# Patient Record
Sex: MALE | Race: WHITE | NOT HISPANIC OR LATINO | Employment: OTHER | URBAN - METROPOLITAN AREA
[De-identification: names, ages, dates, MRNs, and addresses within clinical notes are randomized per-mention and may not be internally consistent; named-entity substitution may affect disease eponyms.]

---

## 2017-11-10 ENCOUNTER — HOSPITAL ENCOUNTER (OUTPATIENT)
Dept: CARDIOLOGY | Facility: HOSPITAL | Age: 72
Discharge: HOME | End: 2017-11-10
Attending: INTERNAL MEDICINE | Admitting: INTERNAL MEDICINE

## 2018-03-09 ENCOUNTER — HOSPITAL ENCOUNTER (OUTPATIENT)
Dept: NUCLEAR MEDICINE | Facility: HOSPITAL | Age: 73
Discharge: HOME | End: 2018-03-09
Attending: INTERNAL MEDICINE | Admitting: INTERNAL MEDICINE

## 2018-03-16 ENCOUNTER — HOSPITAL ENCOUNTER (OUTPATIENT)
Dept: OTHER | Facility: HOSPITAL | Age: 73
Discharge: HOME | End: 2018-03-16
Attending: INTERNAL MEDICINE | Admitting: INTERNAL MEDICINE

## 2018-04-26 ENCOUNTER — ON CAMPUS - OUTPATIENT (AMBULATORY)
Dept: URBAN - METROPOLITAN AREA HOSPITAL 2 | Facility: HOSPITAL | Age: 73
End: 2018-04-26
Payer: COMMERCIAL

## 2018-04-26 ENCOUNTER — OFFICE (AMBULATORY)
Dept: URBAN - METROPOLITAN AREA PATHOLOGY 4 | Facility: PATHOLOGY | Age: 73
End: 2018-04-26
Payer: COMMERCIAL

## 2018-04-26 ENCOUNTER — HOSPITAL ENCOUNTER (OUTPATIENT)
Dept: OTHER | Facility: HOSPITAL | Age: 73
Setting detail: SPECIMEN
Discharge: HOME | End: 2018-04-26
Attending: INTERNAL MEDICINE | Admitting: INTERNAL MEDICINE

## 2018-04-26 VITALS
SYSTOLIC BLOOD PRESSURE: 117 MMHG | OXYGEN SATURATION: 98 % | SYSTOLIC BLOOD PRESSURE: 115 MMHG | HEART RATE: 60 BPM | DIASTOLIC BLOOD PRESSURE: 64 MMHG | DIASTOLIC BLOOD PRESSURE: 46 MMHG | TEMPERATURE: 97.7 F | RESPIRATION RATE: 17 BRPM | OXYGEN SATURATION: 93 % | HEART RATE: 81 BPM | SYSTOLIC BLOOD PRESSURE: 77 MMHG | DIASTOLIC BLOOD PRESSURE: 59 MMHG | OXYGEN SATURATION: 99 % | SYSTOLIC BLOOD PRESSURE: 129 MMHG | SYSTOLIC BLOOD PRESSURE: 131 MMHG | SYSTOLIC BLOOD PRESSURE: 100 MMHG | DIASTOLIC BLOOD PRESSURE: 58 MMHG | DIASTOLIC BLOOD PRESSURE: 60 MMHG | RESPIRATION RATE: 16 BRPM | SYSTOLIC BLOOD PRESSURE: 128 MMHG | WEIGHT: 223 LBS | SYSTOLIC BLOOD PRESSURE: 107 MMHG | DIASTOLIC BLOOD PRESSURE: 48 MMHG | SYSTOLIC BLOOD PRESSURE: 90 MMHG | OXYGEN SATURATION: 97 % | DIASTOLIC BLOOD PRESSURE: 65 MMHG | SYSTOLIC BLOOD PRESSURE: 124 MMHG | SYSTOLIC BLOOD PRESSURE: 98 MMHG | HEART RATE: 78 BPM | DIASTOLIC BLOOD PRESSURE: 41 MMHG | DIASTOLIC BLOOD PRESSURE: 61 MMHG | OXYGEN SATURATION: 95 % | OXYGEN SATURATION: 96 % | HEIGHT: 68 IN | HEART RATE: 66 BPM | DIASTOLIC BLOOD PRESSURE: 72 MMHG | HEART RATE: 61 BPM

## 2018-04-26 DIAGNOSIS — D12.3 BENIGN NEOPLASM OF TRANSVERSE COLON: ICD-10-CM

## 2018-04-26 DIAGNOSIS — D12.8 BENIGN NEOPLASM OF RECTUM: ICD-10-CM

## 2018-04-26 DIAGNOSIS — D12.2 BENIGN NEOPLASM OF ASCENDING COLON: ICD-10-CM

## 2018-04-26 DIAGNOSIS — Z12.11 ENCOUNTER FOR SCREENING FOR MALIGNANT NEOPLASM OF COLON: ICD-10-CM

## 2018-04-26 DIAGNOSIS — K64.8 OTHER HEMORRHOIDS: ICD-10-CM

## 2018-04-26 DIAGNOSIS — D12.5 BENIGN NEOPLASM OF SIGMOID COLON: ICD-10-CM

## 2018-04-26 DIAGNOSIS — K62.1 RECTAL POLYP: ICD-10-CM

## 2018-04-26 DIAGNOSIS — K57.30 DIVERTICULOSIS OF LARGE INTESTINE WITHOUT PERFORATION OR ABS: ICD-10-CM

## 2018-04-26 LAB
GI HISTOLOGY: A. UNSPECIFIED: (no result)
GI HISTOLOGY: B. UNSPECIFIED: (no result)
GI HISTOLOGY: C. UNSPECIFIED: (no result)
GI HISTOLOGY: D. UNSPECIFIED: (no result)
GI HISTOLOGY: PDF REPORT: (no result)

## 2018-04-26 PROCEDURE — 88305 TISSUE EXAM BY PATHOLOGIST: CPT | Mod: 26 | Performed by: INTERNAL MEDICINE

## 2018-04-26 PROCEDURE — 45385 COLONOSCOPY W/LESION REMOVAL: CPT | Mod: PT | Performed by: INTERNAL MEDICINE

## 2018-04-26 RX ADMIN — PROPOFOL: 10 INJECTION, EMULSION INTRAVENOUS at 07:40

## 2018-05-23 ENCOUNTER — HOSPITAL ENCOUNTER (OUTPATIENT)
Dept: OTHER | Facility: HOSPITAL | Age: 73
Discharge: HOME | End: 2018-05-23
Attending: UROLOGY | Admitting: UROLOGY

## 2018-05-23 LAB
ANION GAP SERPL CALC-SCNC: 9.9 MMOL/L (ref 10–20)
BASOPHILS # BLD AUTO: 0.1 10*3/UL (ref 0–0.2)
BASOPHILS NFR BLD AUTO: 1 % (ref 0–2)
BUN SERPL-MCNC: 11 MG/DL (ref 8–20)
BUN/CREAT SERPL: 12.2 (ref 6.2–20.3)
CALCIUM SERPL-MCNC: 9.2 MG/DL (ref 8.9–10.3)
CHLORIDE SERPL-SCNC: 106 MMOL/L (ref 101–111)
CONV CO2: 25 MMOL/L (ref 22–32)
CREAT UR-MCNC: 0.9 MG/DL (ref 0.7–1.2)
DIFFERENTIAL METHOD BLD: (no result)
EOSINOPHIL # BLD AUTO: 0.2 10*3/UL (ref 0–0.3)
EOSINOPHIL # BLD AUTO: 2 % (ref 0–3)
ERYTHROCYTE [DISTWIDTH] IN BLOOD BY AUTOMATED COUNT: 13.2 % (ref 11.5–14.5)
GLUCOSE SERPL-MCNC: 104 MG/DL (ref 65–99)
HCT VFR BLD AUTO: 46.5 % (ref 40–54)
HGB BLD-MCNC: 15.7 G/DL (ref 14–18)
LYMPHOCYTES # BLD AUTO: 3.1 10*3/UL (ref 0.8–4.8)
LYMPHOCYTES NFR BLD AUTO: 30 % (ref 18–42)
MCH RBC QN AUTO: 32 PG (ref 26–32)
MCHC RBC AUTO-ENTMCNC: 33.8 G/DL (ref 32–36)
MCV RBC AUTO: 94.8 FL (ref 80–94)
MONOCYTES # BLD AUTO: 0.9 10*3/UL (ref 0.1–1.3)
MONOCYTES NFR BLD AUTO: 9 % (ref 2–11)
NEUTROPHILS # BLD AUTO: 6.2 10*3/UL (ref 2.3–8.6)
NEUTROPHILS NFR BLD AUTO: 58 % (ref 50–75)
NRBC BLD AUTO-RTO: 0 /100{WBCS}
NRBC/RBC NFR BLD MANUAL: 0 10*3/UL
PLATELET # BLD AUTO: 162 10*3/UL (ref 150–450)
PMV BLD AUTO: 8.7 FL (ref 7.4–10.4)
POTASSIUM SERPL-SCNC: 3.9 MMOL/L (ref 3.6–5.1)
RBC # BLD AUTO: 4.9 10*6/UL (ref 4.6–6)
SODIUM SERPL-SCNC: 137 MMOL/L (ref 136–144)
WBC # BLD AUTO: 10.4 10*3/UL (ref 4.5–11.5)

## 2018-08-09 ENCOUNTER — HOSPITAL ENCOUNTER (OUTPATIENT)
Dept: OTHER | Facility: HOSPITAL | Age: 73
Setting detail: SPECIMEN
Discharge: HOME | End: 2018-08-09
Attending: UROLOGY | Admitting: UROLOGY

## 2018-08-27 ENCOUNTER — HOSPITAL ENCOUNTER (OUTPATIENT)
Dept: CT IMAGING | Facility: HOSPITAL | Age: 73
Discharge: HOME | End: 2018-08-27
Attending: UROLOGY | Admitting: UROLOGY

## 2018-08-27 LAB — CREAT BLDA-MCNC: 1 MG/DL (ref 0.6–1.3)

## 2019-01-07 ENCOUNTER — HOSPITAL ENCOUNTER (OUTPATIENT)
Dept: OTHER | Facility: HOSPITAL | Age: 74
Setting detail: SPECIMEN
Discharge: HOME | End: 2019-01-07
Attending: UROLOGY | Admitting: UROLOGY

## 2019-06-27 ENCOUNTER — CLINICAL SUPPORT NO REQUIREMENTS (OUTPATIENT)
Dept: CARDIOLOGY | Facility: CLINIC | Age: 74
End: 2019-06-27

## 2019-06-27 DIAGNOSIS — Z95.0 PRESENCE OF CARDIAC PACEMAKER: ICD-10-CM

## 2019-06-27 DIAGNOSIS — I49.5 SSS (SICK SINUS SYNDROME) (HCC): Primary | ICD-10-CM

## 2019-06-27 PROCEDURE — 93294 REM INTERROG EVL PM/LDLS PM: CPT | Performed by: NURSE PRACTITIONER

## 2019-06-27 PROCEDURE — 93296 REM INTERROG EVL PM/IDS: CPT | Performed by: NURSE PRACTITIONER

## 2019-07-05 PROBLEM — I49.5 SSS (SICK SINUS SYNDROME): Status: ACTIVE | Noted: 2019-07-05

## 2019-07-05 PROBLEM — Z95.0 PRESENCE OF CARDIAC PACEMAKER: Status: ACTIVE | Noted: 2019-07-05

## 2019-07-05 NOTE — PROGRESS NOTES
REMOTE DEVICE INTERROGATION    Remote device interrogation is reviewed.     Device Company: [employer]    Battery Stable.  Time to AMPARO 7.5 years    Presenting EGM: Sinus rhythm    Arrhythmia Logbook Reviewed: No events    Device function appears Stable    Continue remote device interrogations every 3 months.

## 2019-07-31 RX ORDER — AMLODIPINE BESYLATE 10 MG/1
TABLET ORAL
Qty: 90 TABLET | Refills: 2 | Status: SHIPPED | OUTPATIENT
Start: 2019-07-31 | End: 2020-09-17 | Stop reason: SDUPTHER

## 2019-09-30 ENCOUNTER — CLINICAL SUPPORT NO REQUIREMENTS (OUTPATIENT)
Dept: CARDIOLOGY | Facility: CLINIC | Age: 74
End: 2019-09-30

## 2019-09-30 DIAGNOSIS — I49.5 SSS (SICK SINUS SYNDROME) (HCC): Primary | ICD-10-CM

## 2019-09-30 DIAGNOSIS — Z95.0 PRESENCE OF CARDIAC PACEMAKER: ICD-10-CM

## 2019-09-30 PROCEDURE — 93296 REM INTERROG EVL PM/IDS: CPT | Performed by: NURSE PRACTITIONER

## 2019-09-30 PROCEDURE — 93294 REM INTERROG EVL PM/LDLS PM: CPT | Performed by: NURSE PRACTITIONER

## 2019-10-28 NOTE — PROGRESS NOTES
REMOTE DEVICE INTERROGATION    Remote device interrogation is reviewed.     Device Company: [employer]    Battery Stable.  Time to AMPARO 0.5 years    Presenting EGM: A sensed V sensed    Arrhythmia Logbook Reviewed: No sustained events    Device function appears Stable    Continue remote device interrogations every 3 months.

## 2019-11-19 PROBLEM — E78.5 HYPERLIPIDEMIA: Status: ACTIVE | Noted: 2019-11-19

## 2019-11-19 PROBLEM — R01.1 HEART MURMUR: Status: ACTIVE | Noted: 2017-04-28

## 2019-11-19 PROBLEM — N52.9 IMPOTENCE OF ORGANIC ORIGIN: Status: ACTIVE | Noted: 2019-11-19

## 2019-11-19 PROBLEM — E78.2 MIXED HYPERLIPIDEMIA: Status: ACTIVE | Noted: 2019-11-19

## 2019-11-19 PROBLEM — I10 HYPERTENSION: Status: ACTIVE | Noted: 2019-11-19

## 2019-11-19 PROBLEM — I25.10 CORONARY ARTERY DISEASE: Status: ACTIVE | Noted: 2019-11-19

## 2019-11-19 RX ORDER — TAMSULOSIN HYDROCHLORIDE 0.4 MG/1
1 CAPSULE ORAL EVERY 24 HOURS
COMMUNITY
Start: 2017-06-07 | End: 2019-11-25

## 2019-11-19 RX ORDER — SIMVASTATIN 40 MG
40 TABLET ORAL DAILY
COMMUNITY
End: 2020-06-05

## 2019-11-19 RX ORDER — LOSARTAN POTASSIUM 25 MG/1
25 TABLET ORAL DAILY
COMMUNITY
End: 2019-11-25

## 2019-11-19 RX ORDER — MULTIVIT WITH MINERALS/LUTEIN
1 TABLET ORAL DAILY
COMMUNITY
Start: 2016-11-22 | End: 2019-11-25

## 2019-11-19 RX ORDER — ASPIRIN 81 MG/1
81 TABLET ORAL DAILY
COMMUNITY

## 2019-11-19 RX ORDER — GABAPENTIN 100 MG/1
100 CAPSULE ORAL 2 TIMES DAILY
COMMUNITY

## 2019-11-20 PROBLEM — I49.5 SSS (SICK SINUS SYNDROME) (HCC): Status: RESOLVED | Noted: 2019-07-05 | Resolved: 2019-11-20

## 2019-11-25 ENCOUNTER — OFFICE VISIT (OUTPATIENT)
Dept: CARDIOLOGY | Facility: CLINIC | Age: 74
End: 2019-11-25

## 2019-11-25 VITALS
WEIGHT: 224 LBS | SYSTOLIC BLOOD PRESSURE: 118 MMHG | BODY MASS INDEX: 38.24 KG/M2 | OXYGEN SATURATION: 98 % | HEART RATE: 78 BPM | DIASTOLIC BLOOD PRESSURE: 62 MMHG | HEIGHT: 64 IN

## 2019-11-25 DIAGNOSIS — I10 ESSENTIAL HYPERTENSION: ICD-10-CM

## 2019-11-25 DIAGNOSIS — I45.10 BUNDLE BRANCH BLOCK, RIGHT: ICD-10-CM

## 2019-11-25 DIAGNOSIS — T87.89: ICD-10-CM

## 2019-11-25 DIAGNOSIS — S45.292A: ICD-10-CM

## 2019-11-25 DIAGNOSIS — I35.0 AORTIC VALVE STENOSIS, ETIOLOGY OF CARDIAC VALVE DISEASE UNSPECIFIED: Primary | ICD-10-CM

## 2019-11-25 DIAGNOSIS — Z95.0 PRESENCE OF CARDIAC PACEMAKER: ICD-10-CM

## 2019-11-25 DIAGNOSIS — I25.10 CORONARY ARTERY DISEASE INVOLVING NATIVE CORONARY ARTERY OF NATIVE HEART WITHOUT ANGINA PECTORIS: ICD-10-CM

## 2019-11-25 PROCEDURE — 93000 ELECTROCARDIOGRAM COMPLETE: CPT | Performed by: INTERNAL MEDICINE

## 2019-11-25 PROCEDURE — 99213 OFFICE O/P EST LOW 20 MIN: CPT | Performed by: INTERNAL MEDICINE

## 2019-11-25 NOTE — PROGRESS NOTES
Cardiology Office Visit Note      Referring physician:  MD Sebastian    Reason For Followup: 6 month    HPI:  Navneet Lind is a 73 y.o. male presents FU  multivessel CABG in 1995: Had subsequent occlusion of saphenous vein graft to RCA but is still thought to have   well-preserved LV systolic function; right bundle branch block on ECG, HTN, hyperlipidemia, NATALIA with cpap and BS dual chamber PM   Implanted on  11/7/16.      Continues to report some ZULETA; unchanged.  Goes to the [facility] to swim 4 days weekly.     He denies chest pain, orthopnea, PND, palpitations,   lower extremity edema,  or  feelings of his  heart racing.          Past Medical History:   Diagnosis Date   • Aortic stenosis 9/29/2015    Per Echo 2017   • Benign essential HTN    • Bundle branch block, right 2/7/2013   • CAD (coronary artery disease), native coronary artery    • Coronary artery disease involving native coronary artery of native heart without angina pectoris 11/19/2019    CABG 1995; SVG to RCA is occluded, LIMA to LAD, SVG to diag of LAD, SVG to marginal of LCX   • Essential hypertension 11/19/2019   • Heart murmur    • Hyperlipidemia, mixed    • Mixed hyperlipidemia 11/19/2019   • Near syncope    • NATALIA (obstructive sleep apnea)    • Premature ventricular contractions 2/11/2014   • Presence of cardiac pacemaker 7/5/2019    BS dual chamber PM 11/7/2016   • Sick sinus syndrome (CMS/HCC)    • SSS (sick sinus syndrome) (CMS/HCC) 7/5/2019       Past Surgical History:   Procedure Laterality Date   • CARDIAC CATHETERIZATION  2018   • CARDIOVASCULAR STRESS TEST  2017   • CORONARY ARTERY BYPASS GRAFT  1995   • ECHO - CONVERTED  2017   • PACEMAKER IMPLANTATION  2016    bs         Current Outpatient Medications:   •  amLODIPine (NORVASC) 10 MG tablet, TAKE 1 TABLET EVERY DAY, Disp: 90 tablet, Rfl: 2  •  aspirin 81 MG EC tablet, Take 81 mg by mouth Daily., Disp: , Rfl:   •  gabapentin (NEURONTIN) 800 MG tablet, Take 800 mg by mouth Daily., Disp: ,  "Rfl:   •  simvastatin (ZOCOR) 40 MG tablet, Take 40 mg by mouth Daily., Disp: , Rfl:     Social History     Socioeconomic History   • Marital status:      Spouse name: Not on file   • Number of children: Not on file   • Years of education: Not on file   • Highest education level: Not on file   Tobacco Use   • Smoking status: Former Smoker       No family history on file.      Review of Systems   General: denies fever, chills, anorexia, weight loss  Eyes: denies blurring, diplopia  Ear/Nose/Throat: denies ear pain, nosebleeds, hoarseness  Cardiovascular: See HPI  Respiratory: denies excessive sputum, hemoptysis, wheezing  Gastrointestinal: denies nausea, vomiting, change in bowel habits, abdominal pain  Genitourinary: denies dysuria and hematuria  Musculoskeletal: denies back pain, joint pain, joint swelling, muscle cramps, weakness  Skin: denies rashes, itching, suspicious lesions  Neurologic: denies focal neuro deficits  Psychiatric: denies depression, anxiety  Endocrine: denies cold intolerance, heat intolerance  Hematologic/Lymphatic: denies abnormal bruising, bleeding  Allergic/Immunologic: denies urticaria or persistent infections      Objective     Visit Vitals  /62   Pulse 78   Ht 162.6 cm (64\")   Wt 102 kg (224 lb)   SpO2 98% Comment: room air   BMI 38.45 kg/m²           Physical Exam  General:     Obese, well developed,, in no acute distress.    Head:     normocephalic and atraumatic.    Eyes:    PERRL/EOM intact, conjunctiva and sclera clear with out nystagmus.    Neck:    no jvd or bruits  Chest Wall:    no deformities   Lungs:    clear bilaterally to auscultation with adequate global airflow   Heart:    non-displaced PMI; regular rate and rhythm, normal S1, S2 without murmurs, rubs, or gallops  Abdomen:  Soft, nontender without HSM  Msk:    no deformity; adequate R OM  Pulses:    pulses normal in all 4 extremities.    Extremities:    no clubbing, cyanosis, edema   Neurologic:    no focal " sensory or motor deficits  Skin:    intact without lesions or rashes.    Psych:    alert and cooperative; normal mood and affect; normal attention span and concentration.            ECG 12 Lead  Date/Time: 11/25/2019 11:12 AM  Performed by: ADRIANO Erazo MD  Authorized by: ADRIANO Erazo MD   Comparison: compared with previous ECG from 4/16/2019  Similar to previous ECG  Comments: Sinus rhythm; heart rate 78.  Marked right axis and right bundle branch block.  No change from previous tracing              Assessment:   1. Aortic valve stenosis, etiology of cardiac valve disease unspecified  -Remains well compensated, asymptomatic    2. Bundle branch block, right  Unchanged, asymptomatic    3. Coronary artery disease involving native coronary artery of native heart without angina pectoris  Remote multivessel CABG 1995; now well compensated and angina free    4. Essential hypertension  Well-regulated on current medications.    5. Presence of cardiac pacemaker  Satisfactory device site and functioning      Plan:  Continue current medication as listed reviewed in detail.  Encourage to continue regular progressive exercise and weight reduction.  Return to clinic 1 year    ADRIANO Erazo MD  11/30/2019 11:00 PM

## 2019-11-30 PROBLEM — T87.89: Status: RESOLVED | Noted: 2019-11-25 | Resolved: 2019-11-30

## 2020-01-02 ENCOUNTER — CLINICAL SUPPORT NO REQUIREMENTS (OUTPATIENT)
Dept: CARDIOLOGY | Facility: CLINIC | Age: 75
End: 2020-01-02

## 2020-01-02 DIAGNOSIS — Z95.0 PRESENCE OF CARDIAC PACEMAKER: ICD-10-CM

## 2020-01-02 DIAGNOSIS — I49.5 SICK SINUS SYNDROME (HCC): Primary | ICD-10-CM

## 2020-01-10 PROCEDURE — 93294 REM INTERROG EVL PM/LDLS PM: CPT | Performed by: NURSE PRACTITIONER

## 2020-01-10 PROCEDURE — 93296 REM INTERROG EVL PM/IDS: CPT | Performed by: NURSE PRACTITIONER

## 2020-01-10 NOTE — PROGRESS NOTES
REMOTE DEVICE INTERROGATION    Remote device interrogation is reviewed.     Device Company: [employer]    Battery Stable.  Time to AMPARO 7 years    Presenting EGM: A sensed V sense    Arrhythmia Logbook Reviewed: No sustained events    Device function appears Stable    Continue remote device interrogations every 3 months.

## 2020-02-27 ENCOUNTER — LAB REQUISITION (OUTPATIENT)
Dept: LAB | Facility: HOSPITAL | Age: 75
End: 2020-02-27

## 2020-02-27 DIAGNOSIS — K80.20 CALCULUS OF GALLBLADDER WITHOUT CHOLECYSTITIS WITHOUT OBSTRUCTION: ICD-10-CM

## 2020-02-27 PROCEDURE — 88304 TISSUE EXAM BY PATHOLOGIST: CPT | Performed by: SURGERY

## 2020-02-28 LAB
LAB AP CASE REPORT: NORMAL
PATH REPORT.FINAL DX SPEC: NORMAL
PATH REPORT.GROSS SPEC: NORMAL

## 2020-04-06 ENCOUNTER — LAB (OUTPATIENT)
Dept: LAB | Facility: HOSPITAL | Age: 75
End: 2020-04-06

## 2020-04-06 ENCOUNTER — TRANSCRIBE ORDERS (OUTPATIENT)
Dept: LAB | Facility: HOSPITAL | Age: 75
End: 2020-04-06

## 2020-04-06 ENCOUNTER — HOSPITAL ENCOUNTER (OUTPATIENT)
Dept: CARDIOLOGY | Facility: HOSPITAL | Age: 75
Discharge: HOME | End: 2020-04-06
Admitting: UROLOGY

## 2020-04-06 DIAGNOSIS — Z01.818 PRE-OP EXAMINATION: ICD-10-CM

## 2020-04-06 DIAGNOSIS — N13.8 ENLARGED PROSTATE WITH URINARY OBSTRUCTION: ICD-10-CM

## 2020-04-06 DIAGNOSIS — C67.9 MALIGNANT NEOPLASM OF URINARY BLADDER, UNSPECIFIED SITE (HCC): ICD-10-CM

## 2020-04-06 DIAGNOSIS — N40.1 ENLARGED PROSTATE WITH URINARY OBSTRUCTION: ICD-10-CM

## 2020-04-06 DIAGNOSIS — Z01.818 PRE-OP EXAMINATION: Primary | ICD-10-CM

## 2020-04-06 LAB
ANION GAP SERPL CALCULATED.3IONS-SCNC: 10.2 MMOL/L (ref 5–15)
BASOPHILS # BLD AUTO: 0.09 10*3/MM3 (ref 0–0.2)
BASOPHILS NFR BLD AUTO: 0.7 % (ref 0–1.5)
BUN BLD-MCNC: 10 MG/DL (ref 8–23)
BUN/CREAT SERPL: 10.5 (ref 7–25)
CALCIUM SPEC-SCNC: 8.9 MG/DL (ref 8.6–10.5)
CHLORIDE SERPL-SCNC: 101 MMOL/L (ref 98–107)
CO2 SERPL-SCNC: 25.8 MMOL/L (ref 22–29)
CREAT BLD-MCNC: 0.95 MG/DL (ref 0.76–1.27)
DEPRECATED RDW RBC AUTO: 43.5 FL (ref 37–54)
EOSINOPHIL # BLD AUTO: 0.36 10*3/MM3 (ref 0–0.4)
EOSINOPHIL NFR BLD AUTO: 2.8 % (ref 0.3–6.2)
ERYTHROCYTE [DISTWIDTH] IN BLOOD BY AUTOMATED COUNT: 12.7 % (ref 12.3–15.4)
GFR SERPL CREATININE-BSD FRML MDRD: 78 ML/MIN/1.73
GLUCOSE BLD-MCNC: 146 MG/DL (ref 65–99)
HCT VFR BLD AUTO: 43.5 % (ref 37.5–51)
HGB BLD-MCNC: 14.9 G/DL (ref 13–17.7)
IMM GRANULOCYTES # BLD AUTO: 0.04 10*3/MM3 (ref 0–0.05)
IMM GRANULOCYTES NFR BLD AUTO: 0.3 % (ref 0–0.5)
LYMPHOCYTES # BLD AUTO: 3.61 10*3/MM3 (ref 0.7–3.1)
LYMPHOCYTES NFR BLD AUTO: 28.5 % (ref 19.6–45.3)
MCH RBC QN AUTO: 32 PG (ref 26.6–33)
MCHC RBC AUTO-ENTMCNC: 34.3 G/DL (ref 31.5–35.7)
MCV RBC AUTO: 93.3 FL (ref 79–97)
MONOCYTES # BLD AUTO: 0.76 10*3/MM3 (ref 0.1–0.9)
MONOCYTES NFR BLD AUTO: 6 % (ref 5–12)
NEUTROPHILS # BLD AUTO: 7.82 10*3/MM3 (ref 1.7–7)
NEUTROPHILS NFR BLD AUTO: 61.7 % (ref 42.7–76)
NRBC BLD AUTO-RTO: 0 /100 WBC (ref 0–0.2)
PLATELET # BLD AUTO: 190 10*3/MM3 (ref 140–450)
PMV BLD AUTO: 10.9 FL (ref 6–12)
POTASSIUM BLD-SCNC: 4.2 MMOL/L (ref 3.5–5.2)
RBC # BLD AUTO: 4.66 10*6/MM3 (ref 4.14–5.8)
SODIUM BLD-SCNC: 137 MMOL/L (ref 136–145)
WBC NRBC COR # BLD: 12.68 10*3/MM3 (ref 3.4–10.8)

## 2020-04-06 PROCEDURE — 36415 COLL VENOUS BLD VENIPUNCTURE: CPT

## 2020-04-06 PROCEDURE — 85025 COMPLETE CBC W/AUTO DIFF WBC: CPT

## 2020-04-06 PROCEDURE — 80048 BASIC METABOLIC PNL TOTAL CA: CPT

## 2020-04-06 PROCEDURE — 93005 ELECTROCARDIOGRAM TRACING: CPT | Performed by: UROLOGY

## 2020-04-13 ENCOUNTER — LAB REQUISITION (OUTPATIENT)
Dept: LAB | Facility: HOSPITAL | Age: 75
End: 2020-04-13

## 2020-04-13 DIAGNOSIS — R33.8 OTHER RETENTION OF URINE: ICD-10-CM

## 2020-04-13 DIAGNOSIS — N40.1 BENIGN PROSTATIC HYPERPLASIA WITH LOWER URINARY TRACT SYMPTOMS: ICD-10-CM

## 2020-04-13 DIAGNOSIS — C67.0 MALIGNANT NEOPLASM OF TRIGONE OF BLADDER (HCC): ICD-10-CM

## 2020-04-13 PROCEDURE — 88305 TISSUE EXAM BY PATHOLOGIST: CPT | Performed by: UROLOGY

## 2020-04-13 PROCEDURE — 88307 TISSUE EXAM BY PATHOLOGIST: CPT | Performed by: UROLOGY

## 2020-04-14 ENCOUNTER — CLINICAL SUPPORT NO REQUIREMENTS (OUTPATIENT)
Dept: CARDIOLOGY | Facility: CLINIC | Age: 75
End: 2020-04-14

## 2020-04-14 ENCOUNTER — LAB REQUISITION (OUTPATIENT)
Dept: LAB | Facility: HOSPITAL | Age: 75
End: 2020-04-14

## 2020-04-14 DIAGNOSIS — I25.10 ATHEROSCLEROTIC HEART DISEASE OF NATIVE CORONARY ARTERY WITHOUT ANGINA PECTORIS: ICD-10-CM

## 2020-04-14 DIAGNOSIS — Z95.0 PRESENCE OF CARDIAC PACEMAKER: ICD-10-CM

## 2020-04-14 DIAGNOSIS — C67.0 MALIGNANT NEOPLASM OF TRIGONE OF BLADDER (HCC): ICD-10-CM

## 2020-04-14 DIAGNOSIS — N40.1 BENIGN PROSTATIC HYPERPLASIA WITH LOWER URINARY TRACT SYMPTOMS: ICD-10-CM

## 2020-04-14 DIAGNOSIS — E78.5 HYPERLIPIDEMIA, UNSPECIFIED: ICD-10-CM

## 2020-04-14 DIAGNOSIS — R33.8 OTHER RETENTION OF URINE: ICD-10-CM

## 2020-04-14 DIAGNOSIS — C67.9 MALIGNANT NEOPLASM OF BLADDER, UNSPECIFIED (HCC): ICD-10-CM

## 2020-04-14 DIAGNOSIS — I49.5 SICK SINUS SYNDROME (HCC): Primary | ICD-10-CM

## 2020-04-14 LAB
ANION GAP SERPL CALCULATED.3IONS-SCNC: 12 MMOL/L (ref 5–15)
BASOPHILS # BLD AUTO: 0.1 10*3/MM3 (ref 0–0.2)
BASOPHILS NFR BLD AUTO: 0.5 % (ref 0–1.5)
BUN BLD-MCNC: 16 MG/DL (ref 8–23)
BUN/CREAT SERPL: 8 (ref 7–25)
CALCIUM SPEC-SCNC: 8.4 MG/DL (ref 8.6–10.5)
CHLORIDE SERPL-SCNC: 102 MMOL/L (ref 98–107)
CO2 SERPL-SCNC: 19 MMOL/L (ref 22–29)
CREAT BLD-MCNC: 1.99 MG/DL (ref 0.76–1.27)
DEPRECATED RDW RBC AUTO: 46.4 FL (ref 37–54)
EOSINOPHIL # BLD AUTO: 0.1 10*3/MM3 (ref 0–0.4)
EOSINOPHIL NFR BLD AUTO: 0.6 % (ref 0.3–6.2)
ERYTHROCYTE [DISTWIDTH] IN BLOOD BY AUTOMATED COUNT: 13.8 % (ref 12.3–15.4)
GFR SERPL CREATININE-BSD FRML MDRD: 33 ML/MIN/1.73
GLUCOSE BLD-MCNC: 190 MG/DL (ref 65–99)
HCT VFR BLD AUTO: 41 % (ref 37.5–51)
HGB BLD-MCNC: 13.9 G/DL (ref 13–17.7)
LAB AP CASE REPORT: NORMAL
LYMPHOCYTES # BLD AUTO: 0.7 10*3/MM3 (ref 0.7–3.1)
LYMPHOCYTES NFR BLD AUTO: 2.6 % (ref 19.6–45.3)
MCH RBC QN AUTO: 31.8 PG (ref 26.6–33)
MCHC RBC AUTO-ENTMCNC: 33.9 G/DL (ref 31.5–35.7)
MCV RBC AUTO: 93.9 FL (ref 79–97)
MONOCYTES # BLD AUTO: 1.5 10*3/MM3 (ref 0.1–0.9)
MONOCYTES NFR BLD AUTO: 5.9 % (ref 5–12)
NEUTROPHILS # BLD AUTO: 23 10*3/MM3 (ref 1.7–7)
NEUTROPHILS NFR BLD AUTO: 90.4 % (ref 42.7–76)
NRBC BLD AUTO-RTO: 0 /100 WBC (ref 0–0.2)
PATH REPORT.FINAL DX SPEC: NORMAL
PATH REPORT.GROSS SPEC: NORMAL
PLATELET # BLD AUTO: 222 10*3/MM3 (ref 140–450)
PMV BLD AUTO: 8.8 FL (ref 6–12)
POTASSIUM BLD-SCNC: 5.1 MMOL/L (ref 3.5–5.2)
RBC # BLD AUTO: 4.37 10*6/MM3 (ref 4.14–5.8)
SODIUM BLD-SCNC: 133 MMOL/L (ref 136–145)
WBC NRBC COR # BLD: 25.5 10*3/MM3 (ref 3.4–10.8)

## 2020-04-14 PROCEDURE — 93294 REM INTERROG EVL PM/LDLS PM: CPT | Performed by: NURSE PRACTITIONER

## 2020-04-14 PROCEDURE — 80048 BASIC METABOLIC PNL TOTAL CA: CPT | Performed by: UROLOGY

## 2020-04-14 PROCEDURE — 85025 COMPLETE CBC W/AUTO DIFF WBC: CPT | Performed by: UROLOGY

## 2020-04-14 PROCEDURE — 93296 REM INTERROG EVL PM/IDS: CPT | Performed by: NURSE PRACTITIONER

## 2020-04-14 NOTE — PROGRESS NOTES
REMOTE DEVICE INTERROGATION    Received and reviewed on:   4/14/2020    Remote device interrogation is reviewed.     Device Company: [employer]    Battery Stable.  Time to AMPARO 7 years    Presenting EGM: A sensed V sense    Arrhythmia Logbook Reviewed: Nonsustained ventricular tachycardia short episode noted 12/26/2019.    Device function appears Stable    Continue remote device interrogations every 3 months.

## 2020-05-06 PROCEDURE — 93010 ELECTROCARDIOGRAM REPORT: CPT | Performed by: INTERNAL MEDICINE

## 2020-06-05 RX ORDER — SIMVASTATIN 40 MG
TABLET ORAL
Qty: 90 TABLET | Refills: 0 | Status: SHIPPED | OUTPATIENT
Start: 2020-06-05 | End: 2020-08-27

## 2020-06-09 ENCOUNTER — OFFICE VISIT (OUTPATIENT)
Dept: SLEEP MEDICINE | Facility: CLINIC | Age: 75
End: 2020-06-09

## 2020-06-09 VITALS
DIASTOLIC BLOOD PRESSURE: 69 MMHG | OXYGEN SATURATION: 96 % | HEIGHT: 68 IN | WEIGHT: 210 LBS | SYSTOLIC BLOOD PRESSURE: 118 MMHG | HEART RATE: 62 BPM | BODY MASS INDEX: 31.83 KG/M2

## 2020-06-09 DIAGNOSIS — G47.33 OSA ON CPAP: Primary | ICD-10-CM

## 2020-06-09 DIAGNOSIS — I10 ESSENTIAL HYPERTENSION: ICD-10-CM

## 2020-06-09 DIAGNOSIS — Z99.89 OSA ON CPAP: Primary | ICD-10-CM

## 2020-06-09 DIAGNOSIS — E66.09 CLASS 1 OBESITY DUE TO EXCESS CALORIES WITHOUT SERIOUS COMORBIDITY WITH BODY MASS INDEX (BMI) OF 31.0 TO 31.9 IN ADULT: ICD-10-CM

## 2020-06-09 PROCEDURE — 99204 OFFICE O/P NEW MOD 45 MIN: CPT | Performed by: INTERNAL MEDICINE

## 2020-06-09 PROCEDURE — G0463 HOSPITAL OUTPT CLINIC VISIT: HCPCS

## 2020-06-09 RX ORDER — GABAPENTIN 800 MG/1
TABLET ORAL
COMMUNITY
Start: 2020-04-27 | End: 2020-06-09 | Stop reason: SDUPTHER

## 2020-06-09 NOTE — PROGRESS NOTES
[facility]  [address] [place] [address]  Phone   Fax       Navneet Lind  1945  74 y.o.  male      PCP:Uri Cortes MD    Type of service: Initial New Patient Office Visit  Date of service: 6/9/2020    Chief Complaint   Patient presents with   • Sleep Apnea   • Follow-up       History of present illness;  Navneet Lind is a new patient for me and was seen today for sleep related problems.  Patient has a history of sleep apnea was tested at [facility] in 2013.  I have reviewed his polysomnography and the titration study.  He has sleep apnea with AHI of 11.6 and he was given a CPAP of 12 cm.  Since then he is using the CPAP on a regular basis but has not seen him physician.    Today patient reports that he is using the machine on a regular basis and feels that it has helped him.  He does not use the humidifier, but does not have any problems    Patient gives the following sleep history.  Sleep schedule:  Bedtime: 9 PM  Wake time: 6 AM  Normally takes about few minutes to fall asleep  Average hours of sleep 7-8  Number of naps per day no  When patient wakes up still feels tired and not rested  Snoring resolved    I have reviewed the smartcard download today  Compliance 98% in the last 1 year  Average use about 6 hours and 17 minutes per night  More than 4 hours usage per night is 89%  Residual AHI 0.6/h, less than 5 is normal  CPAP 12 cm  Masks type full facemask  [facility]      Past Medical History:   Diagnosis Date   • Aortic stenosis 9/29/2015    Per Echo 2017   • Benign essential HTN    • Bundle branch block, right 2/7/2013   • CAD (coronary artery disease), native coronary artery    • Coronary artery disease involving native coronary artery of native heart without angina pectoris 11/19/2019    CABG 1995; SVG to RCA is occluded, LIMA to LAD, SVG to diag of LAD, SVG to marginal of LCX   • Essential hypertension  "11/19/2019   • Heart murmur    • Hyperlipidemia, mixed    • Mixed hyperlipidemia 11/19/2019   • Near syncope    • NATALIA (obstructive sleep apnea)     AHI 11.6/h   • Premature ventricular contractions 2/11/2014   • Presence of cardiac pacemaker 7/5/2019    BS dual chamber PM 11/7/2016   • Sick sinus syndrome (CMS/HCC)    • SSS (sick sinus syndrome) (CMS/HCC) 7/5/2019       Social history:  Shift work: no  Tobacco use: Ex-smoker  Alcohol use: No  Caffeinated drinks: 1-2  Over-the-counter sleeping aid and medications: No  Narcotic medications: No    Family Hx  Family history of sleep apnea not applicable  History reviewed. No pertinent family history.    Medications: reviewed    Current Outpatient Medications:   •  amLODIPine (NORVASC) 10 MG tablet, TAKE 1 TABLET EVERY DAY, Disp: 90 tablet, Rfl: 2  •  aspirin 81 MG EC tablet, Take 81 mg by mouth Daily., Disp: , Rfl:   •  gabapentin (NEURONTIN) 800 MG tablet, Take 800 mg by mouth Daily., Disp: , Rfl:   •  Mirabegron ER (Myrbetriq) 25 MG tablet sustained-release 24 hour 24 hr tablet, Take 25 mg by mouth Daily., Disp: , Rfl:   •  simvastatin (ZOCOR) 40 MG tablet, TAKE 1 TABLET BY MOUTH EVERY DAY, Disp: 90 tablet, Rfl: 0  •  gabapentin (NEURONTIN) 800 MG tablet, TAKE 1 TABLET BY MOUTH TWICE A DAY, Disp: , Rfl:     Review of systems:  [employer] Sleepiness Scale: Total score: 6   Negative for shortness of breath, cough, wheezing, chest pain, nausea and vomiting, palpitation, swelling of feet:    Morning headaches: No  Awaken with sore throat or dry mouth : No  Leg jerking at night: No  Crawly feeling/urge sensation to move in the legs: No  Teeth grinding: No  Sleepwalking, nightmares, muscle weakness with laughing or anger,sleep paralysis: No  Nasal Congestion: No  Nocturia (how many times/night): 1  Memory Problem: No  Weight change, no    Physical exam:  Vitals:    06/09/20 0900   BP: 118/69   Pulse: 62   SpO2: 96%   Weight: 95.3 kg (210 lb)   Height: 172.7 cm (68\")    Body " mass index is 31.93 kg/m². Neck Circumference: 18 inches  HEENT: Head is atraumatic, normocephalic  Eyes: pupils are round equal and reacting to light and accommodation, conjunctiva normal  Nose: no nasal septal defects or deviation and the nasal passages are clear, no nasal polyps,  Throat: tonsils are not enlarged, tongue normal size, oral airway Mallampati class 3  NECK: No lymphadenopathy, trachea is in the midline, thyroid not enlarged  RESPIRATORY SYSTEM: Breath sounds are equal on both sides, there are no wheezes or crackles  CARDIOVASULAR SYSTEM: Heart sounds are regular rhythm and rl rate, there are no murmurs or thrills  ABDOMEN: Soft, no hepatosplenomegaly, no evidence of ascites  EXTREMITES: No cyanosis, clubbing or edema   NEUROLOGICAL SYSTEM: Oriented x 3, no gross motor defects, gait normal    Medical records and labs reviewed in [facility] reviewed    Assessment and plan:  Obstructive sleep apnea, patient is using the device with good compliance for treatment of sleep apnea.  I have reviewed the smart card down load and discussed with the patient the download data and encouarged the patient to continue to use the device. The residual AHI is acceptable.  The device is benefiting the patient and it is medically necessary.  The patient is also instructed to get the supplies from the [employer] company and see me in 1 year for follow-up.  The prescription for supplies has been sent to the [employer] company.   · Obesity, patient's BMI is Body mass index is 31.93 kg/m².. I have discussed the relationship between weight and sleep apnea. Weight reduction is encouraged.  I will also discussed with the patient diet and exercise.  · I have sent a prescription to [employer] for supplies  · Return to clinic in 1 year for follow-up        Cristina Wolfe MD, [facility]  Sleep Medicine.(Board-certified)  [facility]  Medical Director for [facility]  6/9/2020

## 2020-07-20 ENCOUNTER — CLINICAL SUPPORT NO REQUIREMENTS (OUTPATIENT)
Dept: CARDIOLOGY | Facility: CLINIC | Age: 75
End: 2020-07-20

## 2020-07-20 DIAGNOSIS — Z95.0 PRESENCE OF CARDIAC PACEMAKER: ICD-10-CM

## 2020-07-20 DIAGNOSIS — I49.5 SICK SINUS SYNDROME (HCC): Primary | ICD-10-CM

## 2020-07-20 PROCEDURE — 93294 REM INTERROG EVL PM/LDLS PM: CPT | Performed by: NURSE PRACTITIONER

## 2020-07-20 PROCEDURE — 93296 REM INTERROG EVL PM/IDS: CPT | Performed by: NURSE PRACTITIONER

## 2020-07-21 NOTE — PROGRESS NOTES
REMOTE DEVICE INTERROGATION    Received and reviewed on:   7/20/2020    Remote device interrogation is reviewed.     Device Company: [employer]    Battery Stable.  Time to AMPARO 6.5 years    Presenting EGM: A sensed V sensed    Arrhythmia Logbook Reviewed:  no sustained events last 3 months    Device function appears Stable    Continue remote device interrogations every 3 months.

## 2020-08-27 RX ORDER — SIMVASTATIN 40 MG
TABLET ORAL
Qty: 90 TABLET | Refills: 0 | Status: SHIPPED | OUTPATIENT
Start: 2020-08-27 | End: 2020-11-23

## 2020-09-17 RX ORDER — AMLODIPINE BESYLATE 10 MG/1
10 TABLET ORAL DAILY
Qty: 90 TABLET | Refills: 1 | Status: SHIPPED | OUTPATIENT
Start: 2020-09-17 | End: 2020-11-23

## 2020-10-06 ENCOUNTER — TELEPHONE (OUTPATIENT)
Dept: CARDIOLOGY | Facility: CLINIC | Age: 75
End: 2020-10-06

## 2020-10-06 NOTE — TELEPHONE ENCOUNTER
Dr li stopped his aspirin he had blood in his urine and he's going to do a procedure he will resume aspirin after surgery

## 2020-10-21 PROCEDURE — 88305 TISSUE EXAM BY PATHOLOGIST: CPT | Performed by: UROLOGY

## 2020-10-22 ENCOUNTER — LAB REQUISITION (OUTPATIENT)
Dept: LAB | Facility: HOSPITAL | Age: 75
End: 2020-10-22

## 2020-10-22 DIAGNOSIS — N20.0 CALCULUS OF KIDNEY: ICD-10-CM

## 2020-10-22 DIAGNOSIS — R31.0 GROSS HEMATURIA: ICD-10-CM

## 2020-10-23 LAB
LAB AP CASE REPORT: NORMAL
LAB AP DIAGNOSIS COMMENT: NORMAL
PATH REPORT.FINAL DX SPEC: NORMAL
PATH REPORT.GROSS SPEC: NORMAL

## 2020-10-28 ENCOUNTER — OFFICE VISIT (OUTPATIENT)
Dept: CARDIOLOGY | Facility: CLINIC | Age: 75
End: 2020-10-28

## 2020-10-28 VITALS
WEIGHT: 203 LBS | OXYGEN SATURATION: 99 % | DIASTOLIC BLOOD PRESSURE: 74 MMHG | BODY MASS INDEX: 30.77 KG/M2 | HEART RATE: 85 BPM | SYSTOLIC BLOOD PRESSURE: 122 MMHG | HEIGHT: 68 IN

## 2020-10-28 DIAGNOSIS — N28.89 LEFT RENAL MASS: ICD-10-CM

## 2020-10-28 DIAGNOSIS — I10 ESSENTIAL HYPERTENSION: ICD-10-CM

## 2020-10-28 DIAGNOSIS — I25.10 CORONARY ARTERY DISEASE INVOLVING NATIVE CORONARY ARTERY OF NATIVE HEART WITHOUT ANGINA PECTORIS: ICD-10-CM

## 2020-10-28 DIAGNOSIS — I35.1 AORTIC VALVE INSUFFICIENCY, ETIOLOGY OF CARDIAC VALVE DISEASE UNSPECIFIED: ICD-10-CM

## 2020-10-28 DIAGNOSIS — I49.5 SICK SINUS SYNDROME (HCC): ICD-10-CM

## 2020-10-28 DIAGNOSIS — Z01.810 PREOP CARDIOVASCULAR EXAM: Primary | ICD-10-CM

## 2020-10-28 PROCEDURE — 99214 OFFICE O/P EST MOD 30 MIN: CPT | Performed by: NURSE PRACTITIONER

## 2020-10-28 PROCEDURE — 93000 ELECTROCARDIOGRAM COMPLETE: CPT | Performed by: NURSE PRACTITIONER

## 2020-10-28 RX ORDER — HYDROCODONE BITARTRATE AND ACETAMINOPHEN 7.5; 325 MG/1; MG/1
1 TABLET ORAL EVERY 6 HOURS PRN
COMMUNITY
Start: 2020-10-22 | End: 2020-11-23

## 2020-10-29 PROBLEM — Z01.810 PREOP CARDIOVASCULAR EXAM: Status: ACTIVE | Noted: 2020-10-29

## 2020-10-29 PROBLEM — N28.89 LEFT RENAL MASS: Status: ACTIVE | Noted: 2020-10-29

## 2020-10-29 PROBLEM — I35.1 AORTIC VALVE REGURGITATION: Status: ACTIVE | Noted: 2020-10-29

## 2020-10-30 ENCOUNTER — LAB (OUTPATIENT)
Dept: LAB | Facility: HOSPITAL | Age: 75
End: 2020-10-30

## 2020-10-30 ENCOUNTER — HOSPITAL ENCOUNTER (OUTPATIENT)
Dept: CARDIOLOGY | Facility: HOSPITAL | Age: 75
Discharge: HOME | End: 2020-10-30

## 2020-10-30 DIAGNOSIS — Z01.810 PREOP CARDIOVASCULAR EXAM: ICD-10-CM

## 2020-10-30 DIAGNOSIS — I35.1 AORTIC VALVE INSUFFICIENCY, ETIOLOGY OF CARDIAC VALVE DISEASE UNSPECIFIED: ICD-10-CM

## 2020-10-30 LAB
ABO GROUP BLD: NORMAL
APTT PPP: 26.7 SECONDS (ref 24–31)
BLD GP AB SCN SERPL QL: NEGATIVE
DEPRECATED RDW RBC AUTO: 41.4 FL (ref 37–54)
ERYTHROCYTE [DISTWIDTH] IN BLOOD BY AUTOMATED COUNT: 12.6 % (ref 12.3–15.4)
HCT VFR BLD AUTO: 44.5 % (ref 37.5–51)
HGB BLD-MCNC: 15.5 G/DL (ref 13–17.7)
INR PPP: 1.03 (ref 0.93–1.1)
MCH RBC QN AUTO: 31.7 PG (ref 26.6–33)
MCHC RBC AUTO-ENTMCNC: 34.8 G/DL (ref 31.5–35.7)
MCV RBC AUTO: 91 FL (ref 79–97)
PLATELET # BLD AUTO: 299 10*3/MM3 (ref 140–450)
PMV BLD AUTO: 11.1 FL (ref 6–12)
PROTHROMBIN TIME: 11.3 SECONDS (ref 9.6–11.7)
RBC # BLD AUTO: 4.89 10*6/MM3 (ref 4.14–5.8)
RH BLD: POSITIVE
T&S EXPIRATION DATE: NORMAL
WBC # BLD AUTO: 13.3 10*3/MM3 (ref 3.4–10.8)

## 2020-10-30 PROCEDURE — 93010 ELECTROCARDIOGRAM REPORT: CPT | Performed by: INTERNAL MEDICINE

## 2020-10-30 PROCEDURE — 86901 BLOOD TYPING SEROLOGIC RH(D): CPT

## 2020-10-30 PROCEDURE — 93306 TTE W/DOPPLER COMPLETE: CPT

## 2020-10-30 PROCEDURE — 93005 ELECTROCARDIOGRAM TRACING: CPT | Performed by: UROLOGY

## 2020-10-30 PROCEDURE — 86900 BLOOD TYPING SEROLOGIC ABO: CPT | Performed by: UROLOGY

## 2020-10-30 PROCEDURE — 85610 PROTHROMBIN TIME: CPT

## 2020-10-30 PROCEDURE — 86900 BLOOD TYPING SEROLOGIC ABO: CPT

## 2020-10-30 PROCEDURE — U0004 COV-19 TEST NON-CDC HGH THRU: HCPCS

## 2020-10-30 PROCEDURE — 86901 BLOOD TYPING SEROLOGIC RH(D): CPT | Performed by: UROLOGY

## 2020-10-30 PROCEDURE — C9803 HOPD COVID-19 SPEC COLLECT: HCPCS

## 2020-10-30 PROCEDURE — 93306 TTE W/DOPPLER COMPLETE: CPT | Performed by: INTERNAL MEDICINE

## 2020-10-30 PROCEDURE — 86850 RBC ANTIBODY SCREEN: CPT | Performed by: UROLOGY

## 2020-10-30 PROCEDURE — 85027 COMPLETE CBC AUTOMATED: CPT

## 2020-10-30 PROCEDURE — 85730 THROMBOPLASTIN TIME PARTIAL: CPT

## 2020-10-31 LAB
QT INTERVAL: 423 MS
SARS-COV-2 RNA RESP QL NAA+PROBE: NOT DETECTED

## 2020-11-02 ENCOUNTER — HOSPITAL ENCOUNTER (INPATIENT)
Facility: HOSPITAL | Age: 75
LOS: 7 days | Discharge: HOME | End: 2020-11-09
Attending: UROLOGY | Admitting: UROLOGY

## 2020-11-02 ENCOUNTER — ANESTHESIA EVENT (OUTPATIENT)
Dept: PERIOP | Facility: HOSPITAL | Age: 75
End: 2020-11-02

## 2020-11-02 ENCOUNTER — ANESTHESIA (OUTPATIENT)
Dept: PERIOP | Facility: HOSPITAL | Age: 75
End: 2020-11-02

## 2020-11-02 DIAGNOSIS — N28.89 LEFT RENAL MASS: ICD-10-CM

## 2020-11-02 DIAGNOSIS — E78.2 MIXED HYPERLIPIDEMIA: ICD-10-CM

## 2020-11-02 DIAGNOSIS — N18.31 STAGE 3A CHRONIC KIDNEY DISEASE (HCC): Primary | ICD-10-CM

## 2020-11-02 DIAGNOSIS — I10 ESSENTIAL HYPERTENSION: ICD-10-CM

## 2020-11-02 PROCEDURE — C2617 STENT, NON-COR, TEM W/O DEL: HCPCS | Performed by: UROLOGY

## 2020-11-02 PROCEDURE — 0T760DZ DILATION OF RIGHT URETER WITH INTRALUMINAL DEVICE, OPEN APPROACH: ICD-10-PCS | Performed by: UROLOGY

## 2020-11-02 PROCEDURE — 25010000002 DEXAMETHASONE PER 1 MG: Performed by: ANESTHESIOLOGY

## 2020-11-02 PROCEDURE — C1769 GUIDE WIRE: HCPCS | Performed by: UROLOGY

## 2020-11-02 PROCEDURE — 25010000002 ONDANSETRON PER 1 MG: Performed by: ANESTHESIOLOGY

## 2020-11-02 PROCEDURE — 25010000002 HYDROMORPHONE PER 4 MG: Performed by: UROLOGY

## 2020-11-02 PROCEDURE — 25010000002 HYDROMORPHONE PER 4 MG: Performed by: ANESTHESIOLOGY

## 2020-11-02 PROCEDURE — 0TT70ZZ RESECTION OF LEFT URETER, OPEN APPROACH: ICD-10-PCS | Performed by: UROLOGY

## 2020-11-02 PROCEDURE — 0TT10ZZ RESECTION OF LEFT KIDNEY, OPEN APPROACH: ICD-10-PCS | Performed by: UROLOGY

## 2020-11-02 PROCEDURE — 25010000002 ONDANSETRON PER 1 MG: Performed by: UROLOGY

## 2020-11-02 PROCEDURE — 25010000002 FENTANYL CITRATE (PF) 100 MCG/2ML SOLUTION: Performed by: ANESTHESIOLOGY

## 2020-11-02 PROCEDURE — 88307 TISSUE EXAM BY PATHOLOGIST: CPT | Performed by: UROLOGY

## 2020-11-02 PROCEDURE — 25010000002 CEFAZOLIN PER 500 MG: Performed by: UROLOGY

## 2020-11-02 DEVICE — CLIP LIGAT VASC HORIZON TI MD/LG GRN 6CT: Type: IMPLANTABLE DEVICE | Site: ABDOMEN | Status: FUNCTIONAL

## 2020-11-02 DEVICE — CLIP LIGAT VASC HORIZON TI LG ORNG 6CT: Type: IMPLANTABLE DEVICE | Site: ABDOMEN | Status: FUNCTIONAL

## 2020-11-02 DEVICE — ENDOPATH ETS45 2.5MM RELOADS (VASCULAR/THIN)
Type: IMPLANTABLE DEVICE | Site: ABDOMEN | Status: FUNCTIONAL
Brand: ENDOPATH

## 2020-11-02 RX ORDER — NALOXONE HCL 0.4 MG/ML
0.1 VIAL (ML) INJECTION
Status: DISCONTINUED | OUTPATIENT
Start: 2020-11-02 | End: 2020-11-09 | Stop reason: HOSPADM

## 2020-11-02 RX ORDER — PHENYLEPHRINE HCL IN 0.9% NACL 0.5 MG/5ML
SYRINGE (ML) INTRAVENOUS AS NEEDED
Status: DISCONTINUED | OUTPATIENT
Start: 2020-11-02 | End: 2020-11-02 | Stop reason: SURG

## 2020-11-02 RX ORDER — ACETAMINOPHEN 650 MG/1
650 SUPPOSITORY RECTAL EVERY 4 HOURS PRN
Status: DISCONTINUED | OUTPATIENT
Start: 2020-11-02 | End: 2020-11-09 | Stop reason: HOSPADM

## 2020-11-02 RX ORDER — NICARDIPINE HYDROCHLORIDE 2.5 MG/ML
INJECTION INTRAVENOUS AS NEEDED
Status: DISCONTINUED | OUTPATIENT
Start: 2020-11-02 | End: 2020-11-02 | Stop reason: SURG

## 2020-11-02 RX ORDER — MEPERIDINE HYDROCHLORIDE 25 MG/ML
12.5 INJECTION INTRAMUSCULAR; INTRAVENOUS; SUBCUTANEOUS
Status: DISCONTINUED | OUTPATIENT
Start: 2020-11-02 | End: 2020-11-02 | Stop reason: HOSPADM

## 2020-11-02 RX ORDER — ROCURONIUM BROMIDE 10 MG/ML
INJECTION, SOLUTION INTRAVENOUS AS NEEDED
Status: DISCONTINUED | OUTPATIENT
Start: 2020-11-02 | End: 2020-11-02 | Stop reason: SURG

## 2020-11-02 RX ORDER — HYDROMORPHONE HCL 110MG/55ML
PATIENT CONTROLLED ANALGESIA SYRINGE INTRAVENOUS AS NEEDED
Status: DISCONTINUED | OUTPATIENT
Start: 2020-11-02 | End: 2020-11-02 | Stop reason: SURG

## 2020-11-02 RX ORDER — SODIUM CHLORIDE 9 MG/ML
50 INJECTION, SOLUTION INTRAVENOUS CONTINUOUS
Status: DISCONTINUED | OUTPATIENT
Start: 2020-11-02 | End: 2020-11-04

## 2020-11-02 RX ORDER — SODIUM CHLORIDE 9 MG/ML
60 INJECTION, SOLUTION INTRAVENOUS CONTINUOUS
Status: DISCONTINUED | OUTPATIENT
Start: 2020-11-02 | End: 2020-11-07

## 2020-11-02 RX ORDER — SODIUM CHLORIDE, SODIUM LACTATE, POTASSIUM CHLORIDE, CALCIUM CHLORIDE 600; 310; 30; 20 MG/100ML; MG/100ML; MG/100ML; MG/100ML
1000 INJECTION, SOLUTION INTRAVENOUS CONTINUOUS
Status: DISCONTINUED | OUTPATIENT
Start: 2020-11-02 | End: 2020-11-02

## 2020-11-02 RX ORDER — HYDROCODONE BITARTRATE AND ACETAMINOPHEN 7.5; 325 MG/1; MG/1
2 TABLET ORAL EVERY 4 HOURS PRN
Status: DISCONTINUED | OUTPATIENT
Start: 2020-11-02 | End: 2020-11-09 | Stop reason: HOSPADM

## 2020-11-02 RX ORDER — SODIUM CHLORIDE 0.9 % (FLUSH) 0.9 %
10 SYRINGE (ML) INJECTION AS NEEDED
Status: DISCONTINUED | OUTPATIENT
Start: 2020-11-02 | End: 2020-11-02 | Stop reason: HOSPADM

## 2020-11-02 RX ORDER — LIDOCAINE HYDROCHLORIDE 10 MG/ML
0.5 INJECTION, SOLUTION INFILTRATION; PERINEURAL ONCE AS NEEDED
Status: DISCONTINUED | OUTPATIENT
Start: 2020-11-02 | End: 2020-11-02 | Stop reason: HOSPADM

## 2020-11-02 RX ORDER — ONDANSETRON 2 MG/ML
4 INJECTION INTRAMUSCULAR; INTRAVENOUS ONCE AS NEEDED
Status: DISCONTINUED | OUTPATIENT
Start: 2020-11-02 | End: 2020-11-02 | Stop reason: HOSPADM

## 2020-11-02 RX ORDER — ONDANSETRON 2 MG/ML
4 INJECTION INTRAMUSCULAR; INTRAVENOUS EVERY 6 HOURS PRN
Status: DISCONTINUED | OUTPATIENT
Start: 2020-11-02 | End: 2020-11-09 | Stop reason: HOSPADM

## 2020-11-02 RX ORDER — HYDROCODONE BITARTRATE AND ACETAMINOPHEN 7.5; 325 MG/1; MG/1
2 TABLET ORAL EVERY 4 HOURS PRN
Status: DISCONTINUED | OUTPATIENT
Start: 2020-11-02 | End: 2020-11-02 | Stop reason: HOSPADM

## 2020-11-02 RX ORDER — LIDOCAINE HYDROCHLORIDE 20 MG/ML
INJECTION, SOLUTION EPIDURAL; INFILTRATION; INTRACAUDAL; PERINEURAL AS NEEDED
Status: DISCONTINUED | OUTPATIENT
Start: 2020-11-02 | End: 2020-11-02 | Stop reason: SURG

## 2020-11-02 RX ORDER — FENTANYL CITRATE 50 UG/ML
INJECTION, SOLUTION INTRAMUSCULAR; INTRAVENOUS AS NEEDED
Status: DISCONTINUED | OUTPATIENT
Start: 2020-11-02 | End: 2020-11-02 | Stop reason: SURG

## 2020-11-02 RX ORDER — ETOMIDATE 2 MG/ML
INJECTION INTRAVENOUS AS NEEDED
Status: DISCONTINUED | OUTPATIENT
Start: 2020-11-02 | End: 2020-11-02 | Stop reason: SURG

## 2020-11-02 RX ORDER — HYDROMORPHONE HCL 110MG/55ML
0.5 PATIENT CONTROLLED ANALGESIA SYRINGE INTRAVENOUS
Status: DISCONTINUED | OUTPATIENT
Start: 2020-11-02 | End: 2020-11-02 | Stop reason: HOSPADM

## 2020-11-02 RX ORDER — HYDROMORPHONE HCL 110MG/55ML
0.5 PATIENT CONTROLLED ANALGESIA SYRINGE INTRAVENOUS
Status: DISCONTINUED | OUTPATIENT
Start: 2020-11-02 | End: 2020-11-09 | Stop reason: HOSPADM

## 2020-11-02 RX ORDER — GLYCOPYRROLATE 1 MG/5 ML
SYRINGE (ML) INTRAVENOUS AS NEEDED
Status: DISCONTINUED | OUTPATIENT
Start: 2020-11-02 | End: 2020-11-02 | Stop reason: SURG

## 2020-11-02 RX ORDER — NEOSTIGMINE METHYLSULFATE 5 MG/5 ML
SYRINGE (ML) INTRAVENOUS AS NEEDED
Status: DISCONTINUED | OUTPATIENT
Start: 2020-11-02 | End: 2020-11-02 | Stop reason: SURG

## 2020-11-02 RX ORDER — DEXAMETHASONE SODIUM PHOSPHATE 4 MG/ML
INJECTION, SOLUTION INTRA-ARTICULAR; INTRALESIONAL; INTRAMUSCULAR; INTRAVENOUS; SOFT TISSUE AS NEEDED
Status: DISCONTINUED | OUTPATIENT
Start: 2020-11-02 | End: 2020-11-02 | Stop reason: SURG

## 2020-11-02 RX ORDER — ONDANSETRON 2 MG/ML
INJECTION INTRAMUSCULAR; INTRAVENOUS AS NEEDED
Status: DISCONTINUED | OUTPATIENT
Start: 2020-11-02 | End: 2020-11-02 | Stop reason: SURG

## 2020-11-02 RX ORDER — ACETAMINOPHEN 325 MG/1
650 TABLET ORAL EVERY 4 HOURS PRN
Status: DISCONTINUED | OUTPATIENT
Start: 2020-11-02 | End: 2020-11-09 | Stop reason: HOSPADM

## 2020-11-02 RX ORDER — ATROPA BELLADONNA AND OPIUM 16.2; 3 MG/1; MG/1
30 SUPPOSITORY RECTAL DAILY PRN
Status: DISCONTINUED | OUTPATIENT
Start: 2020-11-02 | End: 2020-11-09 | Stop reason: HOSPADM

## 2020-11-02 RX ORDER — BUPIVACAINE HYDROCHLORIDE 5 MG/ML
INJECTION, SOLUTION PERINEURAL AS NEEDED
Status: DISCONTINUED | OUTPATIENT
Start: 2020-11-02 | End: 2020-11-02 | Stop reason: HOSPADM

## 2020-11-02 RX ORDER — ONDANSETRON 4 MG/1
4 TABLET, FILM COATED ORAL EVERY 6 HOURS PRN
Status: DISCONTINUED | OUTPATIENT
Start: 2020-11-02 | End: 2020-11-09 | Stop reason: HOSPADM

## 2020-11-02 RX ADMIN — HYDROMORPHONE HYDROCHLORIDE 0.5 MG: 2 INJECTION INTRAMUSCULAR; INTRAVENOUS; SUBCUTANEOUS at 12:48

## 2020-11-02 RX ADMIN — SODIUM CHLORIDE 50 ML/HR: 9 INJECTION, SOLUTION INTRAVENOUS at 09:10

## 2020-11-02 RX ADMIN — ROCURONIUM BROMIDE 15 MG: 10 INJECTION, SOLUTION INTRAVENOUS at 11:53

## 2020-11-02 RX ADMIN — ROCURONIUM BROMIDE 40 MG: 10 INJECTION, SOLUTION INTRAVENOUS at 10:26

## 2020-11-02 RX ADMIN — CEFAZOLIN SODIUM 2 G: 1 INJECTION, POWDER, FOR SOLUTION INTRAMUSCULAR; INTRAVENOUS at 10:27

## 2020-11-02 RX ADMIN — ONDANSETRON 4 MG: 2 INJECTION INTRAMUSCULAR; INTRAVENOUS at 12:47

## 2020-11-02 RX ADMIN — PHENYLEPHRINE HYDROCHLORIDE 100 MCG: 10 INJECTION INTRAVENOUS at 10:32

## 2020-11-02 RX ADMIN — DEXAMETHASONE SODIUM PHOSPHATE 4 MG: 4 INJECTION, SOLUTION INTRAMUSCULAR; INTRAVENOUS at 10:44

## 2020-11-02 RX ADMIN — HYDROMORPHONE HYDROCHLORIDE 0.5 MG: 2 INJECTION, SOLUTION INTRAMUSCULAR; INTRAVENOUS; SUBCUTANEOUS at 15:33

## 2020-11-02 RX ADMIN — HYDROMORPHONE HYDROCHLORIDE 0.5 MG: 2 INJECTION, SOLUTION INTRAMUSCULAR; INTRAVENOUS; SUBCUTANEOUS at 22:04

## 2020-11-02 RX ADMIN — Medication 0.4 MG: at 12:50

## 2020-11-02 RX ADMIN — FENTANYL CITRATE 50 MCG: 50 INJECTION, SOLUTION INTRAMUSCULAR; INTRAVENOUS at 10:19

## 2020-11-02 RX ADMIN — LIDOCAINE HYDROCHLORIDE 60 MG: 20 INJECTION, SOLUTION EPIDURAL; INFILTRATION; INTRACAUDAL; PERINEURAL at 10:21

## 2020-11-02 RX ADMIN — HYDROMORPHONE HYDROCHLORIDE 0.5 MG: 2 INJECTION INTRAMUSCULAR; INTRAVENOUS; SUBCUTANEOUS at 10:39

## 2020-11-02 RX ADMIN — NICARDIPINE HYDROCHLORIDE 0.5 MG: 2.5 INJECTION INTRAVENOUS at 10:42

## 2020-11-02 RX ADMIN — HYDROMORPHONE HYDROCHLORIDE 0.5 MG: 2 INJECTION, SOLUTION INTRAMUSCULAR; INTRAVENOUS; SUBCUTANEOUS at 18:20

## 2020-11-02 RX ADMIN — HYDROMORPHONE HYDROCHLORIDE 0.5 MG: 2 INJECTION INTRAMUSCULAR; INTRAVENOUS; SUBCUTANEOUS at 11:50

## 2020-11-02 RX ADMIN — Medication 3 MG: at 12:50

## 2020-11-02 RX ADMIN — HYDROMORPHONE HYDROCHLORIDE 0.5 MG: 2 INJECTION, SOLUTION INTRAMUSCULAR; INTRAVENOUS; SUBCUTANEOUS at 15:00

## 2020-11-02 RX ADMIN — ETOMIDATE 18 MG: 2 INJECTION, SOLUTION INTRAVENOUS at 10:24

## 2020-11-02 RX ADMIN — ROCURONIUM BROMIDE 20 MG: 10 INJECTION, SOLUTION INTRAVENOUS at 11:24

## 2020-11-02 RX ADMIN — CEFAZOLIN SODIUM 2 G: 10 INJECTION, POWDER, FOR SOLUTION INTRAVENOUS at 18:21

## 2020-11-02 RX ADMIN — NICARDIPINE HYDROCHLORIDE 0.5 MG: 2.5 INJECTION INTRAVENOUS at 11:07

## 2020-11-02 RX ADMIN — HYDROMORPHONE HYDROCHLORIDE 0.5 MG: 2 INJECTION INTRAMUSCULAR; INTRAVENOUS; SUBCUTANEOUS at 11:02

## 2020-11-02 RX ADMIN — PHENYLEPHRINE HYDROCHLORIDE 100 MCG: 10 INJECTION INTRAVENOUS at 10:25

## 2020-11-02 RX ADMIN — SODIUM CHLORIDE 100 ML/HR: 9 INJECTION, SOLUTION INTRAVENOUS at 16:50

## 2020-11-02 RX ADMIN — HYDROMORPHONE HYDROCHLORIDE 0.5 MG: 2 INJECTION, SOLUTION INTRAMUSCULAR; INTRAVENOUS; SUBCUTANEOUS at 14:08

## 2020-11-02 RX ADMIN — ONDANSETRON 4 MG: 2 INJECTION INTRAMUSCULAR; INTRAVENOUS at 21:40

## 2020-11-02 RX ADMIN — FENTANYL CITRATE 50 MCG: 50 INJECTION, SOLUTION INTRAMUSCULAR; INTRAVENOUS at 10:27

## 2020-11-02 RX ADMIN — PHENYLEPHRINE HYDROCHLORIDE 100 MCG: 10 INJECTION INTRAVENOUS at 12:23

## 2020-11-02 RX ADMIN — PHENYLEPHRINE HYDROCHLORIDE 100 MCG: 10 INJECTION INTRAVENOUS at 12:01

## 2020-11-02 NOTE — ANESTHESIA PREPROCEDURE EVALUATION
Anesthesia Evaluation     Patient summary reviewed   NPO Solid Status: > 8 hours  NPO Liquid Status: > 8 hours           Airway   Mallampati: II  TM distance: >3 FB  Neck ROM: full  No difficulty expected  Dental - normal exam     Pulmonary - normal exam   (+) shortness of breath, sleep apnea on CPAP,   Cardiovascular - normal exam  Exercise tolerance: poor (<4 METS)    ECG reviewed    (+) pacemaker pacemaker, hypertension well controlled less than 2 medications, valvular problems/murmurs AI and AS, CAD, CABG >6 Months, dysrhythmias PVC, hyperlipidemia,       Neuro/Psych  GI/Hepatic/Renal/Endo    (+) obesity,       Musculoskeletal     Abdominal  - normal exam    Bowel sounds: normal.   Substance History      OB/GYN          Other      history of cancer active    ROS/Med Hx Other: Cardiac clearance given by Dr Erazo  Normal LV fxn, severe AS by area 0.5 cm (moderate by velocities).  Plan pre induction a-line and titrated controlled SIVI d/w patient.  Consents to blood transfusion if necessary.                Anesthesia Plan    ASA 4     general     intravenous induction     Anesthetic plan, all risks, benefits, and alternatives have been provided, discussed and informed consent has been obtained with: patient.  Use of blood products discussed with patient  Consented to blood products.

## 2020-11-02 NOTE — ANESTHESIA PROCEDURE NOTES
Airway  Urgency: elective    Date/Time: 11/2/2020 10:26 AM  Airway not difficult    General Information and Staff    Patient location during procedure: OR  Anesthesiologist: James Mars MD    Indications and Patient Condition  Indications for airway management: airway protection    Preoxygenated: yes  MILS not maintained throughout  Mask difficulty assessment: 2 - vent by mask + OA or adjuvant +/- NMBA    Final Airway Details  Final airway type: endotracheal airway      Successful airway: ETT  Cuffed: yes   Successful intubation technique: direct laryngoscopy  Facilitating devices/methods: intubating stylet  Endotracheal tube insertion site: oral  Blade: Nina  Blade size: 4  ETT size (mm): 7.5  Cormack-Lehane Classification: grade I - full view of glottis  Placement verified by: chest auscultation and capnometry   Inital cuff pressure (cm H2O): 0  Measured from: teeth  Number of attempts at approach: 1  Assessment: lips, teeth, and gum same as pre-op and atraumatic intubation

## 2020-11-02 NOTE — H&P
Urology History and Physical    Patient:Navneet Lind  :1945   Room:[facility] MAIN OR/MAIN OR   Admit Date2020  Age:74 y.o.      SEX:male      DOS:2020      MR:[id number]      Visit:[id number]       Chief complaint left renal mass    Subjective     History of present illness: Patient is a 74-year-old white male who is status post cystoscopy left ureteroscopy, left nephroscopy with biopsy and stent couple weeks ago.  He presented with gross hematuria from that kidney.  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  He is here for a left nephro ureterectomy    Review of Systems  12 point review of systems were reviewed and are negative except for what is in HPI.    History  Past Medical History:   Diagnosis Date   • Aortic stenosis 2015    Per Echo 2017   • Benign essential HTN    • Bundle branch block, right 2013   • CAD (coronary artery disease), native coronary artery    • Cancer (CMS/HCC)     bladder- chemo, new left renal mass   • Coronary artery disease involving native coronary artery of native heart without angina pectoris 2019    CABG ; SVG to RCA is occluded, LIMA to LAD, SVG to diag of LAD, SVG to marginal of LCX   • Essential hypertension 2019   • Heart murmur    • Hyperlipidemia, mixed    • Mixed hyperlipidemia 2019   • Near syncope    • NATALIA (obstructive sleep apnea)     AHI 11.6/h   • Premature ventricular contractions 2014   • Presence of cardiac pacemaker 2019    BS dual chamber PM 2016   • Sick sinus syndrome (CMS/HCC)    • SSS (sick sinus syndrome) (CMS/HCC) 2019     Past Surgical History:   Procedure Laterality Date   • CARDIAC CATHETERIZATION     • CARDIOVASCULAR STRESS TEST     • CORONARY ARTERY BYPASS GRAFT     • ECHO - CONVERTED     • PACEMAKER IMPLANTATION      bs     Social  History     Socioeconomic History   • Marital status:      Spouse name: Not on file   • Number of children: Not on file   • Years of education: Not on file   • Highest education level: Not on file   Tobacco Use   • Smoking status: Former Smoker     Quit date:      Years since quittin.8   • Smokeless tobacco: Never Used   Substance and Sexual Activity   • Alcohol use: Never     Frequency: Never     Comment: once/week    • Drug use: Never   • Sexual activity: Defer     History reviewed. No pertinent family history.  No Known Allergies  Prior to Admission medications    Medication Sig Start Date End Date Taking? Authorizing Provider   amLODIPine (NORVASC) 10 MG tablet Take 1 tablet by mouth Daily.  Patient taking differently: Take 10 mg by mouth Every Evening. 20  Yes ADRIANO Erazo MD   aspirin 81 MG EC tablet Take 81 mg by mouth Daily. LD 10/12 stopped for surgery   Yes Jaja Watson MD   gabapentin (NEURONTIN) 800 MG tablet Take 800 mg by mouth Every Evening.   Yes Jaja Watson MD   simvastatin (ZOCOR) 40 MG tablet TAKE 1 TABLET BY MOUTH EVERY DAY  Patient taking differently: 40 mg Every Night. 20  Yes ADRIANO Erazo MD   HYDROcodone-acetaminophen (NORCO) 7.5-325 MG per tablet 1 tablet Every 6 (Six) Hours As Needed. 10/22/20   ProviderJaja MD         Objective     tMax 24 hours:  Temp (24hrs), Av.7 °F (35.9 °C), Min:96.7 °F (35.9 °C), Max:96.7 °F (35.9 °C)    Vital Sign Ranges:  Temp:  [96.7 °F (35.9 °C)] 96.7 °F (35.9 °C)  Heart Rate:  [97] 97  Resp:  [13] 13  Intake and Output Last 3 Shifts:  No intake/output data recorded.    Physical Exam:     General Appearance:    Alert, cooperative, in no acute distress   Head:    Normocephalic, without obvious abnormality, atraumatic   Eyes:            Lids and lashes normal, conjunctivae and sclerae normal, no   icterus, no pallor, corneas clear, PERRLA   Ears:    Ears appear intact with no abnormalities noted    Throat:   No oral lesions, no thrush, oral mucosa moist   Neck:   No adenopathy, supple, trachea midline, no thyromegaly, no   carotid bruit, no JVD   Back:     No kyphosis present, no scoliosis present, no skin lesions,      erythema or scars, no tenderness to percussion or                   palpation,   range of motion normal   Lungs:     Clear to auscultation,respirations regular, even and                  unlabored    Heart:    Regular rhythm and normal rate, normal S1 and S2, no            murmur, no gallop, no rub, no click   Chest Wall:    No abnormalities observed   Abdomen:     Normal bowel sounds, no masses, no organomegaly, soft        non-tender, non-distended, no guarding, no rebound                tenderness   Rectal:     Deferred   Extremities:   Moves all extremities well, no edema, no cyanosis, no             redness   Pulses:   Pulses palpable and equal bilaterally   Skin:   No bleeding, bruising or rash   Lymph nodes:   No palpable adenopathy   Neurologic:   Cranial nerves 2 - 12 grossly intact, sensation intact, DTR       present and equal bilaterally       Results Review:     Lab Results (last 24 hours)     ** No results found for the last 24 hours. **         No results found for: URINECX     Imaging Results (Last 7 Days)     ** No results found for the last 168 hours. **          Inpatient Meds:   Scheduled Meds:ceFAZolin (ANCEF) in SWFI 2 g/20ml IV PUSH syringe, 2 g, Intravenous, Once       Continuous Infusions:sodium chloride, 50 mL/hr, Last Rate: 50 mL/hr (11/02/20 0910)       PRN Meds:.lidocaine  •  sodium chloride      Assessment/Plan     Left renal transitional cell carcinoma    Plan left nephro ureterectomy.  Discussed with patient and wife all risk benefits and options including but not limited to bleeding, infection, acute and chronic pain, damage strain structures as well as need for further surgical and chemotherapy.  They also understand there is possibilities of not finding  cancer.  They understand all this and they are willing to proceed    I discussed the patients findings and my recommendations with patient.    Rogers Bae MD  11/02/20  10:14 EST

## 2020-11-02 NOTE — ANESTHESIA POSTPROCEDURE EVALUATION
Patient: Navneet Lind    Procedure Summary     Date: 11/02/20 Room / Location: [facility] OR 11 / [facility] MAIN OR    Anesthesia Start: 1015 Anesthesia Stop: 1306    Procedure: NEPHROURETERECTOMY (Left Abdomen) Diagnosis:       Left renal mass      (Left renal mass [N28.89])    Surgeon: Rogers Bae MD Provider: James Mars MD    Anesthesia Type: general ASA Status: 4          Anesthesia Type: general    Vitals  Vitals Value Taken Time   /66 11/02/20 1319   Temp 97.6 °F (36.4 °C) 11/02/20 1304   Pulse 65 11/02/20 1320   Resp 16 11/02/20 1319   SpO2 96 % 11/02/20 1320   Vitals shown include unvalidated device data.        Post Anesthesia Care and Evaluation    Patient location during evaluation: PACU  Patient participation: complete - patient participated  Level of consciousness: awake  Pain scale: See nurse's notes for pain score.  Pain management: adequate  Airway patency: patent  Anesthetic complications: No anesthetic complications  PONV Status: none  Cardiovascular status: acceptable  Respiratory status: acceptable  Hydration status: acceptable    Comments: Patient seen and examined postoperatively; vital signs stable; SpO2 greater than or equal to 90%; cardiopulmonary status stable; nausea/vomiting adequately controlled; pain adequately controlled; no apparent anesthesia complications; patient discharged from anesthesia care when discharge criteria were met

## 2020-11-02 NOTE — OP NOTE
NEPHROURETERECTOMY  Procedure Report    Patient Name:  Navneet Lind  YOB: 1945    Date of Surgery:  11/2/2020     Indications: Left renal transitional cell carcinoma    Pre-op Diagnosis:   Left renal mass [N28.89]       Post-Op Diagnosis Codes:     * Left renal mass [N28.89]    Procedure/CPT® Codes:      Procedure(s):  NEPHROURETERECTOMY left and right ureteral stent placement  Staff:  Surgeon(s):  Rogers Bae MD    Assistant: Radha Gutierrez RNFA    Assistant: Radha Gutierrez RNFA  was responsible for performing the following activities: Irrigation and their skilled assistance was necessary for the success of this case.    Anesthesia: General    Estimated Blood Loss: 20 mL    Implants:    Implant Name Type Inv. Item Serial No.  Lot No. LRB No. Used Action   CLIP LIGAT VASC HORIZON TI LG ORNG 6CT - [id number] Implant CLIP LIGAT VASC HORIZON TI LG ORNG 6CT  [employer] [id number] Left 1 Implanted   CLIP LIGAT VASC HORIZON EDWAR MEYERS/CLAUDIA GRN 6CT - [id number] Implant CLIP LIGAT VASC HORIZON TI MD/CLAUDIA GRN 6CT  [employer] [id number] Left 1 Implanted   RELOAD STPLR ENDOPATH/ETS LNR/CUT THN 45MM WHT - [id number] Implant RELOAD STPLR ENDOPATH/ETS LNR/CUT THN 45MM WHT  [employer]  DIV OF J AND J [id number] Left 3 Implanted       Specimen:          ID Type Source Tests Collected by Time   A (Not marked as sent) : Left Kidney and Ureter Tissue Kidney, Left TISSUE PATHOLOGY EXAM Rogers Bae MD 11/2/2020 1134   B (Not marked as sent) : Left ureter - stent curl is distal. Tissue Ureter, Left TISSUE PATHOLOGY EXAM Rogers Bae MD 11/2/2020 1228         Findings: Left renal mass    Complications: None    Description of Procedure: Patient was taken to the operating room laid in the supine position where general endotracheal anesthesia was induced.  Then he was placed in a comfortable left lateral decubitus position with his left flank in the air.  All the bony  prominences were padded and a Huang catheter placed.  We made left subcostal incision through the skin and subcutaneous tissues and through the muscle layers and into the peritoneal cavity.  The Bookwalter was used for retraction.  We then mobilized the left colon medially and identified the left kidney which was freed up with the Bovie cautery.  We took the left ureter sharply with a stent in place.  We put a silk suture on the and that was good be taken out at the end of the case.  We use a stapler to come across the renal artery and vein.  Once the kidney was out we did oversew a small bleeding artery at the level of the hilum with a 2-0 silk suture.  There was no more bleeding seen.  The wound was irrigated.  The left ureter was freed up all the way down to the level of the bladder.  We then closed the left subcostal incision and 2 layers using a #1 Vicryl suture starting from each end and meeting in the middle.  Use a stapler to close the skin.  Note that the sponge and needle counts were correct for that portion of the procedure.  Then we laid the patient in a supine position and reprepped and draped in which included his lower abdomen and groin area.  We placed a Huang catheter and filled his bladder up full of water.  We then made a infraumbilical midline incision down through subcutaneous tissues.  We opened up the fascia and got into the space of Retzius.  The Bookwalter was used for retraction.  We opened up the bladder in the midline and put silk sutures to hold open the bladder.  The existing left ureteral stent was in place.  We used a silk ligature to tie that to the ureter and proceeded to Bovie out the left ureter.  Also note that we did place a sensor wire up the right ureter along with a 6 x 24 double-J stent since that ureteral orifice was quite so close to the incision.  We then remove the left ureteral stent with the ureter.  The curl in the stent was at the distal portion of the left ureter.   We closed the bladder in 2 layers using a 2-0 Vicryl suture in a running manner.  We then we then closed the bladder incision towards the dome with a 2-0 Vicryl suture as well.  Note that a Huang catheter was placed with 10 cc of water in the balloon.  The bladder was irrigated and was watertight.  We then placed a Kenn-José drain in the right lower quadrant and tied it in position with a 2-0 silk suture.  We closed the fascia with a #1 Vicryl suture starting from each end and meeting in the middle.  Staples were used to close the skin.  Another 20 cc of quarter percent Marcaine was used to inject  both incisions.  Patient tolerated procedure well      Rogers Bae MD     Date: 11/2/2020  Time: 13:03 EST

## 2020-11-02 NOTE — PLAN OF CARE
Goal Outcome Evaluation:  Plan of Care Reviewed With: patient  Progress: improving  Outcome Summary: pt admitted post op, with MARII drain and carrera; SIx2. moderately painful, will be giving prn norco. clear liquids for now, tolerating well.

## 2020-11-02 NOTE — ANESTHESIA PROCEDURE NOTES
Arterial Line      Patient reassessed immediately prior to procedure    Patient location during procedure: OR  Start time: 11/2/2020 10:18 AM  Stop Time:11/2/2020 10:21 AM       Line placed for hemodynamic monitoring.  Performed By   Anesthesiologist: James Mars MD  Preanesthetic Checklist  Completed: patient identified, site marked, surgical consent, pre-op evaluation, timeout performed, IV checked, risks and benefits discussed and monitors and equipment checked  Arterial Line Prep   Sterile Tech: cap, gloves and mask  Prep: ChloraPrep  Patient monitoring: continuous pulse oximetry, EKG and blood pressure monitoring  Arterial Line Procedure   Laterality:right  Location:  radial artery  Catheter size: 20 G   Guidance: landmark technique and palpation technique  Number of attempts: 1  Successful placement: yes  Post Assessment   Dressing Type: occlusive dressing applied, secured with tape and wrist guard applied.   Complications no  Circ/Move/Sens Assessment: normal.   Patient Tolerance: patient tolerated the procedure well with no apparent complications

## 2020-11-03 LAB
ANION GAP SERPL CALCULATED.3IONS-SCNC: 11 MMOL/L (ref 5–15)
BASOPHILS # BLD AUTO: 0.3 10*3/MM3 (ref 0–0.2)
BASOPHILS NFR BLD AUTO: 1 % (ref 0–1.5)
BUN SERPL-MCNC: 19 MG/DL (ref 8–23)
BUN/CREAT SERPL: 13.3 (ref 7–25)
CALCIUM SPEC-SCNC: 7.8 MG/DL (ref 8.6–10.5)
CHLORIDE SERPL-SCNC: 105 MMOL/L (ref 98–107)
CO2 SERPL-SCNC: 19 MMOL/L (ref 22–29)
CREAT SERPL-MCNC: 1.43 MG/DL (ref 0.76–1.27)
DEPRECATED RDW RBC AUTO: 45.1 FL (ref 37–54)
EOSINOPHIL # BLD AUTO: 0.1 10*3/MM3 (ref 0–0.4)
EOSINOPHIL NFR BLD AUTO: 0.5 % (ref 0.3–6.2)
ERYTHROCYTE [DISTWIDTH] IN BLOOD BY AUTOMATED COUNT: 13.4 % (ref 12.3–15.4)
GFR SERPL CREATININE-BSD FRML MDRD: 48 ML/MIN/1.73
GLUCOSE SERPL-MCNC: 132 MG/DL (ref 65–99)
HCT VFR BLD AUTO: 39.8 % (ref 37.5–51)
HGB BLD-MCNC: 12.8 G/DL (ref 13–17.7)
LAB AP CASE REPORT: NORMAL
LAB AP DIAGNOSIS COMMENT: NORMAL
LAB AP SYNOPTIC CHECKLIST: NORMAL
LYMPHOCYTES # BLD AUTO: 1.7 10*3/MM3 (ref 0.7–3.1)
LYMPHOCYTES NFR BLD AUTO: 6.4 % (ref 19.6–45.3)
MCH RBC QN AUTO: 30.8 PG (ref 26.6–33)
MCHC RBC AUTO-ENTMCNC: 32.3 G/DL (ref 31.5–35.7)
MCV RBC AUTO: 95.4 FL (ref 79–97)
MONOCYTES # BLD AUTO: 2.2 10*3/MM3 (ref 0.1–0.9)
MONOCYTES NFR BLD AUTO: 8.1 % (ref 5–12)
NEUTROPHILS NFR BLD AUTO: 22.9 10*3/MM3 (ref 1.7–7)
NEUTROPHILS NFR BLD AUTO: 84 % (ref 42.7–76)
NRBC BLD AUTO-RTO: 0 /100 WBC (ref 0–0.2)
PATH REPORT.FINAL DX SPEC: NORMAL
PATH REPORT.GROSS SPEC: NORMAL
PLATELET # BLD AUTO: 239 10*3/MM3 (ref 140–450)
PMV BLD AUTO: 8.2 FL (ref 6–12)
POTASSIUM SERPL-SCNC: 5.5 MMOL/L (ref 3.5–5.2)
RBC # BLD AUTO: 4.17 10*6/MM3 (ref 4.14–5.8)
SODIUM SERPL-SCNC: 135 MMOL/L (ref 136–145)
TROPONIN T SERPL-MCNC: <0.01 NG/ML (ref 0–0.03)
WBC # BLD AUTO: 27.3 10*3/MM3 (ref 3.4–10.8)

## 2020-11-03 PROCEDURE — 93010 ELECTROCARDIOGRAM REPORT: CPT | Performed by: INTERNAL MEDICINE

## 2020-11-03 PROCEDURE — 25010000002 ONDANSETRON PER 1 MG: Performed by: UROLOGY

## 2020-11-03 PROCEDURE — 80048 BASIC METABOLIC PNL TOTAL CA: CPT | Performed by: UROLOGY

## 2020-11-03 PROCEDURE — 25010000002 HYDROMORPHONE PER 4 MG: Performed by: UROLOGY

## 2020-11-03 PROCEDURE — 84484 ASSAY OF TROPONIN QUANT: CPT | Performed by: UROLOGY

## 2020-11-03 PROCEDURE — 85025 COMPLETE CBC W/AUTO DIFF WBC: CPT | Performed by: UROLOGY

## 2020-11-03 PROCEDURE — 25010000003 CEFAZOLIN PER 500 MG: Performed by: UROLOGY

## 2020-11-03 PROCEDURE — 93005 ELECTROCARDIOGRAM TRACING: CPT | Performed by: UROLOGY

## 2020-11-03 PROCEDURE — 25010000002 CEFAZOLIN PER 500 MG: Performed by: UROLOGY

## 2020-11-03 RX ORDER — SODIUM BICARBONATE 650 MG/1
1300 TABLET ORAL 3 TIMES DAILY
Status: DISCONTINUED | OUTPATIENT
Start: 2020-11-03 | End: 2020-11-04

## 2020-11-03 RX ORDER — SIMVASTATIN 40 MG
40 TABLET ORAL NIGHTLY
Status: DISCONTINUED | OUTPATIENT
Start: 2020-11-03 | End: 2020-11-09 | Stop reason: HOSPADM

## 2020-11-03 RX ORDER — ALUMINA, MAGNESIA, AND SIMETHICONE 2400; 2400; 240 MG/30ML; MG/30ML; MG/30ML
15 SUSPENSION ORAL EVERY 6 HOURS PRN
Status: DISCONTINUED | OUTPATIENT
Start: 2020-11-03 | End: 2020-11-04

## 2020-11-03 RX ORDER — GABAPENTIN 400 MG/1
800 CAPSULE ORAL NIGHTLY
Status: DISCONTINUED | OUTPATIENT
Start: 2020-11-03 | End: 2020-11-09 | Stop reason: HOSPADM

## 2020-11-03 RX ORDER — AMLODIPINE BESYLATE 5 MG/1
10 TABLET ORAL
Status: DISCONTINUED | OUTPATIENT
Start: 2020-11-03 | End: 2020-11-09 | Stop reason: HOSPADM

## 2020-11-03 RX ADMIN — ONDANSETRON 4 MG: 2 INJECTION INTRAMUSCULAR; INTRAVENOUS at 09:59

## 2020-11-03 RX ADMIN — CEFAZOLIN 1 G: 1 INJECTION, POWDER, FOR SOLUTION INTRAMUSCULAR; INTRAVENOUS at 11:45

## 2020-11-03 RX ADMIN — SODIUM CHLORIDE 100 ML/HR: 9 INJECTION, SOLUTION INTRAVENOUS at 20:10

## 2020-11-03 RX ADMIN — GABAPENTIN 800 MG: 400 CAPSULE ORAL at 20:08

## 2020-11-03 RX ADMIN — ONDANSETRON 4 MG: 2 INJECTION INTRAMUSCULAR; INTRAVENOUS at 16:02

## 2020-11-03 RX ADMIN — HYDROMORPHONE HYDROCHLORIDE 0.5 MG: 2 INJECTION, SOLUTION INTRAMUSCULAR; INTRAVENOUS; SUBCUTANEOUS at 10:43

## 2020-11-03 RX ADMIN — ONDANSETRON 4 MG: 2 INJECTION INTRAMUSCULAR; INTRAVENOUS at 04:20

## 2020-11-03 RX ADMIN — HYDROCODONE BITARTRATE AND ACETAMINOPHEN 2 TABLET: 7.5; 325 TABLET ORAL at 02:01

## 2020-11-03 RX ADMIN — SODIUM CHLORIDE 100 ML/HR: 9 INJECTION, SOLUTION INTRAVENOUS at 02:02

## 2020-11-03 RX ADMIN — ALUMINUM HYDROXIDE, MAGNESIUM HYDROXIDE, AND DIMETHICONE 15 ML: 400; 400; 40 SUSPENSION ORAL at 18:26

## 2020-11-03 RX ADMIN — HYDROMORPHONE HYDROCHLORIDE 0.5 MG: 2 INJECTION, SOLUTION INTRAMUSCULAR; INTRAVENOUS; SUBCUTANEOUS at 20:06

## 2020-11-03 RX ADMIN — CEFAZOLIN 1 G: 1 INJECTION, POWDER, FOR SOLUTION INTRAMUSCULAR; INTRAVENOUS at 18:27

## 2020-11-03 RX ADMIN — CEFAZOLIN SODIUM 2 G: 10 INJECTION, POWDER, FOR SOLUTION INTRAVENOUS at 02:02

## 2020-11-03 RX ADMIN — SODIUM BICARBONATE 650 MG TABLET 1300 MG: at 20:05

## 2020-11-03 RX ADMIN — SODIUM BICARBONATE 650 MG TABLET 1300 MG: at 16:02

## 2020-11-03 RX ADMIN — HYDROMORPHONE HYDROCHLORIDE 0.5 MG: 2 INJECTION, SOLUTION INTRAMUSCULAR; INTRAVENOUS; SUBCUTANEOUS at 05:20

## 2020-11-03 NOTE — CONSULTS
NEPHROLOGY CONSULTATION-----[facility]    [facility]  [phone]  Herminio Prieto MD    Patient Care Team:  Uri Cortes MD as PCP - General  Provider, No Known as PCP - Family Medicine  Ander Veliz MD as Consulting Physician (Nephrology)    CC/REASON FOR CONSULTATION: Elevated creatinine/ hyperkalemia    History of Present Illness  Patient with PMHx HTN, CKD admitted for left nephrectomy due to transitional cell carcinoma. His creatinine is 1.4. K elevated. Not on ACEi or ARBs. No vomiting or diarrhea. No dysuria, gross hematuria. Currently has carrera with dark urine. Alsoo on IVF. Feels nauseated, but taking sips of water. BP has not been low.    Review of Systems   As noted above, otherwise 10 systems reviewed and were negative.    Past Medical History:   Diagnosis Date   • Aortic stenosis 9/29/2015    Per Echo 2017   • Benign essential HTN    • Bundle branch block, right 2/7/2013   • CAD (coronary artery disease), native coronary artery    • Cancer (CMS/HCC)     bladder- chemo, new left renal mass   • Coronary artery disease involving native coronary artery of native heart without angina pectoris 11/19/2019    CABG 1995; SVG to RCA is occluded, LIMA to LAD, SVG to diag of LAD, SVG to marginal of LCX   • Essential hypertension 11/19/2019   • Heart murmur    • Hyperlipidemia, mixed    • Mixed hyperlipidemia 11/19/2019   • Near syncope    • NATALIA (obstructive sleep apnea)     AHI 11.6/h   • Premature ventricular contractions 2/11/2014   • Presence of cardiac pacemaker 7/5/2019    BS dual chamber PM 11/7/2016   • Sick sinus syndrome (CMS/HCC)    • SSS (sick sinus syndrome) (CMS/[facility]) 7/5/2019       Past Surgical History:   Procedure Laterality Date   • CARDIAC CATHETERIZATION  2018   • CARDIOVASCULAR STRESS TEST  2017   • CORONARY ARTERY BYPASS GRAFT  1995   • ECHO - CONVERTED  2017   • NEPHROURETERECTOMY Left 11/2/2020    Procedure:  NEPHROURETERECTOMY;  Surgeon: Rogers Bae MD;  [facility]: [facility] MAIN OR;  Service: Urology;  Laterality: Left;   • PACEMAKER IMPLANTATION  2016    bs       History reviewed. No pertinent family history.    Social History     Tobacco Use   • Smoking status: Former Smoker     Quit date:      Years since quittin.8   • Smokeless tobacco: Never Used   Substance Use Topics   • Alcohol use: Never     Frequency: Never     Comment: once/week    • Drug use: Never       Home Meds:   Medications Prior to Admission   Medication Sig Dispense Refill Last Dose   • amLODIPine (NORVASC) 10 MG tablet Take 1 tablet by mouth Daily. (Patient taking differently: Take 10 mg by mouth Every Evening.) 90 tablet 1 2020 at Unknown time   • aspirin 81 MG EC tablet Take 81 mg by mouth Daily. LD 10/12 stopped for surgery   10/12/2020   • gabapentin (NEURONTIN) 800 MG tablet Take 800 mg by mouth Every Evening.   2020 at Unknown time   • simvastatin (ZOCOR) 40 MG tablet TAKE 1 TABLET BY MOUTH EVERY DAY (Patient taking differently: 40 mg Every Night.) 90 tablet 0 2020 at Unknown time   • HYDROcodone-acetaminophen (NORCO) 7.5-325 MG per tablet 1 tablet Every 6 (Six) Hours As Needed.   2020       Scheduled Meds:  amLODIPine, 10 mg, Oral, Q24H  ceFAZolin, 1 g, Intravenous, Q8H  gabapentin, 800 mg, Oral, Nightly  simvastatin, 40 mg, Oral, Nightly        Continuous Infusions:  sodium chloride, 50 mL/hr, Last Rate: 50 mL/hr (20 0910)  sodium chloride, 100 mL/hr, Last Rate: 100 mL/hr (20 0202)        PRN Meds:  •  acetaminophen **OR** acetaminophen  •  HYDROcodone-acetaminophen  •  HYDROmorphone **AND** naloxone  •  influenza vaccine  •  ondansetron **OR** ondansetron  •  opium-belladonna    Allergies:  Patient has no known allergies.    OBJECTIVE    Vital Signs  Temp:  [97.3 °F (36.3 °C)-97.7 °F (36.5 °C)] 97.7 °F (36.5 °C)  Heart Rate:  [61-87] 80  Resp:  [14-20] 16  BP: (119-156)/(52-82) 152/81    I/O this  shift:  In: 120 [P.O.:120]  Out: -   I/O last 3 completed shifts:  In: 3265 [P.O.:580; I.V.:2685]  Out: 820 [Urine:750; Drains:50; Blood:20]    Physical Exam:  General Appearance: alert, appears stated age and cooperative  Head: normocephalic, without obvious abnormality and atraumatic  Eyes: conjunctivae and sclerae normal and no icterus  Neck: supple and no JVD  Lungs: clear to auscultation and respirations regular  Heart: regular rhythm & normal rate and normal S1, S2  Chest Wall: no abnormalities observed  Abdomen: normal bowel sounds and soft non-tender  Extremities: moves extremities well, no edema, no cyanosis  Skin: no bleeding, bruising or rash  Neurologic: Alert, and oriented. No focal deficits    Results Review:    I reviewed the patient's new clinical results.    WBC WBC   Date Value Ref Range Status   11/03/2020 27.30 (H) 3.40 - 10.80 10*3/mm3 Final      HGB Hemoglobin   Date Value Ref Range Status   11/03/2020 12.8 (L) 13.0 - 17.7 g/dL Final      HCT Hematocrit   Date Value Ref Range Status   11/03/2020 39.8 37.5 - 51.0 % Final      Platlets No results found for: LABPLAT   MCV MCV   Date Value Ref Range Status   11/03/2020 95.4 79.0 - 97.0 fL Final          Sodium Sodium   Date Value Ref Range Status   11/03/2020 135 (L) 136 - 145 mmol/L Final      Potassium Potassium   Date Value Ref Range Status   11/03/2020 5.5 (H) 3.5 - 5.2 mmol/L Final     Comment:     Slight hemolysis detected by analyzer. Results may be affected.      Chloride Chloride   Date Value Ref Range Status   11/03/2020 105 98 - 107 mmol/L Final      CO2 CO2   Date Value Ref Range Status   11/03/2020 19.0 (L) 22.0 - 29.0 mmol/L Final      BUN BUN   Date Value Ref Range Status   11/03/2020 19 8 - 23 mg/dL Final      Creatinine Creatinine   Date Value Ref Range Status   11/03/2020 1.43 (H) 0.76 - 1.27 mg/dL Final      Calcium Calcium   Date Value Ref Range Status   11/03/2020 7.8 (L) 8.6 - 10.5 mg/dL Final      PO4 No results found for:  CAPO4   Albumin No results found for: ALBUMIN   Magnesium No results found for: MG   Uric Acid No results found for: URICACID       Imaging Results (Last 72 Hours)     ** No results found for the last 72 hours. **                        ASSESSMENT / PLAN      Left renal mass    · CKD3--most likely related to hypertensive nephrosclerosis.   · Hyperkalemia--avoid ACEi/ARBs. Low K diet.  · Acidosis  · Left nephrectomy for TCC  · HTN    Cr stable, low K diet  Add sodium bicarb  Avoid ACEi/ARBs/NSAIDS      I discussed the patients findings and my recommendations with patient, family and consulting provider    Will follow along closely. Thank you for allowing us to see this patient in renal consultation.    [facility]  [phone]  MD Herminio Gary MD  11/03/20  13:30 EST

## 2020-11-03 NOTE — PROGRESS NOTES
Urology Progress Note    Patient Identification:  Name:  Navneet Lind  Age:  74 y.o.  Sex:  male  :  1945  MRN:  [id number]    Chief Complaint: Patient with appropriate incisional pain    History of Present Illness: Patient tolerating sips.  Has not been out of bed.    Problem List:  Patient Active Problem List   Diagnosis   • Sick sinus syndrome (CMS/HCC)   • Presence of cardiac pacemaker   • Aortic stenosis   • Bundle branch block, right   • Coronary artery disease involving native coronary artery of native heart without angina pectoris   • Dyspnea on exertion   • Heart murmur   • Mixed hyperlipidemia   • Essential hypertension   • Impotence of organic origin   • Premature ventricular contractions   • NATALIA on CPAP   • Class 1 obesity due to excess calories without serious comorbidity with body mass index (BMI) of 31.0 to 31.9 in adult   • Preop cardiovascular exam   • Aortic valve regurgitation   • Left renal mass     Past Medical History:  Past Medical History:   Diagnosis Date   • Aortic stenosis 2015    Per Echo 2017   • Benign essential HTN    • Bundle branch block, right 2013   • CAD (coronary artery disease), native coronary artery    • Cancer (CMS/HCC)     bladder- chemo, new left renal mass   • Coronary artery disease involving native coronary artery of native heart without angina pectoris 2019    CABG 1995; SVG to RCA is occluded, LIMA to LAD, SVG to diag of LAD, SVG to marginal of LCX   • Essential hypertension 2019   • Heart murmur    • Hyperlipidemia, mixed    • Mixed hyperlipidemia 2019   • Near syncope    • NATALIA (obstructive sleep apnea)     AHI 11.6/h   • Premature ventricular contractions 2014   • Presence of cardiac pacemaker 2019    BS dual chamber PM 2016   • Sick sinus syndrome (CMS/HCC)    • SSS (sick sinus syndrome) (CMS/HCC) 2019     Past Surgical History:  Past Surgical History:   Procedure Laterality Date   • CARDIAC CATHETERIZATION   2018   • CARDIOVASCULAR STRESS TEST  2017   • CORONARY ARTERY BYPASS GRAFT  1995   • ECHO - CONVERTED  2017   • PACEMAKER IMPLANTATION  2016    bs     Home Meds:  Medications Prior to Admission   Medication Sig Dispense Refill Last Dose   • amLODIPine (NORVASC) 10 MG tablet Take 1 tablet by mouth Daily. (Patient taking differently: Take 10 mg by mouth Every Evening.) 90 tablet 1 11/1/2020 at Unknown time   • aspirin 81 MG EC tablet Take 81 mg by mouth Daily. LD 10/12 stopped for surgery   10/12/2020   • gabapentin (NEURONTIN) 800 MG tablet Take 800 mg by mouth Every Evening.   11/1/2020 at Unknown time   • simvastatin (ZOCOR) 40 MG tablet TAKE 1 TABLET BY MOUTH EVERY DAY (Patient taking differently: 40 mg Every Night.) 90 tablet 0 11/1/2020 at Unknown time   • HYDROcodone-acetaminophen (NORCO) 7.5-325 MG per tablet 1 tablet Every 6 (Six) Hours As Needed.   9/29/2020     Current Meds:    Current Facility-Administered Medications:   •  acetaminophen (TYLENOL) tablet 650 mg, 650 mg, Oral, Q4H PRN **OR** acetaminophen (TYLENOL) suppository 650 mg, 650 mg, Rectal, Q4H PRN, Rogers Bae MD  •  HYDROcodone-acetaminophen (NORCO) 7.5-325 MG per tablet 2 tablet, 2 tablet, Oral, Q4H PRN, Rogers Bae MD, 2 tablet at 11/03/20 0201  •  HYDROmorphone (DILAUDID) injection 0.5 mg, 0.5 mg, Intravenous, Q2H PRN, 0.5 mg at 11/03/20 0520 **AND** naloxone (NARCAN) injection 0.1 mg, 0.1 mg, Intravenous, Q5 Min PRN, Rogers Bae MD  •  influenza vac split quad (FLUZONE,FLUARIX,AFLURIA,FLULAVAL) injection 0.5 mL, 0.5 mL, Intramuscular, During Hospitalization, Rogers Bae MD  •  ondansetron (ZOFRAN) tablet 4 mg, 4 mg, Oral, Q6H PRN **OR** ondansetron (ZOFRAN) injection 4 mg, 4 mg, Intravenous, Q6H PRN, Rogers Bae MD, 4 mg at 11/03/20 0420  •  opium-belladonna (B&O SUPPRETTES) 16.2-30 MG suppository 30 mg, 30 mg, Rectal, Daily PRN, Rogers Bae MD  •  sodium chloride 0.9 % infusion, 50 mL/hr,  "Intravenous, Continuous, Rogers Bae MD, Last Rate: 50 mL/hr at 20 0910  •  sodium chloride 0.9 % infusion, 100 mL/hr, Intravenous, Continuous, Rogers Bae MD, Last Rate: 100 mL/hr at 20 020, 100 mL/hr at 20 020  Allergies:  Patient has no known allergies.    Review of Systems no fevers or chills.  No congestion or nasal discharge.    Objective:  tMax 24 hours:  Temp (24hrs), Av.3 °F (36.3 °C), Min:96.7 °F (35.9 °C), Max:97.7 °F (36.5 °C)    Vital Sign Ranges:  Temp:  [96.7 °F (35.9 °C)-97.7 °F (36.5 °C)] 97.3 °F (36.3 °C)  Heart Rate:  [61-97] 78  Resp:  [13-20] 18  BP: (119-156)/(52-82) 144/79  Intake and Output Last 3 Shifts:  I/O last 3 completed shifts:  In: 1500 [I.V.:1500]  Out: 45 [Drains:25; Blood:20]    Exam:  /79 (BP Location: Right arm, Patient Position: Lying)   Pulse 78   Temp 97.3 °F (36.3 °C) (Oral)   Resp 18   Ht 172.7 cm (68\")   Wt 99.8 kg (220 lb 0.3 oz)   SpO2 96%   BMI 33.45 kg/m²    General Appearance:    Alert, cooperative, no acute distress, general         appearance is normal   Head:    Normocephalic, without obvious abnormality, atraumatic   Eyes:            Pupils/Irises normal. Exterior inspection conjunctivae       and lids normal.   Ears:    Normal external inspection   Nose:   Exterior inspection of nose is normal   Throat:   Lips, mucosa, and tongue normal   Lungs:     Respirations unlabored; normal effort, no audible     abnormality   CV:   Regular rhythm and normal rate, no edema   Abdomen:    Soft, nondistended.  Incisions clean and dry.  MARII drain with serosanguineous drainage.   :  Huang with red-tinged urine     Data Review:  All labs (24hrs):    Lab Results (last 24 hours)     Procedure Component Value Units Date/Time    Tissue Pathology Exam [[id number]] Collected: 20 1134    Specimen: Tissue from Kidney, Left; Tissue from Ureter, Left Updated: 20 1406        Radiology:   Imaging Results (Last 72 Hours)     ** No " results found for the last 72 hours. **          Assessment/Plan:    Active Problems:    Left renal mass    Left renal mass  Postop day #1 status post left nephro ureterectomy    Plan  Increase diet as tolerated  Increase activity  Incentive spirometry    Neil Lew MD  11/3/2020  06:50 EST

## 2020-11-03 NOTE — PROGRESS NOTES
Discharge Planning Assessment  [facility]     Patient Name: Navneet Lind  MRN: [id number]  Today's Date: 11/3/2020    Admit Date: 11/2/2020    Discharge Needs Assessment     Row Name 11/03/20 1420       Living Environment    Lives With  spouse    Current Living Arrangements  home/apartment/condo    Primary Care Provided by  self    Provides Primary Care For  no one    Family Caregiver if Needed  spouse    Able to Return to Prior Arrangements  yes       Resource/Environmental Concerns    Resource/Environmental Concerns  none    Transportation Concerns  car, none       Transition Planning    Patient/Family Anticipates Transition to  home with family    Patient/Family Anticipated Services at Transition  none    Transportation Anticipated  family or friend will provide       Discharge Needs Assessment    Readmission Within the Last 30 Days  no previous admission in last 30 days    Equipment Currently Used at Home  none    Concerns to be Addressed  denies needs/concerns at this time    Anticipated Changes Related to Illness  none    Equipment Needed After Discharge  none    Provided Post Acute Provider List?  N/A        Discharge Plan     Row Name 11/03/20 1424       Plan    Plan  DC plan: home with family    Plan Comments  spoke to patient wearing PPE(mask and gloves and greater than 6ft apart).  patient states independent at home, lives with wife.  denies DC needs.        Continued Care and Services - Admitted Since 11/2/2020    Coordination has not been started for this encounter.         Demographic Summary     Row Name 11/03/20 1419       General Information    Admission Type  inpatient    Arrived From  home    Referral Source  admission list    Reason for Consult  discharge planning    Preferred Language  English     Used During This Interaction  no        Functional Status     Row Name 11/03/20 1420       Functional Status    Usual Activity Tolerance  good    Current Activity Tolerance  good        Functional Status, IADL    Medications  independent    Meal Preparation  independent    Housekeeping  independent    Laundry  independent    Shopping  independent       Mental Status    General Appearance WDL  WDL       Mental Status Summary    Recent Changes in Mental Status/Cognitive Functioning  no changes        Met with patient in room wearing PPE: mask, face shield/goggles    Maintained distance greater than six feet and spent less than 15 minutes in the room.        Ching Jackman RN

## 2020-11-03 NOTE — PLAN OF CARE
Goal Outcome Evaluation:  Plan of Care Reviewed With: patient  Progress: improving  Outcome Summary: Patient is resting at this time. FC remains intact. Bloody output. Moderate pain. Attempted PRN PO pain rx, patient refused. IV rx given. Clear liquids at this time. Tolerating but appetite seems decreased. Cont to monitor.

## 2020-11-03 NOTE — CONSULTS
"Nutrition Services    Patient Name: Navneet Lind  YOB: 1945  MRN: [id number]  Admission date: 11/2/2020    Comment:    Starting Boost Glucose Control BID given decreased PO intakes     *This assessment completed remotely with assistance from nursing communication and EMR documentation    PPE Documentation        PPE Worn By Provider N/A assessment completed remotely    PPE Worn By Patient  N/A      CLINICAL NUTRITION ASSESSMENT       Reason for Assessment 11/3: MST 2      H&P:  Patient is a 74-year-old white male who is status post cystoscopy left ureteroscopy, left nephroscopy with biopsy and stent couple weeks ago.  He presented with gross hematuria from that kidney. 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  He is here for a left nephro ureterectomy    HTN, CAD s/p CABG, Bladder cancer, HLD      Current Problems:   Left Renal Mass  - 11/2: s/p Left nephro uterectomy   - Urology Following      Nutrition/Diet History         Narrative     11/3: Called pt this afternoon who reports losing 25 lbs in 1.5 weeks (unable to currently prove per EMR documentation r/t questionable weights). Pt attributes his weight loss r/t ongoing nausea causing a very poor appetite. Prior to acute nausea, pt states maintaining weight & having a good appetite. Currently pt reports only being able to tolerate eating bites of pudding. He does not like regular Boost Plus, but prefers Boost Glucose Control Vanilla. RDN to visit pt in am to assess potential need for NPFE.      Functional Status PT is independent with ADLs. Pt is able to feed themselves.      Food Allergies NKFA      Factors Affecting   Nutritional Intake Left Renal Mass   Pain  Decreased appetite  Nausea      Anthropometrics        Current Height, Weight 172.7 cm (68\")   99.8 kg (220 lb) 11/3/20, ? Weight gain accuracy      "    Admit Height, Weight 90.5 kg (199 lb) 11/2/20, noted standing weight & likely more accurate            Ideal Body Weight (IBW) 154 lb    % Ideal Body Weight 143%        Usual Body Weight 220 lbs per pt    % Usual Body Weight 100%    Wt Change Observation 11/3: Noted questionable weight trends, will cont to monitor     Weight Hx  210 lb (10/23/20)  224 lb (4/26/19)   232 lb (4/2/18)      BMI kg/m2 33.45      Labs/Medications         Pertinent Labs Gluc 132 H, Na 135 L, K+ 5.5 H, Crt 143 H, Ca 7.8, Hgb 12.8 L        Pertinent Medications Cefazoliln, Neurontin, Zocor, Sodium Bicarb, NS @ 100 ml/hr, Norco prn, dilaudid prn, zofran prn      Physical Findings        Overall Physical   Appearance, MSA Unable to observe r/t limiting face-to-face contact in the context of the COVID19 pandemic   -  Edema  None documented      Gastrointestinal No BM x 1 day since admit      Tubes No feeding tubes      Oral/Mouth Cavity Pt denies any issues chewing or swallowing. Pt reports having several missing teeth.      Skin Left upper flank sx incision  Midline abdominal incision        Estimated/Assessed Needs       Energy Requirements    Height for Calculation  -   Weight for Calculation -   Method for Estimation  -   EST Needs (kcal/day) -       Protein Requirements    Weight for Calculation -   EST Protein Needs (g/kg) -   EST Daily Needs (g/day) -       Fluid Requirements     Estimated Needs (mL/day) -       Fluid Deficit    Current Na Level (mEq/L) -   Desired Na Level (mEq/L) -   Estimated Fluid Deficit (L)  -     Current Nutrition Orders & Evaluation of Received Nutrient/Fluid Intake       Oral Nutrition     Current PO Diet 2 gm K+ diet    Supplement None    PO Evaluation     % PO Intake 11/3: 0% x 1 solid meal recorded, previous meals documented were clear liquids    -  Enteral Nutrition    Enteral Route -   TF Modular -   TF Delivery Method -   Current Ordered TF  -   Current Ordered H2O flush  -    TF Observation  -   EN  Evaluation     TF Changes -    TF Residual -    TF Tolerance -    Average EN Delivered -       Parenteral Nutrition     TPN Route -   Current Ordered TPN VOL  -   Dextrose (g/kcals)  -   Amino Acid (g/kcals) -   Lipids (mL/Concentration/FREQ)  -   MVI Frequency  -   Trace Element Frequency  -   TPN Observation  -    TPN Evaluation    Total # Days on TPN -   -  Clinical Course    Nutrition Course Details PO diet    11/2 CLD  11/3 2 gm K+ Diet        Nutritional Risk Screening        NRS-2002 Score   -       Nutrition Diagnosis         Nutrition Dx Problem 1 Inadequate oral intakes r/t recent nausea & decreased appetite associated with renal mass AEB pt only eating 0% to bites of food and as per above pt interview.       Nutrition Dx Problem 2 -       Intervention Goal         Intervention Goal(s) PO intakes will improve to at least 50% of meals.     Pt will be accepting of oral nutritional supplements      Nutrition Intervention        RD/Tech Action Starting oral nutritional supplement     Monitor at follow up need to liberalize diet        Nutrition Prescription          Diet Prescription 2 gm K+    Supplement Prescription Boost Glucose Control BID (vanilla)    -  Enteral Prescription -       TPN Prescription -     Monitor/Evaluation        Monitor Weights, intakes, labs (especially K+), skin, BM and medication      Electronically signed by:  Melissa Rosa RD  11/03/20 15:04 EST

## 2020-11-03 NOTE — PLAN OF CARE
Goal Outcome Evaluation:  Plan of Care Reviewed With: patient  Progress: improving  Outcome Summary: pt still very drowsy today. cont with nausea, prn zofran given and effective. FC intact to bsd, still bloody but lighter in color today. unable to ambulate, stood at bed and dangled for 30 min.

## 2020-11-04 LAB
ALBUMIN SERPL-MCNC: 2.7 G/DL (ref 3.5–5.2)
ANION GAP SERPL CALCULATED.3IONS-SCNC: 8 MMOL/L (ref 5–15)
BASOPHILS # BLD AUTO: 0.1 10*3/MM3 (ref 0–0.2)
BASOPHILS NFR BLD AUTO: 0.4 % (ref 0–1.5)
BUN SERPL-MCNC: 18 MG/DL (ref 8–23)
BUN/CREAT SERPL: 14.6 (ref 7–25)
CALCIUM SPEC-SCNC: 8.4 MG/DL (ref 8.6–10.5)
CHLORIDE SERPL-SCNC: 100 MMOL/L (ref 98–107)
CO2 SERPL-SCNC: 26 MMOL/L (ref 22–29)
CREAT SERPL-MCNC: 1.23 MG/DL (ref 0.76–1.27)
DEPRECATED RDW RBC AUTO: 43.3 FL (ref 37–54)
EOSINOPHIL # BLD AUTO: 0.1 10*3/MM3 (ref 0–0.4)
EOSINOPHIL NFR BLD AUTO: 0.4 % (ref 0.3–6.2)
ERYTHROCYTE [DISTWIDTH] IN BLOOD BY AUTOMATED COUNT: 13.2 % (ref 12.3–15.4)
GFR SERPL CREATININE-BSD FRML MDRD: 58 ML/MIN/1.73
GLUCOSE SERPL-MCNC: 147 MG/DL (ref 65–99)
HCT VFR BLD AUTO: 35.9 % (ref 37.5–51)
HGB BLD-MCNC: 12 G/DL (ref 13–17.7)
LYMPHOCYTES # BLD AUTO: 1.1 10*3/MM3 (ref 0.7–3.1)
LYMPHOCYTES NFR BLD AUTO: 4.2 % (ref 19.6–45.3)
MAGNESIUM SERPL-MCNC: 2.4 MG/DL (ref 1.6–2.4)
MCH RBC QN AUTO: 31.1 PG (ref 26.6–33)
MCHC RBC AUTO-ENTMCNC: 33.3 G/DL (ref 31.5–35.7)
MCV RBC AUTO: 93.4 FL (ref 79–97)
MONOCYTES # BLD AUTO: 1.2 10*3/MM3 (ref 0.1–0.9)
MONOCYTES NFR BLD AUTO: 4.5 % (ref 5–12)
NEUTROPHILS NFR BLD AUTO: 22.9 10*3/MM3 (ref 1.7–7)
NEUTROPHILS NFR BLD AUTO: 90.5 % (ref 42.7–76)
NRBC BLD AUTO-RTO: 0 /100 WBC (ref 0–0.2)
PHOSPHATE SERPL-MCNC: 2.6 MG/DL (ref 2.5–4.5)
PLATELET # BLD AUTO: 222 10*3/MM3 (ref 140–450)
PMV BLD AUTO: 8.1 FL (ref 6–12)
POTASSIUM SERPL-SCNC: 4.5 MMOL/L (ref 3.5–5.2)
RBC # BLD AUTO: 3.85 10*6/MM3 (ref 4.14–5.8)
SODIUM SERPL-SCNC: 134 MMOL/L (ref 136–145)
TROPONIN T SERPL-MCNC: <0.01 NG/ML (ref 0–0.03)
WBC # BLD AUTO: 25.3 10*3/MM3 (ref 3.4–10.8)

## 2020-11-04 PROCEDURE — 97162 PT EVAL MOD COMPLEX 30 MIN: CPT

## 2020-11-04 PROCEDURE — 80069 RENAL FUNCTION PANEL: CPT | Performed by: INTERNAL MEDICINE

## 2020-11-04 PROCEDURE — 85025 COMPLETE CBC W/AUTO DIFF WBC: CPT | Performed by: UROLOGY

## 2020-11-04 PROCEDURE — 84484 ASSAY OF TROPONIN QUANT: CPT | Performed by: UROLOGY

## 2020-11-04 PROCEDURE — 25010000002 ONDANSETRON PER 1 MG: Performed by: UROLOGY

## 2020-11-04 PROCEDURE — 97116 GAIT TRAINING THERAPY: CPT

## 2020-11-04 PROCEDURE — 25010000002 HYDROMORPHONE PER 4 MG: Performed by: UROLOGY

## 2020-11-04 PROCEDURE — 25010000003 CEFAZOLIN PER 500 MG: Performed by: UROLOGY

## 2020-11-04 PROCEDURE — 83735 ASSAY OF MAGNESIUM: CPT | Performed by: INTERNAL MEDICINE

## 2020-11-04 RX ORDER — SODIUM BICARBONATE 650 MG/1
650 TABLET ORAL 3 TIMES DAILY
Status: DISCONTINUED | OUTPATIENT
Start: 2020-11-04 | End: 2020-11-09

## 2020-11-04 RX ORDER — HYDRALAZINE HYDROCHLORIDE 25 MG/1
25 TABLET, FILM COATED ORAL EVERY 8 HOURS SCHEDULED
Status: DISCONTINUED | OUTPATIENT
Start: 2020-11-04 | End: 2020-11-09 | Stop reason: HOSPADM

## 2020-11-04 RX ADMIN — GABAPENTIN 800 MG: 400 CAPSULE ORAL at 19:59

## 2020-11-04 RX ADMIN — HYDROMORPHONE HYDROCHLORIDE 0.5 MG: 2 INJECTION, SOLUTION INTRAMUSCULAR; INTRAVENOUS; SUBCUTANEOUS at 23:30

## 2020-11-04 RX ADMIN — HYDROMORPHONE HYDROCHLORIDE 0.5 MG: 2 INJECTION, SOLUTION INTRAMUSCULAR; INTRAVENOUS; SUBCUTANEOUS at 20:02

## 2020-11-04 RX ADMIN — ONDANSETRON 4 MG: 2 INJECTION INTRAMUSCULAR; INTRAVENOUS at 01:09

## 2020-11-04 RX ADMIN — CEFAZOLIN 1 G: 1 INJECTION, POWDER, FOR SOLUTION INTRAMUSCULAR; INTRAVENOUS at 12:47

## 2020-11-04 RX ADMIN — HYDROMORPHONE HYDROCHLORIDE 0.5 MG: 2 INJECTION, SOLUTION INTRAMUSCULAR; INTRAVENOUS; SUBCUTANEOUS at 01:10

## 2020-11-04 RX ADMIN — CEFAZOLIN 1 G: 1 INJECTION, POWDER, FOR SOLUTION INTRAMUSCULAR; INTRAVENOUS at 03:25

## 2020-11-04 RX ADMIN — AMLODIPINE BESYLATE 10 MG: 5 TABLET ORAL at 09:29

## 2020-11-04 RX ADMIN — HYDROMORPHONE HYDROCHLORIDE 0.5 MG: 2 INJECTION, SOLUTION INTRAMUSCULAR; INTRAVENOUS; SUBCUTANEOUS at 04:42

## 2020-11-04 RX ADMIN — CEFAZOLIN 1 G: 1 INJECTION, POWDER, FOR SOLUTION INTRAMUSCULAR; INTRAVENOUS at 19:57

## 2020-11-04 RX ADMIN — SODIUM BICARBONATE 650 MG TABLET 650 MG: at 16:17

## 2020-11-04 RX ADMIN — SODIUM BICARBONATE 650 MG TABLET 650 MG: at 23:29

## 2020-11-04 RX ADMIN — SODIUM BICARBONATE 650 MG TABLET 650 MG: at 09:29

## 2020-11-04 RX ADMIN — HYDRALAZINE HYDROCHLORIDE 25 MG: 25 TABLET, FILM COATED ORAL at 09:29

## 2020-11-04 RX ADMIN — HYDRALAZINE HYDROCHLORIDE 25 MG: 25 TABLET, FILM COATED ORAL at 23:29

## 2020-11-04 RX ADMIN — ALUMINUM HYDROXIDE, MAGNESIUM HYDROXIDE, AND DIMETHICONE 15 ML: 400; 400; 40 SUSPENSION ORAL at 03:42

## 2020-11-04 NOTE — PLAN OF CARE
Goal Outcome Evaluation:  Plan of Care Reviewed With: patient  Progress: improving  Outcome Summary: pt needs increased support for motivation on activity. only up in chair twice today for short periods. educated on activity and healing as well as nutrition.

## 2020-11-04 NOTE — PROGRESS NOTES
Nutrition Services    Patient Name:  Navneet Lind  YOB: 1945  MRN: [id number]  Admit Date:  11/2/2020    Progress note:     Attempted to see patient today to complete NFPE, however door closed & RN reports pt has requested not to be bothered at this time. Will complete full follow up tomorrow as planned.     Electronically signed by:  Melissa Rosa RD  11/04/20 13:08 EST

## 2020-11-04 NOTE — NURSING NOTE
Patient c/o nausea throughout night. Zofran and MAALOX have been given per orders. Patient states it was helping at first but is no longer fixing his issue. Emesis 25 ml, clear liquid. Patient states his stents are causing him to be nauseated and that this happen to him prior.   Cont to andriy

## 2020-11-04 NOTE — THERAPY EVALUATION
Patient Name: Navneet Lind  : 1945    MRN: [id number]                              Today's Date: 2020       Admit Date: 2020    Visit Dx:     ICD-10-CM ICD-9-CM   1. Left renal mass  N28.89 593.9     Patient Active Problem List   Diagnosis   • Sick sinus syndrome (CMS/HCC)   • Presence of cardiac pacemaker   • Aortic stenosis   • Bundle branch block, right   • Coronary artery disease involving native coronary artery of native heart without angina pectoris   • Dyspnea on exertion   • Heart murmur   • Mixed hyperlipidemia   • Essential hypertension   • Impotence of organic origin   • Premature ventricular contractions   • NATALIA on CPAP   • Class 1 obesity due to excess calories without serious comorbidity with body mass index (BMI) of 31.0 to 31.9 in adult   • Preop cardiovascular exam   • Aortic valve regurgitation   • Left renal mass     Past Medical History:   Diagnosis Date   • Aortic stenosis 2015    Per Echo 2017   • Benign essential HTN    • Bundle branch block, right 2013   • CAD (coronary artery disease), native coronary artery    • Cancer (CMS/HCC)     bladder- chemo, new left renal mass   • Coronary artery disease involving native coronary artery of native heart without angina pectoris 2019    CABG ; SVG to RCA is occluded, LIMA to LAD, SVG to diag of LAD, SVG to marginal of LCX   • Essential hypertension 2019   • Heart murmur    • Hyperlipidemia, mixed    • Mixed hyperlipidemia 2019   • Near syncope    • NATALIA (obstructive sleep apnea)     AHI 11.6/h   • Premature ventricular contractions 2014   • Presence of cardiac pacemaker 2019    BS dual chamber PM 2016   • Sick sinus syndrome (CMS/HCC)    • SSS (sick sinus syndrome) (CMS/HCC) 2019     Past Surgical History:   Procedure Laterality Date   • CARDIAC CATHETERIZATION     • CARDIOVASCULAR STRESS TEST     • CORONARY ARTERY BYPASS GRAFT     • ECHO - CONVERTED     •  NEPHROURETERECTOMY Left 11/2/2020    Procedure: NEPHROURETERECTOMY;  Surgeon: Rogers Bae MD;  Location: [facility] MAIN OR;  Service: Urology;  Laterality: Left;   • PACEMAKER IMPLANTATION  2016    bs     General Information     Row Name 11/04/20 1257          Physical Therapy Time and Intention    Document Type  evaluation  -     Mode of Treatment  physical therapy  -     Row Name 11/04/20 1257          General Information    Patient Profile Reviewed  yes  -HC     Prior Level of Function  independent:  -HC     Row Name 11/04/20 1257          Living Environment    Lives With  spouse  -HC     Row Name 11/04/20 1257          Home Main Entrance    Number of Stairs, Main Entrance  four  -HC     Row Name 11/04/20 1257          Cognition    Orientation Status (Cognition)  oriented x 4  -HC     Row Name 11/04/20 1257          Safety Issues, Functional Mobility    Safety Issues Affecting Function (Mobility)  safety precaution awareness  -     Impairments Affecting Function (Mobility)  postural/trunk control;pain;strength;shortness of breath;endurance/activity tolerance;balance  -       User Key  (r) = Recorded By, (t) = Taken By, (c) = Cosigned By    Initials Name Provider Type    HC Lucy Marie, PT Physical Therapist        Mobility     Row Name 11/04/20 1258          Bed Mobility    Bed Mobility  sit-supine  -     Sit-Supine [place] (Bed Mobility)  moderate assist (50% patient effort)  -     Assistive Device (Bed Mobility)  bed rails  -     Comment (Bed Mobility)  pt educated on logroll technique to assist with decreasing pain during bed mobility  -     Row Name 11/04/20 1258          Sit-Stand Transfer    Sit-Stand [place] (Transfers)  minimum assist (75% patient effort)  -     Assistive Device (Sit-Stand Transfers)  walker, front-wheeled  -     Row Name 11/04/20 1258          Gait/Stairs (Locomotion)    [place] Level (Gait)  minimum assist (75% patient effort)  -      Assistive Device (Gait)  walker, front-wheeled  -HC     Distance in Feet (Gait)  125 ft  -HC     Deviations/Abnormal Patterns (Gait)  gait speed decreased  -HC     [place] Level (Stairs)  contact guard  -HC     Handrail Location (Stairs)  right side (ascending)  -HC     Number of Steps (Stairs)  4  -HC     Ascending Technique (Stairs)  step-to-step  -HC     Descending Technique (Stairs)  step-to-step  -HC     Comment (Gait/Stairs)  increased reliance on RW for UE support, slow gait speed, fatigues quickly but motivated to continue to ambulate  -HC       User Key  (r) = Recorded By, (t) = Taken By, (c) = Cosigned By    Initials Name Provider Type    HC Lucy Marie, PT Physical Therapist        Obj/Interventions     Row Name 11/04/20 1259          Range of Motion Comprehensive    Comment, General Range of Motion  BLEs WFL  -HC     Row Name 11/04/20 1259          Strength Comprehensive (MMT)    Comment, General Manual Muscle Testing (MMT) Assessment  BLEs grossly 4/5  -HC     Row Name 11/04/20 1259          Balance    Balance Assessment  sitting static balance;sitting dynamic balance;standing static balance;standing dynamic balance  -HC     Static Sitting Balance  WFL  -HC     Dynamic Sitting Balance  WFL  -HC     Static Standing Balance  mild impairment  -HC     Dynamic Standing Balance  mild impairment  -HC     Row Name 11/04/20 1259          Sensory Assessment (Somatosensory)    Sensory Assessment (Somatosensory)  -- 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  -HC       User Key  (r) = Recorded By, (t) = Taken By, (c) = Cosigned By    Initials Name Provider Type     Lucy Marie, PT Physical Therapist        Goals/Plan     Row Name 11/04/20 1302          Bed Mobility Goal 1 (PT)    Activity/Assistive Device (Bed Mobility Goal 1, PT)  bed mobility activities, all  -HC     [place] Level/Cues Needed (Bed Mobility Goal 1, PT)  standby assist  -HC     Time Frame (Bed Mobility Goal 1, PT)  2 weeks   -HC     Row Name 11/04/20 1302          Transfer Goal 1 (PT)    Activity/Assistive Device (Transfer Goal 1, PT)  transfers, all  -HC     [place] Level/Cues Needed (Transfer Goal 1, PT)  standby assist  -HC     Time Frame (Transfer Goal 1, PT)  2 weeks  -HC     Row Name 11/04/20 1302          Gait Training Goal 1 (PT)    Activity/Assistive Device (Gait Training Goal 1, PT)  gait (walking locomotion);assistive device use  -HC     [place] Level (Gait Training Goal 1, PT)  standby assist  -HC     Distance (Gait Training Goal 1, PT)  300 ft  -HC     Time Frame (Gait Training Goal 1, PT)  2 weeks  -HC       User Key  (r) = Recorded By, (t) = Taken By, (c) = Cosigned By    Initials Name Provider Type    HC Lucy Marie, PT Physical Therapist        Clinical Impression     Row Name 11/04/20 1300          Pain    Additional Documentation  Pain Scale: FACES Pre/Post-Treatment (Group)  -     Row Name 11/04/20 1300          Pain Scale: FACES Pre/Post-Treatment    Pain: FACES Scale, Pretreatment  6-->hurts even more  -HC     Posttreatment Pain Rating  6-->hurts even more  -HC     Pre/Posttreatment Pain Comment  abdominal discomfort from sx  -HC     Row Name 11/04/20 1300          Plan of Care Review    Outcome Summary  Pt is 75 yo male s/p left nephroureterectomy and right ureteral stent placement on 11/2/2020 due to left renal transitional cell carcinoma.  Pt has hx of bladder cancer.  -     Row Name 11/04/20 1300          Therapy Assessment/Plan (PT)    Patient/Family Therapy Goals Statement (PT)  increase strength  -HC     Rehab Potential (PT)  good, to achieve stated therapy goals  -     Criteria for Skilled Interventions Met (PT)  yes;meets criteria  -     Predicted Duration of Therapy Intervention (PT)  until d/c  -HC     Row Name 11/04/20 1300          Positioning and Restraints    Pre-Treatment Position  sitting in chair/recliner  -HC     Post Treatment Position  bed  -HC     In Bed  notified  nsg;call light within reach;encouraged to call for assist wife present  -       User Key  (r) = Recorded By, (t) = Taken By, (c) = Cosigned By    Initials Name Provider Type     Lucy Marie, PT Physical Therapist        Outcome Measures     Row Name 11/04/20 1302          How much help from another person do you currently need...    Turning from your back to your side while in flat bed without using bedrails?  4  -HC     Moving from lying on back to sitting on the side of a flat bed without bedrails?  2  -HC     Moving to and from a bed to a chair (including a wheelchair)?  3  -HC     Standing up from a chair using your arms (e.g., wheelchair, bedside chair)?  3  -HC     Climbing 3-5 steps with a railing?  3  -HC     To walk in hospital room?  3  -     AM-PAC 6 Clicks Score (PT)  18  -HC     Row Name 11/04/20 1302          Functional Assessment    Outcome Measure Options  AM-PAC 6 Clicks Basic Mobility (PT)  -       User Key  (r) = Recorded By, (t) = Taken By, (c) = Cosigned By    Initials Name Provider Type     Lucy Marie, PT Physical Therapist        Physical Therapy Education                 Title: PT OT SLP Therapies (Done)     Topic: Physical Therapy (Done)     Point: Mobility training (Done)     Learning Progress Summary           Patient Acceptance, E, VU by  at 11/4/2020 1302                   Point: Precautions (Done)     Learning Progress Summary           Patient Acceptance, E, VU by  at 11/4/2020 1302                               User Key     Initials Effective Dates Name Provider Type Discipline     04/17/20 -  Lucy Marie, PT Physical Therapist PT              PT Recommendation and Plan  Planned Therapy Interventions (PT): balance training, bed mobility training, gait training, neuromuscular re-education, stair training, strengthening, home exercise program, patient/family education, postural re-education, transfer training  Plan of Care Reviewed With:  patient  Progress: improving  Outcome Summary: Pt is 73 yo male s/p left nephroureterectomy and right ureteral stent placement on 11/2/2020 due to left renal transitional cell carcinoma.  Pt has hx of bladder cancer.  Pt presents with fatigue and pain limiting endurance with mobility, however pt motivated to complete treatment session.  Pt able to complete transfers with increased time and [place] using RW.  Pt ambulated 125 ft with RW and ascend/descend 4 steps using one handrail with CGA.  Pt requiring modA for sit -> supine bed mobility for BLE mgmt and training on logroll to assist with discomfort with bed mobility.  Pt has strong support of wife who will be able to assist as needed at home.  Plan home with wife, HHPT, and possible need of RW pending pt progress.  PT will continue to follow while at [facility].  PPE donned: mask with faceshield, gloves.     Time Calculation:   PT Charges     Row Name 11/04/20 1306             Time Calculation    Start Time  0939  -      Stop Time  1017  -      Time Calculation (min)  38 min  -      PT Received On  11/04/20  -      PT - Next Appointment  11/05/20  -      PT Goal Re-Cert Due Date  11/18/20  -         Time Calculation- PT    Total Timed Code Minutes- PT  15 minute(s)  -        User Key  (r) = Recorded By, (t) = Taken By, (c) = Cosigned By    Initials Name Provider Type     Lucy Marie, PT Physical Therapist        Therapy Charges for Today     Code Description Service Date Service Provider Modifiers Qty    [id number]  PT EVAL MOD COMPLEXITY 3 11/4/2020 Lucy Marie, PT GP 1    [id number]  GAIT TRAINING EA 15 MIN 11/4/2020 Lucy Marie, PT GP 1          PT G-Codes  Outcome Measure Options: AM-PAC 6 Clicks Basic Mobility (PT)  AM-PAC 6 Clicks Score (PT): 18    Lucy Marie, TOYIN  11/4/2020

## 2020-11-04 NOTE — PLAN OF CARE
"Goal Outcome Evaluation:  Plan of Care Reviewed With: patient  Progress: improving  Outcome Summary: Patient is experiecing nausea. Refused ambulation. Zofran effective for nausea but once \"worn off\" patient reexperiences. Encouraged patient to try and get up to the bed today. PT was consulted to encourage and assist patient with getting out of the bed. Cont to andriy  "

## 2020-11-04 NOTE — PROGRESS NOTES
Urology Progress Note    Patient Identification:  Name:  Navneet Lind  Age:  74 y.o.  Sex:  male  :  1945  MRN:  [id number]    Subjective:   Sore, not ambulating or using I-S    Objective    Incisions look good.  Drain not putting much out so will be removed.  Leave Huang catheter in place    Problem List:  Patient Active Problem List   Diagnosis   • Sick sinus syndrome (CMS/HCC)   • Presence of cardiac pacemaker   • Aortic stenosis   • Bundle branch block, right   • Coronary artery disease involving native coronary artery of native heart without angina pectoris   • Dyspnea on exertion   • Heart murmur   • Mixed hyperlipidemia   • Essential hypertension   • Impotence of organic origin   • Premature ventricular contractions   • NATALIA on CPAP   • Class 1 obesity due to excess calories without serious comorbidity with body mass index (BMI) of 31.0 to 31.9 in adult   • Preop cardiovascular exam   • Aortic valve regurgitation   • Left renal mass     Past Medical History:  Past Medical History:   Diagnosis Date   • Aortic stenosis 2015    Per Echo    • Benign essential HTN    • Bundle branch block, right 2013   • CAD (coronary artery disease), native coronary artery    • Cancer (CMS/HCC)     bladder- chemo, new left renal mass   • Coronary artery disease involving native coronary artery of native heart without angina pectoris 2019    CABG 1995; SVG to RCA is occluded, LIMA to LAD, SVG to diag of LAD, SVG to marginal of LCX   • Essential hypertension 2019   • Heart murmur    • Hyperlipidemia, mixed    • Mixed hyperlipidemia 2019   • Near syncope    • NATALIA (obstructive sleep apnea)     AHI 11.6/h   • Premature ventricular contractions 2014   • Presence of cardiac pacemaker 2019    BS dual chamber PM 2016   • Sick sinus syndrome (CMS/HCC)    • SSS (sick sinus syndrome) (CMS/HCC) 2019     Past Surgical History:  Past Surgical History:   Procedure Laterality Date   •  CARDIAC CATHETERIZATION  2018   • CARDIOVASCULAR STRESS TEST  2017   • CORONARY ARTERY BYPASS GRAFT  1995   • ECHO - CONVERTED  2017   • NEPHROURETERECTOMY Left 11/2/2020    Procedure: NEPHROURETERECTOMY;  Surgeon: Rogers Bae MD;  Location: [facility] MAIN OR;  Service: Urology;  Laterality: Left;   • PACEMAKER IMPLANTATION  2016         Home Meds:  Medications Prior to Admission   Medication Sig Dispense Refill Last Dose   • amLODIPine (NORVASC) 10 MG tablet Take 1 tablet by mouth Daily. (Patient taking differently: Take 10 mg by mouth Every Evening.) 90 tablet 1 11/1/2020 at Unknown time   • aspirin 81 MG EC tablet Take 81 mg by mouth Daily. LD 10/12 stopped for surgery   10/12/2020   • gabapentin (NEURONTIN) 800 MG tablet Take 800 mg by mouth Every Evening.   11/1/2020 at Unknown time   • simvastatin (ZOCOR) 40 MG tablet TAKE 1 TABLET BY MOUTH EVERY DAY (Patient taking differently: 40 mg Every Night.) 90 tablet 0 11/1/2020 at Unknown time   • HYDROcodone-acetaminophen (NORCO) 7.5-325 MG per tablet 1 tablet Every 6 (Six) Hours As Needed.   9/29/2020     Current Meds:    Current Facility-Administered Medications:   •  acetaminophen (TYLENOL) tablet 650 mg, 650 mg, Oral, Q4H PRN **OR** acetaminophen (TYLENOL) suppository 650 mg, 650 mg, Rectal, Q4H PRN, Rogers Bae MD  •  aluminum-magnesium hydroxide-simethicone (MAALOX MAX) 400-400-40 MG/5ML suspension 15 mL, 15 mL, Oral, Q6H PRN, Neil Lew MD, 15 mL at 11/04/20 0342  •  amLODIPine (NORVASC) tablet 10 mg, 10 mg, Oral, Q24H, Rogers Bae MD  •  ceFAZolin 1 gm IVPB in 100 mL NS (MBP), 1 g, Intravenous, Q8H, Rogers Bae MD, 1 g at 11/04/20 0325  •  gabapentin (NEURONTIN) capsule 800 mg, 800 mg, Oral, Nightly, Rogers Bae MD, 800 mg at 11/03/20 2008  •  HYDROcodone-acetaminophen (NORCO) 7.5-325 MG per tablet 2 tablet, 2 tablet, Oral, Q4H PRN, Rogers Bae MD, 2 tablet at 11/03/20 0201  •  HYDROmorphone (DILAUDID) injection  "0.5 mg, 0.5 mg, Intravenous, Q2H PRN, 0.5 mg at 20 0442 **AND** naloxone (NARCAN) injection 0.1 mg, 0.1 mg, Intravenous, Q5 Min PRN, Rogers Bae MD  •  influenza vac split quad (FLUZONE,FLUARIX,AFLURIA,FLULAVAL) injection 0.5 mL, 0.5 mL, Intramuscular, During Hospitalization, Rogers Bae MD  •  ondansetron (ZOFRAN) tablet 4 mg, 4 mg, Oral, Q6H PRN **OR** ondansetron (ZOFRAN) injection 4 mg, 4 mg, Intravenous, Q6H PRN, Rogers Bae MD, 4 mg at 20 0109  •  opium-belladonna (B&O SUPPRETTES) 16.2-30 MG suppository 30 mg, 30 mg, Rectal, Daily PRN, Rogers Bae MD  •  simvastatin (ZOCOR) tablet 40 mg, 40 mg, Oral, Nightly, Rogers Bae MD  •  sodium bicarbonate tablet 1,300 mg, 1,300 mg, Oral, TID, Herminio Prieto MD, 1,300 mg at 20  •  sodium chloride 0.9 % infusion, 50 mL/hr, Intravenous, Continuous, Rogers Bae MD, Last Rate: 50 mL/hr at 20 0910  •  sodium chloride 0.9 % infusion, 100 mL/hr, Intravenous, Continuous, Rogers Bae MD, Last Rate: 100 mL/hr at 20, 100 mL/hr at 20  Allergies:  Patient has no known allergies.    Review of Systems:  Negative 12 point system review except for what is in the HPI    Objective:  tMax 24 hours:  Temp (24hrs), Av.1 °F (36.7 °C), Min:97.6 °F (36.4 °C), Max:98.6 °F (37 °C)    Vital Sign Ranges:  Temp:  [97.6 °F (36.4 °C)-98.6 °F (37 °C)] 98.3 °F (36.8 °C)  Heart Rate:  [80-91] 91  Resp:  [15-18] 18  BP: (146-179)/(72-90) 179/81  Intake and Output Last 3 Shifts:  I/O last 3 completed shifts:  In: 3505 [P.O.:820; I.V.:2685]  Out: 1580 [Urine:1500; Drains:60; Blood:20]    Exam:  /81 (BP Location: Left arm, Patient Position: Lying)   Pulse 91   Temp 98.3 °F (36.8 °C) (Oral)   Resp 18   Ht 172.7 cm (68\")   Wt 99.8 kg (220 lb 0.3 oz)   SpO2 96%   BMI 33.45 kg/m²    General Appearance:    Alert, cooperative, no acute distress, general       appearance is normal   Head:    " Normocephalic, without obvious abnormality, atraumatic   Eyes:            Pupils/irises normal. Exterior inspection conjunctivae       and lids normal.   Ears:    Normal external inspection   Nose:   Exterior inspection of nose is normal   Throat:   Lips, mucosa, and tongue normal   Neck:   No adenopathy, supple, symmetrical, trachea midline   Back:     No CVA tenderness   Lungs:     Respirations unlabored; normal effort, no audible     abnormality   CV:   Regular rhythm and normal rate, no edema   Abdomen:     No hernia, examination of the abdomen is normal with     no masses, tenderness, or distension    Male :  Normal   Musculoskeletal:   Inspection of the nails and digits reveal no abnormalities   Skin:   Inspection normal, palpation normal   Neurologic/Psych:   Orientation normal; Mood/Affect normal     Data Review:  All labs (24hrs):    Lab Results (last 24 hours)     Procedure Component Value Units Date/Time    Troponin [[id number]]  (Normal) Collected: 11/03/20 2138    Specimen: Blood Updated: 11/03/20 2224     Troponin T <0.010 ng/mL     Narrative:      Troponin T Reference Range:  <= 0.03 ng/mL-   Negative for AMI  >0.03 ng/mL-     Abnormal for myocardial necrosis.  Clinicians would have to utilize clinical acumen, EKG, Troponin and serial changes to determine if it is an Acute Myocardial Infarction or myocardial injury due to an underlying chronic condition.       Results may be falsely decreased if patient taking Biotin.      Tissue Pathology Exam [[id number]] Collected: 11/02/20 1134    Specimen: Tissue from Kidney, Left; Tissue from Ureter, Left Updated: 11/03/20 1410     Case Report --     Surgical Pathology Report                         Case: [id number]                                  Authorizing Provider:  Rogers Bae MD      Collected:           11/02/2020 11:34 AM          Ordering Location:     [facility] MAIN  Received:            11/02/2020 02:06 PM                                  OR                                                                           Pathologist:           Rupesh Garrett MD                                                            Specimens:   1) - Kidney, Left, Left Kidney and Ureter                                                           2) - Ureter, Left, Left ureter - stent curl is distal.                                      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)    Resection margins negative for malignancy    See synoptic template for additional details    Specimen #2 (Left ureter, resection):    Benign ureter with urothelial dysplasia    No invasive malignancy identified    Final resection margin negative for dysplasia    See COMMENT     DAMON/tkd        Synoptic Checklist --     RENAL PELVIS AND URETER: Resection  (RENAL PELVIS AND URETER - All Specimens)    8th Edition - Protocol posted: 8/28/2019    SPECIMEN     Procedure:    Nephroureterectomy      Specimen Laterality:    Left     TUMOR     Tumor Site:    Renal pelvis      Histologic Type:    Papillary urothelial carcinoma, invasive      Histologic Grade:    High-grade      Tumor Extension:    Tumor invades beyond muscularis into periureteral fat or peripelvic fat or the renal parenchyma      Lymphovascular Invasion:    Not identified      Tumor Configuration:    Papillary     MARGINS     Margins:    Uninvolved by invasive carcinoma and carcinoma in situ / noninvasive urothelial carcinoma     LYMPH NODES     Regional Lymph Nodes:    No lymph nodes submitted or found     PATHOLOGIC STAGE CLASSIFICATION (pTNM, AJCC 8th Edition)     Primary Tumor (pT):    pT3      Regional Lymph Nodes (pN):    pNX     ADDITIONAL FINDINGS     Associated Epithelial Lesions:    Urothelial dysplasia      Pathologic Findings in Ipsilateral Nonneoplastic Renal Tissue:    None identified        Comment --     Part 2: Deeper levels were  "examined through the block and support the rendered diagnosis.    [facility]        Gross Description --     Part 1: Received designated \"Left kidney\"  is a radical nephrectomy specimen encased in yellow fatty tissue measuring 17 x 10 x 8.5 cm. The fatty tissue strips easily from the kidney. Sectioning reveals yellow hoff cut surfaces. No nodules or masses are identified. A portion of adrenal tissue is identified near the superior pole. After removal of the fatty tissue the kidney measures 11.8 x 65. X 5.5 cm. A stent is present in the ureter. The kidney is bisected revealing reddish tan cut surfaces notable for an exophytic mass located in the pelvis which measures 3 cm in greatest dimension. Grossly the mass is located 4.5 cm from the ureteral resectin margin. Sectioning through the kidney tumor reveals whitish pink cut surfaces. No definite invasion is identified. Ureteral and vascular margins are submitted in cassette A. The entire tumor is submitted in cassettes B through F, a section of normal appearing adrenal is submitted in G and a section of normal kidney in H.    Part 2: Received designated \"Left ureter\" is a stented ureter which measures 18.5 cm in length and varies from 0.5 to 1.5 cm in diameter. Sectioning through the ureter reveals a patent lumen without mass or obstruction. The distal ureter margin is submitted in cassette A. Representative random sections are submitted in cassette B and proceeding proximally.     [facility]/tkd        Basic Metabolic Panel [[id number]]  (Abnormal) Collected: 11/03/20 0902    Specimen: Blood Updated: 11/03/20 0951     Glucose 132 mg/dL      BUN 19 mg/dL      Creatinine 1.43 mg/dL      Sodium 135 mmol/L      Potassium 5.5 mmol/L      Comment: Slight hemolysis detected by analyzer. Results may be affected.        Chloride 105 mmol/L      CO2 19.0 mmol/L      Calcium 7.8 mg/dL      eGFR Non African Amer 48 mL/min/1.73      BUN/Creatinine Ratio 13.3     Anion Gap 11.0 mmol/L  "    Narrative:      GFR Normal >60  Chronic Kidney Disease <60  Kidney Failure <15      CBC & Differential [[id number]]  (Abnormal) Collected: 11/03/20 0902    Specimen: Blood Updated: 11/03/20 0913    Narrative:      The following orders were created for panel order CBC & Differential.  Procedure                               Abnormality         Status                     ---------                               -----------         ------                     CBC Auto Differential[[id number]]        Abnormal            Final result                 Please view results for these tests on the individual orders.    CBC Auto Differential [[id number]]  (Abnormal) Collected: 11/03/20 0902    Specimen: Blood Updated: 11/03/20 0913     WBC 27.30 10*3/mm3      RBC 4.17 10*6/mm3      Hemoglobin 12.8 g/dL      Hematocrit 39.8 %      MCV 95.4 fL      MCH 30.8 pg      MCHC 32.3 g/dL      RDW 13.4 %      RDW-SD 45.1 fl      MPV 8.2 fL      Platelets 239 10*3/mm3      Neutrophil % 84.0 %      Lymphocyte % 6.4 %      Monocyte % 8.1 %      Eosinophil % 0.5 %      Basophil % 1.0 %      Neutrophils, Absolute 22.90 10*3/mm3      Lymphocytes, Absolute 1.70 10*3/mm3      Monocytes, Absolute 2.20 10*3/mm3      Eosinophils, Absolute 0.10 10*3/mm3      Basophils, Absolute 0.30 10*3/mm3      nRBC 0.0 /100 WBC         Radiology:   Imaging Results (Last 72 Hours)     ** No results found for the last 72 hours. **          Assessment/Plan:    Left renal mass    Status post left nephro ureterectomy stop day #2    Renal insufficiency with elevated creatinine and potassium for which nephrology is following    Path and labs pending    Incentive spirometer    Get physical therapy involved to help patient ambulate better    Rogers Bae MD  11/4/2020  06:56 EST

## 2020-11-04 NOTE — PROGRESS NOTES
Continued Stay Note  ISIDRO Rodríguez     Patient Name: Navneet Lind  MRN: [id number]  Today's Date: 11/4/2020    Admit Date: 11/2/2020    Discharge Plan     Row Name 11/04/20 1309       Plan    Plan  D/C Plan: Home with family. Declines home health at this time.    Plan Comments   spoke to patient and spouse at bedside wearing mask and goggles and keeping distance greater than 6 feet and spent less than 15 minutes in room. CM discussed PT recommendations for home health-declined at this time, but agreeable to reconsidering closer to d/c. Barrier to D/C: IV abx, monitoring renal function.          Expected Discharge Date and Time     Expected Discharge Date Expected Discharge Time    Nov 7, 2020             Mehreen Ferrer

## 2020-11-04 NOTE — PLAN OF CARE
Problem: Adult Inpatient Plan of Care  Goal: Plan of Care Review  Outcome: Ongoing, Progressing  Flowsheets  Taken 11/4/2020 1303  Progress: improving  Plan of Care Reviewed With: patient  Outcome Summary: Pt is 73 yo male s/p left nephroureterectomy and right ureteral stent placement on 11/2/2020 due to left renal transitional cell carcinoma.  Pt has hx of bladder cancer.  Pt presents with fatigue and pain limiting endurance with mobility, however pt motivated to complete treatment session.  Pt able to complete transfers with increased time and Ingrid using RW.  Pt ambulated 125 ft with RW and ascend/descend 4 steps using one handrail with CGA.  Pt requiring modA for sit -> supine bed mobility for BLE mgmt and training on logroll to assist with discomfort with bed mobility.  Pt has strong support of wife who will be able to assist as needed at home.  Plan home with wife, HHPT, and possible need of RW pending pt progress.  PT will continue to follow while at [facility].  PPE donned: mask with faceshield, gloves.

## 2020-11-04 NOTE — PROGRESS NOTES
"NEPHROLOGY PROGRESS NOTE------[facility]    [facility]  [phone]  Lauren Veliz MD      Patient Care Team:  Uri Cortes MD as PCP - General  Provider, No Known as PCP - Family Medicine  Andre Veliz MD as Consulting Physician (Nephrology)      Provider:  Lauren Veliz MD  Patient Name: Navneet Lind  :  1945    SUBJECTIVE:    F/U CRF/CKD STG 3A    Some n/v. Generally malaised. Mild SOB. No angina. Still with carrera and some gross hematuria.    Medication:  amLODIPine, 10 mg, Oral, Q24H  ceFAZolin, 1 g, Intravenous, Q8H  gabapentin, 800 mg, Oral, Nightly  simvastatin, 40 mg, Oral, Nightly  sodium bicarbonate, 1,300 mg, Oral, TID      sodium chloride, 50 mL/hr, Last Rate: 50 mL/hr (20)  sodium chloride, 100 mL/hr, Last Rate: 100 mL/hr (20)        OBJECTIVE    Vital Sign Min/Max for last 24 hours  Temp  Min: 97.6 °F (36.4 °C)  Max: 98.6 °F (37 °C)   BP  Min: 146/90  Max: 179/81   Pulse  Min: 80  Max: 91   Resp  Min: 15  Max: 18   SpO2  Min: 96 %  Max: 98 %   No data recorded   No data recorded     Flowsheet Rows      First Filed Value   Admission Height  172.7 cm (68\") Documented at 10/23/2020 1608   Admission Weight  95.3 kg (210 lb) Documented at 10/23/2020 1608          No intake/output data recorded.  I/O last 3 completed shifts:  In: 2245 [P.O.:1060; I.V.:1185]  Out: 2155 [Urine:2100; Emesis/NG output:20; Drains:35]    Physical Exam:  General Appearance: alert, appears stated age and cooperative  Head: normocephalic, without obvious abnormality and atraumatic  Eyes: conjunctivae and sclerae normal and no icterus  Neck: supple and no JVD  Lungs: DECREASED BS BIBASILAR WITH FEW SCATTERED RHONCHI  Heart: regular rhythm & normal rate and normal S1, S2 +LAURIE  Chest: Wall no abnormalities observed  Abdomen: normal bowel sounds and soft non-tender  Extremities: moves extremities well, no edema, no " cyanosis and no redness  Skin: no bleeding, bruising or rash, turgor normal, color normal and no lesions noted  Neurologic: Alert, and oriented. No focal deficits    Labs:    WBC WBC   Date Value Ref Range Status   11/04/2020 25.30 (H) 3.40 - 10.80 10*3/mm3 Final   11/03/2020 27.30 (H) 3.40 - 10.80 10*3/mm3 Final      HGB Hemoglobin   Date Value Ref Range Status   11/04/2020 12.0 (L) 13.0 - 17.7 g/dL Final   11/03/2020 12.8 (L) 13.0 - 17.7 g/dL Final      HCT Hematocrit   Date Value Ref Range Status   11/04/2020 35.9 (L) 37.5 - 51.0 % Final   11/03/2020 39.8 37.5 - 51.0 % Final      Platlets No results found for: LABPLAT   MCV MCV   Date Value Ref Range Status   11/04/2020 93.4 79.0 - 97.0 fL Final   11/03/2020 95.4 79.0 - 97.0 fL Final          Sodium Sodium   Date Value Ref Range Status   11/03/2020 135 (L) 136 - 145 mmol/L Final      Potassium Potassium   Date Value Ref Range Status   11/03/2020 5.5 (H) 3.5 - 5.2 mmol/L Final     Comment:     Slight hemolysis detected by analyzer. Results may be affected.      Chloride Chloride   Date Value Ref Range Status   11/03/2020 105 98 - 107 mmol/L Final      CO2 CO2   Date Value Ref Range Status   11/03/2020 19.0 (L) 22.0 - 29.0 mmol/L Final      BUN BUN   Date Value Ref Range Status   11/03/2020 19 8 - 23 mg/dL Final      Creatinine Creatinine   Date Value Ref Range Status   11/03/2020 1.43 (H) 0.76 - 1.27 mg/dL Final      Calcium Calcium   Date Value Ref Range Status   11/03/2020 7.8 (L) 8.6 - 10.5 mg/dL Final      PO4 No components found for: PO4   Albumin No results found for: ALBUMIN   Magnesium No results found for: MG   Uric Acid No components found for: URIC ACID     Imaging Results (Last 72 Hours)     ** No results found for the last 72 hours. **                      ASSESSMENT / PLAN      Left renal mass    1. CRF/CKD STG 3A------Nonoliguric. Stable. Back down IVFs. No NSAIDs or IV dye. Dose meds for CrCl 45-60 cc/min.     2. HYPERKALEMIA------Resolved    3.  HTN WITH CKD-----BP okay. Avoid hypotension. No ACE-I/ARB/DRI    4. S/P LEFT NEPHRECOTOMY------per , Urology    5. ACIDOSIS--------Resolved    6. ANEMIA------H/H stable.     7. VERY MILD HYPONATREMIA--------Hypotonic and clinically hypovolemic. Continue NEGIN Veliz MD  [facility]  [phone]  11/04/20  07:10 EST

## 2020-11-04 NOTE — NURSING NOTE
Patient c/o chest discomfort. States that is occasionally happens at home as well. STAT EKG ordered and Troponin now and in the AM. EKG is comparable to old. Patient states pain has subsided. Continue to monitor.

## 2020-11-05 LAB
ALBUMIN SERPL-MCNC: 2.4 G/DL (ref 3.5–5.2)
ALBUMIN/GLOB SERPL: 0.8 G/DL
ALP SERPL-CCNC: 70 U/L (ref 39–117)
ALT SERPL W P-5'-P-CCNC: 9 U/L (ref 1–41)
ANION GAP SERPL CALCULATED.3IONS-SCNC: 6 MMOL/L (ref 5–15)
AST SERPL-CCNC: 24 U/L (ref 1–40)
BASOPHILS # BLD AUTO: 0.1 10*3/MM3 (ref 0–0.2)
BASOPHILS NFR BLD AUTO: 0.3 % (ref 0–1.5)
BILIRUB SERPL-MCNC: 0.3 MG/DL (ref 0–1.2)
BUN SERPL-MCNC: 21 MG/DL (ref 8–23)
BUN/CREAT SERPL: 21.6 (ref 7–25)
CA-I SERPL ISE-MCNC: 1.19 MMOL/L (ref 1.2–1.3)
CALCIUM SPEC-SCNC: 8.1 MG/DL (ref 8.6–10.5)
CHLORIDE SERPL-SCNC: 100 MMOL/L (ref 98–107)
CO2 SERPL-SCNC: 23 MMOL/L (ref 22–29)
CREAT SERPL-MCNC: 0.97 MG/DL (ref 0.76–1.27)
DEPRECATED RDW RBC AUTO: 42.4 FL (ref 37–54)
EOSINOPHIL # BLD AUTO: 0.4 10*3/MM3 (ref 0–0.4)
EOSINOPHIL NFR BLD AUTO: 2.8 % (ref 0.3–6.2)
ERYTHROCYTE [DISTWIDTH] IN BLOOD BY AUTOMATED COUNT: 12.8 % (ref 12.3–15.4)
GFR SERPL CREATININE-BSD FRML MDRD: 76 ML/MIN/1.73
GLOBULIN UR ELPH-MCNC: 2.9 GM/DL
GLUCOSE SERPL-MCNC: 110 MG/DL (ref 65–99)
HCT VFR BLD AUTO: 30.1 % (ref 37.5–51)
HGB BLD-MCNC: 10.1 G/DL (ref 13–17.7)
LYMPHOCYTES # BLD AUTO: 1.9 10*3/MM3 (ref 0.7–3.1)
LYMPHOCYTES NFR BLD AUTO: 11.7 % (ref 19.6–45.3)
MAGNESIUM SERPL-MCNC: 2.1 MG/DL (ref 1.6–2.4)
MCH RBC QN AUTO: 31.2 PG (ref 26.6–33)
MCHC RBC AUTO-ENTMCNC: 33.6 G/DL (ref 31.5–35.7)
MCV RBC AUTO: 92.9 FL (ref 79–97)
MONOCYTES # BLD AUTO: 1.1 10*3/MM3 (ref 0.1–0.9)
MONOCYTES NFR BLD AUTO: 6.9 % (ref 5–12)
NEUTROPHILS NFR BLD AUTO: 12.6 10*3/MM3 (ref 1.7–7)
NEUTROPHILS NFR BLD AUTO: 78.3 % (ref 42.7–76)
NRBC BLD AUTO-RTO: 0 /100 WBC (ref 0–0.2)
PHOSPHATE SERPL-MCNC: 1.3 MG/DL (ref 2.5–4.5)
PLATELET # BLD AUTO: 189 10*3/MM3 (ref 140–450)
PMV BLD AUTO: 8.1 FL (ref 6–12)
POTASSIUM SERPL-SCNC: 4 MMOL/L (ref 3.5–5.2)
PROT SERPL-MCNC: 5.3 G/DL (ref 6–8.5)
RBC # BLD AUTO: 3.24 10*6/MM3 (ref 4.14–5.8)
SODIUM SERPL-SCNC: 129 MMOL/L (ref 136–145)
WBC # BLD AUTO: 16.1 10*3/MM3 (ref 3.4–10.8)

## 2020-11-05 PROCEDURE — 82330 ASSAY OF CALCIUM: CPT | Performed by: INTERNAL MEDICINE

## 2020-11-05 PROCEDURE — 80053 COMPREHEN METABOLIC PANEL: CPT | Performed by: INTERNAL MEDICINE

## 2020-11-05 PROCEDURE — 25010000002 CALCIUM GLUCONATE-NACL 1-0.675 GM/50ML-% SOLUTION: Performed by: INTERNAL MEDICINE

## 2020-11-05 PROCEDURE — 25010000003 CEFAZOLIN PER 500 MG: Performed by: UROLOGY

## 2020-11-05 PROCEDURE — 84100 ASSAY OF PHOSPHORUS: CPT | Performed by: INTERNAL MEDICINE

## 2020-11-05 PROCEDURE — 83735 ASSAY OF MAGNESIUM: CPT | Performed by: INTERNAL MEDICINE

## 2020-11-05 PROCEDURE — 85025 COMPLETE CBC W/AUTO DIFF WBC: CPT | Performed by: INTERNAL MEDICINE

## 2020-11-05 PROCEDURE — 25010000002 HYDROMORPHONE PER 4 MG: Performed by: UROLOGY

## 2020-11-05 RX ORDER — CALCIUM GLUCONATE 20 MG/ML
1 INJECTION, SOLUTION INTRAVENOUS EVERY 12 HOURS
Status: COMPLETED | OUTPATIENT
Start: 2020-11-05 | End: 2020-11-05

## 2020-11-05 RX ADMIN — CEFAZOLIN 1 G: 1 INJECTION, POWDER, FOR SOLUTION INTRAMUSCULAR; INTRAVENOUS at 03:26

## 2020-11-05 RX ADMIN — SODIUM BICARBONATE 650 MG TABLET 650 MG: at 08:31

## 2020-11-05 RX ADMIN — CALCIUM GLUCONATE 1 G: 20 INJECTION, SOLUTION INTRAVENOUS at 23:04

## 2020-11-05 RX ADMIN — CEFAZOLIN 1 G: 1 INJECTION, POWDER, FOR SOLUTION INTRAMUSCULAR; INTRAVENOUS at 14:30

## 2020-11-05 RX ADMIN — GABAPENTIN 800 MG: 400 CAPSULE ORAL at 21:15

## 2020-11-05 RX ADMIN — CEFAZOLIN 1 G: 1 INJECTION, POWDER, FOR SOLUTION INTRAMUSCULAR; INTRAVENOUS at 21:14

## 2020-11-05 RX ADMIN — HYDROMORPHONE HYDROCHLORIDE 0.5 MG: 2 INJECTION, SOLUTION INTRAMUSCULAR; INTRAVENOUS; SUBCUTANEOUS at 11:14

## 2020-11-05 RX ADMIN — HYDRALAZINE HYDROCHLORIDE 25 MG: 25 TABLET, FILM COATED ORAL at 21:15

## 2020-11-05 RX ADMIN — ATROPA BELLADONNA AND OPIUM 30 MG: 16.2; 3 SUPPOSITORY RECTAL at 14:18

## 2020-11-05 RX ADMIN — SODIUM BICARBONATE 650 MG TABLET 650 MG: at 21:15

## 2020-11-05 RX ADMIN — SODIUM BICARBONATE 650 MG TABLET 650 MG: at 16:08

## 2020-11-05 RX ADMIN — HYDRALAZINE HYDROCHLORIDE 25 MG: 25 TABLET, FILM COATED ORAL at 13:10

## 2020-11-05 RX ADMIN — CALCIUM GLUCONATE 1 G: 20 INJECTION, SOLUTION INTRAVENOUS at 13:10

## 2020-11-05 RX ADMIN — SODIUM CHLORIDE 15 MMOL: 9 INJECTION, SOLUTION INTRAVENOUS at 08:32

## 2020-11-05 RX ADMIN — AMLODIPINE BESYLATE 10 MG: 5 TABLET ORAL at 08:32

## 2020-11-05 RX ADMIN — HYDROMORPHONE HYDROCHLORIDE 0.5 MG: 2 INJECTION, SOLUTION INTRAMUSCULAR; INTRAVENOUS; SUBCUTANEOUS at 14:04

## 2020-11-05 NOTE — PROGRESS NOTES
Continued Stay Note  ISIDRO Rodríguez     Patient Name: Navneet Lind  MRN: [id number]  Today's Date: 11/5/2020    Admit Date: 11/2/2020    Discharge Plan     Row Name 11/05/20 1506       Plan    Plan Comments  Barrier to D/C: IV abx, IVFs.          Expected Discharge Date and Time     Expected Discharge Date Expected Discharge Time    Nov 7, 2020             Mehreen Ferrer

## 2020-11-05 NOTE — PROGRESS NOTES
Urology Progress Note    Patient Identification:  Name:  Navneet Lind  Age:  74 y.o.  Sex:  male  :  1945  MRN:  [id number]    Subjective:   Feeling a little better.  Tolerating sips and ambulating    Objective    Incisions look good and drain was removed yesterday    Problem List:  Patient Active Problem List   Diagnosis   • Sick sinus syndrome (CMS/HCC)   • Presence of cardiac pacemaker   • Aortic stenosis   • Bundle branch block, right   • Coronary artery disease involving native coronary artery of native heart without angina pectoris   • Dyspnea on exertion   • Heart murmur   • Mixed hyperlipidemia   • Essential hypertension   • Impotence of organic origin   • Premature ventricular contractions   • NATALIA on CPAP   • Class 1 obesity due to excess calories without serious comorbidity with body mass index (BMI) of 31.0 to 31.9 in adult   • Preop cardiovascular exam   • Aortic valve regurgitation   • Left renal mass     Past Medical History:  Past Medical History:   Diagnosis Date   • Aortic stenosis 2015    Per Echo    • Benign essential HTN    • Bundle branch block, right 2013   • CAD (coronary artery disease), native coronary artery    • Cancer (CMS/HCC)     bladder- chemo, new left renal mass   • Coronary artery disease involving native coronary artery of native heart without angina pectoris 2019    CABG 1995; SVG to RCA is occluded, LIMA to LAD, SVG to diag of LAD, SVG to marginal of LCX   • Essential hypertension 2019   • Heart murmur    • Hyperlipidemia, mixed    • Mixed hyperlipidemia 2019   • Near syncope    • NATALIA (obstructive sleep apnea)     AHI 11.6/h   • Premature ventricular contractions 2014   • Presence of cardiac pacemaker 2019    BS dual chamber PM 2016   • Sick sinus syndrome (CMS/HCC)    • SSS (sick sinus syndrome) (CMS/HCC) 2019     Past Surgical History:  Past Surgical History:   Procedure Laterality Date   • CARDIAC CATHETERIZATION   2018   • CARDIOVASCULAR STRESS TEST  2017   • CORONARY ARTERY BYPASS GRAFT  1995   • ECHO - CONVERTED  2017   • NEPHROURETERECTOMY Left 11/2/2020    Procedure: NEPHROURETERECTOMY;  Surgeon: Rogers Bae MD;  Location: [facility] MAIN OR;  Service: Urology;  Laterality: Left;   • PACEMAKER IMPLANTATION  2016    bs     Home Meds:  Medications Prior to Admission   Medication Sig Dispense Refill Last Dose   • amLODIPine (NORVASC) 10 MG tablet Take 1 tablet by mouth Daily. (Patient taking differently: Take 10 mg by mouth Every Evening.) 90 tablet 1 11/1/2020 at Unknown time   • aspirin 81 MG EC tablet Take 81 mg by mouth Daily. LD 10/12 stopped for surgery   10/12/2020   • gabapentin (NEURONTIN) 800 MG tablet Take 800 mg by mouth Every Evening.   11/1/2020 at Unknown time   • simvastatin (ZOCOR) 40 MG tablet TAKE 1 TABLET BY MOUTH EVERY DAY (Patient taking differently: 40 mg Every Night.) 90 tablet 0 11/1/2020 at Unknown time   • HYDROcodone-acetaminophen (NORCO) 7.5-325 MG per tablet 1 tablet Every 6 (Six) Hours As Needed.   9/29/2020     Current Meds:    Current Facility-Administered Medications:   •  acetaminophen (TYLENOL) tablet 650 mg, 650 mg, Oral, Q4H PRN **OR** acetaminophen (TYLENOL) suppository 650 mg, 650 mg, Rectal, Q4H PRN, Rogers Bae MD  •  amLODIPine (NORVASC) tablet 10 mg, 10 mg, Oral, Q24H, Rogers Bae MD, 10 mg at 11/04/20 0929  •  ceFAZolin 1 gm IVPB in 100 mL NS (MBP), 1 g, Intravenous, Q8H, Rogers Bae MD, 1 g at 11/05/20 0326  •  gabapentin (NEURONTIN) capsule 800 mg, 800 mg, Oral, Nightly, Rogers Bae MD, 800 mg at 11/04/20 1959  •  hydrALAZINE (APRESOLINE) tablet 25 mg, 25 mg, Oral, Q8H, Andre Veliz MD, 25 mg at 11/04/20 2329  •  HYDROcodone-acetaminophen (NORCO) 7.5-325 MG per tablet 2 tablet, 2 tablet, Oral, Q4H PRN, Rogers Bae MD, 2 tablet at 11/03/20 0201  •  HYDROmorphone (DILAUDID) injection 0.5 mg, 0.5 mg, Intravenous, Q2H PRN, 0.5 mg  "at 20 2330 **AND** naloxone (NARCAN) injection 0.1 mg, 0.1 mg, Intravenous, Q5 Min PRN, Rogers Bae MD  •  influenza vac split quad (FLUZONE,FLUARIX,AFLURIA,FLULAVAL) injection 0.5 mL, 0.5 mL, Intramuscular, During Hospitalization, Rogers Bae MD  •  ondansetron (ZOFRAN) tablet 4 mg, 4 mg, Oral, Q6H PRN **OR** ondansetron (ZOFRAN) injection 4 mg, 4 mg, Intravenous, Q6H PRN, Rogers Bae MD, 4 mg at 20 0109  •  opium-belladonna (B&O SUPPRETTES) 16.2-30 MG suppository 30 mg, 30 mg, Rectal, Daily PRN, Rogers Bae MD  •  simvastatin (ZOCOR) tablet 40 mg, 40 mg, Oral, Nightly, Rogers Bae MD  •  sodium bicarbonate tablet 650 mg, 650 mg, Oral, TID, Andre Veliz MD, 650 mg at 20 2329  •  sodium chloride 0.9 % infusion, 75 mL/hr, Intravenous, Continuous, Andre Veliz MD, Last Rate: 75 mL/hr at 20 0930, 75 mL/hr at 20 09  Allergies:  Patient has no known allergies.    Review of Systems:  Negative 12 point system review except for what is in the HPI    Objective:  tMax 24 hours:  Temp (24hrs), Av.5 °F (36.9 °C), Min:98 °F (36.7 °C), Max:99.1 °F (37.3 °C)    Vital Sign Ranges:  Temp:  [98 °F (36.7 °C)-99.1 °F (37.3 °C)] 98.4 °F (36.9 °C)  Heart Rate:  [91-97] 91  Resp:  [17-20] 17  BP: (125-155)/(58-77) 125/72  Intake and Output Last 3 Shifts:  I/O last 3 completed shifts:  In: 960 [P.O.:960]  Out: 1580 [Urine:1550; Emesis/NG output:20; Drains:10]    Exam:  /72 (BP Location: Left arm, Patient Position: Lying)   Pulse 91   Temp 98.4 °F (36.9 °C) (Oral)   Resp 17   Ht 172.7 cm (68\")   Wt 99.8 kg (220 lb 0.3 oz)   SpO2 97%   BMI 33.45 kg/m²    General Appearance:    Alert, cooperative, no acute distress, general       appearance is normal   Head:    Normocephalic, without obvious abnormality, atraumatic   Eyes:            Pupils/irises normal. Exterior inspection conjunctivae       and lids normal.   Ears:    Normal " external inspection   Nose:   Exterior inspection of nose is normal   Throat:   Lips, mucosa, and tongue normal   Neck:   No adenopathy, supple, symmetrical, trachea midline   Back:     No CVA tenderness   Lungs:     Respirations unlabored; normal effort, no audible     abnormality   CV:   Regular rhythm and normal rate, no edema   Abdomen:     No hernia, examination of the abdomen is normal with     no masses, tenderness, or distension    Male :  Normal   Musculoskeletal:   Inspection of the nails and digits reveal no abnormalities   Skin:   Inspection normal, palpation normal   Neurologic/Psych:   Orientation normal; Mood/Affect normal     Data Review:  All labs (24hrs):    Lab Results (last 24 hours)     Procedure Component Value Units Date/Time    Comprehensive Metabolic Panel [[id number]]  (Abnormal) Collected: 11/05/20 0506    Specimen: Blood Updated: 11/05/20 0610     Glucose 110 mg/dL      BUN 21 mg/dL      Creatinine 0.97 mg/dL      Sodium 129 mmol/L      Potassium 4.0 mmol/L      Chloride 100 mmol/L      CO2 23.0 mmol/L      Calcium 8.1 mg/dL      Total Protein 5.3 g/dL      Albumin 2.40 g/dL      ALT (SGPT) 9 U/L      AST (SGOT) 24 U/L      Alkaline Phosphatase 70 U/L      Total Bilirubin 0.3 mg/dL      eGFR Non African Amer 76 mL/min/1.73      Globulin 2.9 gm/dL      A/G Ratio 0.8 g/dL      BUN/Creatinine Ratio 21.6     Anion Gap 6.0 mmol/L     Narrative:      GFR Normal >60  Chronic Kidney Disease <60  Kidney Failure <15      Magnesium [[id number]]  (Normal) Collected: 11/05/20 0506    Specimen: Blood Updated: 11/05/20 0610     Magnesium 2.1 mg/dL     Calcium, Ionized [[id number]]  (Abnormal) Collected: 11/05/20 0506    Specimen: Blood Updated: 11/05/20 0601     Ionized Calcium 1.19 mmol/L     CBC & Differential [[id number]]  (Abnormal) Collected: 11/05/20 0506    Specimen: Blood Updated: 11/05/20 0544    Narrative:      The following orders were created for panel order CBC & Differential.  Procedure                                Abnormality         Status                     ---------                               -----------         ------                     CBC Auto Differential[[id number]]        Abnormal            Final result                 Please view results for these tests on the individual orders.    CBC Auto Differential [[id number]]  (Abnormal) Collected: 11/05/20 0506    Specimen: Blood Updated: 11/05/20 0544     WBC 16.10 10*3/mm3      RBC 3.24 10*6/mm3      Hemoglobin 10.1 g/dL      Hematocrit 30.1 %      MCV 92.9 fL      MCH 31.2 pg      MCHC 33.6 g/dL      RDW 12.8 %      RDW-SD 42.4 fl      MPV 8.1 fL      Platelets 189 10*3/mm3      Neutrophil % 78.3 %      Lymphocyte % 11.7 %      Monocyte % 6.9 %      Eosinophil % 2.8 %      Basophil % 0.3 %      Neutrophils, Absolute 12.60 10*3/mm3      Lymphocytes, Absolute 1.90 10*3/mm3      Monocytes, Absolute 1.10 10*3/mm3      Eosinophils, Absolute 0.40 10*3/mm3      Basophils, Absolute 0.10 10*3/mm3      nRBC 0.0 /100 WBC     Phosphorus [[id number]] Collected: 11/05/20 0506    Specimen: Blood Updated: 11/05/20 0522    Magnesium [[id number]]  (Normal) Collected: 11/04/20 0647    Specimen: Blood Updated: 11/04/20 0728     Magnesium 2.4 mg/dL     Renal Function Panel [[id number]]  (Abnormal) Collected: 11/04/20 0647    Specimen: Blood Updated: 11/04/20 0728     Glucose 147 mg/dL      BUN 18 mg/dL      Creatinine 1.23 mg/dL      Sodium 134 mmol/L      Potassium 4.5 mmol/L      Chloride 100 mmol/L      CO2 26.0 mmol/L      Calcium 8.4 mg/dL      Albumin 2.70 g/dL      Phosphorus 2.6 mg/dL      Anion Gap 8.0 mmol/L      BUN/Creatinine Ratio 14.6     eGFR Non African Amer 58 mL/min/1.73     Narrative:      GFR Normal >60  Chronic Kidney Disease <60  Kidney Failure <15      Troponin [[id number]]  (Normal) Collected: 11/04/20 0647    Specimen: Blood Updated: 11/04/20 0728     Troponin T <0.010 ng/mL     Narrative:      Troponin T Reference Range:  <= 0.03 ng/mL-    Negative for AMI  >0.03 ng/mL-     Abnormal for myocardial necrosis.  Clinicians would have to utilize clinical acumen, EKG, Troponin and serial changes to determine if it is an Acute Myocardial Infarction or myocardial injury due to an underlying chronic condition.       Results may be falsely decreased if patient taking Biotin.      CBC & Differential [[id number]]  (Abnormal) Collected: 11/04/20 0647    Specimen: Blood Updated: 11/04/20 0705    Narrative:      The following orders were created for panel order CBC & Differential.  Procedure                               Abnormality         Status                     ---------                               -----------         ------                     CBC Auto Differential[[id number]]        Abnormal            Final result                 Please view results for these tests on the individual orders.    CBC Auto Differential [[id number]]  (Abnormal) Collected: 11/04/20 0647    Specimen: Blood Updated: 11/04/20 0705     WBC 25.30 10*3/mm3      RBC 3.85 10*6/mm3      Hemoglobin 12.0 g/dL      Hematocrit 35.9 %      MCV 93.4 fL      MCH 31.1 pg      MCHC 33.3 g/dL      RDW 13.2 %      RDW-SD 43.3 fl      MPV 8.1 fL      Platelets 222 10*3/mm3      Neutrophil % 90.5 %      Lymphocyte % 4.2 %      Monocyte % 4.5 %      Eosinophil % 0.4 %      Basophil % 0.4 %      Neutrophils, Absolute 22.90 10*3/mm3      Lymphocytes, Absolute 1.10 10*3/mm3      Monocytes, Absolute 1.20 10*3/mm3      Eosinophils, Absolute 0.10 10*3/mm3      Basophils, Absolute 0.10 10*3/mm3      nRBC 0.0 /100 WBC         Radiology:   Imaging Results (Last 72 Hours)     ** No results found for the last 72 hours. **          Assessment/Plan:    Left renal mass    Status post left nephro ureterectomy postop day #3    Continue to ambulate with incentive spirometry and advance diet.    Path pending    Appreciate nephrology    Rogers Bae MD  11/5/2020  06:11 EST

## 2020-11-05 NOTE — PROGRESS NOTES
"NEPHROLOGY PROGRESS NOTE------[facility]    [facility]  [phone]  Lauren Veliz MD      Patient Care Team:  Uri Cortes MD as PCP - General  Provider, No Known as PCP - Family Medicine  Andre Veliz MD as Consulting Physician (Nephrology)      Provider:  Lauren Veliz MD  Patient Name: Navneet Lind  :  1945    SUBJECTIVE:    F/U CRF/CKD STG 3A    N/V better. Still no BM. No angina.     Medication:  amLODIPine, 10 mg, Oral, Q24H  ceFAZolin, 1 g, Intravenous, Q8H  gabapentin, 800 mg, Oral, Nightly  hydrALAZINE, 25 mg, Oral, Q8H  simvastatin, 40 mg, Oral, Nightly  sodium bicarbonate, 650 mg, Oral, TID      sodium chloride, 75 mL/hr, Last Rate: 75 mL/hr (20 0930)        OBJECTIVE    Vital Sign Min/Max for last 24 hours  Temp  Min: 98 °F (36.7 °C)  Max: 99.1 °F (37.3 °C)   BP  Min: 125/72  Max: 155/77   Pulse  Min: 91  Max: 97   Resp  Min: 17  Max: 20   SpO2  Min: 92 %  Max: 97 %   No data recorded   No data recorded     Flowsheet Rows      First Filed Value   Admission Height  172.7 cm (68\") Documented at 10/23/2020 1608   Admission Weight  95.3 kg (210 lb) Documented at 10/23/2020 1608          No intake/output data recorded.  I/O last 3 completed shifts:  In: 720 [P.O.:720]  Out: 1420 [Urine:1400; Emesis/NG output:20]    Physical Exam:  General Appearance: alert, appears stated age and cooperative  Head: normocephalic, without obvious abnormality and atraumatic  Eyes: conjunctivae and sclerae normal and no icterus  Neck: supple and no JVD  Lungs: DECREASED BS BIBASILAR WITH FEW SCATTERED RHONCHI  Heart: regular rhythm & normal rate and normal S1, S2 +LAURIE  Chest: Wall no abnormalities observed  Abdomen: normal bowel sounds and soft non-tender  Extremities: moves extremities well, no edema, no cyanosis and no redness  Skin: no bleeding, bruising or rash, turgor normal, color normal and no lesions noted  Neurologic: " Alert, and oriented. No focal deficits    Labs:    WBC WBC   Date Value Ref Range Status   11/05/2020 16.10 (H) 3.40 - 10.80 10*3/mm3 Final   11/04/2020 25.30 (H) 3.40 - 10.80 10*3/mm3 Final   11/03/2020 27.30 (H) 3.40 - 10.80 10*3/mm3 Final      HGB Hemoglobin   Date Value Ref Range Status   11/05/2020 10.1 (L) 13.0 - 17.7 g/dL Final   11/04/2020 12.0 (L) 13.0 - 17.7 g/dL Final   11/03/2020 12.8 (L) 13.0 - 17.7 g/dL Final      HCT Hematocrit   Date Value Ref Range Status   11/05/2020 30.1 (L) 37.5 - 51.0 % Final   11/04/2020 35.9 (L) 37.5 - 51.0 % Final   11/03/2020 39.8 37.5 - 51.0 % Final      Platlets No results found for: LABPLAT   MCV MCV   Date Value Ref Range Status   11/05/2020 92.9 79.0 - 97.0 fL Final   11/04/2020 93.4 79.0 - 97.0 fL Final   11/03/2020 95.4 79.0 - 97.0 fL Final          Sodium Sodium   Date Value Ref Range Status   11/05/2020 129 (L) 136 - 145 mmol/L Final   11/04/2020 134 (L) 136 - 145 mmol/L Final   11/03/2020 135 (L) 136 - 145 mmol/L Final      Potassium Potassium   Date Value Ref Range Status   11/05/2020 4.0 3.5 - 5.2 mmol/L Final   11/04/2020 4.5 3.5 - 5.2 mmol/L Final   11/03/2020 5.5 (H) 3.5 - 5.2 mmol/L Final     Comment:     Slight hemolysis detected by analyzer. Results may be affected.      Chloride Chloride   Date Value Ref Range Status   11/05/2020 100 98 - 107 mmol/L Final   11/04/2020 100 98 - 107 mmol/L Final   11/03/2020 105 98 - 107 mmol/L Final      CO2 CO2   Date Value Ref Range Status   11/05/2020 23.0 22.0 - 29.0 mmol/L Final   11/04/2020 26.0 22.0 - 29.0 mmol/L Final   11/03/2020 19.0 (L) 22.0 - 29.0 mmol/L Final      BUN BUN   Date Value Ref Range Status   11/05/2020 21 8 - 23 mg/dL Final   11/04/2020 18 8 - 23 mg/dL Final   11/03/2020 19 8 - 23 mg/dL Final      Creatinine Creatinine   Date Value Ref Range Status   11/05/2020 0.97 0.76 - 1.27 mg/dL Final   11/04/2020 1.23 0.76 - 1.27 mg/dL Final   11/03/2020 1.43 (H) 0.76 - 1.27 mg/dL Final      Calcium Calcium    Date Value Ref Range Status   11/05/2020 8.1 (L) 8.6 - 10.5 mg/dL Final   11/04/2020 8.4 (L) 8.6 - 10.5 mg/dL Final   11/03/2020 7.8 (L) 8.6 - 10.5 mg/dL Final      PO4 No components found for: PO4   Albumin Albumin   Date Value Ref Range Status   11/05/2020 2.40 (L) 3.50 - 5.20 g/dL Final   11/04/2020 2.70 (L) 3.50 - 5.20 g/dL Final      Magnesium Magnesium   Date Value Ref Range Status   11/05/2020 2.1 1.6 - 2.4 mg/dL Final   11/04/2020 2.4 1.6 - 2.4 mg/dL Final      Uric Acid No components found for: URIC ACID     Imaging Results (Last 72 Hours)     ** No results found for the last 72 hours. **                      ASSESSMENT / PLAN      Left renal mass    1. CRF/CKD STG 3A------Nonoliguric. Stable and current creatinine actually better than expected. No NSAIDs or IV dye. Dose meds for CrCl 45-60 cc/min.     2. HYPERKALEMIA------Resolved    3. HTN WITH CKD-----BP okay. Avoid hypotension. No ACE-I/ARB/DRI    4. S/P LEFT NEPHRECOTOMY------per , Urology    5. ACIDOSIS--------Resolved    6. ANEMIA------H/H stable.     7. HYPONATREMIA-----Increase NS and po fluid restrict    8. HYPOPHOSPHATEMIA------Replace IV and follow    9. HYPOCALCEMIA------Replace IV        Lauren Veliz MD  [facility]  [phone]  11/05/20  07:28 EST

## 2020-11-05 NOTE — PLAN OF CARE
Goal Outcome Evaluation:  Plan of Care Reviewed With: patient  Progress: improving       Patient is able to turn very well, has a little pain but is tolerable, offered medication but does not want to take any due to making him sick to his stomach and he really wants to eat this morning. I offered him jello or apple sauce, he doesn't want to get full before breakfast. Incisions open to air, staples intact with no drainage. Encouraged patient to get up this morning to chair before I leave. Will continue to watch WBC, labs draws completed. Tech just informed me his BP is 94/51

## 2020-11-05 NOTE — PLAN OF CARE
Goal Outcome Evaluation:  Patient having a moderate amount of pain. Pain meds seem effective. VSS.

## 2020-11-06 LAB
ALBUMIN SERPL-MCNC: 2.2 G/DL (ref 3.5–5.2)
ANION GAP SERPL CALCULATED.3IONS-SCNC: 6 MMOL/L (ref 5–15)
BASOPHILS # BLD AUTO: 0.1 10*3/MM3 (ref 0–0.2)
BASOPHILS NFR BLD AUTO: 0.7 % (ref 0–1.5)
BUN SERPL-MCNC: 19 MG/DL (ref 8–23)
BUN/CREAT SERPL: 20.9 (ref 7–25)
CALCIUM SPEC-SCNC: 8.2 MG/DL (ref 8.6–10.5)
CHLORIDE SERPL-SCNC: 103 MMOL/L (ref 98–107)
CO2 SERPL-SCNC: 23 MMOL/L (ref 22–29)
CREAT SERPL-MCNC: 0.91 MG/DL (ref 0.76–1.27)
DEPRECATED RDW RBC AUTO: 42.4 FL (ref 37–54)
EOSINOPHIL # BLD AUTO: 0.4 10*3/MM3 (ref 0–0.4)
EOSINOPHIL NFR BLD AUTO: 3.2 % (ref 0.3–6.2)
ERYTHROCYTE [DISTWIDTH] IN BLOOD BY AUTOMATED COUNT: 12.8 % (ref 12.3–15.4)
GFR SERPL CREATININE-BSD FRML MDRD: 82 ML/MIN/1.73
GLUCOSE SERPL-MCNC: 106 MG/DL (ref 65–99)
HCT VFR BLD AUTO: 30.8 % (ref 37.5–51)
HGB BLD-MCNC: 10.3 G/DL (ref 13–17.7)
LYMPHOCYTES # BLD AUTO: 2 10*3/MM3 (ref 0.7–3.1)
LYMPHOCYTES NFR BLD AUTO: 18.1 % (ref 19.6–45.3)
MAGNESIUM SERPL-MCNC: 2.1 MG/DL (ref 1.6–2.4)
MCH RBC QN AUTO: 31.5 PG (ref 26.6–33)
MCHC RBC AUTO-ENTMCNC: 33.3 G/DL (ref 31.5–35.7)
MCV RBC AUTO: 94.4 FL (ref 79–97)
MONOCYTES # BLD AUTO: 0.9 10*3/MM3 (ref 0.1–0.9)
MONOCYTES NFR BLD AUTO: 8.4 % (ref 5–12)
NEUTROPHILS NFR BLD AUTO: 69.6 % (ref 42.7–76)
NEUTROPHILS NFR BLD AUTO: 7.7 10*3/MM3 (ref 1.7–7)
NRBC BLD AUTO-RTO: 0.1 /100 WBC (ref 0–0.2)
PHOSPHATE SERPL-MCNC: 1.8 MG/DL (ref 2.5–4.5)
PLATELET # BLD AUTO: 183 10*3/MM3 (ref 140–450)
PMV BLD AUTO: 8.1 FL (ref 6–12)
POTASSIUM SERPL-SCNC: 4.2 MMOL/L (ref 3.5–5.2)
RBC # BLD AUTO: 3.27 10*6/MM3 (ref 4.14–5.8)
SODIUM SERPL-SCNC: 132 MMOL/L (ref 136–145)
WBC # BLD AUTO: 11.1 10*3/MM3 (ref 3.4–10.8)

## 2020-11-06 PROCEDURE — 25010000003 CEFAZOLIN PER 500 MG: Performed by: UROLOGY

## 2020-11-06 PROCEDURE — 97116 GAIT TRAINING THERAPY: CPT

## 2020-11-06 PROCEDURE — 97530 THERAPEUTIC ACTIVITIES: CPT

## 2020-11-06 PROCEDURE — 80069 RENAL FUNCTION PANEL: CPT | Performed by: INTERNAL MEDICINE

## 2020-11-06 PROCEDURE — 25010000002 HYDROMORPHONE PER 4 MG: Performed by: UROLOGY

## 2020-11-06 PROCEDURE — P9047 ALBUMIN (HUMAN), 25%, 50ML: HCPCS | Performed by: INTERNAL MEDICINE

## 2020-11-06 PROCEDURE — 83735 ASSAY OF MAGNESIUM: CPT | Performed by: INTERNAL MEDICINE

## 2020-11-06 PROCEDURE — 85025 COMPLETE CBC W/AUTO DIFF WBC: CPT | Performed by: INTERNAL MEDICINE

## 2020-11-06 PROCEDURE — 25010000002 ALBUMIN HUMAN 25% PER 50 ML: Performed by: INTERNAL MEDICINE

## 2020-11-06 RX ORDER — POLYETHYLENE GLYCOL 3350 17 G/17G
17 POWDER, FOR SOLUTION ORAL DAILY PRN
Status: DISCONTINUED | OUTPATIENT
Start: 2020-11-06 | End: 2020-11-09 | Stop reason: HOSPADM

## 2020-11-06 RX ORDER — ALBUMIN (HUMAN) 12.5 G/50ML
25 SOLUTION INTRAVENOUS EVERY 8 HOURS
Status: COMPLETED | OUTPATIENT
Start: 2020-11-06 | End: 2020-11-07

## 2020-11-06 RX ORDER — BISACODYL 10 MG
10 SUPPOSITORY, RECTAL RECTAL ONCE
Status: COMPLETED | OUTPATIENT
Start: 2020-11-06 | End: 2020-11-06

## 2020-11-06 RX ORDER — DOCUSATE SODIUM 100 MG/1
100 CAPSULE, LIQUID FILLED ORAL 2 TIMES DAILY
Status: DISCONTINUED | OUTPATIENT
Start: 2020-11-06 | End: 2020-11-09 | Stop reason: HOSPADM

## 2020-11-06 RX ORDER — DOCUSATE SODIUM 100 MG/1
100 CAPSULE, LIQUID FILLED ORAL 2 TIMES DAILY PRN
Status: DISCONTINUED | OUTPATIENT
Start: 2020-11-06 | End: 2020-11-09 | Stop reason: HOSPADM

## 2020-11-06 RX ADMIN — HYDRALAZINE HYDROCHLORIDE 25 MG: 25 TABLET, FILM COATED ORAL at 13:03

## 2020-11-06 RX ADMIN — ALBUMIN HUMAN 25 G: 0.25 SOLUTION INTRAVENOUS at 16:17

## 2020-11-06 RX ADMIN — SODIUM BICARBONATE 650 MG TABLET 650 MG: at 08:59

## 2020-11-06 RX ADMIN — HYDROCODONE BITARTRATE AND ACETAMINOPHEN 2 TABLET: 7.5; 325 TABLET ORAL at 05:08

## 2020-11-06 RX ADMIN — CEFAZOLIN 1 G: 1 INJECTION, POWDER, FOR SOLUTION INTRAMUSCULAR; INTRAVENOUS at 05:08

## 2020-11-06 RX ADMIN — SODIUM CHLORIDE 15 MMOL: 9 INJECTION, SOLUTION INTRAVENOUS at 09:58

## 2020-11-06 RX ADMIN — SODIUM BICARBONATE 650 MG TABLET 650 MG: at 20:56

## 2020-11-06 RX ADMIN — HYDRALAZINE HYDROCHLORIDE 25 MG: 25 TABLET, FILM COATED ORAL at 05:08

## 2020-11-06 RX ADMIN — SODIUM BICARBONATE 650 MG TABLET 650 MG: at 16:17

## 2020-11-06 RX ADMIN — HYDROCODONE BITARTRATE AND ACETAMINOPHEN 2 TABLET: 7.5; 325 TABLET ORAL at 13:03

## 2020-11-06 RX ADMIN — BISACODYL 10 MG: 10 SUPPOSITORY RECTAL at 08:58

## 2020-11-06 RX ADMIN — CEFAZOLIN 1 G: 1 INJECTION, POWDER, FOR SOLUTION INTRAMUSCULAR; INTRAVENOUS at 20:56

## 2020-11-06 RX ADMIN — DOCUSATE SODIUM 100 MG: 100 CAPSULE, LIQUID FILLED ORAL at 08:59

## 2020-11-06 RX ADMIN — HYDROCODONE BITARTRATE AND ACETAMINOPHEN 2 TABLET: 7.5; 325 TABLET ORAL at 21:06

## 2020-11-06 RX ADMIN — HYDROMORPHONE HYDROCHLORIDE 0.5 MG: 2 INJECTION, SOLUTION INTRAMUSCULAR; INTRAVENOUS; SUBCUTANEOUS at 12:01

## 2020-11-06 RX ADMIN — CEFAZOLIN 1 G: 1 INJECTION, POWDER, FOR SOLUTION INTRAMUSCULAR; INTRAVENOUS at 12:01

## 2020-11-06 RX ADMIN — DOCUSATE SODIUM 100 MG: 100 CAPSULE, LIQUID FILLED ORAL at 20:57

## 2020-11-06 RX ADMIN — ALBUMIN HUMAN 25 G: 0.25 SOLUTION INTRAVENOUS at 08:58

## 2020-11-06 RX ADMIN — GABAPENTIN 800 MG: 400 CAPSULE ORAL at 20:56

## 2020-11-06 RX ADMIN — HYDRALAZINE HYDROCHLORIDE 25 MG: 25 TABLET, FILM COATED ORAL at 21:06

## 2020-11-06 RX ADMIN — AMLODIPINE BESYLATE 10 MG: 5 TABLET ORAL at 08:58

## 2020-11-06 NOTE — PLAN OF CARE
Goal Outcome Evaluation:  Plan of Care Reviewed With: patient  Progress: no change  Outcome Summary: pt doing okay. Pt hasnt requested any pain medication tonight. Pt has rested well tonight. Possibly home today. VSS. will continue to monitor.

## 2020-11-06 NOTE — THERAPY TREATMENT NOTE
Patient Name: Navneet Lind  : 1945    MRN: [id number]                              Today's Date: 2020       Admit Date: 2020    Visit Dx:     ICD-10-CM ICD-9-CM   1. Stage 3a chronic kidney disease  N18.31 585.3   2. Left renal mass  N28.89 593.9     Patient Active Problem List   Diagnosis   • Sick sinus syndrome (CMS/HCC)   • Presence of cardiac pacemaker   • Aortic stenosis   • Bundle branch block, right   • Coronary artery disease involving native coronary artery of native heart without angina pectoris   • Dyspnea on exertion   • Heart murmur   • Mixed hyperlipidemia   • Essential hypertension   • Impotence of organic origin   • Premature ventricular contractions   • NATALIA on CPAP   • Class 1 obesity due to excess calories without serious comorbidity with body mass index (BMI) of 31.0 to 31.9 in adult   • Preop cardiovascular exam   • Aortic valve regurgitation   • Left renal mass     Past Medical History:   Diagnosis Date   • Aortic stenosis 2015    Per Echo 2017   • Benign essential HTN    • Bundle branch block, right 2013   • CAD (coronary artery disease), native coronary artery    • Cancer (CMS/HCC)     bladder- chemo, new left renal mass   • Coronary artery disease involving native coronary artery of native heart without angina pectoris 2019    CABG ; SVG to RCA is occluded, LIMA to LAD, SVG to diag of LAD, SVG to marginal of LCX   • Essential hypertension 2019   • Heart murmur    • Hyperlipidemia, mixed    • Mixed hyperlipidemia 2019   • Near syncope    • NATALIA (obstructive sleep apnea)     AHI 11.6/h   • Premature ventricular contractions 2014   • Presence of cardiac pacemaker 2019    BS dual chamber PM 2016   • Sick sinus syndrome (CMS/HCC)    • SSS (sick sinus syndrome) (CMS/HCC) 2019     Past Surgical History:   Procedure Laterality Date   • CARDIAC CATHETERIZATION     • CARDIOVASCULAR STRESS TEST  2017   • CORONARY ARTERY BYPASS  GRAFT  1995   • ECHO - CONVERTED  2017   • NEPHROURETERECTOMY Left 11/2/2020    Procedure: NEPHROURETERECTOMY;  Surgeon: Rogers Bae MD;  Location: [facility] MAIN OR;  Service: Urology;  Laterality: Left;   • PACEMAKER IMPLANTATION  2016    bs     General Information     Row Name 11/06/20 1317          Physical Therapy Time and Intention    Document Type  therapy note (daily note)  -     Mode of Treatment  physical therapy  -     Row Name 11/06/20 1317          Safety Issues, Functional Mobility    Safety Issues Affecting Function (Mobility)  safety precautions follow-through/compliance  -     Impairments Affecting Function (Mobility)  shortness of breath;endurance/activity tolerance;strength  -       User Key  (r) = Recorded By, (t) = Taken By, (c) = Cosigned By    Initials Name Provider Type     Raven Le PTA Physical Therapy Assistant        Mobility     Row Name 11/06/20 1319          Bed Mobility    Bed Mobility  bed mobility (all) activities  -     All Activities, [place] (Bed Mobility)  minimum assist (75% patient effort);1 person assist  -     Comment (Bed Mobility)  Instructing pt. in log rolling, needs practice, unable to bring BLEs into bed.  -     Row Name 11/06/20 1319          Sit-Stand Transfer    Sit-Stand [place] (Transfers)  verbal cues;contact guard  -     Assistive Device (Sit-Stand Transfers)  walker, front-wheeled  -     Row Name 11/06/20 1310          Gait/Stairs (Locomotion)    [place] Level (Gait)  supervision;1 person assist;verbal cues  -     Assistive Device (Gait)  walker, front-wheeled  -     Distance in Feet (Gait)  200'  -     Deviations/Abnormal Patterns (Gait)  stride length decreased;westley decreased  -     Bilateral Gait Deviations  forward flexed posture  -     [place] Level (Stairs)  stand by assist  -     Handrail Location (Stairs)  both sides  -     Number of Steps (Stairs)  4  -     Ascending Technique (Stairs)   step-to-step  -     Descending Technique (Stairs)  step-to-step  -     Comment (Gait/Stairs)  FF posture, needs reinforced safety c rwx. use and transfers.No lob, required several standing rests due to feeling soa. Will need rwx. at home.  -       User Key  (r) = Recorded By, (t) = Taken By, (c) = Cosigned By    Initials Name Provider Type     Raven Le PTA Physical Therapy Assistant        Obj/Interventions     [facility] Name 11/06/20 1324          Strength Comprehensive (MMT)    Comment, General Manual Muscle Testing (MMT) Assessment  Hospital acquired, expect improvement c increased mobility.  -[facility] Name 11/06/20 1324          Balance    Static Sitting Balance  WNL  -     Dynamic Sitting Balance  mild impairment  -     Static Standing Balance  WFL  -     Dynamic Standing Balance  mild impairment  -     Comment, Balance  Limited righting recovery away from midline.  -       User Key  (r) = Recorded By, (t) = Taken By, (c) = Cosigned By    Initials Name Provider Type     Raven Le PTA Physical Therapy Assistant        Goals/Plan    No documentation.       Clinical Impression     [facility] Name 11/06/20 1326          Pain    Additional Documentation  Pain Scale: Numbers Pre/Post-Treatment (Group)  -LH     Row Name 11/06/20 1326          Pain Scale: Numbers Pre/Post-Treatment    Pretreatment Pain Rating  0/10 - no pain  -     Posttreatment Pain Rating  0/10 - no pain  -[facility] Name 11/06/20 1326          Plan of Care Review    Plan of Care Reviewed With  patient;spouse  -     Progress  improving  -     Outcome Summary  Pt. required sba/cga for safe, functional mobility. Vcs for increased safety c rwx. use and transfers. Amb. 200' and performed 4 steps up/down c sba and vcs for safety and body placement in rwx. Will progress as able, presents safe for home c assist and HH at d/c to address deficits. PPE worn: mask, gloves, shield.  -[facility] Name 11/06/20 1324          Vital Signs     Pretreatment Heart Rate (beats/min)  85  -     Intratreatment Heart Rate (beats/min)  108  -     Posttreatment Heart Rate (beats/min)  91  -LH     O2 Delivery Pre Treatment  room air  -     O2 Delivery Intra Treatment  room air  -     O2 Delivery Post Treatment  room air  -     Pre Patient Position  Sitting  -     Intra Patient Position  Standing  -     Post Patient Position  Supine  -     Row Name 11/06/20 1326          Positioning and Restraints    Pre-Treatment Position  sitting in chair/recliner  -     Post Treatment Position  bed  -LH     In Bed  notified nsg;fowlers;call light within reach;exit alarm on;with family/caregiver  -       User Key  (r) = Recorded By, (t) = Taken By, (c) = Cosigned By    Initials Name Provider Type     Raven Le PTA Physical Therapy Assistant        Outcome Measures     Row Name 11/06/20 1331          How much help from another person do you currently need...    Turning from your back to your side while in flat bed without using bedrails?  3  -LH     Moving from lying on back to sitting on the side of a flat bed without bedrails?  3  -LH     Moving to and from a bed to a chair (including a wheelchair)?  3  -LH     Standing up from a chair using your arms (e.g., wheelchair, bedside chair)?  3  -LH     Climbing 3-5 steps with a railing?  4  -LH     To walk in hospital room?  3  -LH     AM-PAC 6 Clicks Score (PT)  19  -       User Key  (r) = Recorded By, (t) = Taken By, (c) = Cosigned By    Initials Name Provider Type     Raven Le PTA Physical Therapy Assistant        Physical Therapy Education                 Title: PT OT SLP Therapies (Done)     Topic: Physical Therapy (Done)     Point: Mobility training (Done)     Learning Progress Summary           Patient Acceptance, E,D, DU,NR by  at 11/6/2020 1331    Comment: Reinforce safe rwx. use and transfers.    Acceptance, E,TB, VU by MI at 11/6/2020 0743    Acceptance, E,TB, VU by MI at 11/5/2020 1036     Acceptance, E, VU by  at 11/4/2020 1302                   Point: Precautions (Done)     Learning Progress Summary           Patient Acceptance, E,D, DU,NR by  at 11/6/2020 1331    Comment: Reinforce safe rwx. use and transfers.    Acceptance, E,TB, VU by MI at 11/6/2020 0743    Acceptance, E,TB, VU by MI at 11/5/2020 1036    Acceptance, E, VU by  at 11/4/2020 1302                               User Key     Initials Effective Dates Name Provider Type Discipline     04/17/20 -  Lucy Marie, PT Physical Therapist PT     03/01/19 -  Raven Le PTA Physical Therapy Assistant PT    MI 03/01/19 -  Ibrahima Olmedo, RN Registered Nurse Nurse              PT Recommendation and Plan     Plan of Care Reviewed With: patient, spouse  Progress: improving  Outcome Summary: Pt. required sba/cga for safe, functional mobility. Vcs for increased safety c rwx. use and transfers. Amb. 200' and performed 4 steps up/down c sba and vcs for safety and body placement in rwx. Will progress as able, presents safe for home c assist and HH at d/c to address deficits. PPE worn: mask, gloves, shield.     Time Calculation:   PT Charges     Row Name 11/06/20 1334             Time Calculation    Start Time  1012  -      Stop Time  1038  -      Time Calculation (min)  26 min  -      PT Received On  11/06/20  -      PT - Next Appointment  11/08/20  -         Time Calculation- PT    Total Timed Code Minutes- PT  26 minute(s)  -         Timed Charges    98761 - Gait Training Minutes   14  -      57400 - PT Therapeutic Activity Minutes  12  -        User Key  (r) = Recorded By, (t) = Taken By, (c) = Cosigned By    Initials Name Provider Type     Raven Le PTA Physical Therapy Assistant        Therapy Charges for Today     Code Description Service Date Service Provider Modifiers Qty    [id number] HC GAIT TRAINING EA 15 MIN 11/6/2020 Raven Le PTA GP 1    [id number]  PT THERAPEUTIC ACT EA 15 MIN 11/6/2020  Raven Le, LUIGI GP 1          PT G-Codes  Outcome Measure Options: AM-PAC 6 Clicks Basic Mobility (PT)  AM-PAC 6 Clicks Score (PT): 19    Raven Le PTA  11/6/2020

## 2020-11-06 NOTE — PLAN OF CARE
Required standing rests during amb. Due to feeling soa.  Problem: Adult Inpatient Plan of Care  Goal: Plan of Care Review  Outcome: Ongoing, Progressing  Flowsheets (Taken 11/6/2020 1326)  Progress: improving  Plan of Care Reviewed With:   patient   spouse  Outcome Summary: Pt. required sba/cga for safe, functional mobility. Vcs for increased safety c rwx. use and transfers. Amb. 200' and performed 4 steps up/down c sba and vcs for safety and body placement in rwx. Will progress as able, presents safe for home c assist and HH at d/c to address deficits. PPE worn: mask, gloves, shield.

## 2020-11-06 NOTE — PROGRESS NOTES
Continued Stay Note  ISIDRO Rodríguez     Patient Name: Navneet Lind  MRN: [id number]  Today's Date: 11/6/2020    Admit Date: 11/2/2020    Discharge Plan     Row Name 11/06/20 1318       Plan    Plan  D/C Plan: Home with family. Declines home health.    Plan Comments   spoke to patient's spouse at bedside wearing mask and goggles and keeping distance greate than 6 feet and spent less than 15 minutes in room. CM discussed home health recommendations with spouse-declines at this time. Barrier to D/C: IV abx, awaiting bowel function.          Expected Discharge Date and Time     Expected Discharge Date Expected Discharge Time    Nov 8, 2020             Mehreen Ferrer

## 2020-11-06 NOTE — PLAN OF CARE
Goal Outcome Evaluation:  Patient ambulated more today. Still having a moderate amounts of pain. VSS

## 2020-11-06 NOTE — PROGRESS NOTES
"NEPHROLOGY PROGRESS NOTE------[facility]    [facility]  [phone]  Lauren Veliz MD      Patient Care Team:  Uri Cortes MD as PCP - General  Provider, No Known as PCP - Family Medicine  Keren, MD Andre as Consulting Physician (Nephrology)      Provider:  Lauren Veliz MD  Patient Name: Navneet Lind  :  1945    SUBJECTIVE:    F/U CRF/CKD STG 3A    Up in chair eating. Feeling better but still no BM. Reynolds still with blood    Medication:  amLODIPine, 10 mg, Oral, Q24H  bisacodyl, 10 mg, Rectal, Once  ceFAZolin, 1 g, Intravenous, Q8H  docusate sodium, 100 mg, Oral, BID  gabapentin, 800 mg, Oral, Nightly  hydrALAZINE, 25 mg, Oral, Q8H  simvastatin, 40 mg, Oral, Nightly  sodium bicarbonate, 650 mg, Oral, TID      sodium chloride, 100 mL/hr, Last Rate: 100 mL/hr (20 0829)        OBJECTIVE    Vital Sign Min/Max for last 24 hours  Temp  Min: 97.5 °F (36.4 °C)  Max: 98.7 °F (37.1 °C)   BP  Min: 113/68  Max: 141/63   Pulse  Min: 68  Max: 87   Resp  Min: 16  Max: 18   SpO2  Min: 95 %  Max: 97 %   No data recorded   Weight  Min: 99.5 kg (219 lb 5.7 oz)  Max: 99.5 kg (219 lb 5.7 oz)     Flowsheet Rows      First Filed Value   Admission Height  172.7 cm (68\") Documented at 10/23/2020 1608   Admission Weight  95.3 kg (210 lb) Documented at 10/23/2020 1608          No intake/output data recorded.  I/O last 3 completed shifts:  In:  [P.O.:840; I.V.:1300]  Out:  [Urine:]    Physical Exam:  General Appearance: alert, appears stated age and cooperative  Head: normocephalic, without obvious abnormality and atraumatic  Eyes: conjunctivae and sclerae normal and no icterus  Neck: supple and no JVD  Lungs: DECREASED BS BIBASILAR WITH FEW SCATTERED RHONCHI  Heart: regular rhythm & normal rate and normal S1, S2 +LAURIE  Chest: Wall no abnormalities observed  Abdomen: normal bowel sounds and soft non-tender +REYNOLDS +S/P SURGICAL " CHANGES  Extremities: moves extremities well, no edema, no cyanosis and no redness  Skin: no bleeding, bruising or rash, turgor normal, color normal and no lesions noted  Neurologic: Alert, and oriented. No focal deficits    Labs:    WBC WBC   Date Value Ref Range Status   11/06/2020 11.10 (H) 3.40 - 10.80 10*3/mm3 Final   11/05/2020 16.10 (H) 3.40 - 10.80 10*3/mm3 Final   11/04/2020 25.30 (H) 3.40 - 10.80 10*3/mm3 Final   11/03/2020 27.30 (H) 3.40 - 10.80 10*3/mm3 Final      HGB Hemoglobin   Date Value Ref Range Status   11/06/2020 10.3 (L) 13.0 - 17.7 g/dL Final   11/05/2020 10.1 (L) 13.0 - 17.7 g/dL Final   11/04/2020 12.0 (L) 13.0 - 17.7 g/dL Final   11/03/2020 12.8 (L) 13.0 - 17.7 g/dL Final      HCT Hematocrit   Date Value Ref Range Status   11/06/2020 30.8 (L) 37.5 - 51.0 % Final   11/05/2020 30.1 (L) 37.5 - 51.0 % Final   11/04/2020 35.9 (L) 37.5 - 51.0 % Final   11/03/2020 39.8 37.5 - 51.0 % Final      Platlets No results found for: LABPLAT   MCV MCV   Date Value Ref Range Status   11/06/2020 94.4 79.0 - 97.0 fL Final   11/05/2020 92.9 79.0 - 97.0 fL Final   11/04/2020 93.4 79.0 - 97.0 fL Final   11/03/2020 95.4 79.0 - 97.0 fL Final          Sodium Sodium   Date Value Ref Range Status   11/06/2020 132 (L) 136 - 145 mmol/L Final   11/05/2020 129 (L) 136 - 145 mmol/L Final   11/04/2020 134 (L) 136 - 145 mmol/L Final   11/03/2020 135 (L) 136 - 145 mmol/L Final      Potassium Potassium   Date Value Ref Range Status   11/06/2020 4.2 3.5 - 5.2 mmol/L Final   11/05/2020 4.0 3.5 - 5.2 mmol/L Final   11/04/2020 4.5 3.5 - 5.2 mmol/L Final   11/03/2020 5.5 (H) 3.5 - 5.2 mmol/L Final     Comment:     Slight hemolysis detected by analyzer. Results may be affected.      Chloride Chloride   Date Value Ref Range Status   11/06/2020 103 98 - 107 mmol/L Final   11/05/2020 100 98 - 107 mmol/L Final   11/04/2020 100 98 - 107 mmol/L Final   11/03/2020 105 98 - 107 mmol/L Final      CO2 CO2   Date Value Ref Range Status    11/06/2020 23.0 22.0 - 29.0 mmol/L Final   11/05/2020 23.0 22.0 - 29.0 mmol/L Final   11/04/2020 26.0 22.0 - 29.0 mmol/L Final   11/03/2020 19.0 (L) 22.0 - 29.0 mmol/L Final      BUN BUN   Date Value Ref Range Status   11/06/2020 19 8 - 23 mg/dL Final   11/05/2020 21 8 - 23 mg/dL Final   11/04/2020 18 8 - 23 mg/dL Final   11/03/2020 19 8 - 23 mg/dL Final      Creatinine Creatinine   Date Value Ref Range Status   11/06/2020 0.91 0.76 - 1.27 mg/dL Final   11/05/2020 0.97 0.76 - 1.27 mg/dL Final   11/04/2020 1.23 0.76 - 1.27 mg/dL Final   11/03/2020 1.43 (H) 0.76 - 1.27 mg/dL Final      Calcium Calcium   Date Value Ref Range Status   11/06/2020 8.2 (L) 8.6 - 10.5 mg/dL Final   11/05/2020 8.1 (L) 8.6 - 10.5 mg/dL Final   11/04/2020 8.4 (L) 8.6 - 10.5 mg/dL Final   11/03/2020 7.8 (L) 8.6 - 10.5 mg/dL Final      PO4 No components found for: PO4   Albumin Albumin   Date Value Ref Range Status   11/06/2020 2.20 (L) 3.50 - 5.20 g/dL Final   11/05/2020 2.40 (L) 3.50 - 5.20 g/dL Final   11/04/2020 2.70 (L) 3.50 - 5.20 g/dL Final      Magnesium Magnesium   Date Value Ref Range Status   11/06/2020 2.1 1.6 - 2.4 mg/dL Final   11/05/2020 2.1 1.6 - 2.4 mg/dL Final   11/04/2020 2.4 1.6 - 2.4 mg/dL Final      Uric Acid No components found for: URIC ACID     Imaging Results (Last 72 Hours)     Procedure Component Value Units Date/Time    SCANNED - IMAGING [[id number]] Resulted: 11/02/20      Updated: 11/05/20 0749          Results for orders placed during the hospital encounter of 11/02/20   SCANNED - IMAGING              ASSESSMENT / PLAN      Left renal mass    1. CRF/CKD STG 3A------Nonoliguric. Stable and current creatinine actually better than expected. No NSAIDs or IV dye. Dose meds for CrCl 45-60 cc/min.     2. HYPERKALEMIA------Resolved    3. HTN WITH CKD-----BP okay. Avoid hypotension. No ACE-I/ARB/DRI    4. S/P LEFT NEPHRECOTOMY------per , Urology    5. ACIDOSIS--------Resolved    6. ANEMIA------H/H stable.     7.  HYPONATREMIA----Better with NS and po fluid restriction    8. HYPOPHOSPHATEMIA------Replace IV and follow    9. HYPOCALCEMIA------Replaced    10. LEUKOCYTOSIS------Improving        Lauren Veliz MD  [facility]  [phone]  11/06/20  08:18 EST

## 2020-11-06 NOTE — PROGRESS NOTES
Nutrition Services    Patient Name: Navneet Lind  YOB: 1945  MRN: [id number]  Admission date: 11/2/2020      PPE Documentation        PPE Worn By Provider Mask and eye protection    PPE Worn By Patient  None      PROGRESS NOTE      Encounter Information: Progress note to monitor PO intakes. At pt visit, pt not available.        PO Diet: Diet Regular, Renal; 2gm K+; Fluid Restriction; 1200cc/day   PO Supplements: Boost Glucose Control BID    PO Intake:  % (improved) since last review        Nutrition support orders: N/A    Nutrition support review: N/A        Labs (reviewed below): See below         GI Function:  No BM since admit, MD is closely monitoring        Nutrition Intervention: Continue with oral nutritional supplements as ordered, will continue monitor need for supplements at follow up.        Results from last 7 days   Lab Units 11/06/20 0227 11/05/20 0506 11/04/20  0647   SODIUM mmol/L 132* 129* 134*   POTASSIUM mmol/L 4.2 4.0 4.5   CHLORIDE mmol/L 103 100 100   CO2 mmol/L 23.0 23.0 26.0   BUN mg/dL 19 21 18   CREATININE mg/dL 0.91 0.97 1.23   CALCIUM mg/dL 8.2* 8.1* 8.4*   BILIRUBIN mg/dL  --  0.3  --    ALK PHOS U/L  --  70  --    ALT (SGPT) U/L  --  9  --    AST (SGOT) U/L  --  24  --    GLUCOSE mg/dL 106* 110* 147*     Results from last 7 days   Lab Units 11/06/20 0227 11/05/20 0506 11/04/20  0647   MAGNESIUM mg/dL 2.1 2.1 2.4   PHOSPHORUS mg/dL 1.8* 1.3* 2.6   HEMOGLOBIN g/dL 10.3* 10.1* 12.0*   HEMATOCRIT % 30.8* 30.1* 35.9*     SARS-CoV-2 MANNY   Date Value Ref Range Status   10/30/2020 Not Detected Not Detected Final     No results found for: HGBA1C    RD to follow up per protocol.    Electronically signed by:  Melissa Rosa RD  11/06/20 13:59 EST

## 2020-11-06 NOTE — PROGRESS NOTES
Urology Progress Note    Patient Identification:  Name:  Navneet Lind  Age:  74 y.o.  Sex:  male  :  1945  MRN:  [id number]    Subjective:   Complaining of some distention, feels he has some gas pains    Objective    Incisions clean and intact with staples    Problem List:  Patient Active Problem List   Diagnosis   • Sick sinus syndrome (CMS/HCC)   • Presence of cardiac pacemaker   • Aortic stenosis   • Bundle branch block, right   • Coronary artery disease involving native coronary artery of native heart without angina pectoris   • Dyspnea on exertion   • Heart murmur   • Mixed hyperlipidemia   • Essential hypertension   • Impotence of organic origin   • Premature ventricular contractions   • NATALIA on CPAP   • Class 1 obesity due to excess calories without serious comorbidity with body mass index (BMI) of 31.0 to 31.9 in adult   • Preop cardiovascular exam   • Aortic valve regurgitation   • Left renal mass     Past Medical History:  Past Medical History:   Diagnosis Date   • Aortic stenosis 2015    Per Echo 2017   • Benign essential HTN    • Bundle branch block, right 2013   • CAD (coronary artery disease), native coronary artery    • Cancer (CMS/HCC)     bladder- chemo, new left renal mass   • Coronary artery disease involving native coronary artery of native heart without angina pectoris 2019    CABG 1995; SVG to RCA is occluded, LIMA to LAD, SVG to diag of LAD, SVG to marginal of LCX   • Essential hypertension 2019   • Heart murmur    • Hyperlipidemia, mixed    • Mixed hyperlipidemia 2019   • Near syncope    • NATALIA (obstructive sleep apnea)     AHI 11.6/h   • Premature ventricular contractions 2014   • Presence of cardiac pacemaker 2019    BS dual chamber PM 2016   • Sick sinus syndrome (CMS/HCC)    • SSS (sick sinus syndrome) (CMS/HCC) 2019     Past Surgical History:  Past Surgical History:   Procedure Laterality Date   • CARDIAC CATHETERIZATION     •  CARDIOVASCULAR STRESS TEST  2017   • CORONARY ARTERY BYPASS GRAFT  1995   • ECHO - CONVERTED  2017   • NEPHROURETERECTOMY Left 11/2/2020    Procedure: NEPHROURETERECTOMY;  Surgeon: Rogers Bae MD;  Location: [facility] MAIN OR;  Service: Urology;  Laterality: Left;   • PACEMAKER IMPLANTATION  2016         Home Meds:  Medications Prior to Admission   Medication Sig Dispense Refill Last Dose   • amLODIPine (NORVASC) 10 MG tablet Take 1 tablet by mouth Daily. (Patient taking differently: Take 10 mg by mouth Every Evening.) 90 tablet 1 11/1/2020 at Unknown time   • aspirin 81 MG EC tablet Take 81 mg by mouth Daily. LD 10/12 stopped for surgery   10/12/2020   • gabapentin (NEURONTIN) 800 MG tablet Take 800 mg by mouth Every Evening.   11/1/2020 at Unknown time   • simvastatin (ZOCOR) 40 MG tablet TAKE 1 TABLET BY MOUTH EVERY DAY (Patient taking differently: 40 mg Every Night.) 90 tablet 0 11/1/2020 at Unknown time   • HYDROcodone-acetaminophen (NORCO) 7.5-325 MG per tablet 1 tablet Every 6 (Six) Hours As Needed.   9/29/2020     Current Meds:    Current Facility-Administered Medications:   •  acetaminophen (TYLENOL) tablet 650 mg, 650 mg, Oral, Q4H PRN **OR** acetaminophen (TYLENOL) suppository 650 mg, 650 mg, Rectal, Q4H PRN, Rogers Bae MD  •  amLODIPine (NORVASC) tablet 10 mg, 10 mg, Oral, Q24H, Rogers Bae MD, 10 mg at 11/05/20 0832  •  ceFAZolin 1 gm IVPB in 100 mL NS (MBP), 1 g, Intravenous, Q8H, Rogers Bae MD, 1 g at 11/06/20 0508  •  docusate sodium (COLACE) capsule 100 mg, 100 mg, Oral, BID PRN, Rogers Bae MD  •  gabapentin (NEURONTIN) capsule 800 mg, 800 mg, Oral, Nightly, Rogres Bae MD, 800 mg at 11/05/20 2115  •  hydrALAZINE (APRESOLINE) tablet 25 mg, 25 mg, Oral, Q8H, Andre Veliz MD, 25 mg at 11/06/20 0508  •  HYDROcodone-acetaminophen (NORCO) 7.5-325 MG per tablet 2 tablet, 2 tablet, Oral, Q4H PRN, Rogers Bae MD, 2 tablet at 11/06/20 0508  •   "HYDROmorphone (DILAUDID) injection 0.5 mg, 0.5 mg, Intravenous, Q2H PRN, 0.5 mg at 20 1404 **AND** naloxone (NARCAN) injection 0.1 mg, 0.1 mg, Intravenous, Q5 Min PRN, Rogers Bae MD  •  influenza vac split quad (FLUZONE,FLUARIX,AFLURIA,FLULAVAL) injection 0.5 mL, 0.5 mL, Intramuscular, During Hospitalization, Rogers Bae MD  •  ondansetron (ZOFRAN) tablet 4 mg, 4 mg, Oral, Q6H PRN **OR** ondansetron (ZOFRAN) injection 4 mg, 4 mg, Intravenous, Q6H PRN, Rogers Bae MD, 4 mg at 20 0109  •  opium-belladonna (B&O SUPPRETTES) 16.2-30 MG suppository 30 mg, 30 mg, Rectal, Daily PRN, Rogers Bae MD, 30 mg at 20 1418  •  polyethylene glycol (MIRALAX) packet 17 g, 17 g, Oral, Daily PRN, Rogers Bae MD  •  simvastatin (ZOCOR) tablet 40 mg, 40 mg, Oral, Nightly, Rogers Bae MD  •  sodium bicarbonate tablet 650 mg, 650 mg, Oral, TID, Andre Veliz MD, 650 mg at 20 2115  •  sodium chloride 0.9 % infusion, 100 mL/hr, Intravenous, Continuous, Andre Veliz MD, Last Rate: 100 mL/hr at 20 0829, 100 mL/hr at 20 08  Allergies:  Patient has no known allergies.    Review of Systems:  Negative 12 point system review except for what is in the HPI    Objective:  tMax 24 hours:  Temp (24hrs), Av.1 °F (36.7 °C), Min:97.5 °F (36.4 °C), Max:98.7 °F (37.1 °C)    Vital Sign Ranges:  Temp:  [97.5 °F (36.4 °C)-98.7 °F (37.1 °C)] 98 °F (36.7 °C)  Heart Rate:  [68-87] 87  Resp:  [16-18] 17  BP: (113-141)/(59-71) 126/63  Intake and Output Last 3 Shifts:  I/O last 3 completed shifts:  In: 0 [P.O.:840; I.V.:1300]  Out: 0 [Urine:2150]    Exam:  /63   Pulse 87   Temp 98 °F (36.7 °C) (Oral)   Resp 17   Ht 172.7 cm (68\")   Wt 99.5 kg (219 lb 5.7 oz)   SpO2 95%   BMI 33.35 kg/m²                                                                  Data Review:  All labs (24hrs):    Lab Results (last 24 hours)     Procedure Component " Value Units Date/Time    Renal Function Panel [[id number]]  (Abnormal) Collected: 11/06/20 0227    Specimen: Blood Updated: 11/06/20 0329     Glucose 106 mg/dL      BUN 19 mg/dL      Creatinine 0.91 mg/dL      Sodium 132 mmol/L      Potassium 4.2 mmol/L      Chloride 103 mmol/L      CO2 23.0 mmol/L      Calcium 8.2 mg/dL      Albumin 2.20 g/dL      Phosphorus 1.8 mg/dL      Anion Gap 6.0 mmol/L      BUN/Creatinine Ratio 20.9     eGFR Non African Amer 82 mL/min/1.73     Narrative:      GFR Normal >60  Chronic Kidney Disease <60  Kidney Failure <15      Magnesium [[id number]]  (Normal) Collected: 11/06/20 0227    Specimen: Blood Updated: 11/06/20 0327     Magnesium 2.1 mg/dL     CBC & Differential [[id number]]  (Abnormal) Collected: 11/06/20 0227    Specimen: Blood Updated: 11/06/20 0256    Narrative:      The following orders were created for panel order CBC & Differential.  Procedure                               Abnormality         Status                     ---------                               -----------         ------                     CBC Auto Differential[[id number]]        Abnormal            Final result                 Please view results for these tests on the individual orders.    CBC Auto Differential [[id number]]  (Abnormal) Collected: 11/06/20 0227    Specimen: Blood Updated: 11/06/20 0256     WBC 11.10 10*3/mm3      RBC 3.27 10*6/mm3      Hemoglobin 10.3 g/dL      Hematocrit 30.8 %      MCV 94.4 fL      MCH 31.5 pg      MCHC 33.3 g/dL      RDW 12.8 %      RDW-SD 42.4 fl      MPV 8.1 fL      Platelets 183 10*3/mm3      Neutrophil % 69.6 %      Lymphocyte % 18.1 %      Monocyte % 8.4 %      Eosinophil % 3.2 %      Basophil % 0.7 %      Neutrophils, Absolute 7.70 10*3/mm3      Lymphocytes, Absolute 2.00 10*3/mm3      Monocytes, Absolute 0.90 10*3/mm3      Eosinophils, Absolute 0.40 10*3/mm3      Basophils, Absolute 0.10 10*3/mm3      nRBC 0.1 /100 WBC         Radiology:   Imaging Results (Last 72 Hours)      Procedure Component Value Units Date/Time    SCANNED - IMAGING [[id number]] Resulted: 11/02/20      Updated: 11/05/20 0749          Assessment/Plan:    Left renal mass    Status post left nephro ureterectomy postop day #4    Continue ambulation  Dulcolax suppository this a.m.  Await further bowel function and abdominal distention to resolve  Discharge planning once bowels functioning better      Maurisio Burrell MD  11/6/2020  08:04 EST

## 2020-11-07 LAB
ALBUMIN SERPL-MCNC: 2.9 G/DL (ref 3.5–5.2)
ALBUMIN/GLOB SERPL: 1.1 G/DL
ALP SERPL-CCNC: 73 U/L (ref 39–117)
ALT SERPL W P-5'-P-CCNC: 9 U/L (ref 1–41)
ANION GAP SERPL CALCULATED.3IONS-SCNC: 9 MMOL/L (ref 5–15)
AST SERPL-CCNC: 21 U/L (ref 1–40)
BASOPHILS # BLD AUTO: 0 10*3/MM3 (ref 0–0.2)
BASOPHILS NFR BLD AUTO: 0.3 % (ref 0–1.5)
BILIRUB SERPL-MCNC: 0.7 MG/DL (ref 0–1.2)
BUN SERPL-MCNC: 15 MG/DL (ref 8–23)
BUN/CREAT SERPL: 16.1 (ref 7–25)
CA-I SERPL ISE-MCNC: 1.2 MMOL/L (ref 1.2–1.3)
CALCIUM SPEC-SCNC: 8.4 MG/DL (ref 8.6–10.5)
CHLORIDE SERPL-SCNC: 102 MMOL/L (ref 98–107)
CO2 SERPL-SCNC: 21 MMOL/L (ref 22–29)
CREAT SERPL-MCNC: 0.93 MG/DL (ref 0.76–1.27)
DEPRECATED RDW RBC AUTO: 39.8 FL (ref 37–54)
EOSINOPHIL # BLD AUTO: 0.3 10*3/MM3 (ref 0–0.4)
EOSINOPHIL NFR BLD AUTO: 2.9 % (ref 0.3–6.2)
ERYTHROCYTE [DISTWIDTH] IN BLOOD BY AUTOMATED COUNT: 12.6 % (ref 12.3–15.4)
GFR SERPL CREATININE-BSD FRML MDRD: 80 ML/MIN/1.73
GLOBULIN UR ELPH-MCNC: 2.6 GM/DL
GLUCOSE SERPL-MCNC: 109 MG/DL (ref 65–99)
HCT VFR BLD AUTO: 28.7 % (ref 37.5–51)
HGB BLD-MCNC: 9.8 G/DL (ref 13–17.7)
LYMPHOCYTES # BLD AUTO: 1 10*3/MM3 (ref 0.7–3.1)
LYMPHOCYTES NFR BLD AUTO: 10.7 % (ref 19.6–45.3)
MAGNESIUM SERPL-MCNC: 2 MG/DL (ref 1.6–2.4)
MCH RBC QN AUTO: 31.2 PG (ref 26.6–33)
MCHC RBC AUTO-ENTMCNC: 34.3 G/DL (ref 31.5–35.7)
MCV RBC AUTO: 90.9 FL (ref 79–97)
MONOCYTES # BLD AUTO: 0.8 10*3/MM3 (ref 0.1–0.9)
MONOCYTES NFR BLD AUTO: 8.9 % (ref 5–12)
NEUTROPHILS NFR BLD AUTO: 7.3 10*3/MM3 (ref 1.7–7)
NEUTROPHILS NFR BLD AUTO: 77.2 % (ref 42.7–76)
NRBC BLD AUTO-RTO: 0 /100 WBC (ref 0–0.2)
PHOSPHATE SERPL-MCNC: 2 MG/DL (ref 2.5–4.5)
PLATELET # BLD AUTO: 168 10*3/MM3 (ref 140–450)
PMV BLD AUTO: 7.4 FL (ref 6–12)
POTASSIUM SERPL-SCNC: 3.7 MMOL/L (ref 3.5–5.2)
PROT SERPL-MCNC: 5.5 G/DL (ref 6–8.5)
RBC # BLD AUTO: 3.15 10*6/MM3 (ref 4.14–5.8)
SODIUM SERPL-SCNC: 132 MMOL/L (ref 136–145)
WBC # BLD AUTO: 9.4 10*3/MM3 (ref 3.4–10.8)

## 2020-11-07 PROCEDURE — 25010000003 CEFAZOLIN PER 500 MG: Performed by: UROLOGY

## 2020-11-07 PROCEDURE — 25010000002 ALBUMIN HUMAN 25% PER 50 ML: Performed by: INTERNAL MEDICINE

## 2020-11-07 PROCEDURE — 80053 COMPREHEN METABOLIC PANEL: CPT | Performed by: INTERNAL MEDICINE

## 2020-11-07 PROCEDURE — 83735 ASSAY OF MAGNESIUM: CPT | Performed by: INTERNAL MEDICINE

## 2020-11-07 PROCEDURE — 84100 ASSAY OF PHOSPHORUS: CPT | Performed by: INTERNAL MEDICINE

## 2020-11-07 PROCEDURE — 82330 ASSAY OF CALCIUM: CPT | Performed by: INTERNAL MEDICINE

## 2020-11-07 PROCEDURE — 85025 COMPLETE CBC W/AUTO DIFF WBC: CPT | Performed by: INTERNAL MEDICINE

## 2020-11-07 PROCEDURE — P9047 ALBUMIN (HUMAN), 25%, 50ML: HCPCS | Performed by: INTERNAL MEDICINE

## 2020-11-07 RX ADMIN — AMLODIPINE BESYLATE 10 MG: 5 TABLET ORAL at 09:58

## 2020-11-07 RX ADMIN — HYDRALAZINE HYDROCHLORIDE 25 MG: 25 TABLET, FILM COATED ORAL at 13:04

## 2020-11-07 RX ADMIN — HYDRALAZINE HYDROCHLORIDE 25 MG: 25 TABLET, FILM COATED ORAL at 05:14

## 2020-11-07 RX ADMIN — SODIUM BICARBONATE 650 MG TABLET 650 MG: at 15:52

## 2020-11-07 RX ADMIN — CEFAZOLIN 1 G: 1 INJECTION, POWDER, FOR SOLUTION INTRAMUSCULAR; INTRAVENOUS at 05:15

## 2020-11-07 RX ADMIN — GABAPENTIN 800 MG: 400 CAPSULE ORAL at 22:17

## 2020-11-07 RX ADMIN — SODIUM BICARBONATE 650 MG TABLET 650 MG: at 09:58

## 2020-11-07 RX ADMIN — DOCUSATE SODIUM 100 MG: 100 CAPSULE, LIQUID FILLED ORAL at 22:19

## 2020-11-07 RX ADMIN — CEFAZOLIN 1 G: 1 INJECTION, POWDER, FOR SOLUTION INTRAMUSCULAR; INTRAVENOUS at 22:16

## 2020-11-07 RX ADMIN — CEFAZOLIN 1 G: 1 INJECTION, POWDER, FOR SOLUTION INTRAMUSCULAR; INTRAVENOUS at 13:03

## 2020-11-07 RX ADMIN — HYDRALAZINE HYDROCHLORIDE 25 MG: 25 TABLET, FILM COATED ORAL at 22:18

## 2020-11-07 RX ADMIN — ALBUMIN HUMAN 25 G: 0.25 SOLUTION INTRAVENOUS at 00:47

## 2020-11-07 RX ADMIN — SODIUM BICARBONATE 650 MG TABLET 650 MG: at 22:17

## 2020-11-07 RX ADMIN — HYDROCODONE BITARTRATE AND ACETAMINOPHEN 2 TABLET: 7.5; 325 TABLET ORAL at 05:14

## 2020-11-07 NOTE — PROGRESS NOTES
Urology Progress Note    Patient Identification:  Name:  aNvneet Lind  Age:  74 y.o.  Sex:  male  :  1945  MRN:  [id number]    Subjective:   Feeling better  No nausea  BM x 2    Objective    Incisions clean and intact with staples    Problem List:  Patient Active Problem List   Diagnosis   • Sick sinus syndrome (CMS/HCC)   • Presence of cardiac pacemaker   • Aortic stenosis   • Bundle branch block, right   • Coronary artery disease involving native coronary artery of native heart without angina pectoris   • Dyspnea on exertion   • Heart murmur   • Mixed hyperlipidemia   • Essential hypertension   • Impotence of organic origin   • Premature ventricular contractions   • NATALIA on CPAP   • Class 1 obesity due to excess calories without serious comorbidity with body mass index (BMI) of 31.0 to 31.9 in adult   • Preop cardiovascular exam   • Aortic valve regurgitation   • Left renal mass     Past Medical History:  Past Medical History:   Diagnosis Date   • Aortic stenosis 2015    Per Echo 2017   • Benign essential HTN    • Bundle branch block, right 2013   • CAD (coronary artery disease), native coronary artery    • Cancer (CMS/HCC)     bladder- chemo, new left renal mass   • Coronary artery disease involving native coronary artery of native heart without angina pectoris 2019    CABG ; SVG to RCA is occluded, LIMA to LAD, SVG to diag of LAD, SVG to marginal of LCX   • Essential hypertension 2019   • Heart murmur    • Hyperlipidemia, mixed    • Mixed hyperlipidemia 2019   • Near syncope    • NATALIA (obstructive sleep apnea)     AHI 11.6/h   • Premature ventricular contractions 2014   • Presence of cardiac pacemaker 2019    BS dual chamber PM 2016   • Sick sinus syndrome (CMS/HCC)    • SSS (sick sinus syndrome) (CMS/HCC) 2019     Past Surgical History:  Past Surgical History:   Procedure Laterality Date   • CARDIAC CATHETERIZATION     • CARDIOVASCULAR STRESS TEST   2017   • CORONARY ARTERY BYPASS GRAFT  1995   • ECHO - CONVERTED  2017   • NEPHROURETERECTOMY Left 11/2/2020    Procedure: NEPHROURETERECTOMY;  Surgeon: Rogers Bae MD;  Location: [facility];  Service: Urology;  Laterality: Left;   • PACEMAKER IMPLANTATION  2016         Home Meds:  Medications Prior to Admission   Medication Sig Dispense Refill Last Dose   • amLODIPine (NORVASC) 10 MG tablet Take 1 tablet by mouth Daily. (Patient taking differently: Take 10 mg by mouth Every Evening.) 90 tablet 1 11/1/2020 at Unknown time   • aspirin 81 MG EC tablet Take 81 mg by mouth Daily. LD 10/12 stopped for surgery   10/12/2020   • gabapentin (NEURONTIN) 800 MG tablet Take 800 mg by mouth Every Evening.   11/1/2020 at Unknown time   • simvastatin (ZOCOR) 40 MG tablet TAKE 1 TABLET BY MOUTH EVERY DAY (Patient taking differently: 40 mg Every Night.) 90 tablet 0 11/1/2020 at Unknown time   • HYDROcodone-acetaminophen (NORCO) 7.5-325 MG per tablet 1 tablet Every 6 (Six) Hours As Needed.   9/29/2020     Current Meds:    Current Facility-Administered Medications:   •  acetaminophen (TYLENOL) tablet 650 mg, 650 mg, Oral, Q4H PRN **OR** acetaminophen (TYLENOL) suppository 650 mg, 650 mg, Rectal, Q4H PRN, Rogers Bae MD  •  amLODIPine (NORVASC) tablet 10 mg, 10 mg, Oral, Q24H, Rogers Bae MD, 10 mg at 11/07/20 0958  •  ceFAZolin 1 gm IVPB in 100 mL NS (MBP), 1 g, Intravenous, Q8H, Rogers Bae MD, 1 g at 11/07/20 0515  •  docusate sodium (COLACE) capsule 100 mg, 100 mg, Oral, BID PRN, Rogers Bae MD  •  docusate sodium (COLACE) capsule 100 mg, 100 mg, Oral, BID, Maurisio Burrell MD, 100 mg at 11/06/20 2057  •  gabapentin (NEURONTIN) capsule 800 mg, 800 mg, Oral, Nightly, Rogers Bae MD, 800 mg at 11/06/20 2056  •  hydrALAZINE (APRESOLINE) tablet 25 mg, 25 mg, Oral, Q8H, Andre Veliz MD, 25 mg at 11/07/20 0514  •  HYDROcodone-acetaminophen (NORCO) 7.5-325 MG per tablet 2  "tablet, 2 tablet, Oral, Q4H PRN, Rogers Bae MD, 2 tablet at 20 0514  •  HYDROmorphone (DILAUDID) injection 0.5 mg, 0.5 mg, Intravenous, Q2H PRN, 0.5 mg at 20 1201 **AND** naloxone (NARCAN) injection 0.1 mg, 0.1 mg, Intravenous, Q5 Min PRN, Rogers Bae MD  •  influenza vac split quad (FLUZONE,FLUARIX,AFLURIA,FLULAVAL) injection 0.5 mL, 0.5 mL, Intramuscular, During Hospitalization, Rogers Bae MD  •  ondansetron (ZOFRAN) tablet 4 mg, 4 mg, Oral, Q6H PRN **OR** ondansetron (ZOFRAN) injection 4 mg, 4 mg, Intravenous, Q6H PRN, Rogers Bae MD, 4 mg at 20 0109  •  opium-belladonna (B&O SUPPRETTES) 16.2-30 MG suppository 30 mg, 30 mg, Rectal, Daily PRN, Rogers Bae MD, 30 mg at 20 1418  •  polyethylene glycol (MIRALAX) packet 17 g, 17 g, Oral, Daily PRN, Rogers Bae MD  •  simvastatin (ZOCOR) tablet 40 mg, 40 mg, Oral, Nightly, Rogers Bae MD  •  sodium bicarbonate tablet 650 mg, 650 mg, Oral, TID, Andre Veliz MD, 650 mg at 20 0958  •  sodium chloride 0.9 % infusion, 60 mL/hr, Intravenous, Continuous, Andre Veliz MD, Stopped at 20 0053  Allergies:  Patient has no known allergies.    Review of Systems:  Negative 12 point system review except for what is in the HPI    Objective:  tMax 24 hours:  Temp (24hrs), Av °F (36.7 °C), Min:97.5 °F (36.4 °C), Max:98.6 °F (37 °C)    Vital Sign Ranges:  Temp:  [97.5 °F (36.4 °C)-98.6 °F (37 °C)] 98.6 °F (37 °C)  Heart Rate:  [] 115  Resp:  [18] 18  BP: (126-162)/(63-75) 126/63  Intake and Output Last 3 Shifts:  I/O last 3 completed shifts:  In: 2930 [P.O.:240; I.V.:2590; IV Piggyback:100]  Out: 2100 [Urine:2100]    Exam:  /63   Pulse 115   Temp 98.6 °F (37 °C) (Oral)   Resp 18   Ht 172.7 cm (68\")   Wt 100 kg (220 lb 7.4 oz)   SpO2 99%   BMI 33.52 kg/m²                                                                  Data Review:  All labs (24hrs):  "   Lab Results (last 24 hours)     Procedure Component Value Units Date/Time    Comprehensive Metabolic Panel [[id number]]  (Abnormal) Collected: 11/07/20 0323    Specimen: Blood Updated: 11/07/20 0354     Glucose 109 mg/dL      BUN 15 mg/dL      Creatinine 0.93 mg/dL      Sodium 132 mmol/L      Potassium 3.7 mmol/L      Comment: Slight hemolysis detected by analyzer. Results may be affected.        Chloride 102 mmol/L      CO2 21.0 mmol/L      Calcium 8.4 mg/dL      Total Protein 5.5 g/dL      Albumin 2.90 g/dL      ALT (SGPT) 9 U/L      AST (SGOT) 21 U/L      Alkaline Phosphatase 73 U/L      Total Bilirubin 0.7 mg/dL      eGFR Non African Amer 80 mL/min/1.73      Globulin 2.6 gm/dL      A/G Ratio 1.1 g/dL      BUN/Creatinine Ratio 16.1     Anion Gap 9.0 mmol/L     Narrative:      GFR Normal >60  Chronic Kidney Disease <60  Kidney Failure <15      Magnesium [[id number]]  (Normal) Collected: 11/07/20 0323    Specimen: Blood Updated: 11/07/20 0354     Magnesium 2.0 mg/dL     Phosphorus [[id number]]  (Abnormal) Collected: 11/07/20 0323    Specimen: Blood Updated: 11/07/20 0354     Phosphorus 2.0 mg/dL     CBC & Differential [[id number]]  (Abnormal) Collected: 11/07/20 0323    Specimen: Blood Updated: 11/07/20 0340    Narrative:      The following orders were created for panel order CBC & Differential.  Procedure                               Abnormality         Status                     ---------                               -----------         ------                     CBC Auto Differential[[id number]]        Abnormal            Final result                 Please view results for these tests on the individual orders.    CBC Auto Differential [[id number]]  (Abnormal) Collected: 11/07/20 0323    Specimen: Blood Updated: 11/07/20 0340     WBC 9.40 10*3/mm3      RBC 3.15 10*6/mm3      Hemoglobin 9.8 g/dL      Comment: Result checked         Hematocrit 28.7 %      MCV 90.9 fL      MCH 31.2 pg      MCHC 34.3 g/dL      RDW  12.6 %      RDW-SD 39.8 fl      MPV 7.4 fL      Platelets 168 10*3/mm3      Neutrophil % 77.2 %      Lymphocyte % 10.7 %      Monocyte % 8.9 %      Eosinophil % 2.9 %      Basophil % 0.3 %      Neutrophils, Absolute 7.30 10*3/mm3      Lymphocytes, Absolute 1.00 10*3/mm3      Monocytes, Absolute 0.80 10*3/mm3      Eosinophils, Absolute 0.30 10*3/mm3      Basophils, Absolute 0.00 10*3/mm3      nRBC 0.0 /100 WBC     Calcium, Ionized [[id number]]  (Normal) Collected: 11/07/20 0323    Specimen: Blood Updated: 11/07/20 0340     Ionized Calcium 1.20 mmol/L         Radiology:   Imaging Results (Last 72 Hours)     Procedure Component Value Units Date/Time    SCANNED - IMAGING [[id number]] Resulted: 11/02/20      Updated: 11/05/20 0749          Assessment/Plan:    Left renal mass    Status post left nephro ureterectomy postop day #5    Continue ambulation  Reg diet    Home in 1-2 days      Navneet Serrato MD  11/7/2020  09:59 EST

## 2020-11-07 NOTE — PROGRESS NOTES
"NEPHROLOGY PROGRESS NOTE------[facility]    [facility]  [phone]  Herminio Prieto MD      Patient Care Team:  Uri Cortes MD as PCP - General  Provider, No Known as PCP - Family Medicine  Andre Veliz MD as Consulting Physician (Nephrology)      Provider:  Herminio Prieto MD  Patient Name: Navneet Lind  :  1945    SUBJECTIVE:    F/U CRF/CKD STG 3A    Reynolds, making urine  Appetite poor  Bloating with boost    Medication:  amLODIPine, 10 mg, Oral, Q24H  ceFAZolin, 1 g, Intravenous, Q8H  docusate sodium, 100 mg, Oral, BID  gabapentin, 800 mg, Oral, Nightly  hydrALAZINE, 25 mg, Oral, Q8H  simvastatin, 40 mg, Oral, Nightly  sodium bicarbonate, 650 mg, Oral, TID      sodium chloride, 60 mL/hr, Last Rate: Stopped (20 0053)        OBJECTIVE    Vital Sign Min/Max for last 24 hours  Temp  Min: 97.5 °F (36.4 °C)  Max: 98.6 °F (37 °C)   BP  Min: 126/63  Max: 162/75   Pulse  Min: 82  Max: 115   Resp  Min: 18  Max: 18   SpO2  Min: 91 %  Max: 99 %   No data recorded   Weight  Min: 100 kg (220 lb 7.4 oz)  Max: 100 kg (220 lb 7.4 oz)     Flowsheet Rows      First Filed Value   Admission Height  172.7 cm (68\") Documented at 10/23/2020 1608   Admission Weight  95.3 kg (210 lb) Documented at 10/23/2020 1608          I/O this shift:  In: -   Out: 800 [Urine:800]  I/O last 3 completed shifts:  In: 2930 [P.O.:240; I.V.:2590; IV Piggyback:100]  Out: 2100 [Urine:2100]    Physical Exam:  General Appearance: alert, appears stated age and cooperative  Head: normocephalic, without obvious abnormality and atraumatic  Eyes: conjunctivae and sclerae normal and no icterus  Neck: supple and no JVD  Lungs: DECREASED BS BIBASILAR WITH FEW SCATTERED RHONCHI  Heart: regular rhythm & normal rate and normal S1, S2 +LAURIE  Chest: Wall no abnormalities observed  Abdomen: normal bowel sounds and soft non-tender +REYNOLDS +S/P SURGICAL CHANGES  Extremities: moves extremities " well, no edema, no cyanosis and no redness  Skin: no bleeding, bruising or rash, turgor normal, color normal and no lesions noted  Neurologic: Alert, and oriented. No focal deficits    Labs:    WBC WBC   Date Value Ref Range Status   11/07/2020 9.40 3.40 - 10.80 10*3/mm3 Final   11/06/2020 11.10 (H) 3.40 - 10.80 10*3/mm3 Final   11/05/2020 16.10 (H) 3.40 - 10.80 10*3/mm3 Final      HGB Hemoglobin   Date Value Ref Range Status   11/07/2020 9.8 (L) 13.0 - 17.7 g/dL Final     Comment:     Result checked    11/06/2020 10.3 (L) 13.0 - 17.7 g/dL Final   11/05/2020 10.1 (L) 13.0 - 17.7 g/dL Final      HCT Hematocrit   Date Value Ref Range Status   11/07/2020 28.7 (L) 37.5 - 51.0 % Final   11/06/2020 30.8 (L) 37.5 - 51.0 % Final   11/05/2020 30.1 (L) 37.5 - 51.0 % Final      Platlets No results found for: LABPLAT   MCV MCV   Date Value Ref Range Status   11/07/2020 90.9 79.0 - 97.0 fL Final   11/06/2020 94.4 79.0 - 97.0 fL Final   11/05/2020 92.9 79.0 - 97.0 fL Final          Sodium Sodium   Date Value Ref Range Status   11/07/2020 132 (L) 136 - 145 mmol/L Final   11/06/2020 132 (L) 136 - 145 mmol/L Final   11/05/2020 129 (L) 136 - 145 mmol/L Final      Potassium Potassium   Date Value Ref Range Status   11/07/2020 3.7 3.5 - 5.2 mmol/L Final     Comment:     Slight hemolysis detected by analyzer. Results may be affected.   11/06/2020 4.2 3.5 - 5.2 mmol/L Final   11/05/2020 4.0 3.5 - 5.2 mmol/L Final      Chloride Chloride   Date Value Ref Range Status   11/07/2020 102 98 - 107 mmol/L Final   11/06/2020 103 98 - 107 mmol/L Final   11/05/2020 100 98 - 107 mmol/L Final      CO2 CO2   Date Value Ref Range Status   11/07/2020 21.0 (L) 22.0 - 29.0 mmol/L Final   11/06/2020 23.0 22.0 - 29.0 mmol/L Final   11/05/2020 23.0 22.0 - 29.0 mmol/L Final      BUN BUN   Date Value Ref Range Status   11/07/2020 15 8 - 23 mg/dL Final   11/06/2020 19 8 - 23 mg/dL Final   11/05/2020 21 8 - 23 mg/dL Final      Creatinine Creatinine   Date Value  Ref Range Status   11/07/2020 0.93 0.76 - 1.27 mg/dL Final   11/06/2020 0.91 0.76 - 1.27 mg/dL Final   11/05/2020 0.97 0.76 - 1.27 mg/dL Final      Calcium Calcium   Date Value Ref Range Status   11/07/2020 8.4 (L) 8.6 - 10.5 mg/dL Final   11/06/2020 8.2 (L) 8.6 - 10.5 mg/dL Final   11/05/2020 8.1 (L) 8.6 - 10.5 mg/dL Final      PO4 No components found for: PO4   Albumin Albumin   Date Value Ref Range Status   11/07/2020 2.90 (L) 3.50 - 5.20 g/dL Final   11/06/2020 2.20 (L) 3.50 - 5.20 g/dL Final   11/05/2020 2.40 (L) 3.50 - 5.20 g/dL Final      Magnesium Magnesium   Date Value Ref Range Status   11/07/2020 2.0 1.6 - 2.4 mg/dL Final   11/06/2020 2.1 1.6 - 2.4 mg/dL Final   11/05/2020 2.1 1.6 - 2.4 mg/dL Final      Uric Acid No components found for: URIC ACID     Imaging Results (Last 72 Hours)     Procedure Component Value Units Date/Time    SCANNED - IMAGING [[id number]] Resulted: 11/02/20      Updated: 11/05/20 0749          Results for orders placed during the hospital encounter of 11/02/20   SCANNED - IMAGING              ASSESSMENT / PLAN      Left renal mass    1. CRF/CKD STG 3A------Nonoliguric. Stable and current creatinine actually better than expected. No NSAIDs or IV dye. Dose meds for CrCl 45-60 cc/min.     2. HYPERKALEMIA------Resolved    3. HTN WITH CKD-----BP okay. Avoid hypotension. No ACE-I/ARB/DRI    4. S/P LEFT NEPHRECOTOMY------per , Urology    5. ACIDOSIS--------Resolved    6. ANEMIA------H/H stable.     7. HYPONATREMIA----Better with NS and po fluid restriction    8. HYPOPHOSPHATEMIA------Replace IV and follow    9. HYPOCALCEMIA------Replaced    10. LEUKOCYTOSIS------Improving    Cr stable, BP ok.  Stop fluids  Remove food restriction  Glucerna/ as tolerated  Home soon        Herminio Prieto MD  [facility]  [phone]  11/07/20  10:15 EST

## 2020-11-07 NOTE — PLAN OF CARE
Goal Outcome Evaluation:  Plan of Care Reviewed With: patient, spouse  Progress: improving     Patient C/O some pain today. Wife at bedside most of the day. Patient walked to nurses desk and back to the room, states that he gets a little dizzy when walking. Incisions open to air with no drainage present. Resting in bed with call light in reach. Will continue to monitor.

## 2020-11-08 LAB
ALBUMIN SERPL-MCNC: 2.7 G/DL (ref 3.5–5.2)
ANION GAP SERPL CALCULATED.3IONS-SCNC: 7 MMOL/L (ref 5–15)
BASOPHILS # BLD AUTO: 0 10*3/MM3 (ref 0–0.2)
BASOPHILS NFR BLD AUTO: 0.3 % (ref 0–1.5)
BUN SERPL-MCNC: 15 MG/DL (ref 8–23)
BUN/CREAT SERPL: 18.3 (ref 7–25)
CALCIUM SPEC-SCNC: 8.2 MG/DL (ref 8.6–10.5)
CHLORIDE SERPL-SCNC: 103 MMOL/L (ref 98–107)
CO2 SERPL-SCNC: 24 MMOL/L (ref 22–29)
CREAT SERPL-MCNC: 0.82 MG/DL (ref 0.76–1.27)
DEPRECATED RDW RBC AUTO: 42 FL (ref 37–54)
EOSINOPHIL # BLD AUTO: 0.3 10*3/MM3 (ref 0–0.4)
EOSINOPHIL NFR BLD AUTO: 2.9 % (ref 0.3–6.2)
ERYTHROCYTE [DISTWIDTH] IN BLOOD BY AUTOMATED COUNT: 12.9 % (ref 12.3–15.4)
GFR SERPL CREATININE-BSD FRML MDRD: 92 ML/MIN/1.73
GLUCOSE SERPL-MCNC: 121 MG/DL (ref 65–99)
HCT VFR BLD AUTO: 28.5 % (ref 37.5–51)
HGB BLD-MCNC: 9.7 G/DL (ref 13–17.7)
LYMPHOCYTES # BLD AUTO: 2 10*3/MM3 (ref 0.7–3.1)
LYMPHOCYTES NFR BLD AUTO: 20.2 % (ref 19.6–45.3)
MCH RBC QN AUTO: 31.1 PG (ref 26.6–33)
MCHC RBC AUTO-ENTMCNC: 34 G/DL (ref 31.5–35.7)
MCV RBC AUTO: 91.5 FL (ref 79–97)
MONOCYTES # BLD AUTO: 0.9 10*3/MM3 (ref 0.1–0.9)
MONOCYTES NFR BLD AUTO: 9.2 % (ref 5–12)
NEUTROPHILS NFR BLD AUTO: 6.6 10*3/MM3 (ref 1.7–7)
NEUTROPHILS NFR BLD AUTO: 67.4 % (ref 42.7–76)
NRBC BLD AUTO-RTO: 0.1 /100 WBC (ref 0–0.2)
PHOSPHATE SERPL-MCNC: 1.7 MG/DL (ref 2.5–4.5)
PLATELET # BLD AUTO: 200 10*3/MM3 (ref 140–450)
PMV BLD AUTO: 7.8 FL (ref 6–12)
POTASSIUM SERPL-SCNC: 3.7 MMOL/L (ref 3.5–5.2)
QT INTERVAL: 416 MS
RBC # BLD AUTO: 3.12 10*6/MM3 (ref 4.14–5.8)
SODIUM SERPL-SCNC: 134 MMOL/L (ref 136–145)
WBC # BLD AUTO: 9.8 10*3/MM3 (ref 3.4–10.8)

## 2020-11-08 PROCEDURE — 80069 RENAL FUNCTION PANEL: CPT | Performed by: INTERNAL MEDICINE

## 2020-11-08 PROCEDURE — 97535 SELF CARE MNGMENT TRAINING: CPT

## 2020-11-08 PROCEDURE — 85025 COMPLETE CBC W/AUTO DIFF WBC: CPT | Performed by: INTERNAL MEDICINE

## 2020-11-08 PROCEDURE — 97112 NEUROMUSCULAR REEDUCATION: CPT

## 2020-11-08 PROCEDURE — 25010000003 CEFAZOLIN PER 500 MG: Performed by: UROLOGY

## 2020-11-08 PROCEDURE — 97116 GAIT TRAINING THERAPY: CPT

## 2020-11-08 RX ADMIN — DOCUSATE SODIUM 100 MG: 100 CAPSULE, LIQUID FILLED ORAL at 22:34

## 2020-11-08 RX ADMIN — CEFAZOLIN 1 G: 1 INJECTION, POWDER, FOR SOLUTION INTRAMUSCULAR; INTRAVENOUS at 06:06

## 2020-11-08 RX ADMIN — SODIUM BICARBONATE 650 MG TABLET 650 MG: at 22:35

## 2020-11-08 RX ADMIN — GABAPENTIN 800 MG: 400 CAPSULE ORAL at 22:33

## 2020-11-08 RX ADMIN — HYDRALAZINE HYDROCHLORIDE 25 MG: 25 TABLET, FILM COATED ORAL at 06:06

## 2020-11-08 RX ADMIN — AMLODIPINE BESYLATE 10 MG: 5 TABLET ORAL at 09:35

## 2020-11-08 RX ADMIN — HYDROCODONE BITARTRATE AND ACETAMINOPHEN 2 TABLET: 7.5; 325 TABLET ORAL at 18:44

## 2020-11-08 RX ADMIN — HYDRALAZINE HYDROCHLORIDE 25 MG: 25 TABLET, FILM COATED ORAL at 22:34

## 2020-11-08 RX ADMIN — HYDROCODONE BITARTRATE AND ACETAMINOPHEN 2 TABLET: 7.5; 325 TABLET ORAL at 11:14

## 2020-11-08 RX ADMIN — HYDROCODONE BITARTRATE AND ACETAMINOPHEN 2 TABLET: 7.5; 325 TABLET ORAL at 22:35

## 2020-11-08 RX ADMIN — SODIUM BICARBONATE 650 MG TABLET 650 MG: at 09:34

## 2020-11-08 RX ADMIN — DOCUSATE SODIUM 100 MG: 100 CAPSULE, LIQUID FILLED ORAL at 09:35

## 2020-11-08 RX ADMIN — HYDRALAZINE HYDROCHLORIDE 25 MG: 25 TABLET, FILM COATED ORAL at 14:33

## 2020-11-08 RX ADMIN — SODIUM BICARBONATE 650 MG TABLET 650 MG: at 16:12

## 2020-11-08 NOTE — PLAN OF CARE
Goal Outcome Evaluation:  Plan of Care Reviewed With: patient, spouse  Progress: improving     Patient complains of pain when moving but denies needing pain meds. Had a loose BM this morning brown in color. Huang in place to stay. Patient resting in bed with call light in reach. Will continue to monitor.

## 2020-11-08 NOTE — PROGRESS NOTES
"NEPHROLOGY PROGRESS NOTE------[facility]    [facility]  [phone]  Herminio Prieto MD      Patient Care Team:  Uri Cortes MD as PCP - General  Provider, No Known as PCP - Family Medicine  Andre Veliz MD as Consulting Physician (Nephrology)      Provider:  Herminio Prieto MD  Patient Name: Navneet Lind  :  1945    SUBJECTIVE:    F/U CRF/CKD STG 3A    Reynolds, making urine      Medication:  amLODIPine, 10 mg, Oral, Q24H  docusate sodium, 100 mg, Oral, BID  gabapentin, 800 mg, Oral, Nightly  hydrALAZINE, 25 mg, Oral, Q8H  simvastatin, 40 mg, Oral, Nightly  sodium bicarbonate, 650 mg, Oral, TID           OBJECTIVE    Vital Sign Min/Max for last 24 hours  Temp  Min: 97.7 °F (36.5 °C)  Max: 98.9 °F (37.2 °C)   BP  Min: 129/72  Max: 148/66   Pulse  Min: 78  Max: 89   Resp  Min: 16  Max: 20   SpO2  Min: 90 %  Max: 98 %   No data recorded   Weight  Min: 95.9 kg (211 lb 6.7 oz)  Max: 95.9 kg (211 lb 6.7 oz)     Flowsheet Rows      First Filed Value   Admission Height  172.7 cm (68\") Documented at 10/23/2020 1608   Admission Weight  95.3 kg (210 lb) Documented at 10/23/2020 1608          I/O this shift:  In: 240 [P.O.:240]  Out: -   I/O last 3 completed shifts:  In: 3495 [P.O.:1080; I.V.:2315; IV Piggyback:100]  Out:  [Urine:2000]    Physical Exam:  General Appearance: alert, appears stated age and cooperative  Head: normocephalic, without obvious abnormality and atraumatic  Eyes: conjunctivae and sclerae normal and no icterus  Neck: supple and no JVD  Lungs: DECREASED BS BIBASILAR WITH FEW SCATTERED RHONCHI  Heart: regular rhythm & normal rate and normal S1, S2 +LAURIE  Chest: Wall no abnormalities observed  Abdomen: normal bowel sounds and soft non-tender +REYNOLDS +S/P SURGICAL CHANGES  Extremities: moves extremities well, no edema, no cyanosis and no redness  Skin: no bleeding, bruising or rash, turgor normal, color normal and no lesions " noted  Neurologic: Alert, and oriented. No focal deficits    Labs:    WBC WBC   Date Value Ref Range Status   11/08/2020 9.80 3.40 - 10.80 10*3/mm3 Final   11/07/2020 9.40 3.40 - 10.80 10*3/mm3 Final   11/06/2020 11.10 (H) 3.40 - 10.80 10*3/mm3 Final      HGB Hemoglobin   Date Value Ref Range Status   11/08/2020 9.7 (L) 13.0 - 17.7 g/dL Final   11/07/2020 9.8 (L) 13.0 - 17.7 g/dL Final     Comment:     Result checked    11/06/2020 10.3 (L) 13.0 - 17.7 g/dL Final      HCT Hematocrit   Date Value Ref Range Status   11/08/2020 28.5 (L) 37.5 - 51.0 % Final   11/07/2020 28.7 (L) 37.5 - 51.0 % Final   11/06/2020 30.8 (L) 37.5 - 51.0 % Final      Platlets No results found for: LABPLAT   MCV MCV   Date Value Ref Range Status   11/08/2020 91.5 79.0 - 97.0 fL Final   11/07/2020 90.9 79.0 - 97.0 fL Final   11/06/2020 94.4 79.0 - 97.0 fL Final          Sodium Sodium   Date Value Ref Range Status   11/08/2020 134 (L) 136 - 145 mmol/L Final   11/07/2020 132 (L) 136 - 145 mmol/L Final   11/06/2020 132 (L) 136 - 145 mmol/L Final      Potassium Potassium   Date Value Ref Range Status   11/08/2020 3.7 3.5 - 5.2 mmol/L Final   11/07/2020 3.7 3.5 - 5.2 mmol/L Final     Comment:     Slight hemolysis detected by analyzer. Results may be affected.   11/06/2020 4.2 3.5 - 5.2 mmol/L Final      Chloride Chloride   Date Value Ref Range Status   11/08/2020 103 98 - 107 mmol/L Final   11/07/2020 102 98 - 107 mmol/L Final   11/06/2020 103 98 - 107 mmol/L Final      CO2 CO2   Date Value Ref Range Status   11/08/2020 24.0 22.0 - 29.0 mmol/L Final   11/07/2020 21.0 (L) 22.0 - 29.0 mmol/L Final   11/06/2020 23.0 22.0 - 29.0 mmol/L Final      BUN BUN   Date Value Ref Range Status   11/08/2020 15 8 - 23 mg/dL Final   11/07/2020 15 8 - 23 mg/dL Final   11/06/2020 19 8 - 23 mg/dL Final      Creatinine Creatinine   Date Value Ref Range Status   11/08/2020 0.82 0.76 - 1.27 mg/dL Final   11/07/2020 0.93 0.76 - 1.27 mg/dL Final   11/06/2020 0.91 0.76 - 1.27  mg/dL Final      Calcium Calcium   Date Value Ref Range Status   11/08/2020 8.2 (L) 8.6 - 10.5 mg/dL Final   11/07/2020 8.4 (L) 8.6 - 10.5 mg/dL Final   11/06/2020 8.2 (L) 8.6 - 10.5 mg/dL Final      PO4 No components found for: PO4   Albumin Albumin   Date Value Ref Range Status   11/08/2020 2.70 (L) 3.50 - 5.20 g/dL Final   11/07/2020 2.90 (L) 3.50 - 5.20 g/dL Final   11/06/2020 2.20 (L) 3.50 - 5.20 g/dL Final      Magnesium Magnesium   Date Value Ref Range Status   11/07/2020 2.0 1.6 - 2.4 mg/dL Final   11/06/2020 2.1 1.6 - 2.4 mg/dL Final      Uric Acid No components found for: URIC ACID     Imaging Results (Last 72 Hours)     ** No results found for the last 72 hours. **          Results for orders placed during the hospital encounter of 11/02/20   SCANNED - IMAGING              ASSESSMENT / PLAN      Left renal mass    1. CRF/CKD STG 3A------Nonoliguric. Stable and current creatinine actually better than expected. No NSAIDs or IV dye. Dose meds for CrCl 45-60 cc/min.     2. HYPERKALEMIA------Resolved    3. HTN WITH CKD-----BP okay. Avoid hypotension. No ACE-I/ARB/DRI    4. S/P LEFT NEPHRECOTOMY------per , Urology    5. ACIDOSIS--------Resolved    6. ANEMIA------H/H stable.     7. HYPONATREMIA----Better with NS and po fluid restriction    8. HYPOPHOSPHATEMIA------Replace IV and follow    9. HYPOCALCEMIA------Replaced    10. LEUKOCYTOSIS------Improving    Cr stable, off fluids.  BP ok.    Home soon        Herminio Prieto MD  [facility]  [phone]  11/08/20  11:29 EST

## 2020-11-08 NOTE — PROGRESS NOTES
Urology Progress Note    Patient Identification:  Name:  Navneet Lind  Age:  74 y.o.  Sex:  male  :  1945  MRN:  [id number]    Subjective:   Feeling better  Still no appetite  + stool accidents      Objective    Incisions clean and intact with staples    Problem List:  Patient Active Problem List   Diagnosis   • Sick sinus syndrome (CMS/HCC)   • Presence of cardiac pacemaker   • Aortic stenosis   • Bundle branch block, right   • Coronary artery disease involving native coronary artery of native heart without angina pectoris   • Dyspnea on exertion   • Heart murmur   • Mixed hyperlipidemia   • Essential hypertension   • Impotence of organic origin   • Premature ventricular contractions   • NATALIA on CPAP   • Class 1 obesity due to excess calories without serious comorbidity with body mass index (BMI) of 31.0 to 31.9 in adult   • Preop cardiovascular exam   • Aortic valve regurgitation   • Left renal mass     Past Medical History:  Past Medical History:   Diagnosis Date   • Aortic stenosis 2015    Per Echo 2017   • Benign essential HTN    • Bundle branch block, right 2013   • CAD (coronary artery disease), native coronary artery    • Cancer (CMS/HCC)     bladder- chemo, new left renal mass   • Coronary artery disease involving native coronary artery of native heart without angina pectoris 2019    CABG ; SVG to RCA is occluded, LIMA to LAD, SVG to diag of LAD, SVG to marginal of LCX   • Essential hypertension 2019   • Heart murmur    • Hyperlipidemia, mixed    • Mixed hyperlipidemia 2019   • Near syncope    • NATALIA (obstructive sleep apnea)     AHI 11.6/h   • Premature ventricular contractions 2014   • Presence of cardiac pacemaker 2019    BS dual chamber PM 2016   • Sick sinus syndrome (CMS/HCC)    • SSS (sick sinus syndrome) (CMS/HCC) 2019     Past Surgical History:  Past Surgical History:   Procedure Laterality Date   • CARDIAC CATHETERIZATION     •  CARDIOVASCULAR STRESS TEST  2017   • CORONARY ARTERY BYPASS GRAFT  1995   • ECHO - CONVERTED  2017   • NEPHROURETERECTOMY Left 11/2/2020    Procedure: NEPHROURETERECTOMY;  Surgeon: Rogers Bae MD;  Location: [facility] MAIN OR;  Service: Urology;  Laterality: Left;   • PACEMAKER IMPLANTATION  2016         Home Meds:  Medications Prior to Admission   Medication Sig Dispense Refill Last Dose   • amLODIPine (NORVASC) 10 MG tablet Take 1 tablet by mouth Daily. (Patient taking differently: Take 10 mg by mouth Every Evening.) 90 tablet 1 11/1/2020 at Unknown time   • aspirin 81 MG EC tablet Take 81 mg by mouth Daily. LD 10/12 stopped for surgery   10/12/2020   • gabapentin (NEURONTIN) 800 MG tablet Take 800 mg by mouth Every Evening.   11/1/2020 at Unknown time   • simvastatin (ZOCOR) 40 MG tablet TAKE 1 TABLET BY MOUTH EVERY DAY (Patient taking differently: 40 mg Every Night.) 90 tablet 0 11/1/2020 at Unknown time   • HYDROcodone-acetaminophen (NORCO) 7.5-325 MG per tablet 1 tablet Every 6 (Six) Hours As Needed.   9/29/2020     Current Meds:    Current Facility-Administered Medications:   •  acetaminophen (TYLENOL) tablet 650 mg, 650 mg, Oral, Q4H PRN **OR** acetaminophen (TYLENOL) suppository 650 mg, 650 mg, Rectal, Q4H PRN, Rogers Bae MD  •  amLODIPine (NORVASC) tablet 10 mg, 10 mg, Oral, Q24H, Rogers Bae MD, 10 mg at 11/08/20 0935  •  docusate sodium (COLACE) capsule 100 mg, 100 mg, Oral, BID PRN, Rogers Bae MD  •  docusate sodium (COLACE) capsule 100 mg, 100 mg, Oral, BID, Maurisio Burrell MD, 100 mg at 11/08/20 0935  •  gabapentin (NEURONTIN) capsule 800 mg, 800 mg, Oral, Nightly, Rogers Bae MD, 800 mg at 11/07/20 2217  •  hydrALAZINE (APRESOLINE) tablet 25 mg, 25 mg, Oral, Q8H, Andre Veliz MD, 25 mg at 11/08/20 0606  •  HYDROcodone-acetaminophen (NORCO) 7.5-325 MG per tablet 2 tablet, 2 tablet, Oral, Q4H PRN, Rogers Bae MD, 2 tablet at 11/07/20 0514  •   "HYDROmorphone (DILAUDID) injection 0.5 mg, 0.5 mg, Intravenous, Q2H PRN, 0.5 mg at 20 1201 **AND** naloxone (NARCAN) injection 0.1 mg, 0.1 mg, Intravenous, Q5 Min PRN, Rogers Bae MD  •  influenza vac split quad (FLUZONE,FLUARIX,AFLURIA,FLULAVAL) injection 0.5 mL, 0.5 mL, Intramuscular, During Hospitalization, Rogers Bae MD  •  ondansetron (ZOFRAN) tablet 4 mg, 4 mg, Oral, Q6H PRN **OR** ondansetron (ZOFRAN) injection 4 mg, 4 mg, Intravenous, Q6H PRN, Rogers Bae MD, 4 mg at 20 0109  •  opium-belladonna (B&O SUPPRETTES) 16.2-30 MG suppository 30 mg, 30 mg, Rectal, Daily PRN, Rogers Bae MD, 30 mg at 20 1418  •  polyethylene glycol (MIRALAX) packet 17 g, 17 g, Oral, Daily PRN, Rogers Bae MD  •  simvastatin (ZOCOR) tablet 40 mg, 40 mg, Oral, Nightly, Rogers Bae MD  •  sodium bicarbonate tablet 650 mg, 650 mg, Oral, TID, Andre Veliz MD, 650 mg at 20 0934  Allergies:  Patient has no known allergies.    Review of Systems:  Negative 12 point system review except for what is in the HPI    Objective:  tMax 24 hours:  Temp (24hrs), Av.2 °F (36.8 °C), Min:97.7 °F (36.5 °C), Max:98.9 °F (37.2 °C)    Vital Sign Ranges:  Temp:  [97.7 °F (36.5 °C)-98.9 °F (37.2 °C)] 97.7 °F (36.5 °C)  Heart Rate:  [78-89] 78  Resp:  [16-20] 16  BP: (129-148)/(66-76) 144/76  Intake and Output Last 3 Shifts:  I/O last 3 completed shifts:  In: 3495 [P.O.:1080; I.V.:2315; IV Piggyback:100]  Out:  [Urine:]    Exam:  /76 (BP Location: Right arm, Patient Position: Lying)   Pulse 78   Temp 97.7 °F (36.5 °C) (Oral)   Resp 16   Ht 172.7 cm (67.99\")   Wt 95.9 kg (211 lb 6.7 oz)   SpO2 98%   BMI 32.15 kg/m²                                                                  Data Review:  All labs (24hrs):    Lab Results (last 24 hours)     Procedure Component Value Units Date/Time    Renal Function Panel [[id number]]  (Abnormal) Collected: 20 0329 "    Specimen: Blood Updated: 11/08/20 0438     Glucose 121 mg/dL      BUN 15 mg/dL      Creatinine 0.82 mg/dL      Sodium 134 mmol/L      Potassium 3.7 mmol/L      Chloride 103 mmol/L      CO2 24.0 mmol/L      Calcium 8.2 mg/dL      Albumin 2.70 g/dL      Phosphorus 1.7 mg/dL      Anion Gap 7.0 mmol/L      BUN/Creatinine Ratio 18.3     eGFR Non African Amer 92 mL/min/1.73     Narrative:      GFR Normal >60  Chronic Kidney Disease <60  Kidney Failure <15      CBC & Differential [[id number]]  (Abnormal) Collected: 11/08/20 0329    Specimen: Blood Updated: 11/08/20 0405    Narrative:      The following orders were created for panel order CBC & Differential.  Procedure                               Abnormality         Status                     ---------                               -----------         ------                     CBC Auto Differential[[id number]]        Abnormal            Final result                 Please view results for these tests on the individual orders.    CBC Auto Differential [[id number]]  (Abnormal) Collected: 11/08/20 0329    Specimen: Blood Updated: 11/08/20 0405     WBC 9.80 10*3/mm3      RBC 3.12 10*6/mm3      Hemoglobin 9.7 g/dL      Hematocrit 28.5 %      MCV 91.5 fL      MCH 31.1 pg      MCHC 34.0 g/dL      RDW 12.9 %      RDW-SD 42.0 fl      MPV 7.8 fL      Platelets 200 10*3/mm3      Neutrophil % 67.4 %      Lymphocyte % 20.2 %      Monocyte % 9.2 %      Eosinophil % 2.9 %      Basophil % 0.3 %      Neutrophils, Absolute 6.60 10*3/mm3      Lymphocytes, Absolute 2.00 10*3/mm3      Monocytes, Absolute 0.90 10*3/mm3      Eosinophils, Absolute 0.30 10*3/mm3      Basophils, Absolute 0.00 10*3/mm3      nRBC 0.1 /100 WBC         Radiology:   Imaging Results (Last 72 Hours)     ** No results found for the last 72 hours. **          Assessment/Plan:    Left renal mass    Status post left nephro ureterectomy postop day #6    Increase ambulation and PO intake   Reg diet    Home in 1-2  days      Navneet Serrato MD  11/8/2020  09:43 EST

## 2020-11-08 NOTE — THERAPY TREATMENT NOTE
Patient Name: Navneet Lind  : 1945    MRN: [id number]                              Today's Date: 2020       Admit Date: 2020    Visit Dx:     ICD-10-CM ICD-9-CM   1. Stage 3a chronic kidney disease  N18.31 585.3   2. Left renal mass  N28.89 593.9     Patient Active Problem List   Diagnosis   • Sick sinus syndrome (CMS/HCC)   • Presence of cardiac pacemaker   • Aortic stenosis   • Bundle branch block, right   • Coronary artery disease involving native coronary artery of native heart without angina pectoris   • Dyspnea on exertion   • Heart murmur   • Mixed hyperlipidemia   • Essential hypertension   • Impotence of organic origin   • Premature ventricular contractions   • NATALIA on CPAP   • Class 1 obesity due to excess calories without serious comorbidity with body mass index (BMI) of 31.0 to 31.9 in adult   • Preop cardiovascular exam   • Aortic valve regurgitation   • Left renal mass     Past Medical History:   Diagnosis Date   • Aortic stenosis 2015    Per Echo 2017   • Benign essential HTN    • Bundle branch block, right 2013   • CAD (coronary artery disease), native coronary artery    • Cancer (CMS/HCC)     bladder- chemo, new left renal mass   • Coronary artery disease involving native coronary artery of native heart without angina pectoris 2019    CABG ; SVG to RCA is occluded, LIMA to LAD, SVG to diag of LAD, SVG to marginal of LCX   • Essential hypertension 2019   • Heart murmur    • Hyperlipidemia, mixed    • Mixed hyperlipidemia 2019   • Near syncope    • NATALIA (obstructive sleep apnea)     AHI 11.6/h   • Premature ventricular contractions 2014   • Presence of cardiac pacemaker 2019    BS dual chamber PM 2016   • Sick sinus syndrome (CMS/HCC)    • SSS (sick sinus syndrome) (CMS/HCC) 2019     Past Surgical History:   Procedure Laterality Date   • CARDIAC CATHETERIZATION     • CARDIOVASCULAR STRESS TEST  2017   • CORONARY ARTERY BYPASS  GRAFT  1995   • ECHO - CONVERTED  2017   • NEPHROURETERECTOMY Left 11/2/2020    Procedure: NEPHROURETERECTOMY;  Surgeon: Rogers Bae MD;  Location: [facility] MAIN OR;  Service: Urology;  Laterality: Left;   • PACEMAKER IMPLANTATION  2016    bs     General Information     Row Name 11/08/20 1008          Physical Therapy Time and Intention    Document Type  therapy note (daily note)  -     Mode of Treatment  physical therapy  -     Row Name 11/08/20 1008          General Information    Patient Profile Reviewed  yes  -     Row Name 11/08/20 1008          Safety Issues, Functional Mobility    Impairments Affecting Function (Mobility)  balance;endurance/activity tolerance;shortness of breath;strength  -     Comment, Safety Issues/Impairments (Mobility)  pt been having diarrhea and limited participation from that wearing him out  -       User Key  (r) = Recorded By, (t) = Taken By, (c) = Cosigned By    Initials Name Provider Type     Angela Hale PTA Physical Therapy Assistant        Mobility     Row Name 11/08/20 1009          Bed Mobility    Bed Mobility  bed mobility (all) activities  -     All Activities, [place] (Bed Mobility)  verbal cues;minimum assist (75% patient effort)  -     Row Name 11/08/20 1009          Sit-Stand Transfer    Sit-Stand [place] (Transfers)  contact guard  -     Assistive Device (Sit-Stand Transfers)  walker, front-wheeled  -     Row Name 11/08/20 1009          Gait/Stairs (Locomotion)    [place] Level (Gait)  contact guard  -     Assistive Device (Gait)  walker, front-wheeled  -     Distance in Feet (Gait)  120  -     Comment (Gait/Stairs)  slowed westley, sl flexed posture, sl guarded, no LOB noted  -       User Key  (r) = Recorded By, (t) = Taken By, (c) = Cosigned By    Initials Name Provider Type     Angela Hale PTA Physical Therapy Assistant        Obj/Interventions     Row Name 11/08/20 1011          Balance    Balance  Assessment  sitting static balance;standing dynamic balance;sitting dynamic balance;standing static balance  -     Static Sitting Balance  WNL;sitting, edge of bed;unsupported  -     Dynamic Sitting Balance  WFL;sitting, edge of bed;unsupported  -     Static Standing Balance  WFL;supported  -     Dynamic Standing Balance  mild impairment;supported  -       User Key  (r) = Recorded By, (t) = Taken By, (c) = Cosigned By    Initials Name Provider Type    Angela Venegas PTA Physical Therapy Assistant        Goals/Plan     Row Name 11/08/20 1015          Bed Mobility Goal 1 (PT)    Progress/Outcomes (Bed Mobility Goal 1, PT)  goal ongoing  -     Row Name 11/08/20 1015          Transfer Goal 1 (PT)    Progress/Outcome (Transfer Goal 1, PT)  goal met  -     Row Name 11/08/20 1015          Gait Training Goal 1 (PT)    Progress/Outcome (Gait Training Goal 1, PT)  goal ongoing  -     Row Name 11/08/20 1015          Patient Education Goal (PT)    Progress/Outcome (Patient Education Goal, PT)  goal ongoing  -       User Key  (r) = Recorded By, (t) = Taken By, (c) = Cosigned By    Initials Name Provider Type    Angela Venegas PTA Physical Therapy Assistant        Clinical Impression     Row Name 11/08/20 1011          Pain Scale: Numbers Pre/Post-Treatment    Pretreatment Pain Rating  0/10 - no pain  -     Row Name 11/08/20 1011          Plan of Care Review    Plan of Care Reviewed With  patient;spouse  -     Progress  improving  -     Outcome Summary  pt feeling somewhat better today, still having probs with diarrhea. BM still req min assist with cues for LR, CTS with supervision and amb using rwx with cga x120'. Plans are dc home with wife when stable. GLoves,mask/goggles worn for tx  -     Row Name 11/08/20 1011          Therapy Assessment/Plan (PT)    Rehab Potential (PT)  good, to achieve stated therapy goals  -     Criteria for Skilled Interventions Met (PT)  skilled treatment is  necessary  -     Row Name 11/08/20 1011          Vital Signs    Intratreatment Heart Rate (beats/min)  80  -     Pre SpO2 (%)  96  -     O2 Delivery Pre Treatment  nasal cannula 2.5L  -     Intra SpO2 (%)  90  -     O2 Delivery Intra Treatment  room air  -     Row Name 11/08/20 1011          Positioning and Restraints    Pre-Treatment Position  in bed  -     Post Treatment Position  bed  -     In Bed  supine;call light within reach;with family/caregiver  -       User Key  (r) = Recorded By, (t) = Taken By, (c) = Cosigned By    Initials Name Provider Type     Angela Hale, LUIGI Physical Therapy Assistant        Outcome Measures    No documentation.       Physical Therapy Education                 Title: PT OT SLP Therapies (Done)     Topic: Physical Therapy (Done)     Point: Mobility training (Done)     Learning Progress Summary           Patient Acceptance, E,TB, VU by  at 11/8/2020 1016    Acceptance, E,D, DU,NR by  at 11/6/2020 1331    Comment: Reinforce safe rwx. use and transfers.    Acceptance, E,TB, VU by MI at 11/6/2020 0743    Acceptance, E,TB, VU by MI at 11/5/2020 1036    Acceptance, E, VU by  at 11/4/2020 1302   Family Acceptance, E,TB, VU by  at 11/8/2020 1016                   Point: Precautions (Done)     Learning Progress Summary           Patient Acceptance, E,TB, VU by  at 11/8/2020 1016    Acceptance, E,D, DU,NR by  at 11/6/2020 1331    Comment: Reinforce safe rwx. use and transfers.    Acceptance, E,TB, VU by MI at 11/6/2020 0743    Acceptance, E,TB, VU by MI at 11/5/2020 1036    Acceptance, E, VU by  at 11/4/2020 1302   Family Acceptance, E,TB, VU by  at 11/8/2020 1016                               User Key     Initials Effective Dates Name Provider Type [facility] 04/17/20 -  Lucy Marie, PT Physical Therapist PT     03/01/19 -  Angela Hale PTA Physical Therapy Assistant PT     03/01/19 -  Raven Le PTA Physical Therapy Assistant  PT    MI 03/01/19 -  Ibrahima Olmedo, RN Registered Nurse Nurse              PT Recommendation and Plan  Planned Therapy Interventions (PT): balance training, bed mobility training, gait training, transfer training, patient/family education, strengthening  Plan of Care Reviewed With: patient, spouse  Progress: improving  Outcome Summary: pt feeling somewhat better today, still having probs with diarrhea. BM still req min assist with cues for LR, CTS with supervision and amb using rwx with cga x120'. Plans are dc home with wife when stable. GLoves,mask/goggles worn for tx     Time Calculation:   PT Charges     Row Name 11/08/20 1016             Time Calculation    Start Time  0901  -      Stop Time  0939  -      Time Calculation (min)  38 min  -      PT Received On  11/08/20  -      PT - Next Appointment  11/09/20  -         Time Calculation- PT    Total Timed Code Minutes- PT  38 minute(s)  -        User Key  (r) = Recorded By, (t) = Taken By, (c) = Cosigned By    Initials Name Provider Type     Angela Hale PTA Physical Therapy Assistant        Therapy Charges for Today     Code Description Service Date Service Provider Modifiers Qty    78544928532 HC GAIT TRAINING EA 15 MIN 11/8/2020 Angela Hale PTA GP 1    46303349596 HC PT NEUROMUSC RE EDUCATION EA 15 MIN 11/8/2020 Angela Hale PTA GP 1    08258424203 HC PT SELF CARE/MGMT/TRAIN EA 15 MIN 11/8/2020 Angela Hale PTA GP 1          PT G-Codes  Outcome Measure Options: AM-PAC 6 Clicks Basic Mobility (PT)  AM-PAC 6 Clicks Score (PT): 19    Angela Hale PTA  11/8/2020

## 2020-11-08 NOTE — PLAN OF CARE
Goal Outcome Evaluation:  Plan of Care Reviewed With: patient, spouse  Progress: improving     Patient c/o minimal pain on his incision site, pain medication was offered.  No other complaints through the night.. Will continue to monitor.

## 2020-11-08 NOTE — PLAN OF CARE
Problem: Adult Inpatient Plan of Care  Goal: Plan of Care Review  Recent Flowsheet Documentation  Taken 11/8/2020 1011 by Angela Hale, PTA  Progress: improving  Plan of Care Reviewed With:   patient   spouse  Outcome Summary: pt feeling somewhat better today, still having probs with diarrhea. BM still req min assist with cues for LR, CTS with supervision and amb using rwx with cga x120'. Plans are dc home with wife when stable. GLoves,mask/goggles worn for tx

## 2020-11-09 VITALS
SYSTOLIC BLOOD PRESSURE: 139 MMHG | HEART RATE: 73 BPM | TEMPERATURE: 98.2 F | RESPIRATION RATE: 17 BRPM | OXYGEN SATURATION: 95 % | WEIGHT: 218.03 LBS | DIASTOLIC BLOOD PRESSURE: 72 MMHG | BODY MASS INDEX: 33.04 KG/M2 | HEIGHT: 68 IN

## 2020-11-09 PROBLEM — I49.5 SICK SINUS SYNDROME (HCC): Status: RESOLVED | Noted: 2019-07-05 | Resolved: 2020-11-09

## 2020-11-09 PROBLEM — N28.89 LEFT RENAL MASS: Status: RESOLVED | Noted: 2020-10-29 | Resolved: 2020-11-09

## 2020-11-09 LAB
ALBUMIN SERPL-MCNC: 2.6 G/DL (ref 3.5–5.2)
ANION GAP SERPL CALCULATED.3IONS-SCNC: 8 MMOL/L (ref 5–15)
BASOPHILS # BLD AUTO: 0.1 10*3/MM3 (ref 0–0.2)
BASOPHILS NFR BLD AUTO: 0.6 % (ref 0–1.5)
BUN SERPL-MCNC: 15 MG/DL (ref 8–23)
BUN/CREAT SERPL: 16.7 (ref 7–25)
CALCIUM SPEC-SCNC: 8.3 MG/DL (ref 8.6–10.5)
CHLORIDE SERPL-SCNC: 107 MMOL/L (ref 98–107)
CO2 SERPL-SCNC: 25 MMOL/L (ref 22–29)
CREAT SERPL-MCNC: 0.9 MG/DL (ref 0.76–1.27)
DEPRECATED RDW RBC AUTO: 42 FL (ref 37–54)
EOSINOPHIL # BLD AUTO: 0.5 10*3/MM3 (ref 0–0.4)
EOSINOPHIL NFR BLD AUTO: 5.8 % (ref 0.3–6.2)
ERYTHROCYTE [DISTWIDTH] IN BLOOD BY AUTOMATED COUNT: 13 % (ref 12.3–15.4)
GFR SERPL CREATININE-BSD FRML MDRD: 83 ML/MIN/1.73
GLUCOSE SERPL-MCNC: 102 MG/DL (ref 65–99)
HCT VFR BLD AUTO: 28.8 % (ref 37.5–51)
HGB BLD-MCNC: 9.7 G/DL (ref 13–17.7)
LYMPHOCYTES # BLD AUTO: 2.5 10*3/MM3 (ref 0.7–3.1)
LYMPHOCYTES NFR BLD AUTO: 26.8 % (ref 19.6–45.3)
MCH RBC QN AUTO: 31 PG (ref 26.6–33)
MCHC RBC AUTO-ENTMCNC: 33.6 G/DL (ref 31.5–35.7)
MCV RBC AUTO: 92.3 FL (ref 79–97)
MONOCYTES # BLD AUTO: 0.8 10*3/MM3 (ref 0.1–0.9)
MONOCYTES NFR BLD AUTO: 9.2 % (ref 5–12)
NEUTROPHILS NFR BLD AUTO: 5.3 10*3/MM3 (ref 1.7–7)
NEUTROPHILS NFR BLD AUTO: 57.6 % (ref 42.7–76)
NRBC BLD AUTO-RTO: 0 /100 WBC (ref 0–0.2)
PHOSPHATE SERPL-MCNC: 2.1 MG/DL (ref 2.5–4.5)
PLATELET # BLD AUTO: 219 10*3/MM3 (ref 140–450)
PMV BLD AUTO: 8.3 FL (ref 6–12)
POTASSIUM SERPL-SCNC: 3.8 MMOL/L (ref 3.5–5.2)
RBC # BLD AUTO: 3.12 10*6/MM3 (ref 4.14–5.8)
SODIUM SERPL-SCNC: 140 MMOL/L (ref 136–145)
WBC # BLD AUTO: 9.2 10*3/MM3 (ref 3.4–10.8)

## 2020-11-09 PROCEDURE — 85025 COMPLETE CBC W/AUTO DIFF WBC: CPT | Performed by: INTERNAL MEDICINE

## 2020-11-09 PROCEDURE — 80069 RENAL FUNCTION PANEL: CPT | Performed by: INTERNAL MEDICINE

## 2020-11-09 RX ORDER — GABAPENTIN 400 MG/1
800 CAPSULE ORAL NIGHTLY
Qty: 90 CAPSULE | Refills: 3 | Status: SHIPPED | OUTPATIENT
Start: 2020-11-09 | End: 2020-11-23

## 2020-11-09 RX ORDER — AMLODIPINE BESYLATE 10 MG/1
10 TABLET ORAL
Qty: 90 TABLET | Refills: 3 | Status: SHIPPED | OUTPATIENT
Start: 2020-11-09 | End: 2020-12-28 | Stop reason: SDUPTHER

## 2020-11-09 RX ORDER — HYDRALAZINE HYDROCHLORIDE 25 MG/1
25 TABLET, FILM COATED ORAL EVERY 8 HOURS SCHEDULED
Qty: 90 TABLET | Refills: 3 | Status: SHIPPED | OUTPATIENT
Start: 2020-11-09 | End: 2021-03-01

## 2020-11-09 RX ORDER — SIMVASTATIN 40 MG
40 TABLET ORAL NIGHTLY
Qty: 90 TABLET | Refills: 3 | Status: SHIPPED | OUTPATIENT
Start: 2020-11-09 | End: 2020-12-21

## 2020-11-09 RX ADMIN — AMLODIPINE BESYLATE 10 MG: 5 TABLET ORAL at 09:39

## 2020-11-09 RX ADMIN — POTASSIUM PHOSPHATE, MONOBASIC 1000 MG: 500 TABLET, SOLUBLE ORAL at 11:51

## 2020-11-09 RX ADMIN — DOCUSATE SODIUM 100 MG: 100 CAPSULE, LIQUID FILLED ORAL at 09:39

## 2020-11-09 RX ADMIN — HYDRALAZINE HYDROCHLORIDE 25 MG: 25 TABLET, FILM COATED ORAL at 05:58

## 2020-11-09 NOTE — PLAN OF CARE
Goal Outcome Evaluation:  Plan of Care Reviewed With: patient  Progress: improving  Outcome Summary: Patient is A&O x4. Able to verbalize and make needs known. No complaints of pain noted during shift. Remains on fluid restriction. Anticipates discharge home today. Call light within reach. Will continue to monitor.

## 2020-11-09 NOTE — DISCHARGE PLACEMENT REQUEST
"Power Lind (74 y.o. Male)     Date of Birth Social Security Number Address Home Phone MRN    1945  [address] [place] [facility] [phone] [id number]    [place] Marital Status          Latter-day        Admission Date Admission Type Admitting Provider Attending Provider Department, Room/Bed    11/2/20 Elective Rogers Bae MD Medley, Richard N, MD [facility] SURGICAL INPATIENT, 4132/1    Discharge Date Discharge Disposition Discharge Destination         Home or Self Care              Attending Provider: Rogers Bae MD    Allergies: No Known Allergies    Isolation: None   Infection: None   Code Status: CPR    Ht: 172.7 cm (67.99\")   Wt: 98.9 kg (218 lb 0.6 oz)    Admission Cmt: None   Principal Problem: None                Active Insurance as of 11/2/2020     Primary Coverage     Payor Plan Insurance Group Employer/Plan Group    MEDICARE MEDICARE A & B      Payor Plan Address Payor Plan Phone Number Payor Plan Fax Number Effective Dates    [address] [phone]  12/1/2011 - None Entered    [facility] [id number]       Subscriber Name Subscriber Birth Date Member ID       POWER LIND 1945 [id number]           Secondary Coverage     Payor Plan Insurance Group Employer/Plan Group    [facility]      Payor Plan Address Payor Plan Phone Number Payor Plan Fax Number Effective Dates    [address] [phone]  6/24/2019 - None Entered    [facility] [id number]       Subscriber Name Subscriber Birth Date Member ID       POWER LIND 1945 [id number]                 Emergency Contacts      (Rel.) Home Phone Work Phone Mobile Phone    Clifford Lind (Spouse) [phone] -- --              "

## 2020-11-09 NOTE — PROGRESS NOTES
Case Management Discharge Note      Final Note: Home    Provided Post Acute Provider List?: N/A    Selected Continued Care - Discharged on 11/9/2020 Admission date: 11/2/2020 - Discharge disposition: Home or Self Care        Final Discharge Disposition Code: 01 - home or self-care    Continued Stay Note  ISIDRO Rodríguez     Patient Name: Navneet Lind  MRN: [id number]  Today's Date: 11/9/2020    Admit Date: 11/2/2020    Discharge Plan     Row Name 11/09/20 1513       Plan    Plan  D/C Plan: Home with family. Declines home health. RW from [place] (order placed, delivered to room).    Final Discharge Disposition Code  01 - home or self-care    Final Note  Home          Expected Discharge Date and Time     Expected Discharge Date Expected Discharge Time    Nov 9, 2020             Mehreen Ferrer

## 2020-11-09 NOTE — PLAN OF CARE
Pt with anticipated hospital d/c.  Pt has order for RW and strong support of wife.  PPE donned: mask with faceshield, gloves.    Lucy Marie PT, DPT, GCS

## 2020-11-09 NOTE — DISCHARGE SUMMARY
Urology Discharge Note    Name:  Navneet Lind  Age:  74 y.o.  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         Hospital Course  Patient is a 74 y.o. male presented with gross hematuria from the left kidney.  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  Patient had a fairly uneventful hospital course but did involve some renal insufficiency for which we got nephrology on board..  He eventually was tolerating a regular diet his incisions look good and he was going home with his Huang catheter.  Condition at discharge is improved.  Disposition is home.  Regular diet.  Limit activities.  Continue home medications as well as Norco.  He will follow up in the office in 1 week for his Huang and staple removal    Procedures Performed    Procedure(s):  NEPHROURETERECTOMY left  -------------------       Consults:   Consults     Date and Time Order Name Status Description    11/3/2020 1041 Inpatient Nephrology Consult Completed           Pertinent Test Results:   Lab Results (last 24 hours)     Procedure Component Value Units Date/Time    Renal Function Panel [[id number]]  (Abnormal) Collected: 20 030    Specimen: Blood Updated: 20 043     Glucose 102 mg/dL      BUN 15 mg/dL      Creatinine 0.90 mg/dL      Sodium 140 mmol/L      Potassium 3.8 mmol/L      Chloride 107 mmol/L      CO2 25.0 mmol/L      Calcium 8.3 mg/dL      Albumin 2.60 g/dL      Phosphorus 2.1 mg/dL      Anion Gap 8.0 mmol/L      BUN/Creatinine Ratio 16.7     eGFR Non African Amer 83 mL/min/1.73     Narrative:      GFR Normal  ">60  Chronic Kidney Disease <60  Kidney Failure <15      CBC & Differential [[id number]]  (Abnormal) Collected: 11/09/20 0307    Specimen: Blood Updated: 11/09/20 0408    Narrative:      The following orders were created for panel order CBC & Differential.  Procedure                               Abnormality         Status                     ---------                               -----------         ------                     CBC Auto Differential[[id number]]        Abnormal            Final result                 Please view results for these tests on the individual orders.    CBC Auto Differential [[id number]]  (Abnormal) Collected: 11/09/20 0307    Specimen: Blood Updated: 11/09/20 0408     WBC 9.20 10*3/mm3      RBC 3.12 10*6/mm3      Hemoglobin 9.7 g/dL      Hematocrit 28.8 %      MCV 92.3 fL      MCH 31.0 pg      MCHC 33.6 g/dL      RDW 13.0 %      RDW-SD 42.0 fl      MPV 8.3 fL      Platelets 219 10*3/mm3      Neutrophil % 57.6 %      Lymphocyte % 26.8 %      Monocyte % 9.2 %      Eosinophil % 5.8 %      Basophil % 0.6 %      Neutrophils, Absolute 5.30 10*3/mm3      Lymphocytes, Absolute 2.50 10*3/mm3      Monocytes, Absolute 0.80 10*3/mm3      Eosinophils, Absolute 0.50 10*3/mm3      Basophils, Absolute 0.10 10*3/mm3      nRBC 0.0 /100 WBC         Imaging Results (Last 72 Hours)     ** No results found for the last 72 hours. **          Condition on Discharge:  Stable    Vital Signs     Vitals:    11/08/20 0520 11/08/20 1300 11/08/20 2202 11/09/20 0553   BP: 144/76 146/85 150/75 139/72   BP Location: Right arm Right arm Right arm Right arm   Patient Position: Lying Lying Lying Lying   Pulse: 78 77 73    Resp: 16 18 17 17   Temp: 97.7 °F (36.5 °C) 98 °F (36.7 °C) 98.4 °F (36.9 °C) 98.2 °F (36.8 °C)   TempSrc: Oral Oral Oral Oral   SpO2: 98% 94% 94% 95%   Weight: 95.9 kg (211 lb 6.7 oz)   98.9 kg (218 lb 0.6 oz)   Height: 172.7 cm (67.99\")          Physical Exam:     General Appearance:    Alert, cooperative, " in no acute distress   Head:    Normocephalic, without obvious abnormality, atraumatic   Eyes:            Lids and lashes normal, conjunctivae and sclerae normal, no   icterus, no pallor, corneas clear, PERRLA   Ears:    Ears appear intact with no abnormalities noted   Throat:   No oral lesions, no thrush, oral mucosa moist   Neck:   No adenopathy, supple, trachea midline, no thyromegaly, no   carotid bruit, no JVD   Back:     No kyphosis present, no scoliosis present, no skin lesions,      erythema or scars, no tenderness to percussion or                   palpation,   range of motion normal   Lungs:     Clear to auscultation,respirations regular, even and                  unlabored    Heart:    Regular rhythm and normal rate, normal S1 and S2, no            murmur, no gallop, no rub, no click   Chest Wall:    No abnormalities observed   Abdomen:     Normal bowel sounds, no masses, no organomegaly, soft        non-tender, non-distended, no guarding, no rebound                tenderness   Rectal:     Deferred   Extremities:   Moves all extremities well, no edema, no cyanosis, no             redness   Pulses:   Pulses palpable and equal bilaterally   Skin:   No bleeding, bruising or rash   Lymph nodes:   No palpable adenopathy   Neurologic:   Cranial nerves 2 - 12 grossly intact, sensation intact, DTR       present and equal bilaterally       Discharge Disposition      Discharge Medications     Discharge Medications      ASK your doctor about these medications      Instructions Start Date   amLODIPine 10 MG tablet  Commonly known as: NORVASC   10 mg, Oral, Daily      aspirin 81 MG EC tablet   81 mg, Oral, Daily, LD 10/12 stopped for surgery      gabapentin 800 MG tablet  Commonly known as: NEURONTIN   800 mg, Oral, Every Evening      HYDROcodone-acetaminophen 7.5-325 MG per tablet  Commonly known as: NORCO   1 tablet, Every 6 Hours PRN      simvastatin 40 MG tablet  Commonly known as: ZOCOR   TAKE 1 TABLET BY MOUTH  EVERY DAY             Discharge Diet:     Activity at Discharge:     Follow-up Appointments  Future Appointments   Date Time Provider Department Center   11/23/2020 11:30 AM ADRIANO Erazo MD [facility]   6/8/2021 10:15 AM Cristina Wolfe MD [facility]     Additional Instructions for the Follow-ups that You Need to Schedule     Basic Metabolic Panel    Nov 13, 2020 (Approximate)               Rogers Bae MD  11/09/20  07:38 EST

## 2020-11-09 NOTE — PLAN OF CARE
Goal Outcome Evaluation:  Plan of Care Reviewed With: patient, spouse  Progress: improving   Incisions with clips intact and edges well approx. No redness. FC patent. Urine is pink/reddish color. Abd distended though with faint bs. Refuses scds. Much more alert and talkative tonight than has been. No resp issues.

## 2020-11-09 NOTE — PROGRESS NOTES
"NEPHROLOGY PROGRESS NOTE------[facility]    [facility]  [phone]  Lauren Veliz MD      Patient Care Team:  Uri Cortes MD as PCP - General  Provider, No Known as PCP - Family Medicine  Keren, MD Andre as Consulting Physician (Nephrology)      Provider:  Lauren Veliz MD  Patient Name: Navneet Lind  :  1945    SUBJECTIVE:    F/U CRF/CKD STG 3A    Feeling good. +Flatus. No SOB, CP, n/v      Medication:  amLODIPine, 10 mg, Oral, Q24H  docusate sodium, 100 mg, Oral, BID  gabapentin, 800 mg, Oral, Nightly  hydrALAZINE, 25 mg, Oral, Q8H  simvastatin, 40 mg, Oral, Nightly  sodium bicarbonate, 650 mg, Oral, TID           OBJECTIVE    Vital Sign Min/Max for last 24 hours  Temp  Min: 98 °F (36.7 °C)  Max: 98.4 °F (36.9 °C)   BP  Min: 139/72  Max: 150/75   Pulse  Min: 73  Max: 77   Resp  Min: 17  Max: 18   SpO2  Min: 94 %  Max: 95 %   No data recorded   Weight  Min: 98.9 kg (218 lb 0.6 oz)  Max: 98.9 kg (218 lb 0.6 oz)     Flowsheet Rows      First Filed Value   Admission Height  172.7 cm (68\") Documented at 10/23/2020 1608   Admission Weight  95.3 kg (210 lb) Documented at 10/23/2020 1608          No intake/output data recorded.  I/O last 3 completed shifts:  In: 1180 [P.O.:1180]  Out: 1750 [Urine:1750]    Physical Exam:  General Appearance: alert, appears stated age and cooperative  Head: normocephalic, without obvious abnormality and atraumatic  Eyes: conjunctivae and sclerae normal and no icterus  Neck: supple and no JVD  Lungs: DECREASED BS BIBASILAR WITH FEW SCATTERED RHONCHI  Heart: regular rhythm & normal rate and normal S1, S2 +LAURIE  Chest: Wall no abnormalities observed  Abdomen: normal bowel sounds and soft non-tender +REYNOLDS +S/P SURGICAL CHANGES  Extremities: moves extremities well, no edema, no cyanosis and no redness  Skin: no bleeding, bruising or rash, turgor normal, color normal and no lesions " noted  Neurologic: Alert, and oriented. No focal deficits    Labs:    WBC WBC   Date Value Ref Range Status   11/09/2020 9.20 3.40 - 10.80 10*3/mm3 Final   11/08/2020 9.80 3.40 - 10.80 10*3/mm3 Final   11/07/2020 9.40 3.40 - 10.80 10*3/mm3 Final      HGB Hemoglobin   Date Value Ref Range Status   11/09/2020 9.7 (L) 13.0 - 17.7 g/dL Final   11/08/2020 9.7 (L) 13.0 - 17.7 g/dL Final   11/07/2020 9.8 (L) 13.0 - 17.7 g/dL Final     Comment:     Result checked       HCT Hematocrit   Date Value Ref Range Status   11/09/2020 28.8 (L) 37.5 - 51.0 % Final   11/08/2020 28.5 (L) 37.5 - 51.0 % Final   11/07/2020 28.7 (L) 37.5 - 51.0 % Final      Platlets No results found for: LABPLAT   MCV MCV   Date Value Ref Range Status   11/09/2020 92.3 79.0 - 97.0 fL Final   11/08/2020 91.5 79.0 - 97.0 fL Final   11/07/2020 90.9 79.0 - 97.0 fL Final          Sodium Sodium   Date Value Ref Range Status   11/09/2020 140 136 - 145 mmol/L Final   11/08/2020 134 (L) 136 - 145 mmol/L Final   11/07/2020 132 (L) 136 - 145 mmol/L Final      Potassium Potassium   Date Value Ref Range Status   11/09/2020 3.8 3.5 - 5.2 mmol/L Final   11/08/2020 3.7 3.5 - 5.2 mmol/L Final   11/07/2020 3.7 3.5 - 5.2 mmol/L Final     Comment:     Slight hemolysis detected by analyzer. Results may be affected.      Chloride Chloride   Date Value Ref Range Status   11/09/2020 107 98 - 107 mmol/L Final   11/08/2020 103 98 - 107 mmol/L Final   11/07/2020 102 98 - 107 mmol/L Final      CO2 CO2   Date Value Ref Range Status   11/09/2020 25.0 22.0 - 29.0 mmol/L Final   11/08/2020 24.0 22.0 - 29.0 mmol/L Final   11/07/2020 21.0 (L) 22.0 - 29.0 mmol/L Final      BUN BUN   Date Value Ref Range Status   11/09/2020 15 8 - 23 mg/dL Final   11/08/2020 15 8 - 23 mg/dL Final   11/07/2020 15 8 - 23 mg/dL Final      Creatinine Creatinine   Date Value Ref Range Status   11/09/2020 0.90 0.76 - 1.27 mg/dL Final   11/08/2020 0.82 0.76 - 1.27 mg/dL Final   11/07/2020 0.93 0.76 - 1.27 mg/dL  Final      Calcium Calcium   Date Value Ref Range Status   11/09/2020 8.3 (L) 8.6 - 10.5 mg/dL Final   11/08/2020 8.2 (L) 8.6 - 10.5 mg/dL Final   11/07/2020 8.4 (L) 8.6 - 10.5 mg/dL Final      PO4 No components found for: PO4   Albumin Albumin   Date Value Ref Range Status   11/09/2020 2.60 (L) 3.50 - 5.20 g/dL Final   11/08/2020 2.70 (L) 3.50 - 5.20 g/dL Final   11/07/2020 2.90 (L) 3.50 - 5.20 g/dL Final      Magnesium Magnesium   Date Value Ref Range Status   11/07/2020 2.0 1.6 - 2.4 mg/dL Final      Uric Acid No components found for: URIC ACID     Imaging Results (Last 72 Hours)     ** No results found for the last 72 hours. **          Results for orders placed during the hospital encounter of 11/02/20   SCANNED - IMAGING              ASSESSMENT / PLAN      * No active hospital problems. *    1. CRF/CKD STG 3A------Nonoliguric. Stable and current creatinine actually better than expected. No NSAIDs or IV dye. Dose meds for CrCl 45-60 cc/min.     2. HYPERKALEMIA------Resolved    3. HTN WITH CKD-----BP okay. Avoid hypotension. No ACE-I/ARB/DRI    4. S/P LEFT NEPHRECOTOMY------per , Urology    5. ACIDOSIS--------Resolved    6. ANEMIA------H/H stable.     7. HYPONATREMIA----Resolved    8. HYPOPHOSPHATEMIA------Replaced    9. HYPOCALCEMIA------Replaced    10. LEUKOCYTOSIS------Improving    Okay from RENAL standpoint to d/c today      Lauren Veliz MD  [facility]  [phone]  11/09/20  08:03 EST

## 2020-11-10 ENCOUNTER — READMISSION MANAGEMENT (OUTPATIENT)
Dept: CALL CENTER | Facility: HOSPITAL | Age: 75
End: 2020-11-10

## 2020-11-10 NOTE — OUTREACH NOTE
Prep Survey      Responses   [facility] facility patient discharged from?  [facility]   Is LACE score < 7 ?  No   Eligibility  Readm Mgmt   Discharge diagnosis  Left renal mass    Does the patient have one of the following disease processes/diagnoses(primary or secondary)?  General Surgery   Does the patient have Home health ordered?  No   Is there a DME ordered?  Yes   What DME was ordered?  RW from [facility]    Prep survey completed?  Yes          Yuliet Orozco RN

## 2020-11-12 ENCOUNTER — READMISSION MANAGEMENT (OUTPATIENT)
Dept: CALL CENTER | Facility: HOSPITAL | Age: 75
End: 2020-11-12

## 2020-11-12 NOTE — OUTREACH NOTE
General Surgery Week 1 Survey      Responses   [facility] patient discharged from?  [facility]   Does the patient have one of the following disease processes/diagnoses(primary or secondary)?  General Surgery   Week 1 attempt successful?  Yes   Call start time  1606   Call end time  1614   Discharge diagnosis  Left renal mass    Is patient permission given to speak with other caregiver?  Yes   List who call center can speak with  Wife    Person spoke with today (if not patient) and relationship  wife    Meds reviewed with patient/caregiver?  Yes   Is the patient having any side effects they believe may be caused by any medication additions or changes?  No   Does the patient have all medications related to this admission filled (includes all antibiotics, pain medications, etc.)  Yes   Is the patient taking all medications as directed (includes completed medication regime)?  Yes   Does the patient have a follow up appointment scheduled with their surgeon?  Yes   Has the patient kept scheduled appointments due by today?  N/A   Has home health visited the patient within 72 hours of discharge?  N/A   Has all DME been delivered?  Yes   Psychosocial issues?  No   Did the patient receive a copy of their discharge instructions?  Yes   Nursing interventions  Reviewed instructions with patient   What is the patient's perception of their health status since discharge?  Improving   Nursing interventions  Nurse provided patient education   Is the patient /caregiver able to teach back basic post-op care?  Practice 'cough and deep breath', Drive as instructed by MD in discharge instructions, Take showers only when approved by MD-sponge bathe until then, Lifting as instructed by MD in discharge instructions, No tub bath, swimming, or hot tub until instructed by MD   Is the patient/caregiver able to teach back signs and symptoms of incisional infection?  Increased redness, swelling or pain at the incisonal site, Increased drainage or  bleeding, Incisional warmth, Pus or odor from incision, Fever   Is the patient/caregiver able to teach back steps to recovery at home?  Set small, achievable goals for return to baseline health, Rest and rebuild strength, gradually increase activity, Eat a well-balance diet   Is the patient/caregiver able to teach back the hierarchy of who to call/visit for symptoms/problems? PCP, Specialist, Home health nurse, Urgent Care, ED, 911  Yes   Additional teach back comments  pt doing well, having some pain  but using pain meds.   Week 1 call completed?  Yes          Makenna Martinez RN

## 2020-11-18 LAB
BH CV ECHO MEAS - ACS: 0.86 CM
BH CV ECHO MEAS - AI DEC SLOPE: 229.7 CM/SEC^2
BH CV ECHO MEAS - AI DEC TIME: 2.3 SEC
BH CV ECHO MEAS - AI MAX PG: 104.8 MMHG
BH CV ECHO MEAS - AI MAX VEL: 511.8 CM/SEC
BH CV ECHO MEAS - AI P1/2T: 652.6 MSEC
BH CV ECHO MEAS - AO MAX PG (FULL): 42 MMHG
BH CV ECHO MEAS - AO MAX PG: 43.4 MMHG
BH CV ECHO MEAS - AO MEAN PG (FULL): 26 MMHG
BH CV ECHO MEAS - AO MEAN PG: 26.9 MMHG
BH CV ECHO MEAS - AO ROOT AREA (BSA CORRECTED): 1.8
BH CV ECHO MEAS - AO ROOT AREA: 11.3 CM^2
BH CV ECHO MEAS - AO ROOT DIAM: 3.8 CM
BH CV ECHO MEAS - AO V2 MAX: 324.9 CM/SEC
BH CV ECHO MEAS - AO V2 MEAN: 242 CM/SEC
BH CV ECHO MEAS - AO V2 VTI: 58.6 CM
BH CV ECHO MEAS - AORTIC HR: 75.6 BPM
BH CV ECHO MEAS - AORTIC R-R: 0.79 SEC
BH CV ECHO MEAS - AVA(I,A): 0.61 CM^2
BH CV ECHO MEAS - AVA(I,D): 0.61 CM^2
BH CV ECHO MEAS - AVA(V,A): 0.56 CM^2
BH CV ECHO MEAS - AVA(V,D): 0.56 CM^2
BH CV ECHO MEAS - BSA(HAYCOCK): 2.1 M^2
BH CV ECHO MEAS - BSA: 2.1 M^2
BH CV ECHO MEAS - BZI_BMI: 30.9 KILOGRAMS/M^2
BH CV ECHO MEAS - BZI_METRIC_HEIGHT: 172.7 CM
BH CV ECHO MEAS - BZI_METRIC_WEIGHT: 92.1 KG
BH CV ECHO MEAS - CI(AO): 24.3 L/MIN/M^2
BH CV ECHO MEAS - CI(LVOT): 1.3 L/MIN/M^2
BH CV ECHO MEAS - CO(AO): 49.9 L/MIN
BH CV ECHO MEAS - CO(LVOT): 2.7 L/MIN
BH CV ECHO MEAS - EDV(CUBED): 71.8 ML
BH CV ECHO MEAS - EDV(MOD-SP4): 104 ML
BH CV ECHO MEAS - EDV(TEICH): 76.7 ML
BH CV ECHO MEAS - EF(CUBED): 65.9 %
BH CV ECHO MEAS - EF(MOD-BP): 58 %
BH CV ECHO MEAS - EF(MOD-SP4): 58.1 %
BH CV ECHO MEAS - EF(TEICH): 57.9 %
BH CV ECHO MEAS - ESV(CUBED): 24.5 ML
BH CV ECHO MEAS - ESV(MOD-SP4): 43.6 ML
BH CV ECHO MEAS - ESV(TEICH): 32.3 ML
BH CV ECHO MEAS - FS: 30.2 %
BH CV ECHO MEAS - IVS/LVPW: 1.1
BH CV ECHO MEAS - IVSD: 1.1 CM
BH CV ECHO MEAS - LA DIMENSION(2D): 4.3 CM
BH CV ECHO MEAS - LV DIASTOLIC VOL/BSA (35-75): 50.6 ML/M^2
BH CV ECHO MEAS - LV MASS(C)D: 150.3 GRAMS
BH CV ECHO MEAS - LV MASS(C)DI: 73.1 GRAMS/M^2
BH CV ECHO MEAS - LV MAX PG: 1.4 MMHG
BH CV ECHO MEAS - LV MEAN PG: 0.95 MMHG
BH CV ECHO MEAS - LV SYSTOLIC VOL/BSA (12-30): 21.2 ML/M^2
BH CV ECHO MEAS - LV V1 MAX: 58.5 CM/SEC
BH CV ECHO MEAS - LV V1 MEAN: 48.1 CM/SEC
BH CV ECHO MEAS - LV V1 VTI: 11.5 CM
BH CV ECHO MEAS - LVIDD: 4.2 CM
BH CV ECHO MEAS - LVIDS: 2.9 CM
BH CV ECHO MEAS - LVOT AREA: 3.1 CM^2
BH CV ECHO MEAS - LVOT DIAM: 2 CM
BH CV ECHO MEAS - LVPWD: 1.1 CM
BH CV ECHO MEAS - MV A MAX VEL: 72.4 CM/SEC
BH CV ECHO MEAS - MV DEC SLOPE: 201.3 CM/SEC^2
BH CV ECHO MEAS - MV DEC TIME: 0.21 SEC
BH CV ECHO MEAS - MV E MAX VEL: 42.9 CM/SEC
BH CV ECHO MEAS - MV E/A: 0.59
BH CV ECHO MEAS - MV MAX PG: 2.4 MMHG
BH CV ECHO MEAS - MV MEAN PG: 0.69 MMHG
BH CV ECHO MEAS - MV V2 MAX: 77.8 CM/SEC
BH CV ECHO MEAS - MV V2 MEAN: 37.3 CM/SEC
BH CV ECHO MEAS - MV V2 VTI: 16.6 CM
BH CV ECHO MEAS - MVA(VTI): 2.2 CM^2
BH CV ECHO MEAS - PA MAX PG (FULL): 1.7 MMHG
BH CV ECHO MEAS - PA MAX PG: 3.2 MMHG
BH CV ECHO MEAS - PA V2 MAX: 89.6 CM/SEC
BH CV ECHO MEAS - PULM A REVS DUR: 0.11 SEC
BH CV ECHO MEAS - PULM A REVS VEL: 25.2 CM/SEC
BH CV ECHO MEAS - PULM DIAS VEL: 36.3 CM/SEC
BH CV ECHO MEAS - PULM S/D: 1.3
BH CV ECHO MEAS - PULM SYS VEL: 48.9 CM/SEC
BH CV ECHO MEAS - PVA(V,A): 2.9 CM^2
BH CV ECHO MEAS - PVA(V,D): 2.9 CM^2
BH CV ECHO MEAS - QP/QS: 1.1
BH CV ECHO MEAS - RAP SYSTOLE: 3 MMHG
BH CV ECHO MEAS - RV MAX PG: 1.6 MMHG
BH CV ECHO MEAS - RV MEAN PG: 0.96 MMHG
BH CV ECHO MEAS - RV V1 MAX: 62.4 CM/SEC
BH CV ECHO MEAS - RV V1 MEAN: 46.5 CM/SEC
BH CV ECHO MEAS - RV V1 VTI: 9.7 CM
BH CV ECHO MEAS - RVDD: 3.1 CM
BH CV ECHO MEAS - RVOT AREA: 4.2 CM^2
BH CV ECHO MEAS - RVOT DIAM: 2.3 CM
BH CV ECHO MEAS - RVSP: 18 MMHG
BH CV ECHO MEAS - SI(AO): 321.4 ML/M^2
BH CV ECHO MEAS - SI(CUBED): 23 ML/M^2
BH CV ECHO MEAS - SI(LVOT): 17.4 ML/M^2
BH CV ECHO MEAS - SI(MOD-SP4): 29.4 ML/M^2
BH CV ECHO MEAS - SI(TEICH): 21.6 ML/M^2
BH CV ECHO MEAS - SV(AO): 661 ML
BH CV ECHO MEAS - SV(CUBED): 47.4 ML
BH CV ECHO MEAS - SV(LVOT): 35.8 ML
BH CV ECHO MEAS - SV(MOD-SP4): 60.4 ML
BH CV ECHO MEAS - SV(RVOT): 41 ML
BH CV ECHO MEAS - SV(TEICH): 44.4 ML
BH CV ECHO MEAS - TR MAX VEL: 190 CM/SEC

## 2020-11-20 ENCOUNTER — READMISSION MANAGEMENT (OUTPATIENT)
Dept: CALL CENTER | Facility: HOSPITAL | Age: 75
End: 2020-11-20

## 2020-11-20 NOTE — OUTREACH NOTE
General Surgery Week 2 Survey      Responses   [facility] patient discharged from?  [facility]   Does the patient have one of the following disease processes/diagnoses(primary or secondary)?  General Surgery   Week 2 attempt successful?  Yes   Call start time  1545   Call end time  1550   Meds reviewed with patient/caregiver?  Yes   Is the patient taking all medications as directed (includes completed medication regime)?  Yes   Has the patient kept scheduled appointments due by today?  Yes   Comments  Pt's catheter has beem removed along with the stent.   Week 2 call completed?  Yes   Wrap up additional comments  Pt is doing much better. Appetite is improving.          Mayi Saldana RN

## 2020-11-23 ENCOUNTER — OFFICE VISIT (OUTPATIENT)
Dept: CARDIOLOGY | Facility: CLINIC | Age: 75
End: 2020-11-23

## 2020-11-23 VITALS
HEIGHT: 68 IN | BODY MASS INDEX: 29.55 KG/M2 | WEIGHT: 195 LBS | DIASTOLIC BLOOD PRESSURE: 72 MMHG | SYSTOLIC BLOOD PRESSURE: 120 MMHG | HEART RATE: 79 BPM | OXYGEN SATURATION: 98 %

## 2020-11-23 DIAGNOSIS — I25.10 CORONARY ARTERY DISEASE INVOLVING NATIVE CORONARY ARTERY OF NATIVE HEART WITHOUT ANGINA PECTORIS: ICD-10-CM

## 2020-11-23 DIAGNOSIS — R01.1 HEART MURMUR: ICD-10-CM

## 2020-11-23 DIAGNOSIS — I10 ESSENTIAL HYPERTENSION: Primary | ICD-10-CM

## 2020-11-23 DIAGNOSIS — E78.2 MIXED HYPERLIPIDEMIA: ICD-10-CM

## 2020-11-23 DIAGNOSIS — I35.0 AORTIC VALVE STENOSIS, ETIOLOGY OF CARDIAC VALVE DISEASE UNSPECIFIED: ICD-10-CM

## 2020-11-23 PROCEDURE — 99213 OFFICE O/P EST LOW 20 MIN: CPT | Performed by: INTERNAL MEDICINE

## 2020-11-23 PROCEDURE — 93000 ELECTROCARDIOGRAM COMPLETE: CPT | Performed by: INTERNAL MEDICINE

## 2020-11-23 RX ORDER — ACETAMINOPHEN 500 MG
1000 TABLET ORAL EVERY 6 HOURS PRN
COMMUNITY

## 2020-11-23 RX ORDER — ONDANSETRON HYDROCHLORIDE 8 MG/1
4 TABLET, FILM COATED ORAL EVERY 8 HOURS PRN
COMMUNITY
End: 2021-01-01

## 2020-11-23 NOTE — PROGRESS NOTES
Cardiology Office Visit Note      Referring physician:  Dr. Uri Cortes    Reason For Followup: 1 month follow up/echo    HPI:  Navneet Lind is a 74 y.o. male presents today for a follow up visit. Patient has known history of  Heart murmur, HTN, CAD, hyperlipidemia and cardiac pacemaker.The patient has a history of coronary artery disease with multivessel CABG in 1995 had subsequent occlusion of the saphenous vein graft to the RCA.  Last cardiac catheterization was in March 2018 saphenous vein graft to the OM was okay LIMA to the LAD was okay saphenous vein graft to the RCA was occluded but fills via collaterals distally from the left side.    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 Patient underwent a left nephroureterectomy  . Patient states he's very weak and uncomfortable appearing, though overall is thought to be convalescing reasonably well from his extensive urologic surgery.    Echo 10/30/2020 showed severe aortic valve stenosis is present, left ventricular function is normal, with an EF of 56-60%.     He denies any chest pain, SOB, PND ,palpitations or lower extremity edema.    Patient is complaint with his medications        Past Medical History:   Diagnosis Date   • Aortic stenosis 9/29/2015    Per Echo 2017   • Benign essential HTN    • Bundle branch block, right 2/7/2013   • CAD (coronary artery disease), native coronary artery    • Cancer (CMS/HCC)     bladder- chemo, new left renal mass   • Coronary artery disease involving native coronary artery of native heart without angina pectoris 11/19/2019    CABG 1995; SVG to RCA is occluded, LIMA to LAD, SVG to diag of LAD, SVG to marginal of LCX   • Essential hypertension 11/19/2019   • Heart murmur    • Hyperlipidemia, mixed    • Mixed hyperlipidemia 11/19/2019   • Near syncope    • NATALIA (obstructive sleep apnea)     AHI 11.6/h   • Premature ventricular contractions  2014   • Presence of cardiac pacemaker 2019    BS dual chamber PM 2016   • Sick sinus syndrome (CMS/HCC)    • SSS (sick sinus syndrome) (CMS/HCC) 2019       Past Surgical History:   Procedure Laterality Date   • CARDIAC CATHETERIZATION     • CARDIOVASCULAR STRESS TEST     • CORONARY ARTERY BYPASS GRAFT     • ECHO - CONVERTED     • NEPHROURETERECTOMY Left 2020    Procedure: NEPHROURETERECTOMY;  Surgeon: Rogers Bae MD;  Location: [facility] OR;  Service: Urology;  Laterality: Left;   • PACEMAKER IMPLANTATION      bs         Current Outpatient Medications:   •  acetaminophen (TYLENOL) 500 MG tablet, Take 500 mg by mouth Every 6 (Six) Hours As Needed for Mild Pain ., Disp: , Rfl:   •  amLODIPine (NORVASC) 10 MG tablet, Take 1 tablet by mouth Daily., Disp: 90 tablet, Rfl: 3  •  aspirin 81 MG EC tablet, Take 81 mg by mouth Daily. LD 10/12 stopped for surgery, Disp: , Rfl:   •  gabapentin (NEURONTIN) 800 MG tablet, Take 800 mg by mouth Every Evening., Disp: , Rfl:   •  hydrALAZINE (APRESOLINE) 25 MG tablet, Take 1 tablet by mouth Every 8 (Eight) Hours., Disp: 90 tablet, Rfl: 3  •  ondansetron (ZOFRAN) 8 MG tablet, Take 4 mg by mouth Every 8 (Eight) Hours As Needed for Nausea or Vomiting., Disp: , Rfl:   •  simvastatin (ZOCOR) 40 MG tablet, Take 1 tablet by mouth Every Night., Disp: 90 tablet, Rfl: 3    Social History     Socioeconomic History   • Marital status:      Spouse name: Not on file   • Number of children: Not on file   • Years of education: Not on file   • Highest education level: Not on file   Tobacco Use   • Smoking status: Former Smoker     Quit date:      Years since quittin.8   • Smokeless tobacco: Never Used   Substance and Sexual Activity   • Alcohol use: Never     Frequency: Never     Comment: once/week    • Drug use: Never   • Sexual activity: Defer       History reviewed. No pertinent family history.      Review of Systems   General:  "denies fever, chills, anorexia, weight loss  Eyes: denies blurring, diplopia  Ear/Nose/Throat: denies ear pain, nosebleeds, hoarseness  Cardiovascular: See HPI  Respiratory: denies excessive sputum, hemoptysis, wheezing  Gastrointestinal: denies nausea, vomiting, change in bowel habits, abdominal pain  Genitourinary: Recent urologic surgery-see HPI  Musculoskeletal: No significant arthralgias  Skin: denies rashes, itching, suspicious lesions  Neurologic: denies focal neuro deficits  Psychiatric: denies depression, anxiety  Endocrine: denies cold intolerance, heat intolerance  Hematologic/Lymphatic: denies abnormal bruising, bleeding  Allergic/Immunologic: denies urticaria or persistent infections      Objective     Visit Vitals  /72   Pulse 79   Ht 172.7 cm (67.99\")   Wt 88.5 kg (195 lb)   SpO2 98%   BMI 29.66 kg/m²           Physical Exam  General:    Pale, weak, though well developed,, in no acute distress.    Head:     normocephalic and atraumatic.    Eyes:    PERRL/EOM intact, conjunctiva and sclera clear with out nystagmus.    Neck:    no jvd or bruits  Chest Wall:    no deformities, with surgical incisions healing satisfactorily  Lungs:    clear bilaterally to auscultation with adequate global airflow   Heart:    non-displaced PMI; regular rate and rhythm, normal S1, S2 with grade 3/6 systolic ejection murmur over left upper sternal border rating towards the base of neck  Abdomen:  Soft, nontender without HSM  Msk:    no deformity; adequate R OM  Pulses:    pulses normal in all 4 extremities.    Extremities:    no clubbing, cyanosis, edema   Neurologic:    no focal sensory or motor deficits  Skin:    intact without lesions or rashes.    Psych:    alert and cooperative; normal mood and affect; normal attention span and concentration.            ECG 12 Lead    Date/Time: 11/23/2020 7:45 AM  Performed by: ADRIANO Erazo MD  Authorized by: ADRIANO Erazo MD   Comparison: compared with previous ECG from " 11/3/2020  Rhythm: sinus rhythm  Conduction: right bundle branch block and left posterior fascicular block    Clinical impression: abnormal EKG  Comments: Change from previous tracing              Assessment:   Problems Addressed this Visit        Cardiovascular and Mediastinum    Aortic stenosis    --Severe calcific aortic stenosis; currently hemodynamically stable, asymptomatic        Coronary artery disease involving native coronary artery of native heart without angina pectoris    -Hemodynamically stable, well compensated and angina free                    Mixed hyperlipidemia    -Maintained on simvastatin; followed by PCP        Essential hypertension - Primary    -Currently well controlled on medications  listed and reviewed today          Diagnoses       Codes Comments    Essential hypertension    -  Primary ICD-10-CM: I10  ICD-9-CM: 401.9     Mixed hyperlipidemia     ICD-10-CM: E78.2  ICD-9-CM: 272.2     Heart murmur     ICD-10-CM: R01.1  ICD-9-CM: 785.2     Coronary artery disease involving native coronary artery of native heart without angina pectoris     ICD-10-CM: I25.10  ICD-9-CM: 414.01     Aortic valve stenosis, etiology of cardiac valve disease unspecified     ICD-10-CM: I35.0  ICD-9-CM: 424.1             Plan:  Continue current medications as listed and reviewed in detail.  Discussed future prospects for TAVR once recovered from current urologic surgery.  Return to clinic 3 months or sooner if needed.    ADRIANO Erazo MD  11/23/2020 22:54 EST    This report was generated using the Dragon voice recognition system.

## 2020-11-30 ENCOUNTER — READMISSION MANAGEMENT (OUTPATIENT)
Dept: CALL CENTER | Facility: HOSPITAL | Age: 75
End: 2020-11-30

## 2020-11-30 ENCOUNTER — TRANSCRIBE ORDERS (OUTPATIENT)
Dept: LAB | Facility: HOSPITAL | Age: 75
End: 2020-11-30

## 2020-11-30 ENCOUNTER — LAB (OUTPATIENT)
Dept: LAB | Facility: HOSPITAL | Age: 75
End: 2020-11-30

## 2020-11-30 DIAGNOSIS — I10 ESSENTIAL HYPERTENSION, MALIGNANT: ICD-10-CM

## 2020-11-30 DIAGNOSIS — I10 ESSENTIAL HYPERTENSION, MALIGNANT: Primary | ICD-10-CM

## 2020-11-30 LAB
ANION GAP SERPL CALCULATED.3IONS-SCNC: 8.2 MMOL/L (ref 5–15)
BUN SERPL-MCNC: 11 MG/DL (ref 8–23)
BUN/CREAT SERPL: 9.2 (ref 7–25)
CALCIUM SPEC-SCNC: 9 MG/DL (ref 8.6–10.5)
CHLORIDE SERPL-SCNC: 104 MMOL/L (ref 98–107)
CO2 SERPL-SCNC: 26.8 MMOL/L (ref 22–29)
CREAT SERPL-MCNC: 1.19 MG/DL (ref 0.76–1.27)
GFR SERPL CREATININE-BSD FRML MDRD: 60 ML/MIN/1.73
GLUCOSE SERPL-MCNC: 97 MG/DL (ref 65–99)
POTASSIUM SERPL-SCNC: 4.3 MMOL/L (ref 3.5–5.2)
SODIUM SERPL-SCNC: 139 MMOL/L (ref 136–145)

## 2020-11-30 PROCEDURE — 80048 BASIC METABOLIC PNL TOTAL CA: CPT

## 2020-11-30 PROCEDURE — 36415 COLL VENOUS BLD VENIPUNCTURE: CPT

## 2020-11-30 NOTE — OUTREACH NOTE
General Surgery Week 3 Survey      Responses   [facility] patient discharged from?  [facility]   Does the patient have one of the following disease processes/diagnoses(primary or secondary)?  General Surgery   Week 3 attempt successful?  Yes   Call start time  1559   Call end time  1606   Discharge diagnosis  Left renal mass    Meds reviewed with patient/caregiver?  Yes   Is the patient having any side effects they believe may be caused by any medication additions or changes?  No   Does the patient have all medications related to this admission filled (includes all antibiotics, pain medications, etc.)  Yes   Is the patient taking all medications as directed (includes completed medication regime)?  Yes   Does the patient have a follow up appointment scheduled with their surgeon?  Yes   Has the patient kept scheduled appointments due by today?  Yes   Has home health visited the patient within 72 hours of discharge?  N/A   What DME was ordered?  RW from [facility]    Has all DME been delivered?  Yes   Psychosocial issues?  No   Did the patient receive a copy of their discharge instructions?  Yes   Nursing interventions  Reviewed instructions with patient   What is the patient's perception of their health status since discharge?  Improving   Nursing interventions  Nurse provided patient education   Is the patient /caregiver able to teach back basic post-op care?  Practice 'cough and deep breath', Drive as instructed by MD in discharge instructions, Take showers only when approved by MD-sponge bathe until then, Keep incision areas clean,dry and protected, Do not remove steri-strips, Lifting as instructed by MD in discharge instructions   Is the patient/caregiver able to teach back signs and symptoms of incisional infection?  Increased redness, swelling or pain at the incisonal site, Increased drainage or bleeding, Incisional warmth, Pus or odor from incision, Fever   Is the patient/caregiver able to teach back steps to  recovery at home?  Set small, achievable goals for return to baseline health, Rest and rebuild strength, gradually increase activity, Eat a well-balance diet, Make a list of questions for surgeon's appointment, Weigh daily   If the patient is a current smoker, are they able to teach back resources for cessation?  Not a smoker   Is the patient/caregiver able to teach back the hierarchy of who to call/visit for symptoms/problems? PCP, Specialist, Home health nurse, Urgent Care, ED, 911  Yes   Additional teach back comments  Pain is improved, incision is good, afebrile.   Week 3 call completed?  Yes   Wrap up additional comments  Oncology tomorrow and kidney Friday.          Nadia Mcgee RN

## 2020-12-09 ENCOUNTER — READMISSION MANAGEMENT (OUTPATIENT)
Dept: CALL CENTER | Facility: HOSPITAL | Age: 75
End: 2020-12-09

## 2020-12-09 NOTE — OUTREACH NOTE
"General Surgery Week 4 Survey      Responses   [facility] [place] [facility]   Does the patient have one of the following disease processes/diagnoses(primary or secondary)?  General Surgery   Week 4 attempt successful?  Yes   Call start time  1733   Call end time  1737   Discharge diagnosis  Left renal mass    Is the patient taking all medications as directed (includes completed medication regime)?  Yes   Has the patient kept scheduled appointments due by today?  Yes   Comments  Pt gets SOA w/ exertion. Incisions healed well, no issues. Appetite is WNL. Pt is urinating without difficulty, has some \"leaking every now and then\", he reports.    What is the patient's perception of their health status since discharge?  Improving   Is the patient/caregiver able to teach back steps to recovery at home?  Eat a well-balance diet   Is the patient/caregiver able to teach back the hierarchy of who to call/visit for symptoms/problems? PCP, Specialist, Home health nurse, Urgent Care, ED, 911  Yes   Week 4 call completed?  Yes   Would the patient like one additional call?  No   Graduated  Yes   Is the patient interested in additional calls from an ambulatory ?  NOTE:  applies to high risk patients requiring additional follow-up.  Yes   Did the patient feel the follow up calls were helpful during their recovery period?  Yes          Myriam Doll RN  " Attempted to perform straight catheter but patient became combative during procedure. Pt requesting to use bathroom but uncooperative with using bedpan. MD aware.

## 2020-12-10 ENCOUNTER — EPISODE CHANGES (OUTPATIENT)
Dept: CASE MANAGEMENT | Facility: OTHER | Age: 75
End: 2020-12-10

## 2020-12-17 ENCOUNTER — PATIENT OUTREACH (OUTPATIENT)
Dept: CASE MANAGEMENT | Facility: OTHER | Age: 75
End: 2020-12-17

## 2020-12-17 NOTE — OUTREACH NOTE
Patient Outreach Note    RN-ACM additional call center follow-up contact completed.    Talked briefly with patient, who was driving to get an oil change at time of call, using speaker phone. Discussed 11/2-9/20 [facility] admission for left nephrectomy, DX TRCC - T3. Discussed current health and care plan/needs.  Patient states to be compliant with medical  recommendations & states symptoms have improved. States abdomen still a little tender, but healing well. Both carrera and ureter stent already removed. States still dribbles small amt urine before voiding.  States that sx is not new, but ongoing since prior to surgery.  Pt has no c/o F/C, N/V, chest pain, palps, SOA, dizziness, bowel complaints. Eating/drinking OK.     Patient lives with spouse, has a rolling walker received prior to DC from [facility], no other DME.  Denies insecurities re food, medications, transportation.  States he and spouse being very safe during pandemic: both sheltering at home, social distancing & wearing masks when out.  He plans to speak w/ oncology & nephrology re safety of having covid-19 vaccine prior to chemo.     Pt states has appt for oncology lab work next week and a 12/29 appt for port insertion.  Hospitals in [place] also has these appts in next 30 days: Dr Veliz at [facility]; Dr Bae, urology; Dr Erazo, cardio; and Dr Grewal, PCP.       Pt denies immediate issues/needs or questions re DC instructions/medications. He verb appreciation for the call.    Yoly Miner, ESE  Ambulatory     12/17/2020, 12:37 EST

## 2020-12-19 DIAGNOSIS — E78.2 MIXED HYPERLIPIDEMIA: ICD-10-CM

## 2020-12-21 RX ORDER — SIMVASTATIN 40 MG
TABLET ORAL
Qty: 90 TABLET | Refills: 0 | Status: SHIPPED | OUTPATIENT
Start: 2020-12-21 | End: 2021-03-02

## 2020-12-28 DIAGNOSIS — I10 ESSENTIAL HYPERTENSION: ICD-10-CM

## 2020-12-28 RX ORDER — AMLODIPINE BESYLATE 10 MG/1
10 TABLET ORAL
Qty: 90 TABLET | Refills: 3 | Status: SHIPPED | OUTPATIENT
Start: 2020-12-28 | End: 2021-02-19 | Stop reason: DRUGHIGH

## 2021-01-01 ENCOUNTER — APPOINTMENT (OUTPATIENT)
Dept: CT IMAGING | Facility: HOSPITAL | Age: 76
End: 2021-01-01

## 2021-01-01 ENCOUNTER — APPOINTMENT (OUTPATIENT)
Dept: CARDIAC REHAB | Facility: HOSPITAL | Age: 76
End: 2021-01-01

## 2021-01-01 ENCOUNTER — OFFICE VISIT (OUTPATIENT)
Dept: CARDIOLOGY | Facility: CLINIC | Age: 76
End: 2021-01-01

## 2021-01-01 ENCOUNTER — HOSPITAL ENCOUNTER (OUTPATIENT)
Dept: GENERAL RADIOLOGY | Facility: HOSPITAL | Age: 76
Discharge: HOME | End: 2021-03-29

## 2021-01-01 ENCOUNTER — OFFICE VISIT (OUTPATIENT)
Dept: PULMONOLOGY | Facility: HOSPITAL | Age: 76
End: 2021-01-01

## 2021-01-01 ENCOUNTER — HOSPITAL ENCOUNTER (OUTPATIENT)
Facility: HOSPITAL | Age: 76
Setting detail: HOSPITAL OUTPATIENT SURGERY
Discharge: HOME | End: 2021-03-22
Attending: INTERNAL MEDICINE | Admitting: INTERNAL MEDICINE

## 2021-01-01 ENCOUNTER — PREP FOR SURGERY (OUTPATIENT)
Dept: OTHER | Facility: HOSPITAL | Age: 76
End: 2021-01-01

## 2021-01-01 ENCOUNTER — LAB (OUTPATIENT)
Dept: LAB | Facility: HOSPITAL | Age: 76
End: 2021-01-01

## 2021-01-01 ENCOUNTER — TELEPHONE (OUTPATIENT)
Dept: PHYSICAL THERAPY | Facility: CLINIC | Age: 76
End: 2021-01-01

## 2021-01-01 ENCOUNTER — TRANSCRIBE ORDERS (OUTPATIENT)
Dept: ADMINISTRATIVE | Facility: HOSPITAL | Age: 76
End: 2021-01-01

## 2021-01-01 ENCOUNTER — HOSPITAL ENCOUNTER (OUTPATIENT)
Dept: RESPIRATORY THERAPY | Facility: HOSPITAL | Age: 76
Discharge: HOME | End: 2021-12-18
Admitting: INTERNAL MEDICINE

## 2021-01-01 ENCOUNTER — TREATMENT (OUTPATIENT)
Dept: PHYSICAL THERAPY | Facility: CLINIC | Age: 76
End: 2021-01-01

## 2021-01-01 ENCOUNTER — HOSPITAL ENCOUNTER (OUTPATIENT)
Dept: NUCLEAR MEDICINE | Facility: HOSPITAL | Age: 76
Discharge: HOME | End: 2021-11-30

## 2021-01-01 ENCOUNTER — TELEPHONE (OUTPATIENT)
Dept: PULMONOLOGY | Facility: HOSPITAL | Age: 76
End: 2021-01-01

## 2021-01-01 ENCOUNTER — TELEPHONE (OUTPATIENT)
Dept: CARDIOLOGY | Facility: HOSPITAL | Age: 76
End: 2021-01-01

## 2021-01-01 ENCOUNTER — APPOINTMENT (OUTPATIENT)
Dept: GENERAL RADIOLOGY | Facility: HOSPITAL | Age: 76
End: 2021-01-01

## 2021-01-01 ENCOUNTER — READMISSION MANAGEMENT (OUTPATIENT)
Dept: CALL CENTER | Facility: HOSPITAL | Age: 76
End: 2021-01-01

## 2021-01-01 ENCOUNTER — APPOINTMENT (OUTPATIENT)
Dept: CARDIOLOGY | Facility: HOSPITAL | Age: 76
End: 2021-01-01

## 2021-01-01 ENCOUNTER — APPOINTMENT (OUTPATIENT)
Dept: MRI IMAGING | Facility: HOSPITAL | Age: 76
End: 2021-01-01

## 2021-01-01 ENCOUNTER — ANESTHESIA EVENT (OUTPATIENT)
Dept: PERIOP | Facility: HOSPITAL | Age: 76
End: 2021-01-01

## 2021-01-01 ENCOUNTER — DOCUMENTATION (OUTPATIENT)
Dept: PHYSICAL THERAPY | Facility: CLINIC | Age: 76
End: 2021-01-01

## 2021-01-01 ENCOUNTER — HOSPITAL ENCOUNTER (OUTPATIENT)
Dept: MRI IMAGING | Facility: HOSPITAL | Age: 76
Discharge: HOME | End: 2021-08-26

## 2021-01-01 ENCOUNTER — ANCILLARY PROCEDURE (OUTPATIENT)
Dept: PERIOP | Facility: HOSPITAL | Age: 76
End: 2021-01-01

## 2021-01-01 ENCOUNTER — HOSPITAL ENCOUNTER (OUTPATIENT)
Dept: CT IMAGING | Facility: HOSPITAL | Age: 76
Discharge: HOME | End: 2021-07-22
Admitting: INTERNAL MEDICINE

## 2021-01-01 ENCOUNTER — HOSPITAL ENCOUNTER (OUTPATIENT)
Dept: CT IMAGING | Facility: HOSPITAL | Age: 76
Discharge: HOME | End: 2021-03-25
Admitting: INTERNAL MEDICINE

## 2021-01-01 ENCOUNTER — HOSPITAL ENCOUNTER (INPATIENT)
Facility: HOSPITAL | Age: 76
LOS: 1 days | Discharge: HOME | End: 2021-03-31
Attending: THORACIC SURGERY (CARDIOTHORACIC VASCULAR SURGERY) | Admitting: THORACIC SURGERY (CARDIOTHORACIC VASCULAR SURGERY)

## 2021-01-01 ENCOUNTER — HOSPITAL ENCOUNTER (OUTPATIENT)
Facility: HOSPITAL | Age: 76
Discharge: HOME | End: 2021-06-23
Attending: INTERNAL MEDICINE | Admitting: INTERNAL MEDICINE

## 2021-01-01 ENCOUNTER — HOSPITAL ENCOUNTER (OUTPATIENT)
Dept: INFUSION THERAPY | Facility: HOSPITAL | Age: 76
Discharge: HOME | End: 2021-03-29

## 2021-01-01 ENCOUNTER — HOSPITAL ENCOUNTER (OUTPATIENT)
Dept: CARDIOLOGY | Facility: HOSPITAL | Age: 76
Discharge: HOME | End: 2021-05-12
Admitting: INTERNAL MEDICINE

## 2021-01-01 ENCOUNTER — OFFICE VISIT (OUTPATIENT)
Dept: SLEEP MEDICINE | Facility: CLINIC | Age: 76
End: 2021-01-01

## 2021-01-01 ENCOUNTER — ANESTHESIA (OUTPATIENT)
Dept: PERIOP | Facility: HOSPITAL | Age: 76
End: 2021-01-01

## 2021-01-01 ENCOUNTER — OFFICE VISIT (OUTPATIENT)
Dept: CARDIAC REHAB | Facility: HOSPITAL | Age: 76
End: 2021-01-01

## 2021-01-01 ENCOUNTER — HOSPITAL ENCOUNTER (OUTPATIENT)
Facility: HOSPITAL | Age: 76
Setting detail: HOSPITAL OUTPATIENT SURGERY
End: 2021-01-01
Attending: INTERNAL MEDICINE | Admitting: INTERNAL MEDICINE

## 2021-01-01 ENCOUNTER — HOSPITAL ENCOUNTER (OUTPATIENT)
Dept: CT IMAGING | Facility: HOSPITAL | Age: 76
Discharge: HOME | End: 2021-12-22
Admitting: RADIOLOGY

## 2021-01-01 ENCOUNTER — TRANSCRIBE ORDERS (OUTPATIENT)
Dept: PHYSICAL THERAPY | Facility: CLINIC | Age: 76
End: 2021-01-01

## 2021-01-01 ENCOUNTER — TREATMENT (OUTPATIENT)
Dept: CARDIAC REHAB | Facility: HOSPITAL | Age: 76
End: 2021-01-01

## 2021-01-01 ENCOUNTER — APPOINTMENT (OUTPATIENT)
Dept: PREADMISSION TESTING | Facility: HOSPITAL | Age: 76
End: 2021-01-01

## 2021-01-01 ENCOUNTER — TRANSCRIBE ORDERS (OUTPATIENT)
Dept: LAB | Facility: HOSPITAL | Age: 76
End: 2021-01-01

## 2021-01-01 ENCOUNTER — HOSPITAL ENCOUNTER (INPATIENT)
Facility: HOSPITAL | Age: 76
LOS: 1 days | Discharge: NURSING, REHAB OR LONG-TERM CARE | End: 2021-12-31
Attending: EMERGENCY MEDICINE | Admitting: INTERNAL MEDICINE

## 2021-01-01 ENCOUNTER — TELEPHONE (OUTPATIENT)
Dept: CARDIAC REHAB | Facility: HOSPITAL | Age: 76
End: 2021-01-01

## 2021-01-01 VITALS
OXYGEN SATURATION: 95 % | RESPIRATION RATE: 16 BRPM | WEIGHT: 186 LBS | TEMPERATURE: 98.5 F | DIASTOLIC BLOOD PRESSURE: 60 MMHG | HEART RATE: 68 BPM | SYSTOLIC BLOOD PRESSURE: 122 MMHG | HEIGHT: 68 IN | BODY MASS INDEX: 28.19 KG/M2

## 2021-01-01 VITALS
SYSTOLIC BLOOD PRESSURE: 115 MMHG | OXYGEN SATURATION: 96 % | DIASTOLIC BLOOD PRESSURE: 78 MMHG | HEIGHT: 68 IN | BODY MASS INDEX: 29.55 KG/M2 | HEART RATE: 68 BPM | WEIGHT: 195 LBS | RESPIRATION RATE: 15 BRPM | TEMPERATURE: 98.1 F

## 2021-01-01 VITALS
WEIGHT: 188 LBS | SYSTOLIC BLOOD PRESSURE: 124 MMHG | HEART RATE: 92 BPM | RESPIRATION RATE: 12 BRPM | OXYGEN SATURATION: 93 % | HEIGHT: 68 IN | BODY MASS INDEX: 28.49 KG/M2 | TEMPERATURE: 97.5 F | DIASTOLIC BLOOD PRESSURE: 85 MMHG

## 2021-01-01 VITALS
WEIGHT: 190 LBS | RESPIRATION RATE: 20 BRPM | BODY MASS INDEX: 28.79 KG/M2 | HEIGHT: 68 IN | DIASTOLIC BLOOD PRESSURE: 70 MMHG | SYSTOLIC BLOOD PRESSURE: 110 MMHG | OXYGEN SATURATION: 97 % | HEART RATE: 70 BPM

## 2021-01-01 VITALS
DIASTOLIC BLOOD PRESSURE: 75 MMHG | OXYGEN SATURATION: 93 % | HEIGHT: 68 IN | OXYGEN SATURATION: 95 % | TEMPERATURE: 98 F | HEART RATE: 79 BPM | RESPIRATION RATE: 12 BRPM | WEIGHT: 174 LBS | SYSTOLIC BLOOD PRESSURE: 97 MMHG | SYSTOLIC BLOOD PRESSURE: 125 MMHG | RESPIRATION RATE: 17 BRPM | WEIGHT: 174 LBS | HEART RATE: 64 BPM | TEMPERATURE: 97.8 F | BODY MASS INDEX: 26.37 KG/M2 | DIASTOLIC BLOOD PRESSURE: 58 MMHG | HEIGHT: 68 IN | BODY MASS INDEX: 26.37 KG/M2

## 2021-01-01 VITALS
OXYGEN SATURATION: 95 % | BODY MASS INDEX: 27.64 KG/M2 | TEMPERATURE: 97.8 F | HEART RATE: 73 BPM | SYSTOLIC BLOOD PRESSURE: 122 MMHG | HEIGHT: 66 IN | RESPIRATION RATE: 18 BRPM | DIASTOLIC BLOOD PRESSURE: 68 MMHG | WEIGHT: 171.96 LBS

## 2021-01-01 VITALS
HEART RATE: 78 BPM | OXYGEN SATURATION: 97 % | DIASTOLIC BLOOD PRESSURE: 63 MMHG | SYSTOLIC BLOOD PRESSURE: 114 MMHG | TEMPERATURE: 96.9 F | RESPIRATION RATE: 16 BRPM

## 2021-01-01 VITALS
RESPIRATION RATE: 18 BRPM | BODY MASS INDEX: 29.46 KG/M2 | DIASTOLIC BLOOD PRESSURE: 72 MMHG | SYSTOLIC BLOOD PRESSURE: 120 MMHG | HEART RATE: 72 BPM | OXYGEN SATURATION: 97 % | WEIGHT: 194.4 LBS | HEIGHT: 68 IN

## 2021-01-01 VITALS
BODY MASS INDEX: 28.79 KG/M2 | HEART RATE: 87 BPM | OXYGEN SATURATION: 95 % | TEMPERATURE: 97.3 F | WEIGHT: 190 LBS | DIASTOLIC BLOOD PRESSURE: 73 MMHG | HEIGHT: 68 IN | SYSTOLIC BLOOD PRESSURE: 122 MMHG | RESPIRATION RATE: 16 BRPM

## 2021-01-01 VITALS
SYSTOLIC BLOOD PRESSURE: 118 MMHG | BODY MASS INDEX: 30.05 KG/M2 | HEIGHT: 66 IN | WEIGHT: 187 LBS | DIASTOLIC BLOOD PRESSURE: 70 MMHG | HEART RATE: 74 BPM

## 2021-01-01 VITALS
BODY MASS INDEX: 29.4 KG/M2 | OXYGEN SATURATION: 95 % | HEART RATE: 68 BPM | DIASTOLIC BLOOD PRESSURE: 79 MMHG | SYSTOLIC BLOOD PRESSURE: 134 MMHG | WEIGHT: 194 LBS | HEIGHT: 68 IN

## 2021-01-01 VITALS
HEIGHT: 68 IN | BODY MASS INDEX: 29.4 KG/M2 | WEIGHT: 194 LBS | SYSTOLIC BLOOD PRESSURE: 120 MMHG | DIASTOLIC BLOOD PRESSURE: 72 MMHG

## 2021-01-01 VITALS
OXYGEN SATURATION: 100 % | BODY MASS INDEX: 30.36 KG/M2 | DIASTOLIC BLOOD PRESSURE: 52 MMHG | SYSTOLIC BLOOD PRESSURE: 120 MMHG | TEMPERATURE: 97.1 F | WEIGHT: 188.93 LBS | HEIGHT: 66 IN | RESPIRATION RATE: 16 BRPM | HEART RATE: 66 BPM

## 2021-01-01 VITALS
HEART RATE: 76 BPM | SYSTOLIC BLOOD PRESSURE: 116 MMHG | HEIGHT: 68 IN | TEMPERATURE: 97.6 F | BODY MASS INDEX: 30.27 KG/M2 | DIASTOLIC BLOOD PRESSURE: 72 MMHG | RESPIRATION RATE: 16 BRPM | OXYGEN SATURATION: 96 % | WEIGHT: 199.74 LBS

## 2021-01-01 VITALS
RESPIRATION RATE: 18 BRPM | OXYGEN SATURATION: 95 % | SYSTOLIC BLOOD PRESSURE: 116 MMHG | WEIGHT: 195 LBS | DIASTOLIC BLOOD PRESSURE: 78 MMHG | HEART RATE: 88 BPM | HEIGHT: 68 IN | BODY MASS INDEX: 29.55 KG/M2

## 2021-01-01 VITALS — HEART RATE: 98 BPM | RESPIRATION RATE: 12 BRPM | OXYGEN SATURATION: 93 %

## 2021-01-01 DIAGNOSIS — R06.09 DYSPNEA ON EXERTION: Primary | ICD-10-CM

## 2021-01-01 DIAGNOSIS — M51.34 DDD (DEGENERATIVE DISC DISEASE), THORACIC: Primary | ICD-10-CM

## 2021-01-01 DIAGNOSIS — J84.9 ILD (INTERSTITIAL LUNG DISEASE) (HCC): Primary | ICD-10-CM

## 2021-01-01 DIAGNOSIS — R79.1 ABNORMAL COAGULATION PROFILE: ICD-10-CM

## 2021-01-01 DIAGNOSIS — I70.1 RENAL ARTERY STENOSIS (HCC): ICD-10-CM

## 2021-01-01 DIAGNOSIS — I50.32 CHRONIC DIASTOLIC CONGESTIVE HEART FAILURE (HCC): ICD-10-CM

## 2021-01-01 DIAGNOSIS — R59.0 LYMPHADENOPATHY, MEDIASTINAL: ICD-10-CM

## 2021-01-01 DIAGNOSIS — R59.0 LYMPHADENOPATHY, RETROPERITONEAL: ICD-10-CM

## 2021-01-01 DIAGNOSIS — I35.0 NONRHEUMATIC AORTIC VALVE STENOSIS: Primary | ICD-10-CM

## 2021-01-01 DIAGNOSIS — I35.0 NONRHEUMATIC AORTIC VALVE STENOSIS: ICD-10-CM

## 2021-01-01 DIAGNOSIS — I49.3 PREMATURE VENTRICULAR CONTRACTIONS: ICD-10-CM

## 2021-01-01 DIAGNOSIS — I35.0 AORTIC VALVE STENOSIS, ETIOLOGY OF CARDIAC VALVE DISEASE UNSPECIFIED: ICD-10-CM

## 2021-01-01 DIAGNOSIS — R53.1 WEAKNESS: ICD-10-CM

## 2021-01-01 DIAGNOSIS — R79.9 ABNORMAL FINDING OF BLOOD CHEMISTRY, UNSPECIFIED: ICD-10-CM

## 2021-01-01 DIAGNOSIS — J84.9 ILD (INTERSTITIAL LUNG DISEASE) (HCC): ICD-10-CM

## 2021-01-01 DIAGNOSIS — G47.33 OSA ON CPAP: ICD-10-CM

## 2021-01-01 DIAGNOSIS — R53.83 OTHER FATIGUE: ICD-10-CM

## 2021-01-01 DIAGNOSIS — R06.09 DYSPNEA ON EXERTION: ICD-10-CM

## 2021-01-01 DIAGNOSIS — I70.1 RENAL ARTERY STENOSIS (HCC): Primary | ICD-10-CM

## 2021-01-01 DIAGNOSIS — Z01.812 PRE-PROCEDURE LAB EXAM: ICD-10-CM

## 2021-01-01 DIAGNOSIS — M54.6 PAIN IN THORACIC SPINE: ICD-10-CM

## 2021-01-01 DIAGNOSIS — I25.10 CORONARY ARTERY DISEASE INVOLVING NATIVE CORONARY ARTERY OF NATIVE HEART WITHOUT ANGINA PECTORIS: ICD-10-CM

## 2021-01-01 DIAGNOSIS — E78.2 MIXED HYPERLIPIDEMIA: ICD-10-CM

## 2021-01-01 DIAGNOSIS — Z95.2 S/P TAVR (TRANSCATHETER AORTIC VALVE REPLACEMENT): Primary | ICD-10-CM

## 2021-01-01 DIAGNOSIS — E55.9 INADEQUATE VITAMIN D AND VITAMIN D DERIVATIVE INTAKE: ICD-10-CM

## 2021-01-01 DIAGNOSIS — S22.060D CLOSED WEDGE COMPRESSION FRACTURE OF T7 VERTEBRA WITH ROUTINE HEALING: Primary | ICD-10-CM

## 2021-01-01 DIAGNOSIS — S22.060D CLOSED WEDGE COMPRESSION FRACTURE OF T7 VERTEBRA WITH ROUTINE HEALING: ICD-10-CM

## 2021-01-01 DIAGNOSIS — I13.0 HYPERTENSIVE HEART AND CHRONIC KIDNEY DISEASE WITH HEART FAILURE AND STAGE 1 THROUGH STAGE 4 CHRONIC KIDNEY DISEASE, OR UNSPECIFIED CHRONIC KIDNEY DISEASE (HCC): ICD-10-CM

## 2021-01-01 DIAGNOSIS — J18.9 PNEUMONIA OF RIGHT UPPER LOBE DUE TO INFECTIOUS ORGANISM: Primary | ICD-10-CM

## 2021-01-01 DIAGNOSIS — R29.898 WEAKNESS OF BOTH LOWER EXTREMITIES: ICD-10-CM

## 2021-01-01 DIAGNOSIS — N18.9 CHRONIC KIDNEY DISEASE, UNSPECIFIED CKD STAGE: Primary | ICD-10-CM

## 2021-01-01 DIAGNOSIS — M54.50 LOW BACK PAIN, UNSPECIFIED BACK PAIN LATERALITY, UNSPECIFIED CHRONICITY, UNSPECIFIED WHETHER SCIATICA PRESENT: Primary | ICD-10-CM

## 2021-01-01 DIAGNOSIS — M81.0 SENILE OSTEOPOROSIS: ICD-10-CM

## 2021-01-01 DIAGNOSIS — C68.9 TRANSITIONAL CELL CARCINOMA (HCC): ICD-10-CM

## 2021-01-01 DIAGNOSIS — M54.9 MID BACK PAIN: ICD-10-CM

## 2021-01-01 DIAGNOSIS — I10 ESSENTIAL HYPERTENSION: ICD-10-CM

## 2021-01-01 DIAGNOSIS — E66.09 CLASS 1 OBESITY DUE TO EXCESS CALORIES WITHOUT SERIOUS COMORBIDITY WITH BODY MASS INDEX (BMI) OF 31.0 TO 31.9 IN ADULT: ICD-10-CM

## 2021-01-01 DIAGNOSIS — I25.10 CORONARY ARTERY DISEASE INVOLVING NATIVE CORONARY ARTERY OF NATIVE HEART WITHOUT ANGINA PECTORIS: Primary | ICD-10-CM

## 2021-01-01 DIAGNOSIS — Z20.822 LAB TEST NEGATIVE FOR COVID-19 VIRUS: ICD-10-CM

## 2021-01-01 DIAGNOSIS — D64.9 ANEMIA, UNSPECIFIED TYPE: Primary | ICD-10-CM

## 2021-01-01 DIAGNOSIS — R29.898 WEAKNESS OF BOTH LOWER EXTREMITIES: Primary | ICD-10-CM

## 2021-01-01 DIAGNOSIS — Z01.818 PRE-OP EXAMINATION: ICD-10-CM

## 2021-01-01 DIAGNOSIS — Z95.2 S/P TAVR (TRANSCATHETER AORTIC VALVE REPLACEMENT): ICD-10-CM

## 2021-01-01 DIAGNOSIS — Z01.812 PRE-PROCEDURE LAB EXAM: Primary | ICD-10-CM

## 2021-01-01 DIAGNOSIS — I45.10 BUNDLE BRANCH BLOCK, RIGHT: ICD-10-CM

## 2021-01-01 DIAGNOSIS — M81.0 SENILE OSTEOPOROSIS: Primary | ICD-10-CM

## 2021-01-01 DIAGNOSIS — Z99.89 OSA ON CPAP: Primary | ICD-10-CM

## 2021-01-01 DIAGNOSIS — G47.33 OSA ON CPAP: Primary | ICD-10-CM

## 2021-01-01 DIAGNOSIS — I35.0 AORTIC STENOSIS, SEVERE: Primary | ICD-10-CM

## 2021-01-01 DIAGNOSIS — Z01.818 PRE-OP TESTING: ICD-10-CM

## 2021-01-01 DIAGNOSIS — Z01.818 OTHER SPECIFIED PRE-OPERATIVE EXAMINATION: Primary | ICD-10-CM

## 2021-01-01 DIAGNOSIS — Z99.89 OSA ON CPAP: ICD-10-CM

## 2021-01-01 DIAGNOSIS — I35.0 AORTIC VALVE STENOSIS, ETIOLOGY OF CARDIAC VALVE DISEASE UNSPECIFIED: Primary | ICD-10-CM

## 2021-01-01 DIAGNOSIS — N18.30 STAGE 3 CHRONIC KIDNEY DISEASE, UNSPECIFIED WHETHER STAGE 3A OR 3B CKD (HCC): Primary | ICD-10-CM

## 2021-01-01 DIAGNOSIS — Z95.3 S/P TAVR (TRANSCATHETER AORTIC VALVE REPLACEMENT), BIOPROSTHETIC: Primary | ICD-10-CM

## 2021-01-01 DIAGNOSIS — N18.30 STAGE 3 CHRONIC KIDNEY DISEASE, UNSPECIFIED WHETHER STAGE 3A OR 3B CKD (HCC): ICD-10-CM

## 2021-01-01 DIAGNOSIS — T07.XXXA FRACTURES: ICD-10-CM

## 2021-01-01 DIAGNOSIS — M54.50 LOW BACK PAIN, UNSPECIFIED BACK PAIN LATERALITY, UNSPECIFIED CHRONICITY, UNSPECIFIED WHETHER SCIATICA PRESENT: ICD-10-CM

## 2021-01-01 DIAGNOSIS — R53.83 FATIGUE, UNSPECIFIED TYPE: ICD-10-CM

## 2021-01-01 DIAGNOSIS — E86.0 DEHYDRATION: ICD-10-CM

## 2021-01-01 DIAGNOSIS — R94.39 ABNORMAL MYOCARDIAL PERFUSION STUDY: ICD-10-CM

## 2021-01-01 DIAGNOSIS — N18.9 CHRONIC KIDNEY DISEASE, UNSPECIFIED CKD STAGE: ICD-10-CM

## 2021-01-01 LAB
25(OH)D3 SERPL-MCNC: 19.9 NG/ML (ref 30–100)
25(OH)D3 SERPL-MCNC: 20 NG/ML (ref 30–100)
ABO GROUP BLD: NORMAL
ACT BLD: 136 SECONDS (ref 82–152)
ACT BLD: 169 SECONDS (ref 82–152)
ACT BLD: 252 SECONDS (ref 82–152)
ACT BLD: 285 SECONDS (ref 89–137)
ALBUMIN SERPL ELPH-MCNC: 2.7 G/DL (ref 2.9–4.4)
ALBUMIN SERPL-MCNC: 2.5 G/DL (ref 3.5–5.2)
ALBUMIN SERPL-MCNC: 2.9 G/DL (ref 3.5–5.2)
ALBUMIN SERPL-MCNC: 3 G/DL (ref 3.5–5.2)
ALBUMIN SERPL-MCNC: 3.3 G/DL (ref 3.5–5.2)
ALBUMIN SERPL-MCNC: 3.4 G/DL (ref 3.5–5.2)
ALBUMIN/GLOB SERPL: 0.5 G/DL
ALBUMIN/GLOB SERPL: 0.5 {RATIO} (ref 0.7–1.7)
ALBUMIN/GLOB SERPL: 0.7 G/DL
ALBUMIN/GLOB SERPL: 0.8 G/DL
ALBUMIN/GLOB SERPL: 0.8 G/DL
ALBUMIN/GLOB SERPL: 0.9 G/DL
ALBUMIN/GLOB SERPL: 1 G/DL
ALP SERPL-CCNC: 117 U/L (ref 39–117)
ALP SERPL-CCNC: 123 U/L (ref 39–117)
ALP SERPL-CCNC: 133 U/L (ref 39–117)
ALP SERPL-CCNC: 139 U/L (ref 39–117)
ALP SERPL-CCNC: 157 U/L (ref 39–117)
ALP SERPL-CCNC: 161 U/L (ref 39–117)
ALPHA1 GLOB SERPL ELPH-MCNC: 0.5 G/DL (ref 0–0.4)
ALPHA2 GLOB SERPL ELPH-MCNC: 1.2 G/DL (ref 0.4–1)
ALT SERPL W P-5'-P-CCNC: 10 U/L (ref 1–41)
ALT SERPL W P-5'-P-CCNC: 11 U/L (ref 1–41)
ALT SERPL W P-5'-P-CCNC: 13 U/L (ref 1–41)
ALT SERPL W P-5'-P-CCNC: 15 U/L (ref 1–41)
ALT SERPL W P-5'-P-CCNC: 21 U/L (ref 1–41)
ALT SERPL W P-5'-P-CCNC: 25 U/L (ref 1–41)
AMMONIA BLD-SCNC: 19 UMOL/L (ref 16–60)
ANA SER QL: NEGATIVE
ANION GAP SERPL CALCULATED.3IONS-SCNC: 10 MMOL/L (ref 5–15)
ANION GAP SERPL CALCULATED.3IONS-SCNC: 11 MMOL/L (ref 5–15)
ANION GAP SERPL CALCULATED.3IONS-SCNC: 12 MMOL/L (ref 5–15)
ANION GAP SERPL CALCULATED.3IONS-SCNC: 12.1 MMOL/L (ref 5–15)
ANION GAP SERPL CALCULATED.3IONS-SCNC: 7 MMOL/L (ref 5–15)
ANION GAP SERPL CALCULATED.3IONS-SCNC: 7.6 MMOL/L (ref 5–15)
ANION GAP SERPL CALCULATED.3IONS-SCNC: 7.8 MMOL/L (ref 5–15)
ANION GAP SERPL CALCULATED.3IONS-SCNC: 8 MMOL/L (ref 5–15)
ANION GAP SERPL CALCULATED.3IONS-SCNC: 8.8 MMOL/L (ref 5–15)
ANION GAP SERPL CALCULATED.3IONS-SCNC: 9 MMOL/L (ref 5–15)
ANION GAP SERPL CALCULATED.3IONS-SCNC: 9.1 MMOL/L (ref 5–15)
AORTIC DIMENSIONLESS INDEX: 0.4 (DI)
APTT PPP: 31 SECONDS (ref 22.7–35.4)
APTT PPP: 41 SECONDS (ref 24–31)
AST SERPL-CCNC: 14 U/L (ref 1–40)
AST SERPL-CCNC: 16 U/L (ref 1–40)
AST SERPL-CCNC: 17 U/L (ref 1–40)
AST SERPL-CCNC: 19 U/L (ref 1–40)
B PARAPERT DNA SPEC QL NAA+PROBE: NOT DETECTED
B PERT DNA SPEC QL NAA+PROBE: NOT DETECTED
B-GLOBULIN SERPL ELPH-MCNC: 1.4 G/DL (ref 0.7–1.3)
BACTERIA SPEC AEROBE CULT: NO GROWTH
BACTERIA UR QL AUTO: ABNORMAL /HPF
BASE EXCESS BLDA CALC-SCNC: -4 MMOL/L (ref -5–5)
BASE EXCESS BLDA CALC-SCNC: -5 MMOL/L (ref -5–5)
BASOPHILS # BLD AUTO: 0.03 10*3/MM3 (ref 0–0.2)
BASOPHILS # BLD AUTO: 0.06 10*3/MM3 (ref 0–0.2)
BASOPHILS # BLD AUTO: 0.1 10*3/MM3 (ref 0–0.2)
BASOPHILS # BLD AUTO: 0.2 10*3/MM3 (ref 0–0.2)
BASOPHILS # BLD AUTO: 0.2 10*3/MM3 (ref 0–0.2)
BASOPHILS NFR BLD AUTO: 0.3 % (ref 0–1.5)
BASOPHILS NFR BLD AUTO: 0.5 % (ref 0–1.5)
BASOPHILS NFR BLD AUTO: 0.5 % (ref 0–1.5)
BASOPHILS NFR BLD AUTO: 1 % (ref 0–1.5)
BASOPHILS NFR BLD AUTO: 1.1 % (ref 0–1.5)
BASOPHILS NFR BLD AUTO: 1.2 % (ref 0–1.5)
BASOPHILS NFR BLD AUTO: 1.3 % (ref 0–1.5)
BH CV ECHO AV AORTIC VALVE AT ACCEL TIME CALCULATED: NORMAL MSEC
BH CV ECHO MEAS - ACS: 1.8 CM
BH CV ECHO MEAS - AO MAX PG (FULL): 10.3 MMHG
BH CV ECHO MEAS - AO MAX PG (FULL): 111.1 MMHG
BH CV ECHO MEAS - AO MAX PG (FULL): 18 MMHG
BH CV ECHO MEAS - AO MAX PG: 115.1 MMHG
BH CV ECHO MEAS - AO MAX PG: 13.2 MMHG
BH CV ECHO MEAS - AO MAX PG: 21.9 MMHG
BH CV ECHO MEAS - AO MEAN PG (FULL): 5.1 MMHG
BH CV ECHO MEAS - AO MEAN PG (FULL): 61.1 MMHG
BH CV ECHO MEAS - AO MEAN PG (FULL): 9 MMHG
BH CV ECHO MEAS - AO MEAN PG: 11 MMHG
BH CV ECHO MEAS - AO MEAN PG: 6.5 MMHG
BH CV ECHO MEAS - AO MEAN PG: 63.1 MMHG
BH CV ECHO MEAS - AO ROOT AREA (BSA CORRECTED): 1.5
BH CV ECHO MEAS - AO ROOT AREA: 6.9 CM^2
BH CV ECHO MEAS - AO ROOT DIAM: 3 CM
BH CV ECHO MEAS - AO V2 MAX: 180.8 CM/SEC
BH CV ECHO MEAS - AO V2 MAX: 234 CM/SEC
BH CV ECHO MEAS - AO V2 MAX: 536.4 CM/SEC
BH CV ECHO MEAS - AO V2 MEAN: 115.4 CM/SEC
BH CV ECHO MEAS - AO V2 MEAN: 157 CM/SEC
BH CV ECHO MEAS - AO V2 MEAN: 372.8 CM/SEC
BH CV ECHO MEAS - AO V2 VTI: 105 CM
BH CV ECHO MEAS - AO V2 VTI: 34.8 CM
BH CV ECHO MEAS - AO V2 VTI: 40.9 CM
BH CV ECHO MEAS - AORTIC HR: 73 BPM
BH CV ECHO MEAS - AORTIC R-R: 0.82 SEC
BH CV ECHO MEAS - AT: 48 SEC
BH CV ECHO MEAS - AVA(I,A): 0.58 CM^2
BH CV ECHO MEAS - AVA(I,A): 1.4 CM^2
BH CV ECHO MEAS - AVA(I,A): 1.7 CM^2
BH CV ECHO MEAS - AVA(I,D): 0.58 CM^2
BH CV ECHO MEAS - AVA(I,D): 1.4 CM^2
BH CV ECHO MEAS - AVA(I,D): 1.7 CM^2
BH CV ECHO MEAS - AVA(V,A): 0.71 CM^2
BH CV ECHO MEAS - AVA(V,A): 1.5 CM^2
BH CV ECHO MEAS - AVA(V,A): 1.6 CM^2
BH CV ECHO MEAS - AVA(V,D): 0.71 CM^2
BH CV ECHO MEAS - AVA(V,D): 1.5 CM^2
BH CV ECHO MEAS - AVA(V,D): 1.6 CM^2
BH CV ECHO MEAS - BSA(HAYCOCK): 2 M^2
BH CV ECHO MEAS - BSA(HAYCOCK): 2 M^2
BH CV ECHO MEAS - BSA(HAYCOCK): 2.1 M^2
BH CV ECHO MEAS - BSA: 2 M^2
BH CV ECHO MEAS - BZI_BMI: 27.4 KILOGRAMS/M^2
BH CV ECHO MEAS - BZI_BMI: 28.3 KILOGRAMS/M^2
BH CV ECHO MEAS - BZI_BMI: 29.5 KILOGRAMS/M^2
BH CV ECHO MEAS - BZI_METRIC_HEIGHT: 172.7 CM
BH CV ECHO MEAS - BZI_METRIC_WEIGHT: 81.6 KG
BH CV ECHO MEAS - BZI_METRIC_WEIGHT: 84.4 KG
BH CV ECHO MEAS - BZI_METRIC_WEIGHT: 88 KG
BH CV ECHO MEAS - CI(LVOT): 2.3 L/MIN/M^2
BH CV ECHO MEAS - CO(LVOT): 4.4 L/MIN
BH CV ECHO MEAS - CONTRAST EF 4CH: 63 CM2
BH CV ECHO MEAS - EDV(CUBED): 132.7 ML
BH CV ECHO MEAS - EDV(CUBED): 82 ML
BH CV ECHO MEAS - EDV(MOD-SP2): 81.8 ML
BH CV ECHO MEAS - EDV(MOD-SP2): 94 ML
BH CV ECHO MEAS - EDV(MOD-SP4): 123 ML
BH CV ECHO MEAS - EDV(MOD-SP4): 128.5 ML
BH CV ECHO MEAS - EDV(TEICH): 123.8 ML
BH CV ECHO MEAS - EDV(TEICH): 85.1 ML
BH CV ECHO MEAS - EF(CUBED): 62.1 %
BH CV ECHO MEAS - EF(CUBED): 64.8 %
BH CV ECHO MEAS - EF(MOD-BP): 63.6 %
BH CV ECHO MEAS - EF(MOD-BP): 66 %
BH CV ECHO MEAS - EF(MOD-SP2): 42.7 %
BH CV ECHO MEAS - EF(MOD-SP2): 62.8 %
BH CV ECHO MEAS - EF(MOD-SP4): 65 %
BH CV ECHO MEAS - EF(MOD-SP4): 66.2 %
BH CV ECHO MEAS - EF(TEICH): 53.9 %
BH CV ECHO MEAS - EF(TEICH): 56 %
BH CV ECHO MEAS - ESV(CUBED): 31 ML
BH CV ECHO MEAS - ESV(CUBED): 46.7 ML
BH CV ECHO MEAS - ESV(MOD-SP2): 35 ML
BH CV ECHO MEAS - ESV(MOD-SP2): 46.9 ML
BH CV ECHO MEAS - ESV(MOD-SP4): 43 ML
BH CV ECHO MEAS - ESV(MOD-SP4): 43.4 ML
BH CV ECHO MEAS - ESV(TEICH): 39.2 ML
BH CV ECHO MEAS - ESV(TEICH): 54.4 ML
BH CV ECHO MEAS - FS: 27.6 %
BH CV ECHO MEAS - FS: 29.4 %
BH CV ECHO MEAS - IVS/LVPW: 0.91
BH CV ECHO MEAS - IVS/LVPW: 1.3
BH CV ECHO MEAS - IVSD: 1.2 CM
BH CV ECHO MEAS - IVSD: 1.7 CM
BH CV ECHO MEAS - LA DIMENSION(2D): 4.3 CM
BH CV ECHO MEAS - LV DIASTOLIC VOL/BSA (35-75): 62.1 ML/M^2
BH CV ECHO MEAS - LV DIASTOLIC VOL/BSA (35-75): 63.7 ML/M^2
BH CV ECHO MEAS - LV MASS(C)D: 202.4 GRAMS
BH CV ECHO MEAS - LV MASS(C)D: 332.4 GRAMS
BH CV ECHO MEAS - LV MASS(C)DI: 100.3 GRAMS/M^2
BH CV ECHO MEAS - LV MASS(C)DI: 167.7 GRAMS/M^2
BH CV ECHO MEAS - LV MAX PG: 2.9 MMHG
BH CV ECHO MEAS - LV MAX PG: 3.9 MMHG
BH CV ECHO MEAS - LV MAX PG: 4 MMHG
BH CV ECHO MEAS - LV MEAN PG: 1.4 MMHG
BH CV ECHO MEAS - LV MEAN PG: 2 MMHG
BH CV ECHO MEAS - LV MEAN PG: 2 MMHG
BH CV ECHO MEAS - LV SYSTOLIC VOL/BSA (12-30): 21.5 ML/M^2
BH CV ECHO MEAS - LV SYSTOLIC VOL/BSA (12-30): 21.7 ML/M^2
BH CV ECHO MEAS - LV V1 MAX: 84.5 CM/SEC
BH CV ECHO MEAS - LV V1 MAX: 99.2 CM/SEC
BH CV ECHO MEAS - LV V1 MAX: 99.7 CM/SEC
BH CV ECHO MEAS - LV V1 MEAN: 55.1 CM/SEC
BH CV ECHO MEAS - LV V1 MEAN: 62.9 CM/SEC
BH CV ECHO MEAS - LV V1 MEAN: 63.6 CM/SEC
BH CV ECHO MEAS - LV V1 VTI: 15.8 CM
BH CV ECHO MEAS - LV V1 VTI: 16.7 CM
BH CV ECHO MEAS - LV V1 VTI: 16.8 CM
BH CV ECHO MEAS - LVIDD: 4.3 CM
BH CV ECHO MEAS - LVIDD: 5.1 CM
BH CV ECHO MEAS - LVIDS: 3.1 CM
BH CV ECHO MEAS - LVIDS: 3.6 CM
BH CV ECHO MEAS - LVLD AP2: 8.2 CM
BH CV ECHO MEAS - LVLD AP4: 8.5 CM
BH CV ECHO MEAS - LVLS AP2: 6.8 CM
BH CV ECHO MEAS - LVLS AP4: 6.4 CM
BH CV ECHO MEAS - LVOT AREA (M): 3.5 CM^2
BH CV ECHO MEAS - LVOT AREA: 3.4 CM^2
BH CV ECHO MEAS - LVOT AREA: 3.5 CM^2
BH CV ECHO MEAS - LVOT AREA: 3.8 CM^2
BH CV ECHO MEAS - LVOT DIAM: 2.1 CM
BH CV ECHO MEAS - LVOT DIAM: 2.1 CM
BH CV ECHO MEAS - LVOT DIAM: 2.2 CM
BH CV ECHO MEAS - LVPWD: 1.3 CM
BH CV ECHO MEAS - LVPWD: 1.3 CM
BH CV ECHO MEAS - MV A DUR: 0.2 SEC
BH CV ECHO MEAS - MV A MAX VEL: 75.8 CM/SEC
BH CV ECHO MEAS - MV A MAX VEL: 78.8 CM/SEC
BH CV ECHO MEAS - MV DEC SLOPE: 162.6 CM/SEC^2
BH CV ECHO MEAS - MV DEC SLOPE: 333 CM/SEC^2
BH CV ECHO MEAS - MV DEC TIME: 0.36 SEC
BH CV ECHO MEAS - MV DEC TIME: 306 SEC
BH CV ECHO MEAS - MV E MAX VEL: 51.4 CM/SEC
BH CV ECHO MEAS - MV E MAX VEL: 57.8 CM/SEC
BH CV ECHO MEAS - MV E/A: 0.68
BH CV ECHO MEAS - MV E/A: 0.73
BH CV ECHO MEAS - MV MAX PG: 2.8 MMHG
BH CV ECHO MEAS - MV MAX PG: 3.4 MMHG
BH CV ECHO MEAS - MV MEAN PG: 1 MMHG
BH CV ECHO MEAS - MV MEAN PG: 1.1 MMHG
BH CV ECHO MEAS - MV P1/2T MAX VEL: 77.4 CM/SEC
BH CV ECHO MEAS - MV P1/2T: 68.1 MSEC
BH CV ECHO MEAS - MV V2 MAX: 84.2 CM/SEC
BH CV ECHO MEAS - MV V2 MAX: 92.5 CM/SEC
BH CV ECHO MEAS - MV V2 MEAN: 48.7 CM/SEC
BH CV ECHO MEAS - MV V2 MEAN: 49.2 CM/SEC
BH CV ECHO MEAS - MV V2 VTI: 23.5 CM
BH CV ECHO MEAS - MV V2 VTI: 24.7 CM
BH CV ECHO MEAS - MVA P1/2T LCG: 2.8 CM^2
BH CV ECHO MEAS - MVA(P1/2T): 3.2 CM^2
BH CV ECHO MEAS - MVA(VTI): 2.3 CM^2
BH CV ECHO MEAS - MVA(VTI): 2.5 CM^2
BH CV ECHO MEAS - PA ACC TIME: 0.07 SEC
BH CV ECHO MEAS - PA MAX PG (FULL): 1.1 MMHG
BH CV ECHO MEAS - PA MAX PG (FULL): 3 MMHG
BH CV ECHO MEAS - PA MAX PG: 2.7 MMHG
BH CV ECHO MEAS - PA MAX PG: 4.9 MMHG
BH CV ECHO MEAS - PA PR(ACCEL): 48 MMHG
BH CV ECHO MEAS - PA V2 MAX: 111 CM/SEC
BH CV ECHO MEAS - PA V2 MAX: 82.5 CM/SEC
BH CV ECHO MEAS - PULM A REVS DUR: 0.11 SEC
BH CV ECHO MEAS - PULM A REVS VEL: 24.7 CM/SEC
BH CV ECHO MEAS - PULM DIAS VEL: 28.4 CM/SEC
BH CV ECHO MEAS - PULM S/D: 1.4
BH CV ECHO MEAS - PULM SYS VEL: 40.6 CM/SEC
BH CV ECHO MEAS - PVA(V,A): 1.6 CM^2
BH CV ECHO MEAS - PVA(V,D): 1.6 CM^2
BH CV ECHO MEAS - QP/QS: 0.53
BH CV ECHO MEAS - RAP SYSTOLE: 3 MMHG
BH CV ECHO MEAS - RAP SYSTOLE: 3 MMHG
BH CV ECHO MEAS - RV MAX PG: 1.6 MMHG
BH CV ECHO MEAS - RV MAX PG: 1.9 MMHG
BH CV ECHO MEAS - RV MEAN PG: 0.68 MMHG
BH CV ECHO MEAS - RV MEAN PG: 1 MMHG
BH CV ECHO MEAS - RV V1 MAX: 63.5 CM/SEC
BH CV ECHO MEAS - RV V1 MAX: 69.2 CM/SEC
BH CV ECHO MEAS - RV V1 MEAN: 38.4 CM/SEC
BH CV ECHO MEAS - RV V1 MEAN: 47.1 CM/SEC
BH CV ECHO MEAS - RV V1 VTI: 12.1 CM
BH CV ECHO MEAS - RV V1 VTI: 12.2 CM
BH CV ECHO MEAS - RVDD: 2.4 CM
BH CV ECHO MEAS - RVOT AREA: 2.5 CM^2
BH CV ECHO MEAS - RVOT DIAM: 1.8 CM
BH CV ECHO MEAS - RVSP: 21.9 MMHG
BH CV ECHO MEAS - RVSP: 30 MMHG
BH CV ECHO MEAS - SI(AO): 119.7 ML/M^2
BH CV ECHO MEAS - SI(CUBED): 25.2 ML/M^2
BH CV ECHO MEAS - SI(CUBED): 43.4 ML/M^2
BH CV ECHO MEAS - SI(LVOT): 28.5 ML/M^2
BH CV ECHO MEAS - SI(LVOT): 29.2 ML/M^2
BH CV ECHO MEAS - SI(LVOT): 31 ML/M^2
BH CV ECHO MEAS - SI(MOD-SP2): 17.9 ML/M^2
BH CV ECHO MEAS - SI(MOD-SP2): 29.8 ML/M^2
BH CV ECHO MEAS - SI(MOD-SP4): 40.4 ML/M^2
BH CV ECHO MEAS - SI(MOD-SP4): 42.2 ML/M^2
BH CV ECHO MEAS - SI(TEICH): 22.7 ML/M^2
BH CV ECHO MEAS - SI(TEICH): 35 ML/M^2
BH CV ECHO MEAS - SV(AO): 241.4 ML
BH CV ECHO MEAS - SV(CUBED): 50.9 ML
BH CV ECHO MEAS - SV(CUBED): 86 ML
BH CV ECHO MEAS - SV(LVOT): 57.6 ML
BH CV ECHO MEAS - SV(LVOT): 57.8 ML
BH CV ECHO MEAS - SV(LVOT): 60.6 ML
BH CV ECHO MEAS - SV(MOD-SP2): 34.9 ML
BH CV ECHO MEAS - SV(MOD-SP2): 59 ML
BH CV ECHO MEAS - SV(MOD-SP4): 80 ML
BH CV ECHO MEAS - SV(MOD-SP4): 85.1 ML
BH CV ECHO MEAS - SV(RVOT): 30.8 ML
BH CV ECHO MEAS - SV(TEICH): 45.9 ML
BH CV ECHO MEAS - SV(TEICH): 69.4 ML
BH CV ECHO MEAS - TR MAX VEL: 217.3 CM/SEC
BH CV ECHO MEAS - TR MAX VEL: 263 CM/SEC
BH CV VAS BP RIGHT ARM: NORMAL MMHG
BILIRUB SERPL-MCNC: 0.2 MG/DL (ref 0–1.2)
BILIRUB SERPL-MCNC: 0.2 MG/DL (ref 0–1.2)
BILIRUB SERPL-MCNC: 0.3 MG/DL (ref 0–1.2)
BILIRUB SERPL-MCNC: 0.3 MG/DL (ref 0–1.2)
BILIRUB SERPL-MCNC: 0.4 MG/DL (ref 0–1.2)
BILIRUB SERPL-MCNC: 0.4 MG/DL (ref 0–1.2)
BILIRUB UR QL STRIP: NEGATIVE
BILIRUB UR QL STRIP: NEGATIVE
BLD GP AB SCN SERPL QL: NEGATIVE
BUN SERPL-MCNC: 10 MG/DL (ref 8–23)
BUN SERPL-MCNC: 11 MG/DL (ref 8–23)
BUN SERPL-MCNC: 13 MG/DL (ref 8–23)
BUN SERPL-MCNC: 14 MG/DL (ref 8–23)
BUN SERPL-MCNC: 16 MG/DL (ref 8–23)
BUN SERPL-MCNC: 17 MG/DL (ref 8–23)
BUN SERPL-MCNC: 18 MG/DL (ref 8–23)
BUN SERPL-MCNC: 20 MG/DL (ref 8–23)
BUN SERPL-MCNC: 21 MG/DL (ref 8–23)
BUN SERPL-MCNC: 23 MG/DL (ref 8–23)
BUN SERPL-MCNC: 25 MG/DL (ref 8–23)
BUN/CREAT SERPL: 10.3 (ref 7–25)
BUN/CREAT SERPL: 11.5 (ref 7–25)
BUN/CREAT SERPL: 11.6 (ref 7–25)
BUN/CREAT SERPL: 15 (ref 7–25)
BUN/CREAT SERPL: 15.3 (ref 7–25)
BUN/CREAT SERPL: 16 (ref 7–25)
BUN/CREAT SERPL: 19.3 (ref 7–25)
BUN/CREAT SERPL: 22.1 (ref 7–25)
BUN/CREAT SERPL: 23 (ref 7–25)
BUN/CREAT SERPL: 27.7 (ref 7–25)
BUN/CREAT SERPL: 9.9 (ref 7–25)
C PNEUM DNA NPH QL NAA+NON-PROBE: NOT DETECTED
C-ANCA TITR SER IF: ABNORMAL TITER
CA-I BLDA-SCNC: ABNORMAL MMOL/L
CA-I BLDA-SCNC: ABNORMAL MMOL/L
CALCIUM SPEC-SCNC: 8.3 MG/DL (ref 8.6–10.5)
CALCIUM SPEC-SCNC: 8.9 MG/DL (ref 8.6–10.5)
CALCIUM SPEC-SCNC: 9.1 MG/DL (ref 8.6–10.5)
CALCIUM SPEC-SCNC: 9.2 MG/DL (ref 8.6–10.5)
CALCIUM SPEC-SCNC: 9.2 MG/DL (ref 8.6–10.5)
CALCIUM SPEC-SCNC: 9.3 MG/DL (ref 8.6–10.5)
CALCIUM SPEC-SCNC: 9.3 MG/DL (ref 8.6–10.5)
CALCIUM SPEC-SCNC: 9.5 MG/DL (ref 8.6–10.5)
CALCIUM SPEC-SCNC: 9.6 MG/DL (ref 8.6–10.5)
CALCIUM SPEC-SCNC: 9.6 MG/DL (ref 8.6–10.5)
CHLORIDE SERPL-SCNC: 103 MMOL/L (ref 98–107)
CHLORIDE SERPL-SCNC: 104 MMOL/L (ref 98–107)
CHLORIDE SERPL-SCNC: 108 MMOL/L (ref 98–107)
CHLORIDE SERPL-SCNC: 94 MMOL/L (ref 98–107)
CHLORIDE SERPL-SCNC: 97 MMOL/L (ref 98–107)
CHLORIDE SERPL-SCNC: 98 MMOL/L (ref 98–107)
CHLORIDE SERPL-SCNC: 99 MMOL/L (ref 98–107)
CHROMATIN AB SERPL-ACNC: 10.3 IU/ML (ref 0–14)
CK SERPL-CCNC: 24 U/L (ref 20–200)
CLARITY UR: CLEAR
CLARITY UR: CLEAR
CLOSE TME COLL+ADP + EPINEP PNL BLD: 94 % (ref 86–100)
CO2 BLDA-SCNC: 23 MMOL/L (ref 24–29)
CO2 BLDA-SCNC: 23 MMOL/L (ref 24–29)
CO2 SERPL-SCNC: 22 MMOL/L (ref 22–29)
CO2 SERPL-SCNC: 23.9 MMOL/L (ref 22–29)
CO2 SERPL-SCNC: 24 MMOL/L (ref 22–29)
CO2 SERPL-SCNC: 25 MMOL/L (ref 22–29)
CO2 SERPL-SCNC: 25.2 MMOL/L (ref 22–29)
CO2 SERPL-SCNC: 25.9 MMOL/L (ref 22–29)
CO2 SERPL-SCNC: 26 MMOL/L (ref 22–29)
CO2 SERPL-SCNC: 26 MMOL/L (ref 22–29)
CO2 SERPL-SCNC: 27 MMOL/L (ref 22–29)
CO2 SERPL-SCNC: 27.2 MMOL/L (ref 22–29)
CO2 SERPL-SCNC: 28.4 MMOL/L (ref 22–29)
COLOR UR: ABNORMAL
COLOR UR: YELLOW
CREAT BLDA-MCNC: 1.2 MG/DL (ref 0.6–1.3)
CREAT SERPL-MCNC: 0.83 MG/DL (ref 0.76–1.27)
CREAT SERPL-MCNC: 0.83 MG/DL (ref 0.76–1.27)
CREAT SERPL-MCNC: 0.87 MG/DL (ref 0.76–1.27)
CREAT SERPL-MCNC: 0.97 MG/DL (ref 0.76–1.27)
CREAT SERPL-MCNC: 1.11 MG/DL (ref 0.76–1.27)
CREAT SERPL-MCNC: 1.11 MG/DL (ref 0.76–1.27)
CREAT SERPL-MCNC: 1.13 MG/DL (ref 0.76–1.27)
CREAT SERPL-MCNC: 1.13 MG/DL (ref 0.76–1.27)
CREAT SERPL-MCNC: 1.2 MG/DL (ref 0.76–1.27)
CREAT SERPL-MCNC: 1.21 MG/DL (ref 0.76–1.27)
CREAT SERPL-MCNC: 1.31 MG/DL (ref 0.76–1.27)
D-LACTATE SERPL-SCNC: 0.9 MMOL/L (ref 0.5–2)
D-LACTATE SERPL-SCNC: 1.4 MMOL/L (ref 0.5–2)
D-LACTATE SERPL-SCNC: 1.7 MMOL/L (ref 0.5–2)
DEPRECATED RDW RBC AUTO: 40.7 FL (ref 37–54)
DEPRECATED RDW RBC AUTO: 42.5 FL (ref 37–54)
DEPRECATED RDW RBC AUTO: 43.3 FL (ref 37–54)
DEPRECATED RDW RBC AUTO: 43.8 FL (ref 37–54)
DEPRECATED RDW RBC AUTO: 44.2 FL (ref 37–54)
DEPRECATED RDW RBC AUTO: 44.2 FL (ref 37–54)
DEPRECATED RDW RBC AUTO: 45.9 FL (ref 37–54)
DEPRECATED RDW RBC AUTO: 75.3 FL (ref 37–54)
EOSINOPHIL # BLD AUTO: 0 10*3/MM3 (ref 0–0.4)
EOSINOPHIL # BLD AUTO: 0.1 10*3/MM3 (ref 0–0.4)
EOSINOPHIL # BLD AUTO: 0.2 10*3/MM3 (ref 0–0.4)
EOSINOPHIL # BLD AUTO: 0.3 10*3/MM3 (ref 0–0.4)
EOSINOPHIL # BLD AUTO: 0.39 10*3/MM3 (ref 0–0.4)
EOSINOPHIL # BLD MANUAL: 0.17 10*3/MM3 (ref 0–0.4)
EOSINOPHIL NFR BLD AUTO: 0.2 % (ref 0.3–6.2)
EOSINOPHIL NFR BLD AUTO: 0.3 % (ref 0.3–6.2)
EOSINOPHIL NFR BLD AUTO: 0.7 % (ref 0.3–6.2)
EOSINOPHIL NFR BLD AUTO: 0.8 % (ref 0.3–6.2)
EOSINOPHIL NFR BLD AUTO: 1.8 % (ref 0.3–6.2)
EOSINOPHIL NFR BLD AUTO: 2.6 % (ref 0.3–6.2)
EOSINOPHIL NFR BLD AUTO: 4.5 % (ref 0.3–6.2)
EOSINOPHIL NFR BLD MANUAL: 1 % (ref 0.3–6.2)
ERYTHROCYTE [DISTWIDTH] IN BLOOD BY AUTOMATED COUNT: 11.9 % (ref 12.3–15.4)
ERYTHROCYTE [DISTWIDTH] IN BLOOD BY AUTOMATED COUNT: 12.1 % (ref 12.3–15.4)
ERYTHROCYTE [DISTWIDTH] IN BLOOD BY AUTOMATED COUNT: 13.1 % (ref 12.3–15.4)
ERYTHROCYTE [DISTWIDTH] IN BLOOD BY AUTOMATED COUNT: 13.8 % (ref 12.3–15.4)
ERYTHROCYTE [DISTWIDTH] IN BLOOD BY AUTOMATED COUNT: 13.8 % (ref 12.3–15.4)
ERYTHROCYTE [DISTWIDTH] IN BLOOD BY AUTOMATED COUNT: 13.9 % (ref 12.3–15.4)
ERYTHROCYTE [DISTWIDTH] IN BLOOD BY AUTOMATED COUNT: 14.1 % (ref 12.3–15.4)
ERYTHROCYTE [DISTWIDTH] IN BLOOD BY AUTOMATED COUNT: 22.3 % (ref 12.3–15.4)
ERYTHROCYTE [DISTWIDTH] IN BLOOD BY AUTOMATED COUNT: 22.3 % (ref 12.3–15.4)
ERYTHROCYTE [DISTWIDTH] IN BLOOD BY AUTOMATED COUNT: 23.8 % (ref 12.3–15.4)
FLUAV SUBTYP SPEC NAA+PROBE: NOT DETECTED
FLUBV RNA ISLT QL NAA+PROBE: NOT DETECTED
GAMMA GLOB SERPL ELPH-MCNC: 2 G/DL (ref 0.4–1.8)
GFR SERPL CREATININE-BSD FRML MDRD: 53 ML/MIN/1.73
GFR SERPL CREATININE-BSD FRML MDRD: 59 ML/MIN/1.73
GFR SERPL CREATININE-BSD FRML MDRD: 59 ML/MIN/1.73
GFR SERPL CREATININE-BSD FRML MDRD: 63 ML/MIN/1.73
GFR SERPL CREATININE-BSD FRML MDRD: 63 ML/MIN/1.73
GFR SERPL CREATININE-BSD FRML MDRD: 65 ML/MIN/1.73
GFR SERPL CREATININE-BSD FRML MDRD: 65 ML/MIN/1.73
GFR SERPL CREATININE-BSD FRML MDRD: 76 ML/MIN/1.73
GFR SERPL CREATININE-BSD FRML MDRD: 86 ML/MIN/1.73
GFR SERPL CREATININE-BSD FRML MDRD: 90 ML/MIN/1.73
GFR SERPL CREATININE-BSD FRML MDRD: 90 ML/MIN/1.73
GLOBULIN SER CALC-MCNC: 5 G/DL (ref 2.2–3.9)
GLOBULIN UR ELPH-MCNC: 3.3 GM/DL
GLOBULIN UR ELPH-MCNC: 3.4 GM/DL
GLOBULIN UR ELPH-MCNC: 3.7 GM/DL
GLOBULIN UR ELPH-MCNC: 3.8 GM/DL
GLOBULIN UR ELPH-MCNC: 5 GM/DL
GLOBULIN UR ELPH-MCNC: 5 GM/DL
GLUCOSE BLDC GLUCOMTR-MCNC: 117 MG/DL (ref 70–130)
GLUCOSE BLDC GLUCOMTR-MCNC: 131 MG/DL (ref 70–130)
GLUCOSE SERPL-MCNC: 102 MG/DL (ref 65–99)
GLUCOSE SERPL-MCNC: 105 MG/DL (ref 65–99)
GLUCOSE SERPL-MCNC: 106 MG/DL (ref 65–99)
GLUCOSE SERPL-MCNC: 107 MG/DL (ref 65–99)
GLUCOSE SERPL-MCNC: 111 MG/DL (ref 65–99)
GLUCOSE SERPL-MCNC: 117 MG/DL (ref 65–99)
GLUCOSE SERPL-MCNC: 117 MG/DL (ref 65–99)
GLUCOSE SERPL-MCNC: 123 MG/DL (ref 65–99)
GLUCOSE SERPL-MCNC: 125 MG/DL (ref 65–99)
GLUCOSE SERPL-MCNC: 127 MG/DL (ref 65–99)
GLUCOSE SERPL-MCNC: 90 MG/DL (ref 65–99)
GLUCOSE UR STRIP-MCNC: NEGATIVE MG/DL
GLUCOSE UR STRIP-MCNC: NEGATIVE MG/DL
HADV DNA SPEC NAA+PROBE: NOT DETECTED
HBA1C MFR BLD: 4.9 % (ref 4.8–5.6)
HCO3 BLDA-SCNC: 21.5 MMOL/L (ref 22–26)
HCO3 BLDA-SCNC: 21.8 MMOL/L (ref 22–26)
HCOV 229E RNA SPEC QL NAA+PROBE: NOT DETECTED
HCOV HKU1 RNA SPEC QL NAA+PROBE: NOT DETECTED
HCOV NL63 RNA SPEC QL NAA+PROBE: NOT DETECTED
HCOV OC43 RNA SPEC QL NAA+PROBE: NOT DETECTED
HCT VFR BLD AUTO: 28.4 % (ref 37.5–51)
HCT VFR BLD AUTO: 33.4 % (ref 37.5–51)
HCT VFR BLD AUTO: 33.7 % (ref 37.5–51)
HCT VFR BLD AUTO: 35 % (ref 37.5–51)
HCT VFR BLD AUTO: 37.7 % (ref 37.5–51)
HCT VFR BLD AUTO: 37.7 % (ref 37.5–51)
HCT VFR BLD AUTO: 40.2 % (ref 37.5–51)
HCT VFR BLD AUTO: 40.2 % (ref 37.5–51)
HCT VFR BLD AUTO: 41.4 % (ref 37.5–51)
HCT VFR BLD AUTO: 44.6 % (ref 37.5–51)
HCT VFR BLDA CALC: 27 % (ref 38–51)
HCT VFR BLDA CALC: 28 % (ref 38–51)
HGB BLD-MCNC: 11.2 G/DL (ref 13–17.7)
HGB BLD-MCNC: 11.5 G/DL (ref 13–17.7)
HGB BLD-MCNC: 11.9 G/DL (ref 13–17.7)
HGB BLD-MCNC: 12.8 G/DL (ref 13–17.7)
HGB BLD-MCNC: 13.1 G/DL (ref 13–17.7)
HGB BLD-MCNC: 13.1 G/DL (ref 13–17.7)
HGB BLD-MCNC: 13.4 G/DL (ref 13–17.7)
HGB BLD-MCNC: 14.1 G/DL (ref 13–17.7)
HGB BLD-MCNC: 15.2 G/DL (ref 13–17.7)
HGB BLD-MCNC: 9.8 G/DL (ref 13–17.7)
HGB BLDA-MCNC: 9.2 G/DL (ref 12–17)
HGB BLDA-MCNC: 9.5 G/DL (ref 12–17)
HGB UR QL STRIP.AUTO: NEGATIVE
HGB UR QL STRIP.AUTO: NEGATIVE
HMPV RNA NPH QL NAA+NON-PROBE: NOT DETECTED
HOLD SPECIMEN: NORMAL
HPIV1 RNA ISLT QL NAA+PROBE: NOT DETECTED
HPIV2 RNA SPEC QL NAA+PROBE: NOT DETECTED
HPIV3 RNA NPH QL NAA+PROBE: NOT DETECTED
HPIV4 P GENE NPH QL NAA+PROBE: NOT DETECTED
HYALINE CASTS UR QL AUTO: ABNORMAL /LPF
IGA SERPL-MCNC: 335 MG/DL (ref 61–437)
IGG SERPL-MCNC: 1895 MG/DL (ref 603–1613)
IGM SERPL-MCNC: 88 MG/DL (ref 15–143)
IMM GRANULOCYTES # BLD AUTO: 0.03 10*3/MM3 (ref 0–0.05)
IMM GRANULOCYTES # BLD AUTO: 0.07 10*3/MM3 (ref 0–0.05)
IMM GRANULOCYTES NFR BLD AUTO: 0.3 % (ref 0–0.5)
IMM GRANULOCYTES NFR BLD AUTO: 0.6 % (ref 0–0.5)
INR PPP: 0.97 (ref 0.93–1.1)
INR PPP: 0.98 (ref 0.93–1.1)
INR PPP: 1.02 (ref 0.9–1.1)
INR PPP: 1.03 (ref 0.93–1.1)
KETONES UR QL STRIP: ABNORMAL
KETONES UR QL STRIP: NEGATIVE
L PNEUMO1 AG UR QL IA: NEGATIVE
LABORATORY COMMENT REPORT: ABNORMAL
LEUKOCYTE ESTERASE UR QL STRIP.AUTO: ABNORMAL
LEUKOCYTE ESTERASE UR QL STRIP.AUTO: NEGATIVE
LYMPHOCYTES # BLD AUTO: 1.4 10*3/MM3 (ref 0.7–3.1)
LYMPHOCYTES # BLD AUTO: 1.97 10*3/MM3 (ref 0.7–3.1)
LYMPHOCYTES # BLD AUTO: 2 10*3/MM3 (ref 0.7–3.1)
LYMPHOCYTES # BLD AUTO: 2.3 10*3/MM3 (ref 0.7–3.1)
LYMPHOCYTES # BLD AUTO: 2.35 10*3/MM3 (ref 0.7–3.1)
LYMPHOCYTES # BLD AUTO: 2.6 10*3/MM3 (ref 0.7–3.1)
LYMPHOCYTES # BLD AUTO: 2.6 10*3/MM3 (ref 0.7–3.1)
LYMPHOCYTES # BLD MANUAL: 3.95 10*3/MM3 (ref 0.7–3.1)
LYMPHOCYTES NFR BLD AUTO: 15.9 % (ref 19.6–45.3)
LYMPHOCYTES NFR BLD AUTO: 16.2 % (ref 19.6–45.3)
LYMPHOCYTES NFR BLD AUTO: 20.2 % (ref 19.6–45.3)
LYMPHOCYTES NFR BLD AUTO: 22.7 % (ref 19.6–45.3)
LYMPHOCYTES NFR BLD AUTO: 27.5 % (ref 19.6–45.3)
LYMPHOCYTES NFR BLD AUTO: 7.9 % (ref 19.6–45.3)
LYMPHOCYTES NFR BLD AUTO: 9.4 % (ref 19.6–45.3)
LYMPHOCYTES NFR BLD MANUAL: 23.2 % (ref 19.6–45.3)
LYMPHOCYTES NFR BLD MANUAL: 4 % (ref 5–12)
M PNEUMO IGG SER IA-ACNC: NOT DETECTED
M PROTEIN SERPL ELPH-MCNC: ABNORMAL G/DL
MAGNESIUM SERPL-MCNC: 1.9 MG/DL (ref 1.6–2.4)
MAGNESIUM SERPL-MCNC: 2 MG/DL (ref 1.6–2.4)
MAGNESIUM SERPL-MCNC: 2.1 MG/DL (ref 1.6–2.4)
MAGNESIUM SERPL-MCNC: 2.6 MG/DL (ref 1.6–2.4)
MAXIMAL PREDICTED HEART RATE: 146 BPM
MCH RBC QN AUTO: 29.5 PG (ref 26.6–33)
MCH RBC QN AUTO: 31.1 PG (ref 26.6–33)
MCH RBC QN AUTO: 31.3 PG (ref 26.6–33)
MCH RBC QN AUTO: 31.3 PG (ref 26.6–33)
MCH RBC QN AUTO: 31.4 PG (ref 26.6–33)
MCH RBC QN AUTO: 32.3 PG (ref 26.6–33)
MCH RBC QN AUTO: 32.5 PG (ref 26.6–33)
MCH RBC QN AUTO: 32.5 PG (ref 26.6–33)
MCH RBC QN AUTO: 32.7 PG (ref 26.6–33)
MCH RBC QN AUTO: 33.4 PG (ref 26.6–33)
MCHC RBC AUTO-ENTMCNC: 32.6 G/DL (ref 31.5–35.7)
MCHC RBC AUTO-ENTMCNC: 33.2 G/DL (ref 31.5–35.7)
MCHC RBC AUTO-ENTMCNC: 33.6 G/DL (ref 31.5–35.7)
MCHC RBC AUTO-ENTMCNC: 34 G/DL (ref 31.5–35.7)
MCHC RBC AUTO-ENTMCNC: 34.1 G/DL (ref 31.5–35.7)
MCHC RBC AUTO-ENTMCNC: 34.2 G/DL (ref 31.5–35.7)
MCHC RBC AUTO-ENTMCNC: 34.5 G/DL (ref 31.5–35.7)
MCHC RBC AUTO-ENTMCNC: 34.7 G/DL (ref 31.5–35.7)
MCV RBC AUTO: 90.7 FL (ref 79–97)
MCV RBC AUTO: 91.7 FL (ref 79–97)
MCV RBC AUTO: 91.9 FL (ref 79–97)
MCV RBC AUTO: 93.2 FL (ref 79–97)
MCV RBC AUTO: 94.2 FL (ref 79–97)
MCV RBC AUTO: 94.2 FL (ref 79–97)
MCV RBC AUTO: 95 FL (ref 79–97)
MCV RBC AUTO: 95.2 FL (ref 79–97)
MCV RBC AUTO: 95.5 FL (ref 79–97)
MCV RBC AUTO: 96.9 FL (ref 79–97)
MONOCYTES # BLD AUTO: 0.68 10*3/MM3 (ref 0.1–0.9)
MONOCYTES # BLD AUTO: 0.72 10*3/MM3 (ref 0.1–0.9)
MONOCYTES # BLD AUTO: 0.86 10*3/MM3 (ref 0.1–0.9)
MONOCYTES # BLD AUTO: 1 10*3/MM3 (ref 0.1–0.9)
MONOCYTES # BLD AUTO: 1 10*3/MM3 (ref 0.1–0.9)
MONOCYTES # BLD AUTO: 1.1 10*3/MM3 (ref 0.1–0.9)
MONOCYTES # BLD AUTO: 1.5 10*3/MM3 (ref 0.1–0.9)
MONOCYTES # BLD AUTO: 1.6 10*3/MM3 (ref 0.1–0.9)
MONOCYTES NFR BLD AUTO: 15.8 % (ref 5–12)
MONOCYTES NFR BLD AUTO: 5.7 % (ref 5–12)
MONOCYTES NFR BLD AUTO: 6.5 % (ref 5–12)
MONOCYTES NFR BLD AUTO: 7.1 % (ref 5–12)
MONOCYTES NFR BLD AUTO: 7.2 % (ref 5–12)
MONOCYTES NFR BLD AUTO: 7.4 % (ref 5–12)
MONOCYTES NFR BLD AUTO: 8.3 % (ref 5–12)
MYELOPEROXIDASE AB SER IA-ACNC: <9 U/ML (ref 0–9)
NEUTROPHILS # BLD AUTO: 12.2 10*3/MM3 (ref 1.7–7)
NEUTROPHILS NFR BLD AUTO: 10.8 10*3/MM3 (ref 1.7–7)
NEUTROPHILS NFR BLD AUTO: 12.5 10*3/MM3 (ref 1.7–7)
NEUTROPHILS NFR BLD AUTO: 14.7 10*3/MM3 (ref 1.7–7)
NEUTROPHILS NFR BLD AUTO: 18 10*3/MM3 (ref 1.7–7)
NEUTROPHILS NFR BLD AUTO: 5 10*3/MM3 (ref 1.7–7)
NEUTROPHILS NFR BLD AUTO: 5.54 10*3/MM3 (ref 1.7–7)
NEUTROPHILS NFR BLD AUTO: 53.6 % (ref 42.7–76)
NEUTROPHILS NFR BLD AUTO: 63.9 % (ref 42.7–76)
NEUTROPHILS NFR BLD AUTO: 68.7 % (ref 42.7–76)
NEUTROPHILS NFR BLD AUTO: 74.8 % (ref 42.7–76)
NEUTROPHILS NFR BLD AUTO: 76.1 % (ref 42.7–76)
NEUTROPHILS NFR BLD AUTO: 8 10*3/MM3 (ref 1.7–7)
NEUTROPHILS NFR BLD AUTO: 82.3 % (ref 42.7–76)
NEUTROPHILS NFR BLD AUTO: 85.1 % (ref 42.7–76)
NEUTROPHILS NFR BLD MANUAL: 71.7 % (ref 42.7–76)
NITRITE UR QL STRIP: NEGATIVE
NITRITE UR QL STRIP: NEGATIVE
NRBC BLD AUTO-RTO: 0 /100 WBC (ref 0–0.2)
NRBC BLD AUTO-RTO: 0.1 /100 WBC (ref 0–0.2)
NT-PROBNP SERPL-MCNC: 214.8 PG/ML (ref 0–1800)
NT-PROBNP SERPL-MCNC: 275.9 PG/ML (ref 0–900)
P-ANCA ATYPICAL TITR SER IF: ABNORMAL TITER
P-ANCA TITR SER IF: ABNORMAL TITER
PCO2 BLDA: 42.6 MM HG (ref 35–45)
PCO2 BLDA: 44 MM HG (ref 35–45)
PH BLDA: 7.3 PH UNITS (ref 7.35–7.6)
PH BLDA: 7.32 PH UNITS (ref 7.35–7.6)
PH UR STRIP.AUTO: 5.5 [PH] (ref 5–8)
PH UR STRIP.AUTO: 7 [PH] (ref 5–8)
PHOSPHATE SERPL-MCNC: 2.9 MG/DL (ref 2.5–4.5)
PLAT MORPH BLD: NORMAL
PLATELET # BLD AUTO: 157 10*3/MM3 (ref 140–450)
PLATELET # BLD AUTO: 162 10*3/MM3 (ref 140–450)
PLATELET # BLD AUTO: 168 10*3/MM3 (ref 140–450)
PLATELET # BLD AUTO: 178 10*3/MM3 (ref 140–450)
PLATELET # BLD AUTO: 180 10*3/MM3 (ref 140–450)
PLATELET # BLD AUTO: 188 10*3/MM3 (ref 140–450)
PLATELET # BLD AUTO: 200 10*3/MM3 (ref 140–450)
PLATELET # BLD AUTO: 204 10*3/MM3 (ref 140–450)
PLATELET # BLD AUTO: 216 10*3/MM3 (ref 140–450)
PLATELET # BLD AUTO: 89 10*3/MM3 (ref 140–450)
PMV BLD AUTO: 10 FL (ref 6–12)
PMV BLD AUTO: 10.2 FL (ref 6–12)
PMV BLD AUTO: 10.2 FL (ref 6–12)
PMV BLD AUTO: 10.6 FL (ref 6–12)
PMV BLD AUTO: 7.2 FL (ref 6–12)
PMV BLD AUTO: 7.5 FL (ref 6–12)
PMV BLD AUTO: 7.6 FL (ref 6–12)
PMV BLD AUTO: 7.7 FL (ref 6–12)
PMV BLD AUTO: 7.9 FL (ref 6–12)
PMV BLD AUTO: 8.1 FL (ref 6–12)
PO2 BLDA: 143 MMHG (ref 80–105)
PO2 BLDA: 168 MMHG (ref 80–105)
POTASSIUM BLDA-SCNC: 4.4 MMOL/L (ref 3.5–4.9)
POTASSIUM BLDA-SCNC: 4.4 MMOL/L (ref 3.5–4.9)
POTASSIUM SERPL-SCNC: 4.4 MMOL/L (ref 3.5–5.2)
POTASSIUM SERPL-SCNC: 4.4 MMOL/L (ref 3.5–5.2)
POTASSIUM SERPL-SCNC: 4.5 MMOL/L (ref 3.5–5.2)
POTASSIUM SERPL-SCNC: 4.6 MMOL/L (ref 3.5–5.2)
POTASSIUM SERPL-SCNC: 4.6 MMOL/L (ref 3.5–5.2)
POTASSIUM SERPL-SCNC: 4.7 MMOL/L (ref 3.5–5.2)
POTASSIUM SERPL-SCNC: 4.8 MMOL/L (ref 3.5–5.2)
POTASSIUM SERPL-SCNC: 5 MMOL/L (ref 3.5–5.2)
POTASSIUM SERPL-SCNC: 5.1 MMOL/L (ref 3.5–5.2)
PROCALCITONIN SERPL-MCNC: 0.23 NG/ML (ref 0–0.25)
PROT PATTERN SERPL IFE-IMP: ABNORMAL
PROT SERPL-MCNC: 6.4 G/DL (ref 6–8.5)
PROT SERPL-MCNC: 6.7 G/DL (ref 6–8.5)
PROT SERPL-MCNC: 6.7 G/DL (ref 6–8.5)
PROT SERPL-MCNC: 6.8 G/DL (ref 6–8.5)
PROT SERPL-MCNC: 7.5 G/DL (ref 6–8.5)
PROT SERPL-MCNC: 7.7 G/DL (ref 6–8.5)
PROT SERPL-MCNC: 8.3 G/DL (ref 6–8.5)
PROT UR QL STRIP: ABNORMAL
PROT UR QL STRIP: NEGATIVE
PROTEINASE3 AB SER IA-ACNC: <3.5 U/ML (ref 0–3.5)
PROTHROMBIN TIME: 10.7 SECONDS (ref 9.6–11.7)
PROTHROMBIN TIME: 10.9 SECONDS (ref 9.6–11.7)
PROTHROMBIN TIME: 11.4 SECONDS (ref 9.6–11.7)
PROTHROMBIN TIME: 13.2 SECONDS (ref 11.7–14.2)
PTH-INTACT SERPL-MCNC: 44.5 PG/ML (ref 15–65)
PTH-INTACT SERPL-MCNC: 44.7 PG/ML (ref 15–65)
QT INTERVAL: 440 MS
RBC # BLD AUTO: 2.93 10*6/MM3 (ref 4.14–5.8)
RBC # BLD AUTO: 3.54 10*6/MM3 (ref 4.14–5.8)
RBC # BLD AUTO: 3.58 10*6/MM3 (ref 4.14–5.8)
RBC # BLD AUTO: 3.81 10*6/MM3 (ref 4.14–5.8)
RBC # BLD AUTO: 4 10*6/MM3 (ref 4.14–5.8)
RBC # BLD AUTO: 4.11 10*6/MM3 (ref 4.14–5.8)
RBC # BLD AUTO: 4.26 10*6/MM3 (ref 4.14–5.8)
RBC # BLD AUTO: 4.36 10*6/MM3 (ref 4.14–5.8)
RBC # BLD AUTO: 4.43 10*6/MM3 (ref 4.14–5.8)
RBC # BLD AUTO: 4.67 10*6/MM3 (ref 4.14–5.8)
RBC # UR: ABNORMAL /HPF
RBC MORPH BLD: NORMAL
REF LAB TEST METHOD: ABNORMAL
RH BLD: POSITIVE
RHINOVIRUS RNA SPEC NAA+PROBE: NOT DETECTED
RSV RNA NPH QL NAA+NON-PROBE: NOT DETECTED
S PNEUM AG SPEC QL LA: NEGATIVE
SAO2 % BLDA: 99 % (ref 95–98)
SAO2 % BLDA: 99 % (ref 95–98)
SARS-COV-2 ORF1AB RESP QL NAA+PROBE: NOT DETECTED
SARS-COV-2 ORF1AB RESP QL NAA+PROBE: NOT DETECTED
SARS-COV-2 RNA NPH QL NAA+NON-PROBE: NOT DETECTED
SARS-COV-2 RNA PNL SPEC NAA+PROBE: NOT DETECTED
SODIUM SERPL-SCNC: 131 MMOL/L (ref 136–145)
SODIUM SERPL-SCNC: 133 MMOL/L (ref 136–145)
SODIUM SERPL-SCNC: 134 MMOL/L (ref 136–145)
SODIUM SERPL-SCNC: 136 MMOL/L (ref 136–145)
SODIUM SERPL-SCNC: 137 MMOL/L (ref 136–145)
SODIUM SERPL-SCNC: 138 MMOL/L (ref 136–145)
SODIUM SERPL-SCNC: 139 MMOL/L (ref 136–145)
SODIUM SERPL-SCNC: 140 MMOL/L (ref 136–145)
SODIUM SERPL-SCNC: 140 MMOL/L (ref 136–145)
SP GR UR STRIP: 1.01 (ref 1–1.03)
SP GR UR STRIP: 1.02 (ref 1–1.03)
SQUAMOUS #/AREA URNS HPF: ABNORMAL /HPF
STRESS TARGET HR: 124 BPM
T&S EXPIRATION DATE: NORMAL
TROPONIN T SERPL-MCNC: <0.01 NG/ML (ref 0–0.03)
TROPONIN T SERPL-MCNC: <0.01 NG/ML (ref 0–0.03)
TSH SERPL DL<=0.05 MIU/L-ACNC: 0.98 UIU/ML (ref 0.27–4.2)
TSH SERPL DL<=0.05 MIU/L-ACNC: 1.33 UIU/ML (ref 0.27–4.2)
TSH SERPL DL<=0.05 MIU/L-ACNC: 1.46 UIU/ML (ref 0.27–4.2)
URATE SERPL-MCNC: 5.7 MG/DL (ref 3.4–7)
UROBILINOGEN UR QL STRIP: ABNORMAL
UROBILINOGEN UR QL STRIP: NORMAL
WBC # BLD AUTO: 11.64 10*3/MM3 (ref 3.4–10.8)
WBC # BLD AUTO: 17.02 10*3/MM3 (ref 3.4–10.8)
WBC # BLD AUTO: 8.68 10*3/MM3 (ref 3.4–10.8)
WBC # BLD AUTO: 8.87 10*3/MM3 (ref 3.4–10.8)
WBC # BLD AUTO: 9.3 10*3/MM3 (ref 3.4–10.8)
WBC # BLD AUTO: 9.4 10*3/MM3 (ref 3.4–10.8)
WBC MORPH BLD: NORMAL
WBC NRBC COR # BLD: 14.5 10*3/MM3 (ref 3.4–10.8)
WBC NRBC COR # BLD: 16.5 10*3/MM3 (ref 3.4–10.8)
WBC NRBC COR # BLD: 17.3 10*3/MM3 (ref 3.4–10.8)
WBC NRBC COR # BLD: 21.8 10*3/MM3 (ref 3.4–10.8)
WBC UR QL AUTO: ABNORMAL /HPF
WHOLE BLOOD HOLD SPECIMEN: NORMAL
WHOLE BLOOD HOLD SPECIMEN: NORMAL

## 2021-01-01 PROCEDURE — 25010000002 CEFTRIAXONE PER 250 MG: Performed by: EMERGENCY MEDICINE

## 2021-01-01 PROCEDURE — 92526 ORAL FUNCTION THERAPY: CPT

## 2021-01-01 PROCEDURE — 78306 BONE IMAGING WHOLE BODY: CPT

## 2021-01-01 PROCEDURE — 82803 BLOOD GASES ANY COMBINATION: CPT

## 2021-01-01 PROCEDURE — 84443 ASSAY THYROID STIM HORMONE: CPT | Performed by: EMERGENCY MEDICINE

## 2021-01-01 PROCEDURE — 82306 VITAMIN D 25 HYDROXY: CPT

## 2021-01-01 PROCEDURE — 85610 PROTHROMBIN TIME: CPT | Performed by: RADIOLOGY

## 2021-01-01 PROCEDURE — 83880 ASSAY OF NATRIURETIC PEPTIDE: CPT

## 2021-01-01 PROCEDURE — 02RF38Z REPLACEMENT OF AORTIC VALVE WITH ZOOPLASTIC TISSUE, PERCUTANEOUS APPROACH: ICD-10-PCS | Performed by: THORACIC SURGERY (CARDIOTHORACIC VASCULAR SURGERY)

## 2021-01-01 PROCEDURE — 94799 UNLISTED PULMONARY SVC/PX: CPT

## 2021-01-01 PROCEDURE — 85025 COMPLETE CBC W/AUTO DIFF WBC: CPT | Performed by: INTERNAL MEDICINE

## 2021-01-01 PROCEDURE — 97110 THERAPEUTIC EXERCISES: CPT | Performed by: PHYSICAL THERAPIST

## 2021-01-01 PROCEDURE — 83735 ASSAY OF MAGNESIUM: CPT | Performed by: EMERGENCY MEDICINE

## 2021-01-01 PROCEDURE — A9270 NON-COVERED ITEM OR SERVICE: HCPCS | Performed by: NURSE PRACTITIONER

## 2021-01-01 PROCEDURE — 99223 1ST HOSP IP/OBS HIGH 75: CPT | Performed by: INTERNAL MEDICINE

## 2021-01-01 PROCEDURE — 77012 CT SCAN FOR NEEDLE BIOPSY: CPT

## 2021-01-01 PROCEDURE — 85610 PROTHROMBIN TIME: CPT

## 2021-01-01 PROCEDURE — 86850 RBC ANTIBODY SCREEN: CPT

## 2021-01-01 PROCEDURE — C1760 CLOSURE DEV, VASC: HCPCS | Performed by: THORACIC SURGERY (CARDIOTHORACIC VASCULAR SURGERY)

## 2021-01-01 PROCEDURE — 85018 HEMOGLOBIN: CPT

## 2021-01-01 PROCEDURE — 88305 TISSUE EXAM BY PATHOLOGIST: CPT | Performed by: INTERNAL MEDICINE

## 2021-01-01 PROCEDURE — 03HY32Z INSERTION OF MONITORING DEVICE INTO UPPER ARTERY, PERCUTANEOUS APPROACH: ICD-10-PCS | Performed by: ANESTHESIOLOGY

## 2021-01-01 PROCEDURE — 85025 COMPLETE CBC W/AUTO DIFF WBC: CPT

## 2021-01-01 PROCEDURE — 63710000001 ATORVASTATIN 20 MG TABLET: Performed by: INTERNAL MEDICINE

## 2021-01-01 PROCEDURE — 93010 ELECTROCARDIOGRAM REPORT: CPT | Performed by: INTERNAL MEDICINE

## 2021-01-01 PROCEDURE — C1894 INTRO/SHEATH, NON-LASER: HCPCS | Performed by: THORACIC SURGERY (CARDIOTHORACIC VASCULAR SURGERY)

## 2021-01-01 PROCEDURE — 36415 COLL VENOUS BLD VENIPUNCTURE: CPT

## 2021-01-01 PROCEDURE — 82550 ASSAY OF CK (CPK): CPT

## 2021-01-01 PROCEDURE — 25010000002 FENTANYL CITRATE (PF) 100 MCG/2ML SOLUTION: Performed by: INTERNAL MEDICINE

## 2021-01-01 PROCEDURE — 72158 MRI LUMBAR SPINE W/O & W/DYE: CPT

## 2021-01-01 PROCEDURE — 36415 COLL VENOUS BLD VENIPUNCTURE: CPT | Performed by: INTERNAL MEDICINE

## 2021-01-01 PROCEDURE — 36415 COLL VENOUS BLD VENIPUNCTURE: CPT | Performed by: NURSE PRACTITIONER

## 2021-01-01 PROCEDURE — 88333 PATH CONSLTJ SURG CYTO XM 1: CPT | Performed by: INTERNAL MEDICINE

## 2021-01-01 PROCEDURE — 25010000002 MIDAZOLAM PER 1 MG: Performed by: ANESTHESIOLOGY

## 2021-01-01 PROCEDURE — 85025 COMPLETE CBC W/AUTO DIFF WBC: CPT | Performed by: NURSE PRACTITIONER

## 2021-01-01 PROCEDURE — 97163 PT EVAL HIGH COMPLEX 45 MIN: CPT | Performed by: PHYSICAL THERAPIST

## 2021-01-01 PROCEDURE — 63710000001 CLOPIDOGREL 75 MG TABLET: Performed by: INTERNAL MEDICINE

## 2021-01-01 PROCEDURE — 84484 ASSAY OF TROPONIN QUANT: CPT | Performed by: NURSE PRACTITIONER

## 2021-01-01 PROCEDURE — 80048 BASIC METABOLIC PNL TOTAL CA: CPT | Performed by: INTERNAL MEDICINE

## 2021-01-01 PROCEDURE — 85347 COAGULATION TIME ACTIVATED: CPT

## 2021-01-01 PROCEDURE — C1769 GUIDE WIRE: HCPCS | Performed by: INTERNAL MEDICINE

## 2021-01-01 PROCEDURE — 83735 ASSAY OF MAGNESIUM: CPT | Performed by: NURSE PRACTITIONER

## 2021-01-01 PROCEDURE — S0260 H&P FOR SURGERY: HCPCS | Performed by: INTERNAL MEDICINE

## 2021-01-01 PROCEDURE — 99213 OFFICE O/P EST LOW 20 MIN: CPT | Performed by: INTERNAL MEDICINE

## 2021-01-01 PROCEDURE — 83735 ASSAY OF MAGNESIUM: CPT | Performed by: INTERNAL MEDICINE

## 2021-01-01 PROCEDURE — A9270 NON-COVERED ITEM OR SERVICE: HCPCS | Performed by: INTERNAL MEDICINE

## 2021-01-01 PROCEDURE — C1894 INTRO/SHEATH, NON-LASER: HCPCS | Performed by: INTERNAL MEDICINE

## 2021-01-01 PROCEDURE — 97530 THERAPEUTIC ACTIVITIES: CPT | Performed by: PHYSICAL THERAPIST

## 2021-01-01 PROCEDURE — 93000 ELECTROCARDIOGRAM COMPLETE: CPT | Performed by: INTERNAL MEDICINE

## 2021-01-01 PROCEDURE — 83520 IMMUNOASSAY QUANT NOS NONAB: CPT

## 2021-01-01 PROCEDURE — C1887 CATHETER, GUIDING: HCPCS | Performed by: INTERNAL MEDICINE

## 2021-01-01 PROCEDURE — 25010000002 HEPARIN LOCK FLUSH PER 10 UNITS: Performed by: INTERNAL MEDICINE

## 2021-01-01 PROCEDURE — 81001 URINALYSIS AUTO W/SCOPE: CPT

## 2021-01-01 PROCEDURE — 85025 COMPLETE CBC W/AUTO DIFF WBC: CPT | Performed by: RADIOLOGY

## 2021-01-01 PROCEDURE — 99152 MOD SED SAME PHYS/QHP 5/>YRS: CPT | Performed by: INTERNAL MEDICINE

## 2021-01-01 PROCEDURE — 25010000002 MIDAZOLAM PER 1 MG: Performed by: INTERNAL MEDICINE

## 2021-01-01 PROCEDURE — 93294 REM INTERROG EVL PM/LDLS PM: CPT | Performed by: NURSE PRACTITIONER

## 2021-01-01 PROCEDURE — 37236 OPEN/PERQ PLACE STENT 1ST: CPT | Performed by: INTERNAL MEDICINE

## 2021-01-01 PROCEDURE — 0 IOPAMIDOL PER 1 ML: Performed by: INTERNAL MEDICINE

## 2021-01-01 PROCEDURE — 86900 BLOOD TYPING SEROLOGIC ABO: CPT

## 2021-01-01 PROCEDURE — 80053 COMPREHEN METABOLIC PANEL: CPT | Performed by: INTERNAL MEDICINE

## 2021-01-01 PROCEDURE — C1769 GUIDE WIRE: HCPCS | Performed by: THORACIC SURGERY (CARDIOTHORACIC VASCULAR SURGERY)

## 2021-01-01 PROCEDURE — 25010000002 MAGNESIUM SULFATE PER 500 MG OF MAGNESIUM: Performed by: ANESTHESIOLOGY

## 2021-01-01 PROCEDURE — 25010000003 HEPARIN LOCK FLUSH PER 10 UNITS: Performed by: INTERNAL MEDICINE

## 2021-01-01 PROCEDURE — 72146 MRI CHEST SPINE W/O DYE: CPT

## 2021-01-01 PROCEDURE — 81003 URINALYSIS AUTO W/O SCOPE: CPT | Performed by: EMERGENCY MEDICINE

## 2021-01-01 PROCEDURE — 97112 NEUROMUSCULAR REEDUCATION: CPT | Performed by: PHYSICAL THERAPIST

## 2021-01-01 PROCEDURE — 25010000002 PROTAMINE SULFATE PER 10 MG: Performed by: ANESTHESIOLOGY

## 2021-01-01 PROCEDURE — A9579 GAD-BASE MR CONTRAST NOS,1ML: HCPCS | Performed by: NEUROLOGICAL SURGERY

## 2021-01-01 PROCEDURE — 80048 BASIC METABOLIC PNL TOTAL CA: CPT

## 2021-01-01 PROCEDURE — 99153 MOD SED SAME PHYS/QHP EA: CPT | Performed by: INTERNAL MEDICINE

## 2021-01-01 PROCEDURE — G0463 HOSPITAL OUTPT CLINIC VISIT: HCPCS

## 2021-01-01 PROCEDURE — 86901 BLOOD TYPING SEROLOGIC RH(D): CPT

## 2021-01-01 PROCEDURE — 96361 HYDRATE IV INFUSION ADD-ON: CPT

## 2021-01-01 PROCEDURE — 96367 TX/PROPH/DG ADDL SEQ IV INF: CPT

## 2021-01-01 PROCEDURE — 82140 ASSAY OF AMMONIA: CPT | Performed by: EMERGENCY MEDICINE

## 2021-01-01 PROCEDURE — 97161 PT EVAL LOW COMPLEX 20 MIN: CPT

## 2021-01-01 PROCEDURE — 93455 CORONARY ART/GRFT ANGIO S&I: CPT | Performed by: INTERNAL MEDICINE

## 2021-01-01 PROCEDURE — 99233 SBSQ HOSP IP/OBS HIGH 50: CPT | Performed by: INTERNAL MEDICINE

## 2021-01-01 PROCEDURE — 93005 ELECTROCARDIOGRAM TRACING: CPT | Performed by: NURSE PRACTITIONER

## 2021-01-01 PROCEDURE — 63710000001 GABAPENTIN 300 MG CAPSULE: Performed by: INTERNAL MEDICINE

## 2021-01-01 PROCEDURE — 85007 BL SMEAR W/DIFF WBC COUNT: CPT

## 2021-01-01 PROCEDURE — 25010000002 PROPOFOL 10 MG/ML EMULSION: Performed by: ANESTHESIOLOGY

## 2021-01-01 PROCEDURE — 85027 COMPLETE CBC AUTOMATED: CPT | Performed by: THORACIC SURGERY (CARDIOTHORACIC VASCULAR SURGERY)

## 2021-01-01 PROCEDURE — 63710000001 CLOPIDOGREL 300 MG TABLET: Performed by: INTERNAL MEDICINE

## 2021-01-01 PROCEDURE — 94729 DIFFUSING CAPACITY: CPT

## 2021-01-01 PROCEDURE — 63710000001 CYCLOBENZAPRINE 10 MG TABLET: Performed by: INTERNAL MEDICINE

## 2021-01-01 PROCEDURE — 25010000002 CEFTRIAXONE PER 250 MG: Performed by: INTERNAL MEDICINE

## 2021-01-01 PROCEDURE — 80053 COMPREHEN METABOLIC PANEL: CPT

## 2021-01-01 PROCEDURE — 82310 ASSAY OF CALCIUM: CPT

## 2021-01-01 PROCEDURE — 25010000002 ONDANSETRON PER 1 MG: Performed by: RADIOLOGY

## 2021-01-01 PROCEDURE — 93798 PHYS/QHP OP CAR RHAB W/ECG: CPT

## 2021-01-01 PROCEDURE — B41D1ZZ FLUOROSCOPY OF AORTA AND BILATERAL LOWER EXTREMITY ARTERIES USING LOW OSMOLAR CONTRAST: ICD-10-PCS | Performed by: THORACIC SURGERY (CARDIOTHORACIC VASCULAR SURGERY)

## 2021-01-01 PROCEDURE — 83605 ASSAY OF LACTIC ACID: CPT

## 2021-01-01 PROCEDURE — C9803 HOPD COVID-19 SPEC COLLECT: HCPCS

## 2021-01-01 PROCEDURE — 63710000001 FOLIC ACID 1 MG TABLET: Performed by: INTERNAL MEDICINE

## 2021-01-01 PROCEDURE — 83880 ASSAY OF NATRIURETIC PEPTIDE: CPT | Performed by: NURSE PRACTITIONER

## 2021-01-01 PROCEDURE — 86256 FLUORESCENT ANTIBODY TITER: CPT

## 2021-01-01 PROCEDURE — 93296 REM INTERROG EVL PM/IDS: CPT | Performed by: NURSE PRACTITIONER

## 2021-01-01 PROCEDURE — 99217 PR OBSERVATION CARE DISCHARGE MANAGEMENT: CPT | Performed by: INTERNAL MEDICINE

## 2021-01-01 PROCEDURE — 86038 ANTINUCLEAR ANTIBODIES: CPT

## 2021-01-01 PROCEDURE — 96365 THER/PROPH/DIAG IV INF INIT: CPT

## 2021-01-01 PROCEDURE — 87040 BLOOD CULTURE FOR BACTERIA: CPT | Performed by: EMERGENCY MEDICINE

## 2021-01-01 PROCEDURE — 63710000001 ALBUTEROL SULFATE HFA 108 (90 BASE) MCG/ACT AEROSOL SOLUTION 6.7 G INHALER: Performed by: INTERNAL MEDICINE

## 2021-01-01 PROCEDURE — 63710000001 MUPIROCIN 2 % OINTMENT: Performed by: NURSE PRACTITIONER

## 2021-01-01 PROCEDURE — 63710000001 HYDROCODONE-ACETAMINOPHEN 7.5-325 MG TABLET: Performed by: INTERNAL MEDICINE

## 2021-01-01 PROCEDURE — 96366 THER/PROPH/DIAG IV INF ADDON: CPT

## 2021-01-01 PROCEDURE — 88360 TUMOR IMMUNOHISTOCHEM/MANUAL: CPT

## 2021-01-01 PROCEDURE — 94727 GAS DIL/WSHOT DETER LNG VOL: CPT

## 2021-01-01 PROCEDURE — 97162 PT EVAL MOD COMPLEX 30 MIN: CPT

## 2021-01-01 PROCEDURE — 87899 AGENT NOS ASSAY W/OPTIC: CPT | Performed by: NURSE PRACTITIONER

## 2021-01-01 PROCEDURE — 85025 COMPLETE CBC W/AUTO DIFF WBC: CPT | Performed by: EMERGENCY MEDICINE

## 2021-01-01 PROCEDURE — 25010000002 GADOTERIDOL PER 1 ML: Performed by: NEUROLOGICAL SURGERY

## 2021-01-01 PROCEDURE — 84550 ASSAY OF BLOOD/URIC ACID: CPT

## 2021-01-01 PROCEDURE — 63710000001 CHLORHEXIDINE 0.12 % SOLUTION: Performed by: NURSE PRACTITIONER

## 2021-01-01 PROCEDURE — U0005 INFEC AGEN DETEC AMPLI PROBE: HCPCS

## 2021-01-01 PROCEDURE — 84443 ASSAY THYROID STIM HORMONE: CPT

## 2021-01-01 PROCEDURE — 86334 IMMUNOFIX E-PHORESIS SERUM: CPT

## 2021-01-01 PROCEDURE — 82784 ASSAY IGA/IGD/IGG/IGM EACH: CPT

## 2021-01-01 PROCEDURE — 71250 CT THORAX DX C-: CPT

## 2021-01-01 PROCEDURE — 93005 ELECTROCARDIOGRAM TRACING: CPT

## 2021-01-01 PROCEDURE — 84145 PROCALCITONIN (PCT): CPT | Performed by: EMERGENCY MEDICINE

## 2021-01-01 PROCEDURE — 99214 OFFICE O/P EST MOD 30 MIN: CPT | Performed by: INTERNAL MEDICINE

## 2021-01-01 PROCEDURE — 83735 ASSAY OF MAGNESIUM: CPT | Performed by: THORACIC SURGERY (CARDIOTHORACIC VASCULAR SURGERY)

## 2021-01-01 PROCEDURE — 85014 HEMATOCRIT: CPT

## 2021-01-01 PROCEDURE — 85027 COMPLETE CBC AUTOMATED: CPT | Performed by: INTERNAL MEDICINE

## 2021-01-01 PROCEDURE — C1760 CLOSURE DEV, VASC: HCPCS | Performed by: INTERNAL MEDICINE

## 2021-01-01 PROCEDURE — 71045 X-RAY EXAM CHEST 1 VIEW: CPT

## 2021-01-01 PROCEDURE — 99024 POSTOP FOLLOW-UP VISIT: CPT | Performed by: NURSE PRACTITIONER

## 2021-01-01 PROCEDURE — 63710000001 ASPIRIN 81 MG TABLET DELAYED-RELEASE: Performed by: INTERNAL MEDICINE

## 2021-01-01 PROCEDURE — 85730 THROMBOPLASTIN TIME PARTIAL: CPT

## 2021-01-01 PROCEDURE — 84484 ASSAY OF TROPONIN QUANT: CPT | Performed by: EMERGENCY MEDICINE

## 2021-01-01 PROCEDURE — 25010000002 HEPARIN (PORCINE) PER 1000 UNITS: Performed by: ANESTHESIOLOGY

## 2021-01-01 PROCEDURE — 96376 TX/PRO/DX INJ SAME DRUG ADON: CPT

## 2021-01-01 PROCEDURE — 25010000002 FENTANYL CITRATE (PF) 50 MCG/ML SOLUTION: Performed by: RADIOLOGY

## 2021-01-01 PROCEDURE — 25010000002 HEPARIN (PORCINE) PER 1000 UNITS: Performed by: INTERNAL MEDICINE

## 2021-01-01 PROCEDURE — 99238 HOSP IP/OBS DSCHRG MGMT 30/<: CPT | Performed by: NURSE PRACTITIONER

## 2021-01-01 PROCEDURE — 99239 HOSP IP/OBS DSCHRG MGMT >30: CPT | Performed by: INTERNAL MEDICINE

## 2021-01-01 PROCEDURE — 36251 INS CATH REN ART 1ST UNILAT: CPT | Performed by: INTERNAL MEDICINE

## 2021-01-01 PROCEDURE — U0005 INFEC AGEN DETEC AMPLI PROBE: HCPCS | Performed by: THORACIC SURGERY (CARDIOTHORACIC VASCULAR SURGERY)

## 2021-01-01 PROCEDURE — 94060 EVALUATION OF WHEEZING: CPT

## 2021-01-01 PROCEDURE — 83605 ASSAY OF LACTIC ACID: CPT | Performed by: NURSE PRACTITIONER

## 2021-01-01 PROCEDURE — 0 LIDOCAINE 1 % SOLUTION: Performed by: RADIOLOGY

## 2021-01-01 PROCEDURE — 83036 HEMOGLOBIN GLYCOSYLATED A1C: CPT

## 2021-01-01 PROCEDURE — 94640 AIRWAY INHALATION TREATMENT: CPT

## 2021-01-01 PROCEDURE — 93306 TTE W/DOPPLER COMPLETE: CPT | Performed by: INTERNAL MEDICINE

## 2021-01-01 PROCEDURE — 88341 IMHCHEM/IMCYTCHM EA ADD ANTB: CPT | Performed by: INTERNAL MEDICINE

## 2021-01-01 PROCEDURE — 88342 IMHCHEM/IMCYTCHM 1ST ANTB: CPT | Performed by: INTERNAL MEDICINE

## 2021-01-01 PROCEDURE — C1725 CATH, TRANSLUMIN NON-LASER: HCPCS | Performed by: INTERNAL MEDICINE

## 2021-01-01 PROCEDURE — 93306 TTE W/DOPPLER COMPLETE: CPT

## 2021-01-01 PROCEDURE — A9270 NON-COVERED ITEM OR SERVICE: HCPCS

## 2021-01-01 PROCEDURE — 92610 EVALUATE SWALLOWING FUNCTION: CPT

## 2021-01-01 PROCEDURE — 33361 REPLACE AORTIC VALVE PERQ: CPT | Performed by: THORACIC SURGERY (CARDIOTHORACIC VASCULAR SURGERY)

## 2021-01-01 PROCEDURE — 87086 URINE CULTURE/COLONY COUNT: CPT

## 2021-01-01 PROCEDURE — 63710000001 GABAPENTIN 400 MG CAPSULE: Performed by: INTERNAL MEDICINE

## 2021-01-01 PROCEDURE — 86431 RHEUMATOID FACTOR QUANT: CPT

## 2021-01-01 PROCEDURE — 02HV33Z INSERTION OF INFUSION DEVICE INTO SUPERIOR VENA CAVA, PERCUTANEOUS APPROACH: ICD-10-PCS | Performed by: ANESTHESIOLOGY

## 2021-01-01 PROCEDURE — 33361 REPLACE AORTIC VALVE PERQ: CPT | Performed by: INTERNAL MEDICINE

## 2021-01-01 PROCEDURE — 25010000002 MIDAZOLAM PER 1 MG: Performed by: RADIOLOGY

## 2021-01-01 PROCEDURE — 63710000001 AMLODIPINE 5 MG TABLET: Performed by: INTERNAL MEDICINE

## 2021-01-01 PROCEDURE — 4B02XSZ MEASUREMENT OF CARDIAC PACEMAKER, EXTERNAL APPROACH: ICD-10-PCS | Performed by: THORACIC SURGERY (CARDIOTHORACIC VASCULAR SURGERY)

## 2021-01-01 PROCEDURE — 97166 OT EVAL MOD COMPLEX 45 MIN: CPT

## 2021-01-01 PROCEDURE — 97535 SELF CARE MNGMENT TRAINING: CPT

## 2021-01-01 PROCEDURE — U0003 INFECTIOUS AGENT DETECTION BY NUCLEIC ACID (DNA OR RNA); SEVERE ACUTE RESPIRATORY SYNDROME CORONAVIRUS 2 (SARS-COV-2) (CORONAVIRUS DISEASE [COVID-19]), AMPLIFIED PROBE TECHNIQUE, MAKING USE OF HIGH THROUGHPUT TECHNOLOGIES AS DESCRIBED BY CMS-2020-01-R: HCPCS

## 2021-01-01 PROCEDURE — 25010000002 HEPARIN (PORCINE) PER 1000 UNITS

## 2021-01-01 PROCEDURE — 83970 ASSAY OF PARATHORMONE: CPT

## 2021-01-01 PROCEDURE — 70450 CT HEAD/BRAIN W/O DYE: CPT

## 2021-01-01 PROCEDURE — C9803 HOPD COVID-19 SPEC COLLECT: HCPCS | Performed by: THORACIC SURGERY (CARDIOTHORACIC VASCULAR SURGERY)

## 2021-01-01 PROCEDURE — 82565 ASSAY OF CREATININE: CPT

## 2021-01-01 PROCEDURE — 84165 PROTEIN E-PHORESIS SERUM: CPT

## 2021-01-01 PROCEDURE — 25010000002 CALCIUM GLUCONATE-NACL 1-0.675 GM/50ML-% SOLUTION: Performed by: NURSE PRACTITIONER

## 2021-01-01 PROCEDURE — 85730 THROMBOPLASTIN TIME PARTIAL: CPT | Performed by: RADIOLOGY

## 2021-01-01 PROCEDURE — 99285 EMERGENCY DEPT VISIT HI MDM: CPT

## 2021-01-01 PROCEDURE — 85610 PROTHROMBIN TIME: CPT | Performed by: INTERNAL MEDICINE

## 2021-01-01 PROCEDURE — 71275 CT ANGIOGRAPHY CHEST: CPT

## 2021-01-01 PROCEDURE — 71046 X-RAY EXAM CHEST 2 VIEWS: CPT

## 2021-01-01 PROCEDURE — 74174 CTA ABD&PLVS W/CONTRAST: CPT

## 2021-01-01 PROCEDURE — C1876 STENT, NON-COA/NON-COV W/DEL: HCPCS | Performed by: INTERNAL MEDICINE

## 2021-01-01 PROCEDURE — 0 TECHNETIUM MEDRONATE KIT: Performed by: ORTHOPAEDIC SURGERY

## 2021-01-01 PROCEDURE — 80069 RENAL FUNCTION PANEL: CPT | Performed by: THORACIC SURGERY (CARDIOTHORACIC VASCULAR SURGERY)

## 2021-01-01 PROCEDURE — 25010000003 CEFAZOLIN IN DEXTROSE 2-4 GM/100ML-% SOLUTION: Performed by: NURSE PRACTITIONER

## 2021-01-01 PROCEDURE — G0378 HOSPITAL OBSERVATION PER HR: HCPCS

## 2021-01-01 PROCEDURE — 99152 MOD SED SAME PHYS/QHP 5/>YRS: CPT

## 2021-01-01 PROCEDURE — 84155 ASSAY OF PROTEIN SERUM: CPT

## 2021-01-01 PROCEDURE — 25010000002 PERFLUTREN (DEFINITY) 8.476 MG IN SODIUM CHLORIDE (PF) 0.9 % 10 ML INJECTION: Performed by: NURSE PRACTITIONER

## 2021-01-01 PROCEDURE — 82947 ASSAY GLUCOSE BLOOD QUANT: CPT

## 2021-01-01 PROCEDURE — U0004 COV-19 TEST NON-CDC HGH THRU: HCPCS

## 2021-01-01 PROCEDURE — 0202U NFCT DS 22 TRGT SARS-COV-2: CPT | Performed by: EMERGENCY MEDICINE

## 2021-01-01 PROCEDURE — 85576 BLOOD PLATELET AGGREGATION: CPT

## 2021-01-01 PROCEDURE — 0 IOPAMIDOL PER 1 ML: Performed by: THORACIC SURGERY (CARDIOTHORACIC VASCULAR SURGERY)

## 2021-01-01 PROCEDURE — A9503 TC99M MEDRONATE: HCPCS | Performed by: ORTHOPAEDIC SURGERY

## 2021-01-01 PROCEDURE — 99204 OFFICE O/P NEW MOD 45 MIN: CPT | Performed by: INTERNAL MEDICINE

## 2021-01-01 PROCEDURE — U0004 COV-19 TEST NON-CDC HGH THRU: HCPCS | Performed by: THORACIC SURGERY (CARDIOTHORACIC VASCULAR SURGERY)

## 2021-01-01 PROCEDURE — 63710000001 ACETAMINOPHEN 325 MG TABLET

## 2021-01-01 PROCEDURE — 80053 COMPREHEN METABOLIC PANEL: CPT | Performed by: EMERGENCY MEDICINE

## 2021-01-01 DEVICE — VLV HEART TRNSCATH SAPIEN3 26MM: Type: IMPLANTABLE DEVICE | Site: HEART | Status: FUNCTIONAL

## 2021-01-01 DEVICE — RX HERCULINK ELITE RENAL AND BILIARY STENT SYSTEM 6.0 MM X 18 MM X 135 CM
Type: IMPLANTABLE DEVICE | Status: FUNCTIONAL
Brand: HERCULINK ELITE

## 2021-01-01 RX ORDER — ALBUTEROL SULFATE 90 UG/1
2 AEROSOL, METERED RESPIRATORY (INHALATION) ONCE
Status: COMPLETED | OUTPATIENT
Start: 2021-01-01 | End: 2021-01-01

## 2021-01-01 RX ORDER — SODIUM CHLORIDE 9 MG/ML
100 INJECTION, SOLUTION INTRAVENOUS CONTINUOUS
Status: DISCONTINUED | OUTPATIENT
Start: 2021-01-01 | End: 2021-01-01 | Stop reason: HOSPADM

## 2021-01-01 RX ORDER — ASPIRIN 81 MG/1
81 TABLET ORAL DAILY
Status: DISCONTINUED | OUTPATIENT
Start: 2021-01-01 | End: 2021-01-01 | Stop reason: HOSPADM

## 2021-01-01 RX ORDER — PREDNISONE 1 MG/1
5 TABLET ORAL DAILY
Qty: 30 TABLET | Refills: 0 | Status: SHIPPED | OUTPATIENT
Start: 2021-01-01 | End: 2021-01-01

## 2021-01-01 RX ORDER — CLOPIDOGREL BISULFATE 75 MG/1
75 TABLET ORAL DAILY
Status: DISCONTINUED | OUTPATIENT
Start: 2021-01-01 | End: 2021-01-01 | Stop reason: HOSPADM

## 2021-01-01 RX ORDER — AMLODIPINE BESYLATE 5 MG/1
5 TABLET ORAL DAILY
Status: DISCONTINUED | OUTPATIENT
Start: 2021-01-01 | End: 2021-01-01 | Stop reason: HOSPADM

## 2021-01-01 RX ORDER — HYDROCODONE BITARTRATE AND ACETAMINOPHEN 7.5; 325 MG/1; MG/1
1 TABLET ORAL EVERY 6 HOURS PRN
COMMUNITY
Start: 2021-01-01 | End: 2021-01-01 | Stop reason: HOSPADM

## 2021-01-01 RX ORDER — CLOPIDOGREL 300 MG/1
TABLET, FILM COATED ORAL AS NEEDED
Status: DISCONTINUED | OUTPATIENT
Start: 2021-01-01 | End: 2021-01-01 | Stop reason: HOSPADM

## 2021-01-01 RX ORDER — LIDOCAINE HYDROCHLORIDE 20 MG/ML
INJECTION, SOLUTION INFILTRATION; PERINEURAL AS NEEDED
Status: DISCONTINUED | OUTPATIENT
Start: 2021-01-01 | End: 2021-01-01 | Stop reason: HOSPADM

## 2021-01-01 RX ORDER — SODIUM CHLORIDE, SODIUM LACTATE, POTASSIUM CHLORIDE, CALCIUM CHLORIDE 600; 310; 30; 20 MG/100ML; MG/100ML; MG/100ML; MG/100ML
9 INJECTION, SOLUTION INTRAVENOUS CONTINUOUS
Status: DISCONTINUED | OUTPATIENT
Start: 2021-01-01 | End: 2021-01-01 | Stop reason: HOSPADM

## 2021-01-01 RX ORDER — SODIUM CHLORIDE 0.9 % (FLUSH) 0.9 %
3-10 SYRINGE (ML) INJECTION AS NEEDED
Status: DISCONTINUED | OUTPATIENT
Start: 2021-01-01 | End: 2021-01-01 | Stop reason: HOSPADM

## 2021-01-01 RX ORDER — CLOPIDOGREL BISULFATE 75 MG/1
75 TABLET ORAL DAILY
Qty: 30 TABLET | Refills: 2 | Status: SHIPPED | OUTPATIENT
Start: 2021-01-01 | End: 2021-01-01

## 2021-01-01 RX ORDER — NAPROXEN 500 MG/1
500 TABLET ORAL 2 TIMES DAILY PRN
COMMUNITY
Start: 2021-01-01 | End: 2021-01-01 | Stop reason: HOSPADM

## 2021-01-01 RX ORDER — HEPARIN SODIUM (PORCINE) LOCK FLUSH IV SOLN 100 UNIT/ML 100 UNIT/ML
500 SOLUTION INTRAVENOUS ONCE
Status: DISCONTINUED | OUTPATIENT
Start: 2021-01-01 | End: 2021-01-01 | Stop reason: HOSPADM

## 2021-01-01 RX ORDER — ACETAMINOPHEN 325 MG/1
650 TABLET ORAL EVERY 4 HOURS PRN
Status: DISCONTINUED | OUTPATIENT
Start: 2021-01-01 | End: 2021-01-01 | Stop reason: HOSPADM

## 2021-01-01 RX ORDER — FAMOTIDINE 10 MG/ML
20 INJECTION, SOLUTION INTRAVENOUS ONCE
Status: COMPLETED | OUTPATIENT
Start: 2021-01-01 | End: 2021-01-01

## 2021-01-01 RX ORDER — ONDANSETRON 4 MG/1
4 TABLET, FILM COATED ORAL EVERY 6 HOURS PRN
Status: DISCONTINUED | OUTPATIENT
Start: 2021-01-01 | End: 2021-01-01 | Stop reason: HOSPADM

## 2021-01-01 RX ORDER — SODIUM CHLORIDE 0.9 % (FLUSH) 0.9 %
10 SYRINGE (ML) INJECTION EVERY 12 HOURS SCHEDULED
Status: DISCONTINUED | OUTPATIENT
Start: 2021-01-01 | End: 2021-01-01 | Stop reason: HOSPADM

## 2021-01-01 RX ORDER — MIDAZOLAM HYDROCHLORIDE 1 MG/ML
INJECTION INTRAMUSCULAR; INTRAVENOUS AS NEEDED
Status: DISCONTINUED | OUTPATIENT
Start: 2021-01-01 | End: 2021-01-01 | Stop reason: SURG

## 2021-01-01 RX ORDER — SODIUM CHLORIDE 9 MG/ML
9 INJECTION, SOLUTION INTRAVENOUS CONTINUOUS
Status: DISCONTINUED | OUTPATIENT
Start: 2021-01-01 | End: 2021-01-01 | Stop reason: HOSPADM

## 2021-01-01 RX ORDER — ACETAMINOPHEN 650 MG/1
650 SUPPOSITORY RECTAL EVERY 4 HOURS PRN
Status: DISCONTINUED | OUTPATIENT
Start: 2021-01-01 | End: 2021-01-01 | Stop reason: HOSPADM

## 2021-01-01 RX ORDER — MAGNESIUM SULFATE HEPTAHYDRATE 40 MG/ML
4 INJECTION, SOLUTION INTRAVENOUS AS NEEDED
Status: DISCONTINUED | OUTPATIENT
Start: 2021-01-01 | End: 2021-01-01 | Stop reason: HOSPADM

## 2021-01-01 RX ORDER — FENTANYL CITRATE 50 UG/ML
INJECTION, SOLUTION INTRAMUSCULAR; INTRAVENOUS AS NEEDED
Status: DISCONTINUED | OUTPATIENT
Start: 2021-01-01 | End: 2021-01-01 | Stop reason: HOSPADM

## 2021-01-01 RX ORDER — HEPARIN SODIUM 1000 [USP'U]/ML
INJECTION, SOLUTION INTRAVENOUS; SUBCUTANEOUS AS NEEDED
Status: DISCONTINUED | OUTPATIENT
Start: 2021-01-01 | End: 2021-01-01 | Stop reason: SURG

## 2021-01-01 RX ORDER — HEPARIN SODIUM (PORCINE) LOCK FLUSH IV SOLN 100 UNIT/ML 100 UNIT/ML
500 SOLUTION INTRAVENOUS AS NEEDED
Status: DISCONTINUED | OUTPATIENT
Start: 2021-01-01 | End: 2021-01-01 | Stop reason: HOSPADM

## 2021-01-01 RX ORDER — MAGNESIUM SULFATE HEPTAHYDRATE 40 MG/ML
2 INJECTION, SOLUTION INTRAVENOUS AS NEEDED
Status: DISCONTINUED | OUTPATIENT
Start: 2021-01-01 | End: 2021-01-01 | Stop reason: HOSPADM

## 2021-01-01 RX ORDER — SODIUM CHLORIDE 0.9 % (FLUSH) 0.9 %
10 SYRINGE (ML) INJECTION AS NEEDED
Status: DISCONTINUED | OUTPATIENT
Start: 2021-01-01 | End: 2021-01-01 | Stop reason: HOSPADM

## 2021-01-01 RX ORDER — ACETAMINOPHEN 325 MG/1
650 TABLET ORAL EVERY 6 HOURS PRN
Status: DISCONTINUED | OUTPATIENT
Start: 2021-01-01 | End: 2021-01-01 | Stop reason: HOSPADM

## 2021-01-01 RX ORDER — SODIUM CHLORIDE 9 MG/ML
75 INJECTION, SOLUTION INTRAVENOUS CONTINUOUS
Status: DISPENSED | OUTPATIENT
Start: 2021-01-01 | End: 2021-01-01

## 2021-01-01 RX ORDER — SODIUM CHLORIDE 9 MG/ML
30 INJECTION, SOLUTION INTRAVENOUS CONTINUOUS
Status: DISCONTINUED | OUTPATIENT
Start: 2021-01-01 | End: 2021-01-01 | Stop reason: HOSPADM

## 2021-01-01 RX ORDER — MAGNESIUM SULFATE HEPTAHYDRATE 500 MG/ML
INJECTION, SOLUTION INTRAMUSCULAR; INTRAVENOUS AS NEEDED
Status: DISCONTINUED | OUTPATIENT
Start: 2021-01-01 | End: 2021-01-01 | Stop reason: SURG

## 2021-01-01 RX ORDER — CALCIUM GLUCONATE 20 MG/ML
1 INJECTION, SOLUTION INTRAVENOUS ONCE
Status: COMPLETED | OUTPATIENT
Start: 2021-01-01 | End: 2021-01-01

## 2021-01-01 RX ORDER — ONDANSETRON 2 MG/ML
INJECTION INTRAMUSCULAR; INTRAVENOUS
Status: COMPLETED | OUTPATIENT
Start: 2021-01-01 | End: 2021-01-01

## 2021-01-01 RX ORDER — SODIUM CHLORIDE 9 MG/ML
INJECTION, SOLUTION INTRAVENOUS CONTINUOUS PRN
Status: DISCONTINUED | OUTPATIENT
Start: 2021-01-01 | End: 2021-01-01 | Stop reason: SURG

## 2021-01-01 RX ORDER — PROPOFOL 10 MG/ML
VIAL (ML) INTRAVENOUS CONTINUOUS PRN
Status: DISCONTINUED | OUTPATIENT
Start: 2021-01-01 | End: 2021-01-01 | Stop reason: SURG

## 2021-01-01 RX ORDER — POTASSIUM CHLORIDE 1.5 G/1.77G
40 POWDER, FOR SOLUTION ORAL AS NEEDED
Status: DISCONTINUED | OUTPATIENT
Start: 2021-01-01 | End: 2021-01-01 | Stop reason: HOSPADM

## 2021-01-01 RX ORDER — GABAPENTIN 400 MG/1
400 CAPSULE ORAL 2 TIMES DAILY
Status: DISCONTINUED | OUTPATIENT
Start: 2021-01-01 | End: 2021-01-01 | Stop reason: HOSPADM

## 2021-01-01 RX ORDER — MIDAZOLAM HYDROCHLORIDE 1 MG/ML
INJECTION INTRAMUSCULAR; INTRAVENOUS AS NEEDED
Status: DISCONTINUED | OUTPATIENT
Start: 2021-01-01 | End: 2021-01-01 | Stop reason: HOSPADM

## 2021-01-01 RX ORDER — DIPHENHYDRAMINE HCL 25 MG
25 CAPSULE ORAL EVERY 6 HOURS PRN
Status: DISCONTINUED | OUTPATIENT
Start: 2021-01-01 | End: 2021-01-01 | Stop reason: HOSPADM

## 2021-01-01 RX ORDER — HEPARIN SODIUM (PORCINE) LOCK FLUSH IV SOLN 100 UNIT/ML 100 UNIT/ML
500 SOLUTION INTRAVENOUS ONCE
Status: COMPLETED | OUTPATIENT
Start: 2021-01-01 | End: 2021-01-01

## 2021-01-01 RX ORDER — CEFAZOLIN SODIUM 2 G/100ML
2 INJECTION, SOLUTION INTRAVENOUS
Status: COMPLETED | OUTPATIENT
Start: 2021-01-01 | End: 2021-01-01

## 2021-01-01 RX ORDER — TC 99M MEDRONATE 20 MG/10ML
26.7 INJECTION, POWDER, LYOPHILIZED, FOR SOLUTION INTRAVENOUS
Status: COMPLETED | OUTPATIENT
Start: 2021-01-01 | End: 2021-01-01

## 2021-01-01 RX ORDER — LIDOCAINE HYDROCHLORIDE 10 MG/ML
INJECTION, SOLUTION INFILTRATION; PERINEURAL
Status: COMPLETED | OUTPATIENT
Start: 2021-01-01 | End: 2021-01-01

## 2021-01-01 RX ORDER — DEXMEDETOMIDINE HYDROCHLORIDE 4 UG/ML
INJECTION, SOLUTION INTRAVENOUS CONTINUOUS PRN
Status: DISCONTINUED | OUTPATIENT
Start: 2021-01-01 | End: 2021-01-01 | Stop reason: SURG

## 2021-01-01 RX ORDER — CEFAZOLIN SODIUM 2 G/100ML
2 INJECTION, SOLUTION INTRAVENOUS
Status: CANCELLED | OUTPATIENT
Start: 2021-01-01 | End: 2021-01-01

## 2021-01-01 RX ORDER — MIDAZOLAM HYDROCHLORIDE 1 MG/ML
INJECTION INTRAMUSCULAR; INTRAVENOUS
Status: COMPLETED | OUTPATIENT
Start: 2021-01-01 | End: 2021-01-01

## 2021-01-01 RX ORDER — HEPARIN SODIUM (PORCINE) LOCK FLUSH IV SOLN 100 UNIT/ML 100 UNIT/ML
5 SOLUTION INTRAVENOUS AS NEEDED
Status: DISCONTINUED | OUTPATIENT
Start: 2021-01-01 | End: 2021-01-01 | Stop reason: HOSPADM

## 2021-01-01 RX ORDER — CYCLOBENZAPRINE HCL 10 MG
5 TABLET ORAL EVERY EVENING
Status: DISCONTINUED | OUTPATIENT
Start: 2021-01-01 | End: 2021-01-01 | Stop reason: HOSPADM

## 2021-01-01 RX ORDER — ATORVASTATIN CALCIUM 20 MG/1
20 TABLET, FILM COATED ORAL NIGHTLY
Status: DISCONTINUED | OUTPATIENT
Start: 2021-01-01 | End: 2021-01-01 | Stop reason: HOSPADM

## 2021-01-01 RX ORDER — CHLORHEXIDINE GLUCONATE 0.12 MG/ML
15 RINSE ORAL TAKE AS DIRECTED
COMMUNITY
End: 2021-01-01 | Stop reason: HOSPADM

## 2021-01-01 RX ORDER — SODIUM CHLORIDE 0.9 % (FLUSH) 0.9 %
10 SYRINGE (ML) INJECTION AS NEEDED
Status: CANCELLED | OUTPATIENT
Start: 2021-01-01

## 2021-01-01 RX ORDER — PHENOL 1.4 %
600 AEROSOL, SPRAY (ML) MUCOUS MEMBRANE 2 TIMES DAILY WITH MEALS
COMMUNITY
End: 2022-01-01

## 2021-01-01 RX ORDER — TRAMADOL HYDROCHLORIDE 50 MG/1
50 TABLET ORAL EVERY 8 HOURS PRN
COMMUNITY
Start: 2021-01-01 | End: 2022-01-01

## 2021-01-01 RX ORDER — ONDANSETRON 2 MG/ML
4 INJECTION INTRAMUSCULAR; INTRAVENOUS EVERY 6 HOURS PRN
Status: DISCONTINUED | OUTPATIENT
Start: 2021-01-01 | End: 2021-01-01 | Stop reason: HOSPADM

## 2021-01-01 RX ORDER — HEPARIN SODIUM (PORCINE) LOCK FLUSH IV SOLN 100 UNIT/ML 100 UNIT/ML
500 SOLUTION INTRAVENOUS AS NEEDED
Status: CANCELLED | OUTPATIENT
Start: 2021-01-01

## 2021-01-01 RX ORDER — CHLORHEXIDINE GLUCONATE 0.12 MG/ML
15 RINSE ORAL ONCE
Status: CANCELLED | OUTPATIENT
Start: 2021-01-01 | End: 2021-01-01

## 2021-01-01 RX ORDER — SODIUM CHLORIDE 9 MG/ML
75 INJECTION, SOLUTION INTRAVENOUS CONTINUOUS
Status: DISCONTINUED | OUTPATIENT
Start: 2021-01-01 | End: 2021-01-01 | Stop reason: HOSPADM

## 2021-01-01 RX ORDER — ONDANSETRON 4 MG/1
4 TABLET, FILM COATED ORAL EVERY 8 HOURS PRN
Status: DISCONTINUED | OUTPATIENT
Start: 2021-01-01 | End: 2021-01-01 | Stop reason: HOSPADM

## 2021-01-01 RX ORDER — BUDESONIDE AND FORMOTEROL FUMARATE DIHYDRATE 160; 4.5 UG/1; UG/1
2 AEROSOL RESPIRATORY (INHALATION)
Qty: 1 EACH | Refills: 12
Start: 2021-01-01 | End: 2022-01-01

## 2021-01-01 RX ORDER — GABAPENTIN 300 MG/1
300 CAPSULE ORAL EVERY 12 HOURS SCHEDULED
Status: DISCONTINUED | OUTPATIENT
Start: 2021-01-01 | End: 2021-01-01

## 2021-01-01 RX ORDER — ATORVASTATIN CALCIUM 20 MG/1
10 TABLET, FILM COATED ORAL NIGHTLY
Status: DISCONTINUED | OUTPATIENT
Start: 2021-01-01 | End: 2021-01-01 | Stop reason: HOSPADM

## 2021-01-01 RX ORDER — ALUMINA, MAGNESIA, AND SIMETHICONE 2400; 2400; 240 MG/30ML; MG/30ML; MG/30ML
15 SUSPENSION ORAL EVERY 6 HOURS PRN
Status: DISCONTINUED | OUTPATIENT
Start: 2021-01-01 | End: 2021-01-01 | Stop reason: HOSPADM

## 2021-01-01 RX ORDER — PROTAMINE SULFATE 10 MG/ML
INJECTION, SOLUTION INTRAVENOUS AS NEEDED
Status: DISCONTINUED | OUTPATIENT
Start: 2021-01-01 | End: 2021-01-01 | Stop reason: SURG

## 2021-01-01 RX ORDER — SODIUM CHLORIDE 9 MG/ML
250 INJECTION, SOLUTION INTRAVENOUS ONCE AS NEEDED
Status: DISCONTINUED | OUTPATIENT
Start: 2021-01-01 | End: 2021-01-01 | Stop reason: HOSPADM

## 2021-01-01 RX ORDER — SODIUM CHLORIDE 0.9 % (FLUSH) 0.9 %
3 SYRINGE (ML) INJECTION EVERY 12 HOURS SCHEDULED
Status: DISCONTINUED | OUTPATIENT
Start: 2021-01-01 | End: 2021-01-01 | Stop reason: HOSPADM

## 2021-01-01 RX ORDER — CHLORHEXIDINE GLUCONATE 0.12 MG/ML
15 RINSE ORAL ONCE
Status: COMPLETED | OUTPATIENT
Start: 2021-01-01 | End: 2021-01-01

## 2021-01-01 RX ORDER — CHLORHEXIDINE GLUCONATE 0.12 MG/ML
15 RINSE ORAL EVERY 12 HOURS
Status: CANCELLED | OUTPATIENT
Start: 2021-01-01 | End: 2021-01-01

## 2021-01-01 RX ORDER — CLOPIDOGREL BISULFATE 75 MG/1
75 TABLET ORAL DAILY
COMMUNITY
End: 2021-01-01

## 2021-01-01 RX ORDER — ACETAMINOPHEN 500 MG
1000 TABLET ORAL EVERY 6 HOURS PRN
Status: DISCONTINUED | OUTPATIENT
Start: 2021-01-01 | End: 2021-01-01 | Stop reason: HOSPADM

## 2021-01-01 RX ORDER — CHOLECALCIFEROL (VITAMIN D3) 125 MCG
2000 CAPSULE ORAL DAILY
COMMUNITY

## 2021-01-01 RX ORDER — IPRATROPIUM BROMIDE AND ALBUTEROL SULFATE 2.5; .5 MG/3ML; MG/3ML
3 SOLUTION RESPIRATORY (INHALATION)
Qty: 360 ML | Refills: 0
Start: 2021-01-01 | End: 2022-01-01

## 2021-01-01 RX ORDER — FOLIC ACID 1 MG/1
1 TABLET ORAL DAILY
Status: DISCONTINUED | OUTPATIENT
Start: 2021-01-01 | End: 2021-01-01 | Stop reason: HOSPADM

## 2021-01-01 RX ORDER — KETAMINE HYDROCHLORIDE 10 MG/ML
INJECTION INTRAMUSCULAR; INTRAVENOUS AS NEEDED
Status: DISCONTINUED | OUTPATIENT
Start: 2021-01-01 | End: 2021-01-01 | Stop reason: SURG

## 2021-01-01 RX ORDER — GABAPENTIN 400 MG/1
800 CAPSULE ORAL EVERY 12 HOURS SCHEDULED
Status: DISCONTINUED | OUTPATIENT
Start: 2021-01-01 | End: 2021-01-01

## 2021-01-01 RX ORDER — PROMETHAZINE HYDROCHLORIDE 25 MG/1
25 TABLET ORAL EVERY 6 HOURS PRN
Status: DISCONTINUED | OUTPATIENT
Start: 2021-01-01 | End: 2021-01-01 | Stop reason: HOSPADM

## 2021-01-01 RX ORDER — ASPIRIN 300 MG/1
300 SUPPOSITORY RECTAL DAILY
Status: DISCONTINUED | OUTPATIENT
Start: 2021-01-01 | End: 2021-01-01 | Stop reason: HOSPADM

## 2021-01-01 RX ORDER — ASPIRIN 325 MG
325 TABLET ORAL DAILY
Status: DISCONTINUED | OUTPATIENT
Start: 2021-01-01 | End: 2021-01-01 | Stop reason: HOSPADM

## 2021-01-01 RX ORDER — LOSARTAN POTASSIUM 25 MG/1
25 TABLET ORAL DAILY
COMMUNITY
End: 2021-01-01 | Stop reason: HOSPADM

## 2021-01-01 RX ORDER — CHLORHEXIDINE GLUCONATE 0.12 MG/ML
15 RINSE ORAL TAKE AS DIRECTED
Status: DISCONTINUED | OUTPATIENT
Start: 2021-01-01 | End: 2021-01-01

## 2021-01-01 RX ORDER — NOREPINEPHRINE BITARTRATE 1 MG/ML
INJECTION, SOLUTION INTRAVENOUS CONTINUOUS PRN
Status: DISCONTINUED | OUTPATIENT
Start: 2021-01-01 | End: 2021-01-01 | Stop reason: SURG

## 2021-01-01 RX ORDER — ACETAMINOPHEN 325 MG/1
TABLET ORAL
Status: COMPLETED
Start: 2021-01-01 | End: 2021-01-01

## 2021-01-01 RX ORDER — SIMVASTATIN 20 MG
20 TABLET ORAL NIGHTLY
COMMUNITY
End: 2022-01-01

## 2021-01-01 RX ORDER — FAMOTIDINE 40 MG/1
1 TABLET, FILM COATED ORAL NIGHTLY
COMMUNITY
Start: 2021-01-01 | End: 2022-01-01

## 2021-01-01 RX ORDER — HYDROCODONE BITARTRATE AND ACETAMINOPHEN 7.5; 325 MG/1; MG/1
1 TABLET ORAL EVERY 6 HOURS PRN
Status: DISCONTINUED | OUTPATIENT
Start: 2021-01-01 | End: 2021-01-01 | Stop reason: HOSPADM

## 2021-01-01 RX ORDER — ACETAMINOPHEN 160 MG/5ML
650 SOLUTION ORAL EVERY 4 HOURS PRN
Status: DISCONTINUED | OUTPATIENT
Start: 2021-01-01 | End: 2021-01-01 | Stop reason: HOSPADM

## 2021-01-01 RX ORDER — NINTEDANIB 100 MG/1
100 CAPSULE ORAL 2 TIMES DAILY
Qty: 60 CAPSULE | Refills: 11 | Status: SHIPPED | OUTPATIENT
Start: 2021-01-01 | End: 2021-01-01 | Stop reason: SDUPTHER

## 2021-01-01 RX ORDER — NITROGLYCERIN 0.4 MG/1
0.4 TABLET SUBLINGUAL
Status: DISCONTINUED | OUTPATIENT
Start: 2021-01-01 | End: 2021-01-01 | Stop reason: HOSPADM

## 2021-01-01 RX ORDER — LANOLIN ALCOHOL/MO/W.PET/CERES
400 CREAM (GRAM) TOPICAL DAILY
Status: DISCONTINUED | OUTPATIENT
Start: 2021-01-01 | End: 2021-01-01 | Stop reason: HOSPADM

## 2021-01-01 RX ORDER — POLYETHYLENE GLYCOL 3350 17 G/17G
17 POWDER, FOR SOLUTION ORAL DAILY
Status: DISCONTINUED | OUTPATIENT
Start: 2021-01-01 | End: 2021-01-01 | Stop reason: HOSPADM

## 2021-01-01 RX ORDER — SODIUM CHLORIDE 9 MG/ML
75 INJECTION, SOLUTION INTRAVENOUS CONTINUOUS
Status: DISCONTINUED | OUTPATIENT
Start: 2021-01-01 | End: 2021-01-01

## 2021-01-01 RX ORDER — GABAPENTIN 400 MG/1
800 CAPSULE ORAL EVERY 12 HOURS SCHEDULED
Status: DISCONTINUED | OUTPATIENT
Start: 2021-01-01 | End: 2021-01-01 | Stop reason: HOSPADM

## 2021-01-01 RX ORDER — HYDROCODONE BITARTRATE AND ACETAMINOPHEN 7.5; 325 MG/1; MG/1
1 TABLET ORAL EVERY 6 HOURS PRN
Status: ON HOLD | COMMUNITY
End: 2022-01-01

## 2021-01-01 RX ORDER — NINTEDANIB 100 MG/1
100 CAPSULE ORAL 2 TIMES DAILY
Qty: 60 CAPSULE | Refills: 5 | Status: SHIPPED | OUTPATIENT
Start: 2021-01-01 | End: 2021-01-01

## 2021-01-01 RX ORDER — DOCUSATE SODIUM 100 MG/1
100 CAPSULE, LIQUID FILLED ORAL 2 TIMES DAILY
Status: DISCONTINUED | OUTPATIENT
Start: 2021-01-01 | End: 2021-01-01 | Stop reason: HOSPADM

## 2021-01-01 RX ORDER — FAMOTIDINE 20 MG/1
40 TABLET, FILM COATED ORAL NIGHTLY
Status: DISCONTINUED | OUTPATIENT
Start: 2021-01-01 | End: 2021-01-01 | Stop reason: HOSPADM

## 2021-01-01 RX ORDER — DOCUSATE SODIUM 100 MG/1
100 CAPSULE, LIQUID FILLED ORAL 2 TIMES DAILY
COMMUNITY
End: 2022-01-01

## 2021-01-01 RX ORDER — CEFDINIR 300 MG/1
300 CAPSULE ORAL 2 TIMES DAILY
Qty: 14 CAPSULE | Refills: 0 | Status: SHIPPED | OUTPATIENT
Start: 2021-01-01 | End: 2022-01-01

## 2021-01-01 RX ORDER — SODIUM CHLORIDE 9 MG/ML
30 INJECTION, SOLUTION INTRAVENOUS CONTINUOUS
Status: DISCONTINUED | OUTPATIENT
Start: 2021-01-01 | End: 2021-01-01

## 2021-01-01 RX ORDER — POTASSIUM CHLORIDE 20 MEQ/1
40 TABLET, EXTENDED RELEASE ORAL AS NEEDED
Status: DISCONTINUED | OUTPATIENT
Start: 2021-01-01 | End: 2021-01-01 | Stop reason: HOSPADM

## 2021-01-01 RX ORDER — ONDANSETRON 4 MG/1
4 TABLET, FILM COATED ORAL EVERY 8 HOURS PRN
COMMUNITY
Start: 2021-01-01

## 2021-01-01 RX ORDER — CHLORHEXIDINE GLUCONATE 0.12 MG/ML
15 RINSE ORAL EVERY 12 HOURS
Status: DISPENSED | OUTPATIENT
Start: 2021-01-01 | End: 2021-01-01

## 2021-01-01 RX ORDER — FENTANYL CITRATE 50 UG/ML
INJECTION, SOLUTION INTRAMUSCULAR; INTRAVENOUS
Status: COMPLETED | OUTPATIENT
Start: 2021-01-01 | End: 2021-01-01

## 2021-01-01 RX ORDER — ACETAMINOPHEN 500 MG
1000 TABLET ORAL EVERY 6 HOURS PRN
Status: DISCONTINUED | OUTPATIENT
Start: 2021-01-01 | End: 2021-01-01 | Stop reason: SDUPTHER

## 2021-01-01 RX ORDER — CYCLOBENZAPRINE HCL 5 MG
5 TABLET ORAL EVERY EVENING
COMMUNITY
Start: 2021-01-01 | End: 2021-01-01

## 2021-01-01 RX ORDER — HEPARIN SODIUM 1000 [USP'U]/ML
INJECTION, SOLUTION INTRAVENOUS; SUBCUTANEOUS AS NEEDED
Status: DISCONTINUED | OUTPATIENT
Start: 2021-01-01 | End: 2021-01-01 | Stop reason: HOSPADM

## 2021-01-01 RX ORDER — NOREPINEPHRINE BIT/0.9 % NACL 8 MG/250ML
.02-.3 INFUSION BOTTLE (ML) INTRAVENOUS
Status: DISCONTINUED | OUTPATIENT
Start: 2021-01-01 | End: 2021-01-01 | Stop reason: HOSPADM

## 2021-01-01 RX ORDER — LIDOCAINE HYDROCHLORIDE 10 MG/ML
0.5 INJECTION, SOLUTION EPIDURAL; INFILTRATION; INTRACAUDAL; PERINEURAL ONCE AS NEEDED
Status: DISCONTINUED | OUTPATIENT
Start: 2021-01-01 | End: 2021-01-01 | Stop reason: HOSPADM

## 2021-01-01 RX ORDER — IPRATROPIUM BROMIDE AND ALBUTEROL SULFATE 2.5; .5 MG/3ML; MG/3ML
3 SOLUTION RESPIRATORY (INHALATION)
Status: DISCONTINUED | OUTPATIENT
Start: 2021-01-01 | End: 2021-01-01 | Stop reason: HOSPADM

## 2021-01-01 RX ADMIN — IPRATROPIUM BROMIDE AND ALBUTEROL SULFATE 3 ML: 2.5; .5 SOLUTION RESPIRATORY (INHALATION) at 11:24

## 2021-01-01 RX ADMIN — Medication 3 ML: at 08:20

## 2021-01-01 RX ADMIN — NOREPINEPHRINE BITARTRATE 0.03 MCG/KG/MIN: 1 INJECTION, SOLUTION, CONCENTRATE INTRAVENOUS at 07:26

## 2021-01-01 RX ADMIN — PSYLLIUM HUSK 1 PACKET: 3.4 POWDER ORAL at 23:49

## 2021-01-01 RX ADMIN — AMLODIPINE BESYLATE 5 MG: 5 TABLET ORAL at 09:24

## 2021-01-01 RX ADMIN — SODIUM CHLORIDE 9 ML/HR: 9 INJECTION, SOLUTION INTRAVENOUS at 05:42

## 2021-01-01 RX ADMIN — HYDROCODONE BITARTRATE AND ACETAMINOPHEN 1 TABLET: 7.5; 325 TABLET ORAL at 17:24

## 2021-01-01 RX ADMIN — PROTAMINE SULFATE 40 MG: 10 INJECTION, SOLUTION INTRAVENOUS at 08:14

## 2021-01-01 RX ADMIN — ASPIRIN 81 MG: 81 TABLET, COATED ORAL at 09:23

## 2021-01-01 RX ADMIN — MIDAZOLAM 0.5 MG: 1 INJECTION INTRAMUSCULAR; INTRAVENOUS at 07:11

## 2021-01-01 RX ADMIN — Medication 10 ML: at 20:14

## 2021-01-01 RX ADMIN — NOREPINEPHRINE BITARTRATE 0.02 MCG/KG/MIN: 1 INJECTION, SOLUTION, CONCENTRATE INTRAVENOUS at 07:24

## 2021-01-01 RX ADMIN — Medication 500 UNITS: at 10:32

## 2021-01-01 RX ADMIN — DOXYCYCLINE 100 MG: 100 INJECTION, POWDER, LYOPHILIZED, FOR SOLUTION INTRAVENOUS at 03:25

## 2021-01-01 RX ADMIN — HEPARIN SODIUM 14000 UNITS: 1000 INJECTION INTRAVENOUS; SUBCUTANEOUS at 07:47

## 2021-01-01 RX ADMIN — FOLIC ACID 1 MG: 1 TABLET ORAL at 08:10

## 2021-01-01 RX ADMIN — SODIUM CHLORIDE 1000 ML: 9 INJECTION, SOLUTION INTRAVENOUS at 00:41

## 2021-01-01 RX ADMIN — KETAMINE HYDROCHLORIDE 5 MG: 10 INJECTION INTRAMUSCULAR; INTRAVENOUS at 07:11

## 2021-01-01 RX ADMIN — ASPIRIN 81 MG: 81 TABLET, COATED ORAL at 08:09

## 2021-01-01 RX ADMIN — IPRATROPIUM BROMIDE AND ALBUTEROL SULFATE 3 ML: 2.5; .5 SOLUTION RESPIRATORY (INHALATION) at 20:11

## 2021-01-01 RX ADMIN — TC 99M MEDRONATE 26.7 MILLICURIE: 20 INJECTION, POWDER, LYOPHILIZED, FOR SOLUTION INTRAVENOUS at 10:00

## 2021-01-01 RX ADMIN — MIDAZOLAM 0.5 MG: 1 INJECTION INTRAMUSCULAR; INTRAVENOUS at 09:19

## 2021-01-01 RX ADMIN — Medication 500 UNITS: at 13:28

## 2021-01-01 RX ADMIN — FENTANYL CITRATE 50 MCG: 50 INJECTION, SOLUTION INTRAMUSCULAR; INTRAVENOUS at 09:19

## 2021-01-01 RX ADMIN — KETAMINE HYDROCHLORIDE 5 MG: 10 INJECTION INTRAMUSCULAR; INTRAVENOUS at 07:21

## 2021-01-01 RX ADMIN — CEFTRIAXONE 1 G: 1 INJECTION, POWDER, FOR SOLUTION INTRAMUSCULAR; INTRAVENOUS at 02:37

## 2021-01-01 RX ADMIN — IPRATROPIUM BROMIDE AND ALBUTEROL SULFATE 3 ML: 2.5; .5 SOLUTION RESPIRATORY (INHALATION) at 07:09

## 2021-01-01 RX ADMIN — GABAPENTIN 400 MG: 400 CAPSULE ORAL at 08:45

## 2021-01-01 RX ADMIN — SODIUM CHLORIDE 75 ML/HR: 0.9 INJECTION, SOLUTION INTRAVENOUS at 08:20

## 2021-01-01 RX ADMIN — IOPAMIDOL 100 ML: 755 INJECTION, SOLUTION INTRAVENOUS at 13:27

## 2021-01-01 RX ADMIN — MIDAZOLAM 0.5 MG: 1 INJECTION INTRAMUSCULAR; INTRAVENOUS at 06:46

## 2021-01-01 RX ADMIN — PERFLUTREN 1.5 ML: 6.52 INJECTION, SUSPENSION INTRAVENOUS at 12:13

## 2021-01-01 RX ADMIN — MAGNESIUM SULFATE HEPTAHYDRATE 2 G: 500 INJECTION, SOLUTION INTRAMUSCULAR; INTRAVENOUS at 07:29

## 2021-01-01 RX ADMIN — FOLIC ACID TAB 400 MCG 400 MCG: 400 TAB at 15:17

## 2021-01-01 RX ADMIN — IPRATROPIUM BROMIDE AND ALBUTEROL SULFATE 3 ML: 2.5; .5 SOLUTION RESPIRATORY (INHALATION) at 15:50

## 2021-01-01 RX ADMIN — DEXMEDETOMIDINE HYDROCHLORIDE 1.5 MCG/KG/HR: 4 INJECTION, SOLUTION INTRAVENOUS at 07:12

## 2021-01-01 RX ADMIN — IPRATROPIUM BROMIDE AND ALBUTEROL SULFATE 3 ML: 2.5; .5 SOLUTION RESPIRATORY (INHALATION) at 07:08

## 2021-01-01 RX ADMIN — LIDOCAINE HYDROCHLORIDE 9 ML: 10 INJECTION, SOLUTION INFILTRATION; PERINEURAL at 09:27

## 2021-01-01 RX ADMIN — GABAPENTIN 700 MG: 400 CAPSULE ORAL at 09:23

## 2021-01-01 RX ADMIN — ALBUTEROL SULFATE 2 PUFF: 108 INHALANT RESPIRATORY (INHALATION) at 11:34

## 2021-01-01 RX ADMIN — MIDAZOLAM 0.5 MG: 1 INJECTION INTRAMUSCULAR; INTRAVENOUS at 09:21

## 2021-01-01 RX ADMIN — DOXYCYCLINE 100 MG: 100 INJECTION, POWDER, LYOPHILIZED, FOR SOLUTION INTRAVENOUS at 15:17

## 2021-01-01 RX ADMIN — HEPARIN SODIUM 3000 UNITS: 1000 INJECTION INTRAVENOUS; SUBCUTANEOUS at 07:57

## 2021-01-01 RX ADMIN — ONDANSETRON 4 MG: 2 INJECTION INTRAMUSCULAR; INTRAVENOUS at 09:15

## 2021-01-01 RX ADMIN — HYDROCODONE BITARTRATE AND ACETAMINOPHEN 1 TABLET: 7.5; 325 TABLET ORAL at 22:38

## 2021-01-01 RX ADMIN — SODIUM CHLORIDE 30 ML/HR: 9 INJECTION, SOLUTION INTRAVENOUS at 07:48

## 2021-01-01 RX ADMIN — ASPIRIN 325 MG ORAL TABLET 325 MG: 325 PILL ORAL at 10:10

## 2021-01-01 RX ADMIN — ATORVASTATIN CALCIUM 20 MG: 20 TABLET, FILM COATED ORAL at 22:34

## 2021-01-01 RX ADMIN — CALCIUM GLUCONATE 1 G: 20 INJECTION, SOLUTION INTRAVENOUS at 10:15

## 2021-01-01 RX ADMIN — ASPIRIN 325 MG ORAL TABLET 325 MG: 325 PILL ORAL at 08:45

## 2021-01-01 RX ADMIN — CEFAZOLIN SODIUM 2 G: 2 INJECTION, SOLUTION INTRAVENOUS at 06:55

## 2021-01-01 RX ADMIN — FENTANYL CITRATE 50 MCG: 50 INJECTION, SOLUTION INTRAMUSCULAR; INTRAVENOUS at 09:21

## 2021-01-01 RX ADMIN — GABAPENTIN 700 MG: 400 CAPSULE ORAL at 22:34

## 2021-01-01 RX ADMIN — FOLIC ACID 1 MG: 1 TABLET ORAL at 09:23

## 2021-01-01 RX ADMIN — GADOTERIDOL 17 ML: 279.3 INJECTION, SOLUTION INTRAVENOUS at 14:29

## 2021-01-01 RX ADMIN — FAMOTIDINE 40 MG: 20 TABLET ORAL at 20:13

## 2021-01-01 RX ADMIN — GABAPENTIN 800 MG: 400 CAPSULE ORAL at 21:34

## 2021-01-01 RX ADMIN — FOLIC ACID 1 MG: 1 TABLET ORAL at 14:37

## 2021-01-01 RX ADMIN — DOCUSATE SODIUM 100 MG: 100 CAPSULE, LIQUID FILLED ORAL at 14:25

## 2021-01-01 RX ADMIN — HEPARIN 500 UNITS: 100 SYRINGE at 16:39

## 2021-01-01 RX ADMIN — FOLIC ACID TAB 400 MCG 400 MCG: 400 TAB at 08:45

## 2021-01-01 RX ADMIN — POLYETHYLENE GLYCOL 3350 17 G: 17 POWDER, FOR SOLUTION ORAL at 14:25

## 2021-01-01 RX ADMIN — ACETAMINOPHEN 650 MG: 325 TABLET ORAL at 13:06

## 2021-01-01 RX ADMIN — MIDAZOLAM 1 MG: 1 INJECTION INTRAMUSCULAR; INTRAVENOUS at 06:44

## 2021-01-01 RX ADMIN — CEFTRIAXONE 2 G: 10 INJECTION, POWDER, FOR SOLUTION INTRAVENOUS at 03:12

## 2021-01-01 RX ADMIN — CHLORHEXIDINE GLUCONATE 15 ML: 1.2 RINSE ORAL at 05:34

## 2021-01-01 RX ADMIN — ACETAMINOPHEN 1000 MG: 500 TABLET ORAL at 17:39

## 2021-01-01 RX ADMIN — AMLODIPINE BESYLATE 5 MG: 5 TABLET ORAL at 08:45

## 2021-01-01 RX ADMIN — Medication 10 ML: at 08:45

## 2021-01-01 RX ADMIN — CLOPIDOGREL BISULFATE 75 MG: 75 TABLET ORAL at 09:23

## 2021-01-01 RX ADMIN — SODIUM CHLORIDE: 9 INJECTION, SOLUTION INTRAVENOUS at 06:44

## 2021-01-01 RX ADMIN — GABAPENTIN 400 MG: 400 CAPSULE ORAL at 20:14

## 2021-01-01 RX ADMIN — CYCLOBENZAPRINE HYDROCHLORIDE 5 MG: 10 TABLET, FILM COATED ORAL at 17:24

## 2021-01-01 RX ADMIN — ASPIRIN 81 MG: 81 TABLET, COATED ORAL at 14:38

## 2021-01-01 RX ADMIN — DOXYCYCLINE 100 MG: 100 INJECTION, POWDER, LYOPHILIZED, FOR SOLUTION INTRAVENOUS at 03:13

## 2021-01-01 RX ADMIN — SODIUM CHLORIDE 75 ML/HR: 9 INJECTION, SOLUTION INTRAVENOUS at 17:24

## 2021-01-01 RX ADMIN — FAMOTIDINE 20 MG: 10 INJECTION INTRAVENOUS at 06:14

## 2021-01-01 RX ADMIN — Medication 10 ML: at 10:10

## 2021-01-01 RX ADMIN — ACETAMINOPHEN 650 MG: 325 TABLET, FILM COATED ORAL at 13:06

## 2021-01-01 RX ADMIN — ATORVASTATIN CALCIUM 10 MG: 20 TABLET, FILM COATED ORAL at 20:14

## 2021-01-01 RX ADMIN — SODIUM CHLORIDE 30 ML/HR: 9 INJECTION, SOLUTION INTRAVENOUS at 15:04

## 2021-01-01 RX ADMIN — DOXYCYCLINE 100 MG: 100 INJECTION, POWDER, LYOPHILIZED, FOR SOLUTION INTRAVENOUS at 14:32

## 2021-01-01 RX ADMIN — CLOPIDOGREL 75 MG: 75 TABLET, FILM COATED ORAL at 08:09

## 2021-01-01 RX ADMIN — PROPOFOL 60 MCG/KG/MIN: 10 INJECTION, EMULSION INTRAVENOUS at 07:12

## 2021-01-01 RX ADMIN — Medication 10 ML: at 13:27

## 2021-02-19 ENCOUNTER — OFFICE VISIT (OUTPATIENT)
Dept: CARDIOLOGY | Facility: CLINIC | Age: 76
End: 2021-02-19

## 2021-02-19 VITALS
WEIGHT: 193 LBS | BODY MASS INDEX: 29.25 KG/M2 | HEIGHT: 68 IN | OXYGEN SATURATION: 97 % | SYSTOLIC BLOOD PRESSURE: 108 MMHG | DIASTOLIC BLOOD PRESSURE: 72 MMHG | HEART RATE: 72 BPM

## 2021-02-19 DIAGNOSIS — I35.1 AORTIC VALVE INSUFFICIENCY, ETIOLOGY OF CARDIAC VALVE DISEASE UNSPECIFIED: Primary | ICD-10-CM

## 2021-02-19 DIAGNOSIS — I25.10 CORONARY ARTERY DISEASE INVOLVING NATIVE CORONARY ARTERY OF NATIVE HEART WITHOUT ANGINA PECTORIS: ICD-10-CM

## 2021-02-19 DIAGNOSIS — I10 ESSENTIAL HYPERTENSION: ICD-10-CM

## 2021-02-19 PROCEDURE — 93000 ELECTROCARDIOGRAM COMPLETE: CPT | Performed by: INTERNAL MEDICINE

## 2021-02-19 PROCEDURE — 99213 OFFICE O/P EST LOW 20 MIN: CPT | Performed by: INTERNAL MEDICINE

## 2021-02-19 RX ORDER — CLONAZEPAM 0.5 MG/1
0.5 TABLET ORAL
COMMUNITY
End: 2021-02-19

## 2021-02-19 RX ORDER — AMLODIPINE BESYLATE 5 MG/1
5 TABLET ORAL DAILY
COMMUNITY
Start: 2021-01-27 | End: 2021-01-01

## 2021-02-19 RX ORDER — FERROUS SULFATE 325(65) MG
325 TABLET ORAL DAILY
COMMUNITY
End: 2021-03-01

## 2021-02-19 RX ORDER — PROMETHAZINE HYDROCHLORIDE 25 MG/1
25 TABLET ORAL EVERY 6 HOURS PRN
COMMUNITY
End: 2021-01-01

## 2021-02-19 RX ORDER — LANOLIN ALCOHOL/MO/W.PET/CERES
400 CREAM (GRAM) TOPICAL DAILY
COMMUNITY

## 2021-02-19 RX ORDER — LACTULOSE 10 G/15ML
20 SOLUTION ORAL 2 TIMES DAILY PRN
COMMUNITY
End: 2021-03-11

## 2021-02-19 NOTE — PROGRESS NOTES
Cardiology Office Visit Note      Referring physician: Uri Cortes MD    Reason For Followup:  3 month follow up    HPI:  Navneet Lind is a 75 y.o. male who returns to the office for a 3 month follow up. He has a known history of HTN, CAD S/P CABG in 1995, SSS requiring pacemaker placement and Aortic stenosis. He had a left nephroureterectomy in November 2020 and is recovering slowly. Currently- he denies chest pain, but has been increasingly short of air since they removed his kidney. He does get lightheaded at times and he has a lot of issues with balance. No edema but has extreme fatigue. He is going for a bone scan tomorrow and has had a port placement since his last visit.     Only saw Mr. Lind last on 11/23/2020, he was sent to be cleared for left ureterectomy which she has since undergone and recovered reasonably well.  At that time, he was noted to have significant calcific aortic stenosis though no further pursuit was made of his valvular heart disease as he was asymptomatic and needed to proceed with ureteral surgery.  He was told at that time that we would see him for follow-up once he is recovered from this ureter surgery and that we would further consider aortic stenosis evaluation and possible treatment.    He currently denies any chest pain, increased shortness of air, palpitation, dizziness, lightheadedness or near syncope.  He is not experience any PND orthopnea or bipedal edema.  Child denies any increased shortness of air, fever, cough, anosmia or other COVID-19 symptoms or known COVID-19 exposure.    His medication list was reviewed and updated- compliance voiced.    Past Medical History:   Diagnosis Date   • Aortic stenosis 9/29/2015    Per Echo 2017   • Benign essential HTN    • Bundle branch block, right 2/7/2013   • CAD (coronary artery disease), native coronary artery    • Cancer (CMS/HCC)     bladder- chemo, new left renal mass   • Coronary artery disease involving  native coronary artery of native heart without angina pectoris 11/19/2019    CABG 1995; SVG to RCA is occluded, LIMA to LAD, SVG to diag of LAD, SVG to marginal of LCX   • Essential hypertension 11/19/2019   • Heart murmur    • Hyperlipidemia, mixed    • Mixed hyperlipidemia 11/19/2019   • Near syncope    • NATALIA (obstructive sleep apnea)     AHI 11.6/h   • Premature ventricular contractions 2/11/2014   • Presence of cardiac pacemaker 7/5/2019    BS dual chamber PM 11/7/2016   • Sick sinus syndrome (CMS/HCC)    • SSS (sick sinus syndrome) (CMS/HCC) 7/5/2019       Past Surgical History:   Procedure Laterality Date   • CARDIAC CATHETERIZATION  2018   • CARDIOVASCULAR STRESS TEST  2017   • CORONARY ARTERY BYPASS GRAFT  1995   • ECHO - CONVERTED  2017   • NEPHROURETERECTOMY Left 11/2/2020    Procedure: NEPHROURETERECTOMY;  Surgeon: Rogers Bae MD;  Location: [facility];  Service: Urology;  Laterality: Left;   • PACEMAKER IMPLANTATION  2016    bs   • PORTACATH PLACEMENT           Current Outpatient Medications:   •  acetaminophen (TYLENOL) 500 MG tablet, Take 500 mg by mouth Every 6 (Six) Hours As Needed for Mild Pain ., Disp: , Rfl:   •  amLODIPine (NORVASC) 5 MG tablet, 1 tablet Daily., Disp: , Rfl:   •  aspirin 81 MG EC tablet, Take 81 mg by mouth Daily. LD 10/12 stopped for surgery, Disp: , Rfl:   •  folic acid (FOLVITE) 1 MG tablet, Take 1 mg by mouth Daily., Disp: , Rfl:   •  gabapentin (NEURONTIN) 800 MG tablet, Take 800 mg by mouth Every Evening., Disp: , Rfl:   •  hydrALAZINE (APRESOLINE) 25 MG tablet, Take 1 tablet by mouth Every 8 (Eight) Hours., Disp: 90 tablet, Rfl: 3  •  lactulose (CHRONULAC) 10 GM/15ML solution, Take 20 g by mouth 2 (Two) Times a Day As Needed., Disp: , Rfl:   •  ondansetron (ZOFRAN) 8 MG tablet, Take 4 mg by mouth Every 8 (Eight) Hours As Needed for Nausea or Vomiting., Disp: , Rfl:   •  promethazine (PHENERGAN) 25 MG tablet, Take 25 mg by mouth Every 6 (Six) Hours As Needed for  "Nausea or Vomiting., Disp: , Rfl:   •  simvastatin (ZOCOR) 40 MG tablet, TAKE 1 TABLET BY MOUTH EVERY DAY, Disp: 90 tablet, Rfl: 0  •  ferrous sulfate 325 (65 FE) MG tablet, Take 325 mg by mouth Daily., Disp: , Rfl:     Social History     Socioeconomic History   • Marital status:      Spouse name: Not on file   • Number of children: Not on file   • Years of education: Not on file   • Highest education level: Not on file   Tobacco Use   • Smoking status: Former Smoker     Quit date:      Years since quittin.1   • Smokeless tobacco: Never Used   Substance and Sexual Activity   • Alcohol use: Never     Frequency: Never     Comment: once/week    • Drug use: Never   • Sexual activity: Defer       History reviewed. No pertinent family history.      Review of Systems   General: denies fever, chills, anorexia, weight loss  Eyes: denies blurring, diplopia  Ear/Nose/Throat: denies ear pain, nosebleeds, hoarseness  Cardiovascular: See HPI  Respiratory: denies excessive sputum, hemoptysis, wheezing  Gastrointestinal: denies nausea, vomiting, change in bowel habits, abdominal pain  Genitourinary: No hematuria or dysuria reported  Musculoskeletal: Minor arthralgias and myalgias  Skin: denies rashes, itching, suspicious lesions  Neurologic: denies focal neuro deficits, though has significant issues with ataxia.  Psychiatric: denies depression, anxiety  Endocrine: denies cold intolerance, heat intolerance  Hematologic/Lymphatic: denies abnormal bruising, bleeding  Allergic/Immunologic: denies urticaria or persistent infections      Objective     Visit Vitals  /72   Pulse 72   Ht 172.7 cm (67.99\")   Wt 87.5 kg (193 lb)   SpO2 97%   BMI 29.35 kg/m²           Physical Exam  General:     Borderline Obese, well developed,, in no acute distress, though appears weak and fatigues easily.    Head:     normocephalic and atraumatic.    Eyes:    PERRL/EOM intact, conjunctiva and sclera clear with out nystagmus.    Neck:    " no jvd or bruits  Chest Wall:    no deformities   Lungs:    clear bilaterally to auscultation with adequate global airflow   Heart:    non-displaced PMI; regular rate and rhythm, normal S1, S2 with prominent grade 3/6 to 4/6 systolic ejection murmur best over left upper sternal border  Abdomen:  Soft, nontender without HSM  Msk:    no deformity; adequate R OM  Pulses:    pulses normal in all 4 extremities.    Extremities:    no clubbing, cyanosis, edema   Neurologic:    no focal sensory or motor deficits, though has moderate ataxia  Skin:    intact without lesions or rashes.    Psych:    alert and cooperative; normal mood and affect; normal attention span and concentration.            ECG 12 Lead    Date/Time: 2/19/2021 8:58 AM  Performed by: ADRIANO Erazo MD  Authorized by: ADRIANO Erazo MD   Comparison: compared with previous ECG from 11/23/2020  Similar to previous ECG  Rhythm: sinus rhythm  Conduction: right bundle branch block  QRS axis: right    Clinical impression: abnormal EKG              Assessment:   75-year-old WM with no advanced ischemic coronary disease requiring multivessel CABG 1995  -Remains hemodynamically stable well compensated and angina free     Moderate to severe calcific aortic stenosis  -Does not appear to be particularly symptomatic though difficult to discern his complaints of weakness and fatigue following recent ureteral surgery    Essential hypertension  -Currently well regulated on medication listed and reviewed in detail    Sick sinus syndrome with permanent dual-chamber pacemaker in situ  -Satisfactory device site and function with adequate battery status    Logic neoplasm; status post recent ureteral surgery      Plan:  In view of known moderately severe to severe calcific aortic stenosis, I have advised Mr. Hoffmann to undergo transesophageal echocardiogram to further evaluate valve structure and function with regards to potential TAVR consideration.  This will be scheduled  with Dr. Mesfin Melchor.  Meanwhile, he will continue medications as listed and reviewed in detail.  Return to clinic 2 months or sooner if needed.    ADRIANO Erazo MD  2/25/2021 00:00 EST    This report was generated using the Dragon voice recognition system.

## 2021-02-25 DIAGNOSIS — I45.10 BUNDLE BRANCH BLOCK, RIGHT: ICD-10-CM

## 2021-02-25 DIAGNOSIS — Z01.818 PRE-OP EXAMINATION: Primary | ICD-10-CM

## 2021-02-27 ENCOUNTER — LAB (OUTPATIENT)
Dept: LAB | Facility: HOSPITAL | Age: 76
End: 2021-02-27

## 2021-02-27 DIAGNOSIS — Z01.818 PRE-OP EXAMINATION: ICD-10-CM

## 2021-02-27 DIAGNOSIS — I45.10 BUNDLE BRANCH BLOCK, RIGHT: ICD-10-CM

## 2021-02-27 LAB — SARS-COV-2 ORF1AB RESP QL NAA+PROBE: NOT DETECTED

## 2021-02-27 PROCEDURE — C9803 HOPD COVID-19 SPEC COLLECT: HCPCS

## 2021-02-27 PROCEDURE — U0005 INFEC AGEN DETEC AMPLI PROBE: HCPCS

## 2021-02-27 PROCEDURE — U0004 COV-19 TEST NON-CDC HGH THRU: HCPCS

## 2021-03-01 ENCOUNTER — TRANSCRIBE ORDERS (OUTPATIENT)
Dept: PHYSICAL THERAPY | Facility: CLINIC | Age: 76
End: 2021-03-01

## 2021-03-01 DIAGNOSIS — R29.898 WEAKNESS OF BOTH LOWER EXTREMITIES: Primary | ICD-10-CM

## 2021-03-02 ENCOUNTER — HOSPITAL ENCOUNTER (OUTPATIENT)
Dept: CARDIOLOGY | Facility: HOSPITAL | Age: 76
Discharge: HOME | End: 2021-03-02

## 2021-03-02 ENCOUNTER — ANESTHESIA (OUTPATIENT)
Dept: CARDIOLOGY | Facility: HOSPITAL | Age: 76
End: 2021-03-02

## 2021-03-02 ENCOUNTER — ANESTHESIA EVENT (OUTPATIENT)
Dept: CARDIOLOGY | Facility: HOSPITAL | Age: 76
End: 2021-03-02

## 2021-03-02 VITALS — SYSTOLIC BLOOD PRESSURE: 121 MMHG | DIASTOLIC BLOOD PRESSURE: 59 MMHG | OXYGEN SATURATION: 100 %

## 2021-03-02 VITALS
WEIGHT: 190.7 LBS | TEMPERATURE: 97.6 F | HEIGHT: 66 IN | HEART RATE: 68 BPM | BODY MASS INDEX: 30.65 KG/M2 | RESPIRATION RATE: 19 BRPM | DIASTOLIC BLOOD PRESSURE: 49 MMHG | OXYGEN SATURATION: 98 % | SYSTOLIC BLOOD PRESSURE: 120 MMHG

## 2021-03-02 DIAGNOSIS — I35.1 AORTIC VALVE INSUFFICIENCY, ETIOLOGY OF CARDIAC VALVE DISEASE UNSPECIFIED: ICD-10-CM

## 2021-03-02 DIAGNOSIS — I25.10 CORONARY ARTERY DISEASE INVOLVING NATIVE CORONARY ARTERY OF NATIVE HEART WITHOUT ANGINA PECTORIS: ICD-10-CM

## 2021-03-02 DIAGNOSIS — I10 ESSENTIAL HYPERTENSION: ICD-10-CM

## 2021-03-02 PROCEDURE — 93312 ECHO TRANSESOPHAGEAL: CPT

## 2021-03-02 PROCEDURE — 93312 ECHO TRANSESOPHAGEAL: CPT | Performed by: INTERNAL MEDICINE

## 2021-03-02 PROCEDURE — 93325 DOPPLER ECHO COLOR FLOW MAPG: CPT

## 2021-03-02 PROCEDURE — 93325 DOPPLER ECHO COLOR FLOW MAPG: CPT | Performed by: INTERNAL MEDICINE

## 2021-03-02 PROCEDURE — 93320 DOPPLER ECHO COMPLETE: CPT

## 2021-03-02 PROCEDURE — 93320 DOPPLER ECHO COMPLETE: CPT | Performed by: INTERNAL MEDICINE

## 2021-03-02 PROCEDURE — 25010000002 PROPOFOL 10 MG/ML EMULSION: Performed by: ANESTHESIOLOGY

## 2021-03-02 RX ORDER — SIMVASTATIN 40 MG
20 TABLET ORAL NIGHTLY
COMMUNITY
End: 2021-01-01

## 2021-03-02 RX ORDER — PROPOFOL 10 MG/ML
VIAL (ML) INTRAVENOUS AS NEEDED
Status: DISCONTINUED | OUTPATIENT
Start: 2021-03-02 | End: 2021-03-02 | Stop reason: SURG

## 2021-03-02 RX ORDER — SODIUM CHLORIDE 9 MG/ML
INJECTION, SOLUTION INTRAVENOUS CONTINUOUS PRN
Status: DISCONTINUED | OUTPATIENT
Start: 2021-03-02 | End: 2021-03-02 | Stop reason: SURG

## 2021-03-02 RX ADMIN — SODIUM CHLORIDE: 0.9 INJECTION, SOLUTION INTRAVENOUS at 12:18

## 2021-03-02 RX ADMIN — PROPOFOL 425 MG: 10 INJECTION, EMULSION INTRAVENOUS at 12:21

## 2021-03-02 NOTE — ANESTHESIA PREPROCEDURE EVALUATION
Anesthesia Evaluation     Patient summary reviewed and Nursing notes reviewed   NPO Solid Status: > 8 hours  NPO Liquid Status: > 8 hours           Airway   Mallampati: II  TM distance: >3 FB  Neck ROM: full  No difficulty expected  Dental - normal exam     Pulmonary    (+) shortness of breath, sleep apnea,   Cardiovascular     (+) hypertension, valvular problems/murmurs AS, CAD, CABG, dysrhythmias PVC, hyperlipidemia,       Neuro/Psych  GI/Hepatic/Renal/Endo    (+) obesity,       Musculoskeletal     Abdominal    Substance History      OB/GYN          Other      history of cancer    ROS/Med Hx Other: · Left ventricular ejection fraction appears to be 56 - 60%. Left ventricular systolic function is normal.  · Left ventricular diastolic function is consistent with (grade I) impaired relaxation and age.  · The right ventricular cavity is mild to moderately dilated.  · There is mainly affecting the non-coronary, left coronary and right coronary cusp(s).  · Severe aortic valve stenosis is present.                Anesthesia Plan    ASA 3     MAC     intravenous induction     Anesthetic plan, all risks, benefits, and alternatives have been provided, discussed and informed consent has been obtained with: patient.

## 2021-03-02 NOTE — DISCHARGE INSTRUCTIONS
[facility] DC Instructions    1) The medication, which was used to put the patient to sleep, will be acting in your body for the next twenty-four (24) hours, so you might feel a little sleepy; this feeling will slowly wear off.  Because the medicine is still in your system for the next twenty-four (24) hours, the adult patient SHOULD NOT:    a. Drive a car, operate machinery, or power tools  b. Drink any alcoholic drinks (not even beer)  c. Make any important decisions such as to sign important papers  2) We strongly suggest that a responsible adult be with the patient the rest of the day.    3) Any problems with:    a. EXCESSIVE MUCOUS  b. SPITTING UP BLOOD  c. SORE THROAT AT MORE THAN 72 HOURS    NOTIFY DR. Melchor/Kacey -[phone] OR CALL THE [facility] EM[facility] [phone].

## 2021-03-03 ENCOUNTER — HOSPITAL ENCOUNTER (OUTPATIENT)
Dept: INFUSION THERAPY | Facility: HOSPITAL | Age: 76
Setting detail: INFUSION SERIES
Discharge: HOME | End: 2021-03-03

## 2021-03-03 VITALS
RESPIRATION RATE: 16 BRPM | OXYGEN SATURATION: 97 % | DIASTOLIC BLOOD PRESSURE: 60 MMHG | TEMPERATURE: 98.4 F | HEART RATE: 81 BPM | SYSTOLIC BLOOD PRESSURE: 131 MMHG

## 2021-03-03 DIAGNOSIS — D69.6 THROMBOCYTOPENIA (HCC): Primary | ICD-10-CM

## 2021-03-03 DIAGNOSIS — D69.6 THROMBOCYTOPENIA (HCC): ICD-10-CM

## 2021-03-03 LAB
DEPRECATED RDW RBC AUTO: 57.3 FL (ref 37–54)
ERYTHROCYTE [DISTWIDTH] IN BLOOD BY AUTOMATED COUNT: 19.2 % (ref 12.3–15.4)
HCT VFR BLD AUTO: 24.4 % (ref 37.5–51)
HGB BLD-MCNC: 8.6 G/DL (ref 13–17.7)
MCH RBC QN AUTO: 30.3 PG (ref 26.6–33)
MCHC RBC AUTO-ENTMCNC: 35.1 G/DL (ref 31.5–35.7)
MCV RBC AUTO: 86.2 FL (ref 79–97)
PLATELET # BLD AUTO: 17 10*3/MM3 (ref 140–450)
PMV BLD AUTO: 8.5 FL (ref 6–12)
RBC # BLD AUTO: 2.83 10*6/MM3 (ref 4.14–5.8)
WBC # BLD AUTO: 6.5 10*3/MM3 (ref 3.4–10.8)

## 2021-03-03 PROCEDURE — P9035 PLATELET PHERES LEUKOREDUCED: HCPCS

## 2021-03-03 PROCEDURE — 36591 DRAW BLOOD OFF VENOUS DEVICE: CPT

## 2021-03-03 PROCEDURE — 85027 COMPLETE CBC AUTOMATED: CPT

## 2021-03-03 PROCEDURE — P9037 PLATE PHERES LEUKOREDU IRRAD: HCPCS

## 2021-03-03 PROCEDURE — 36430 TRANSFUSION BLD/BLD COMPNT: CPT

## 2021-03-03 NOTE — PROGRESS NOTES
When the patient arrived he stated that he was to receive platelets and rehydration. We had the order for the platelets, but not for the hydration. Multiple calls placed to Dr. Loredo's office. The patient had been hypotensive while at their office, but patient's vitals stable while under our care. Patient denies any SOB, dizziness, abnormal weakness. Dr. Loredo's office contacted and Fariba Gomez RN stated that the patient no longer needs the fluids. The patient and his wife were agreeable to this.

## 2021-03-04 ENCOUNTER — HOSPITAL ENCOUNTER (OUTPATIENT)
Dept: INFUSION THERAPY | Facility: HOSPITAL | Age: 76
Setting detail: INFUSION SERIES
Discharge: HOME | End: 2021-03-04

## 2021-03-04 VITALS
HEART RATE: 65 BPM | SYSTOLIC BLOOD PRESSURE: 129 MMHG | OXYGEN SATURATION: 97 % | RESPIRATION RATE: 16 BRPM | DIASTOLIC BLOOD PRESSURE: 66 MMHG | TEMPERATURE: 97.6 F

## 2021-03-04 DIAGNOSIS — R06.09 DYSPNEA ON EXERTION: ICD-10-CM

## 2021-03-04 DIAGNOSIS — D64.9 ANEMIA, UNSPECIFIED TYPE: ICD-10-CM

## 2021-03-04 DIAGNOSIS — C64.2 RENAL CELL ADENOCARCINOMA, LEFT (HCC): Primary | ICD-10-CM

## 2021-03-04 DIAGNOSIS — I25.10 CORONARY ARTERY DISEASE INVOLVING NATIVE CORONARY ARTERY OF NATIVE HEART WITHOUT ANGINA PECTORIS: Primary | ICD-10-CM

## 2021-03-04 DIAGNOSIS — C64.2 RENAL CELL ADENOCARCINOMA, LEFT (HCC): ICD-10-CM

## 2021-03-04 LAB
ABO GROUP BLD: NORMAL
BB HOLD TUBE: NORMAL
BLD GP AB SCN SERPL QL: NEGATIVE
DEPRECATED RDW RBC AUTO: 57.8 FL (ref 37–54)
ERYTHROCYTE [DISTWIDTH] IN BLOOD BY AUTOMATED COUNT: 19.2 % (ref 12.3–15.4)
HCT VFR BLD AUTO: 21.4 % (ref 37.5–51)
HGB BLD-MCNC: 7.4 G/DL (ref 13–17.7)
MCH RBC QN AUTO: 29.8 PG (ref 26.6–33)
MCHC RBC AUTO-ENTMCNC: 34.7 G/DL (ref 31.5–35.7)
MCV RBC AUTO: 85.9 FL (ref 79–97)
PLATELET # BLD AUTO: 66 10*3/MM3 (ref 140–450)
PMV BLD AUTO: 7.9 FL (ref 6–12)
RBC # BLD AUTO: 2.49 10*6/MM3 (ref 4.14–5.8)
RH BLD: POSITIVE
T&S EXPIRATION DATE: NORMAL
WBC # BLD AUTO: 6.9 10*3/MM3 (ref 3.4–10.8)

## 2021-03-04 PROCEDURE — 86923 COMPATIBILITY TEST ELECTRIC: CPT

## 2021-03-04 PROCEDURE — 25010000003 HEPARIN LOCK FLUSH PER 10 UNITS: Performed by: INTERNAL MEDICINE

## 2021-03-04 PROCEDURE — 85027 COMPLETE CBC AUTOMATED: CPT | Performed by: INTERNAL MEDICINE

## 2021-03-04 PROCEDURE — 86900 BLOOD TYPING SEROLOGIC ABO: CPT

## 2021-03-04 PROCEDURE — 36430 TRANSFUSION BLD/BLD COMPNT: CPT

## 2021-03-04 PROCEDURE — 86850 RBC ANTIBODY SCREEN: CPT | Performed by: INTERNAL MEDICINE

## 2021-03-04 PROCEDURE — 86900 BLOOD TYPING SEROLOGIC ABO: CPT | Performed by: INTERNAL MEDICINE

## 2021-03-04 PROCEDURE — P9016 RBC LEUKOCYTES REDUCED: HCPCS

## 2021-03-04 PROCEDURE — 86901 BLOOD TYPING SEROLOGIC RH(D): CPT | Performed by: INTERNAL MEDICINE

## 2021-03-04 RX ORDER — SODIUM CHLORIDE 0.9 % (FLUSH) 0.9 %
10 SYRINGE (ML) INJECTION AS NEEDED
Status: DISCONTINUED | OUTPATIENT
Start: 2021-03-04 | End: 2021-03-06 | Stop reason: HOSPADM

## 2021-03-04 RX ORDER — SODIUM CHLORIDE 0.9 % (FLUSH) 0.9 %
10 SYRINGE (ML) INJECTION AS NEEDED
Status: CANCELLED | OUTPATIENT
Start: 2021-03-04

## 2021-03-04 RX ORDER — HEPARIN SODIUM (PORCINE) LOCK FLUSH IV SOLN 100 UNIT/ML 100 UNIT/ML
500 SOLUTION INTRAVENOUS AS NEEDED
Status: DISCONTINUED | OUTPATIENT
Start: 2021-03-04 | End: 2021-03-06 | Stop reason: HOSPADM

## 2021-03-04 RX ORDER — HEPARIN SODIUM (PORCINE) LOCK FLUSH IV SOLN 100 UNIT/ML 100 UNIT/ML
500 SOLUTION INTRAVENOUS AS NEEDED
Status: CANCELLED | OUTPATIENT
Start: 2021-03-04

## 2021-03-04 RX ADMIN — Medication 20 ML: at 13:05

## 2021-03-04 RX ADMIN — Medication 500 UNITS: at 13:05

## 2021-03-05 ENCOUNTER — TREATMENT (OUTPATIENT)
Dept: PHYSICAL THERAPY | Facility: CLINIC | Age: 76
End: 2021-03-05

## 2021-03-05 DIAGNOSIS — R53.1 WEAKNESS: ICD-10-CM

## 2021-03-05 DIAGNOSIS — R29.898 WEAKNESS OF BOTH LOWER EXTREMITIES: Primary | ICD-10-CM

## 2021-03-05 LAB
BH BB BLOOD EXPIRATION DATE: NORMAL
BH BB BLOOD EXPIRATION DATE: NORMAL
BH BB BLOOD TYPE BARCODE: 6200
BH BB BLOOD TYPE BARCODE: 6200
BH BB DISPENSE STATUS: NORMAL
BH BB DISPENSE STATUS: NORMAL
BH BB PRODUCT CODE: NORMAL
BH BB PRODUCT CODE: NORMAL
BH BB UNIT NUMBER: NORMAL
BH BB UNIT NUMBER: NORMAL
CROSSMATCH INTERPRETATION: NORMAL
UNIT  ABO: NORMAL
UNIT  ABO: NORMAL
UNIT  RH: NORMAL
UNIT  RH: NORMAL

## 2021-03-05 PROCEDURE — 97162 PT EVAL MOD COMPLEX 30 MIN: CPT | Performed by: PHYSICAL THERAPIST

## 2021-03-05 PROCEDURE — 97110 THERAPEUTIC EXERCISES: CPT | Performed by: PHYSICAL THERAPIST

## 2021-03-05 NOTE — PROGRESS NOTES
Physical Therapy Initial Evaluation and Plan of Care    Patient: Navneet Lind   : 1945  Diagnosis/ICD-10 Code:  Weakness of both lower extremities [R29.898]  Referring practitioner: Eddie Loredo MD  Date of Initial Visit: 3/5/2021  Today's Date: 3/5/2021  Patient seen for 1 sessions           Subjective Questionnaire: LEFS: 34      Subjective Evaluation    History of Present Illness  Mechanism of injury: Patient reports that ever since he started chemotherapy he has had weakness in the lower extremities. Patient reports that he had one fall when he turned around too quickly. He does have a history of neuropathy in the BL feet and does get shortness of breath because he needs a heart valve. He states he has no balance at this time. Patient reports he feels weak in general but has felt more weakness in the legs recently. Patient had his left kidney and urethra removed and now feels he has abdominal weakness as well.    Ascending/descending stairs, walking, stepping up and down a curb, getting out of bed, dressing/bathing, and standing to shave tend to be difficult for him. He can lose his balance or become quickly fatigued.      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 Quality of life: good    Pain  Aggravating factors: ambulation, squatting, overhead activity, lifting, stairs, prolonged positioning, movement and standing  Progression: worsening    Patient Goals  Patient goals for therapy: improved balance, increased motion, increased strength, independence with ADLs/IADLs and return to sport/leisure activities             Objective          Strength/Myotome Testing     Left Hip   Planes of Motion   Flexion: 4  Abduction: 4-  Adduction: 4-    Right Hip   Planes of Motion   Flexion: 4  Abduction: 4-  Adduction: 4-    Left Knee   Flexion: 4  Extension: 4+    Right Knee   Flexion: 4  Extension: 4+    Left Ankle/Foot   Dorsiflexion: 4    Right Ankle/Foot   Dorsiflexion: 4    Ambulation  "    Comments   Shuffling gait pattern with decreased hip and knee flexion    Functional Assessment     Forward Step Up 8\"   Left Leg  Ipsilateral trunk side bending and increased forward trunk lean.     Right Leg  Ipsilateral trunk side bending and increased forward trunk lean.     Single Leg Stance   Left: 1 seconds  Right: 1 seconds    Comments  Unable to complete SLS without use of one UE          Assessment & Plan     Assessment  Impairments: abnormal coordination, abnormal gait, abnormal muscle firing, abnormal muscle tone, abnormal or restricted ROM, activity intolerance, impaired balance, impaired physical strength, lacks appropriate home exercise program and pain with function  Assessment details: The patient is a 75 y.o. male who presents to physical therapy today with orders to address lower extremity weakness and balance deficits. Upon initial evaluation, the patient demonstrates the following impairments: decreased strength, decreased joint mobility, decreased ROM, and balance deficits. Due to these impairments, the patient is unable to stand or walk for long periods of time, step up/off of a curb, ascend/descend stairs, and walk in the community without balance issues and fatigue. The patient would benefit from skilled PT services to address functional limitations and impairments and to improve patient quality of life.      Prognosis: good  Functional Limitations: walking, moving in bed, standing and reaching overhead  Goals  Plan Goals: STG's: 2 weeks   Patient will report >25% reduction in symptoms  Patient will be able to perform HEP with minimal verbal cues    LTG's: By discharge  Patient will demonstrated a 45/80 on the LEFS for improvement in ADLs  Patient will demonstrate stable gait while performing 90, 180, and 360 degree turns   Patient will demonstrate stable gait with horizontal and vertical head turns  Patient will be able to demonstrate 10 sit to stands without use of UE for improved " balance and strength  Patient will report no onset of falls  Patient will be independent with final HEP      Plan  Therapy options: will be seen for skilled physical therapy services  Planned modality interventions: electrical stimulation/Russian stimulation, cryotherapy, TENS and thermotherapy (hydrocollator packs)  Planned therapy interventions: abdominal trunk stabilization, ADL retraining, balance/weight-bearing training, fine motor coordination training, flexibility, functional ROM exercises, gait training, home exercise program, IADL retraining, joint mobilization, manual therapy, neuromuscular re-education, postural training, soft tissue mobilization, spinal/joint mobilization, strengthening, stretching and therapeutic activities  Duration in visits: 14  Treatment plan discussed with: patient        History # of Personal Factors and/or Comorbidities: MODERATE (1-2)  Examination of Body System(s): # of elements: MODERATE (3)  Clinical Presentation: STABLE   Clinical Decision Making: MODERATE      Timed:         Manual Therapy:         mins  79213;     Therapeutic Exercise:    15     mins  85853;     Neuromuscular Mal:        mins  85573;    Therapeutic Activity:          mins  98306;     Gait Training:           mins  93015;     Ultrasound:          mins  96460;    Ionto                                   mins   54109  Self Care                            mins   66631  Canalith Repos         mins 38008      Un-Timed:  Electrical Stimulation:         mins  15012 ( );  Dry Needling          mins self-pay  Traction          mins 50765  Low Eval          Mins  05693  Mod Eval     25     Mins  80343  High Eval                            Mins  48760  Re-Eval                               mins  53181        Timed Treatment:   15   mins   Total Treatment:     40   mins    PT SIGNATURE: Ofelia Valderrama PT   DATE TREATMENT INITIATED: 3/5/2021    Initial Certification  Certification Period: 6/3/2021  I certify  that the therapy services are furnished while this patient is under my care.  The services outlined above are required by this patient, and will be reviewed every 90 days.     PHYSICIAN: Eddie Loredo MD      DATE:     Please sign and return via fax to [phone].. Thank you, [facility].

## 2021-03-11 ENCOUNTER — OFFICE VISIT (OUTPATIENT)
Dept: CARDIAC SURGERY | Facility: CLINIC | Age: 76
End: 2021-03-11

## 2021-03-11 VITALS
HEIGHT: 68 IN | HEART RATE: 61 BPM | BODY MASS INDEX: 29.1 KG/M2 | OXYGEN SATURATION: 95 % | TEMPERATURE: 96.8 F | RESPIRATION RATE: 20 BRPM | WEIGHT: 192 LBS | DIASTOLIC BLOOD PRESSURE: 72 MMHG | SYSTOLIC BLOOD PRESSURE: 130 MMHG

## 2021-03-11 DIAGNOSIS — R06.09 DYSPNEA ON EXERTION: ICD-10-CM

## 2021-03-11 DIAGNOSIS — Z90.5 H/O RADICAL NEPHRECTOMY: ICD-10-CM

## 2021-03-11 DIAGNOSIS — I35.1 AORTIC VALVE INSUFFICIENCY, ETIOLOGY OF CARDIAC VALVE DISEASE UNSPECIFIED: ICD-10-CM

## 2021-03-11 DIAGNOSIS — I35.0 AORTIC VALVE STENOSIS, ETIOLOGY OF CARDIAC VALVE DISEASE UNSPECIFIED: ICD-10-CM

## 2021-03-11 DIAGNOSIS — I10 ESSENTIAL HYPERTENSION: ICD-10-CM

## 2021-03-11 DIAGNOSIS — Z99.89 OSA ON CPAP: ICD-10-CM

## 2021-03-11 DIAGNOSIS — I25.10 CORONARY ARTERY DISEASE INVOLVING NATIVE CORONARY ARTERY OF NATIVE HEART WITHOUT ANGINA PECTORIS: ICD-10-CM

## 2021-03-11 DIAGNOSIS — G47.33 OSA ON CPAP: ICD-10-CM

## 2021-03-11 DIAGNOSIS — Z95.0 PRESENCE OF CARDIAC PACEMAKER: Primary | ICD-10-CM

## 2021-03-11 DIAGNOSIS — E66.09 CLASS 1 OBESITY DUE TO EXCESS CALORIES WITHOUT SERIOUS COMORBIDITY WITH BODY MASS INDEX (BMI) OF 31.0 TO 31.9 IN ADULT: ICD-10-CM

## 2021-03-11 PROBLEM — T45.1X5A ANEMIA DUE TO CHEMOTHERAPY: Status: ACTIVE | Noted: 2021-03-11

## 2021-03-11 PROBLEM — D64.81 ANEMIA DUE TO CHEMOTHERAPY: Status: ACTIVE | Noted: 2021-03-11

## 2021-03-11 PROCEDURE — 99204 OFFICE O/P NEW MOD 45 MIN: CPT | Performed by: THORACIC SURGERY (CARDIOTHORACIC VASCULAR SURGERY)

## 2021-03-12 NOTE — PROGRESS NOTES
3/11/2021    Chief Complaint     Dyspnea, evaluation of aortic stenosis    History of Present Illness:       Dear Kendrick  and Colleagues,  It was nice to see Navneet Lind in consultation at your request. He is a 75 y.o. 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 He denies chest pain, back pain, syncope, TIA, orthopnea or PND.  He has known coronary artery disease status post CABG x4 in 1985.  He has no cardiac murmur and had a follow-up echocardiogram that showed an ejection fraction of 60%, grade 1A diastolic dysfunction, right ventricular cavity with mild to moderate dilatation, severe aortic stenosis by aortic valve area which is 0.6 cm² but the mean gradient was 27 mmHg, peak gradient 43 mmHg and aortic peak velocity of 3.2 m/s.  2 out of 3 parameters and objective for moderate aortic stenosis.  He has a RAFAEL performed that shows the aortic valve area 0.7 cm² but the gradients were not completed in the preliminary report.  He has no family history of aneurysms, dissections or connective tissue disorders.    Patient Active Problem List   Diagnosis   • Presence of cardiac pacemaker   • Aortic stenosis   • Bundle branch block, right   • Coronary artery disease involving native coronary artery of native heart without angina pectoris   • Dyspnea on exertion   • Heart murmur   • Mixed hyperlipidemia   • Essential hypertension   • Impotence of organic origin   • Premature ventricular contractions   • NATALIA on CPAP   • Class 1 obesity due to excess calories without serious comorbidity with body mass index (BMI) of 31.0 to 31.9 in adult   • Preop cardiovascular exam   • Aortic valve regurgitation   •  Anemia   • H/O radical nephrectomy   • Anemia due to chemotherapy       Past Medical History:   Diagnosis Date   • Aortic stenosis 9/29/2015    Per Echo 2017   • Benign essential HTN    • Bundle branch block, right 2/7/2013   • CAD (coronary artery disease), native coronary artery    • Cancer (CMS/HCC)     bladder- chemo, new left renal mass   • Chronic kidney disease    • Coronary artery disease involving native coronary artery of native heart without angina pectoris 11/19/2019    CABG 1995; SVG to RCA is occluded, LIMA to LAD, SVG to diag of LAD, SVG to marginal of LCX   • Essential hypertension 11/19/2019   • Heart murmur    • Hyperlipidemia, mixed    • Mixed hyperlipidemia 11/19/2019   • Near syncope    • NATALIA (obstructive sleep apnea)     AHI 11.6/h   • Premature ventricular contractions 2/11/2014   • Presence of cardiac pacemaker 7/5/2019    BS dual chamber PM 11/7/2016   • Shortness of breath    • Sick sinus syndrome (CMS/HCC)    • SSS (sick sinus syndrome) (CMS/HCC) 7/5/2019       Past Surgical History:   Procedure Laterality Date   • CARDIAC CATHETERIZATION  2018   • CARDIOVASCULAR STRESS TEST  2017   • CORONARY ARTERY BYPASS GRAFT  1995   • ECHO - CONVERTED  2017   • NEPHROURETERECTOMY Left 11/2/2020    Procedure: NEPHROURETERECTOMY;  Surgeon: Rogers Bae MD;  Location: [facility];  Service: Urology;  Laterality: Left;   • PACEMAKER IMPLANTATION  2016 bs   • PORTACATH PLACEMENT         No Known Allergies      Current Outpatient Medications:   •  acetaminophen (TYLENOL) 500 MG tablet, Take 1,000 mg by mouth Every 6 (Six) Hours As Needed for Mild Pain ., Disp: , Rfl:   •  amLODIPine (NORVASC) 5 MG tablet, Take 1 tablet by mouth Daily., Disp: , Rfl:   •  aspirin 81 MG EC tablet, Take 81 mg by mouth Daily. LD 10/12 stopped for surgery, Disp: , Rfl:   •  folic acid (FOLVITE) 1 MG tablet, Take 1 mg by mouth Daily., Disp: , Rfl:   •  gabapentin (NEURONTIN) 800 MG tablet, Take 800 mg by mouth Every  Evening., Disp: , Rfl:   •  ondansetron (ZOFRAN) 8 MG tablet, Take 4 mg by mouth Every 8 (Eight) Hours As Needed for Nausea or Vomiting., Disp: , Rfl:   •  promethazine (PHENERGAN) 25 MG tablet, Take 25 mg by mouth Every 6 (Six) Hours As Needed for Nausea or Vomiting., Disp: , Rfl:   •  simvastatin (ZOCOR) 40 MG tablet, Take 20 mg by mouth Every Night., Disp: , Rfl:     Social History     Socioeconomic History   • Marital status:      Spouse name: Not on file   • Number of children: Not on file   • Years of education: Not on file   • Highest education level: Not on file   Tobacco Use   • Smoking status: Former Smoker     Quit date:      Years since quittin.1   • Smokeless tobacco: Never Used   Substance and Sexual Activity   • Alcohol use: Yes     Comment: once/week    • Drug use: Never   • Sexual activity: Defer       History reviewed. No pertinent family history.    Review of Systems   Constitutional: Positive for fatigue.   Respiratory: Positive for shortness of breath.    Cardiovascular: Positive for palpitations. Negative for chest pain and leg swelling.   Gastrointestinal: Negative for blood in stool.   Genitourinary: Negative for hematuria.   Neurological: Positive for weakness. Negative for dizziness.   All other systems reviewed and are negative.      Physical Exam:    Vital Signs:  Weight: 87.1 kg (192 lb)   Body mass index is 29.19 kg/m².  Temp: 96.8 °F (36 °C)   Heart Rate: 61   BP: 130/72     Constitutional:       Appearance: Well-developed.   Eyes:      Conjunctiva/sclera: Conjunctivae normal.      Pupils: Pupils are equal, round, and reactive to light.   HENT:      Head: Normocephalic and atraumatic.      Nose: Nose normal.   Neck:      Thyroid: No thyromegaly.      Vascular: No JVD.      Lymphadenopathy: No cervical adenopathy.   Pulmonary:      Effort: Pulmonary effort is normal.      Breath sounds: Normal breath sounds. No rales.   Cardiovascular:      PMI at left midclavicular line.  Normal rate. Regular rhythm.      Murmurs: There is a grade 3/6 harsh midsystolic murmur at the URSB, radiating to the neck.      No gallop. No click. No rub.   Pulses:     Intact distal pulses. No decreased pulses.   Edema:     Ankle: bilateral trace edema of the ankle.  Abdominal:      General: Bowel sounds are normal. There is no distension.      Palpations: Abdomen is soft. There is no abdominal mass.      Tenderness: There is no abdominal tenderness.   Musculoskeletal: Normal range of motion.         General: No tenderness or deformity.      Cervical back: Normal range of motion and neck supple. Skin:     General: Skin is warm and dry.      Findings: No erythema or rash.   Neurological:      Mental Status: Alert and oriented to person, place, and time.      Deep Tendon Reflexes: Reflexes are normal and symmetric.   Psychiatric:         Behavior: Behavior normal.     No groin or neck adenomegaly     Assessment:     Problems Addressed this Visit        Cardiac and Vasculature    Presence of cardiac pacemaker - Primary    Aortic stenosis    Coronary artery disease involving native coronary artery of native heart without angina pectoris    Dyspnea on exertion    Essential hypertension    Aortic valve regurgitation       Endocrine and Metabolic    Class 1 obesity due to excess calories without serious comorbidity with body mass index (BMI) of 31.0 to 31.9 in adult       Genitourinary and Reproductive     H/O radical nephrectomy       Sleep    NATALIA on CPAP      Diagnoses       Codes Comments    Presence of cardiac pacemaker    -  Primary ICD-10-CM: Z95.0  ICD-9-CM: V45.01     Aortic valve stenosis, etiology of cardiac valve disease unspecified     ICD-10-CM: I35.0  ICD-9-CM: 424.1     Coronary artery disease involving native coronary artery of native heart without angina pectoris     ICD-10-CM: I25.10  ICD-9-CM: 414.01     Dyspnea on exertion     ICD-10-CM: R06.00  ICD-9-CM: 786.09     Essential hypertension      ICD-10-CM: I10  ICD-9-CM: 401.9     Aortic valve insufficiency, etiology of cardiac valve disease unspecified     ICD-10-CM: I35.1  ICD-9-CM: 424.1     Class 1 obesity due to excess calories without serious comorbidity with body mass index (BMI) of 31.0 to 31.9 in adult     ICD-10-CM: E66.09, Z68.31  ICD-9-CM: 278.00, V85.31     NATALIA on CPAP     ICD-10-CM: G47.33, Z99.89  ICD-9-CM: 327.23, V46.8     H/O radical nephrectomy     ICD-10-CM: Z90.5  ICD-9-CM: V45.73           Recommendation/Plan:     1. He has moderate may be severe aortic stenosis however the gradients and velocities consistent with mid to moderate stenosis.  It is possible he may have normal flow, low-gradient stenosis.  He has an anemia and he is short of breath and fatigue but he is undergoing chemotherapy status post a large operation.  It is unclear the staging of his renal cancer.  And the symptoms are related only to aortic stenosis.  Most likely multifactorial.  I would favor 3 other months to complete his chemotherapy and then have a full restaging of his renal cancer.  That would allow us to have a good sense of his expected 1 and 3-year survival.  With a repeat of a 2D echocardiogram focusing on peak velocities and gradient measurement.  We will follow up with him in my office in 3 months  2. Coronary artery disease he is status post CABG.  If TAVR is contemplated, then a cardiac cath should be performed to rule out recurrent coronary artery disease.  That could also add to because of dyspnea  3.  Anemia, most likely related to postoperative state and chemotherapy.  March 4, his hemoglobin was 7.4.  I believe he was transfused  Plan is for follow-up in 3 months to reassess degree of aortic stenosis and evaluation after completion of his chemotherapy    Thank you for allowing me to participate in his care.    Regards,    Ajit Quinones M.D.

## 2021-03-15 NOTE — H&P (VIEW-ONLY)
Subjective:     Encounter Date:03/15/2021      Patient ID: Navneet Lind is a 75 y.o. male.    Chief Complaint:  Chief Complaint   Patient presents with   • Cardiac Valve Problem     TAVR Eval from Dr. Quinones   • Hypertension   • Hyperlipidemia   • Coronary Artery Disease   • Shortness of Breath       HPI:  Navneet is a pleasant 75-year-old male patient of Dr. Erazo.  He has past medical history significant for hypertension and known CAD status post coronary artery bypass grafting in 1995.  He also has a past medical history significant for sick sinus syndrome requiring pacemaker placement.  He has had worsening exertional dyspnea with class III-IV New York Heart Association symptoms.  His [facility] Q 12 questionnaire reveals a severe decrease in his overall quality of life.  This has been worsening since January 2021.  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    He is being referred for evaluation for transcatheter aortic valve replacement.  Dr. Quinones has already evaluated him and deemed him to be a better candidate for transcatheter aortic valve replacement.  I personally reviewed his echocardiogram from this year which showed severe aortic stenosis with preserved LV systolic function (aortic valve area 0.6 cm² with a V-max of 3.2 m/s.  Transesophageal echo was performed to determine the aortic valve orifice by planimetry.  It was determined to be 0.58 cm² with a V-max of 5.4 m/s.    Regarding his malignancy, per Dr. Dodd, his cancer is in remission and they were continuing chemotherapy as an adjunct for prevention.  His chemotherapy should be finished in May.    The following portions of the patient's history were reviewed and updated as appropriate: allergies, current medications, past family history, past medical history, past social history, past surgical history and problem list.    Problem List:  Patient Active Problem List   Diagnosis   •  Presence of cardiac pacemaker   • Aortic stenosis   • Bundle branch block, right   • Coronary artery disease involving native coronary artery of native heart without angina pectoris   • Dyspnea on exertion   • Heart murmur   • Mixed hyperlipidemia   • Essential hypertension   • Impotence of organic origin   • Premature ventricular contractions   • NATALIA on CPAP   • Class 1 obesity due to excess calories without serious comorbidity with body mass index (BMI) of 31.0 to 31.9 in adult   • Preop cardiovascular exam   • Aortic valve regurgitation   • Anemia   • H/O radical nephrectomy   • Anemia due to chemotherapy       Past Medical History:  Past Medical History:   Diagnosis Date   • Aortic stenosis 9/29/2015    Per Echo 2017   • Benign essential HTN    • Bundle branch block, right 2/7/2013   • CAD (coronary artery disease), native coronary artery    • Cancer (CMS/HCC)     bladder- chemo, new left renal mass   • Chronic kidney disease    • Coronary artery disease involving native coronary artery of native heart without angina pectoris 11/19/2019    CABG 1995; SVG to RCA is occluded, LIMA to LAD, SVG to diag of LAD, SVG to marginal of LCX   • Essential hypertension 11/19/2019   • Heart murmur    • Hyperlipidemia, mixed    • Mixed hyperlipidemia 11/19/2019   • Near syncope    • NATALIA (obstructive sleep apnea)     AHI 11.6/h   • Premature ventricular contractions 2/11/2014   • Presence of cardiac pacemaker 7/5/2019    BS dual chamber PM 11/7/2016   • Shortness of breath    • Sick sinus syndrome (CMS/HCC)    • SSS (sick sinus syndrome) (CMS/HCC) 7/5/2019       Past Surgical History:  Past Surgical History:   Procedure Laterality Date   • CARDIAC CATHETERIZATION  2018   • CARDIOVASCULAR STRESS TEST  2017   • CORONARY ARTERY BYPASS GRAFT  1995   • ECHO - CONVERTED  2017   • NEPHROURETERECTOMY Left 11/2/2020    Procedure: NEPHROURETERECTOMY;  Surgeon: Rogers Bae MD;  Location: [facility];  Service: Urology;   Laterality: Left;   • PACEMAKER IMPLANTATION  2016    bs   • PORTACATH PLACEMENT         Social History:  Social History     Socioeconomic History   • Marital status:      Spouse name: Not on file   • Number of children: Not on file   • Years of education: Not on file   • Highest education level: Not on file   Tobacco Use   • Smoking status: Former Smoker     Quit date:      Years since quittin.2   • Smokeless tobacco: Never Used   Substance and Sexual Activity   • Alcohol use: Yes     Alcohol/week: 1.0 standard drinks     Types: 1 Shots of liquor per week     Comment: occ.   • Drug use: Never   • Sexual activity: Defer       Allergies:  No Known Allergies      Review of Symptoms:  Constitutional: Patient afebrile no chills or unexpected weight changes  Respiratory: No cough, no wheezing with severe exertional dyspnea  Cardiovascular: Today the patient complains of no chest pain, palpitations, with severe exertion dyspnea, orthopnea and lower extremity edema  Gastrointestinal: No nausea, vomiting, constipation or diarrhea.  No melena or dark stools    All other systems reviewed and are negative             Objective:         There were no vitals taken for this visit.    Physical exam  Constitutional: well-nourished, and appears stated age in no acute distress  PERRL: Conjunctiva clear, no pallor, anicteric  HENMT: normocephalic, normal dentition, no cyanosis or pallor  Neck:no bruits, or thrills and bilateral normal carotid upstroke. Normal jugular venous pressure  Cardiovascular: No parasternal heaves an non-displaced focal PMI. Normal rate and rhythm: no rub, gallop, 2 out of 6 systolic ejection murmur late peaking with an audible A2 component of S2 and normal S1; no lower or upper extremity edema.   Lungs: unlabored, no wheezing with no rales or rhonchi on auscultation.  Extremities: Warm, no clubbing, cyanosis. Full and equal peripheral pulses in extremities with no bruits appreciated.   Abdomen:  soft, non-tender, non-distended  Musculoskeletal: no joint tenderness or swelling and no erythema  Skin: Warm and dry, non-erythematous   Neuro:alert and normal affect. Oriented to time, place and person.           In-Office Procedure(s):  Procedures    ASCVD RIsk Score::  The ASCVD Risk score (Lincoln MULLER Jr., et al., 2013) failed to calculate for the following reasons:    Cannot find a previous HDL lab    Cannot find a previous total cholesterol lab    Recent Radiology:  Imaging Results (Most Recent)     None          Lab Review:   Hospital Outpatient Visit on 03/04/2021   Component Date Value   • BB HOLD TUBE 03/04/2021 CONVERTED TO TYPE AND SCREEN    • WBC 03/04/2021 6.90    • RBC 03/04/2021 2.49*   • Hemoglobin 03/04/2021 7.4*   • Hematocrit 03/04/2021 21.4*   • MCV 03/04/2021 85.9    • MCH 03/04/2021 29.8    • MCHC 03/04/2021 34.7    • RDW 03/04/2021 19.2*   • RDW-SD 03/04/2021 57.8*   • MPV 03/04/2021 7.9    • Platelets 03/04/2021 66*   • Product Code 03/05/2021 [id number]    • Unit Number 03/05/2021 [id number]    • UNIT  ABO 03/05/2021 A    • UNIT  RH 03/05/2021 POS    • Crossmatch Interpretation 03/05/2021 Compatible    • Dispense Status 03/05/2021 PT    • Blood Expiration Date 03/05/2021 202104062359    • Blood Type Barcode 03/05/2021 6200    • ABO Type 03/04/2021 A    • RH type 03/04/2021 Positive    • Antibody Screen 03/04/2021 Negative    • T&S Expiration Date 03/04/2021 3/7/2021 11:59:59 PM    Hospital Outpatient Visit on 03/03/2021   Component Date Value   • WBC 03/03/2021 6.50    • RBC 03/03/2021 2.83*   • Hemoglobin 03/03/2021 8.6*   • Hematocrit 03/03/2021 24.4*   • MCV 03/03/2021 86.2    • MCH 03/03/2021 30.3    • MCHC 03/03/2021 35.1    • RDW 03/03/2021 19.2*   • RDW-SD 03/03/2021 57.3*   • MPV 03/03/2021 8.5    • Platelets 03/03/2021 17*   • Product Code 03/05/2021 [id number]    • Unit Number 03/05/2021 [id number]    • UNIT  ABO 03/05/2021 A    • UNIT  RH 03/05/2021 POS    • Dispense Status  03/05/2021 PT    • Blood Expiration Date 03/05/2021 [id number]    • Blood Type Barcode 03/05/2021 6200    Hospital Outpatient Visit on 03/02/2021   Component Date Value   • BSA 03/02/2021 2.0    • LVOT diam 03/02/2021 2.2    • LVOT area 03/02/2021 3.8    • EDV(MOD-sp2) 03/02/2021 81.8    • ESV(MOD-sp2) 03/02/2021 46.9    • EF(MOD-sp2) 03/02/2021 42.7    • SV(MOD-sp2) 03/02/2021 34.9    • SI(MOD-sp2) 03/02/2021 17.9    • Aortic R-R 03/02/2021 0.82    • Aortic HR 03/02/2021 73.0    • Ao pk yessy 03/02/2021 536.4    • Ao max PG 03/02/2021 115.1    • Ao max PG (full) 03/02/2021 111.1    • Ao V2 mean 03/02/2021 372.8    • Ao mean PG 03/02/2021 63.1    • Ao mean PG (full) 03/02/2021 61.1    • Ao V2 VTI 03/02/2021 105.0    • DAVIS(I,A) 03/02/2021 0.58    • DAVIS(I,D) 03/02/2021 0.58    • DAVIS(V,A) 03/02/2021 0.71    • DAVIS(V,D) 03/02/2021 0.71    • LV V1 max PG 03/02/2021 4.0    • LV V1 mean PG 03/02/2021 2.0    • LV V1 max 03/02/2021 99.7    • LV V1 mean 03/02/2021 62.9    • LV V1 VTI 03/02/2021 15.8    • CO(LVOT) 03/02/2021 4.4    • CI(LVOT) 03/02/2021 2.3    • SV(LVOT) 03/02/2021 60.6    • SI(LVOT) 03/02/2021 31.0    • BH CV ECHO ERIC - BZI_BMI 03/02/2021 27.4    •  CV ECHO ERIC - BSA(HA* 03/02/2021 2.0    •  CV ECHO ERIC - BZI_ME* 03/02/2021 81.6    •  CV ECHO ERIC - BZI_ME* 03/02/2021 172.7    Lab on 02/27/2021   Component Date Value   • COVID19 02/27/2021 Not Detected               Invalid input(s): ALKPO4                        Invalid input(s): LDLCALC                Assessment:          Diagnosis Plan   1. Nonrheumatic aortic valve stenosis     2. Coronary artery disease involving native coronary artery of native heart without angina pectoris     3. Essential hypertension     4. Dyspnea on exertion            Plan:         1. Nonrheumatic aortic valve stenosis  75-year-old male patient with critical aortic stenosis with class III-IV New York Heart Association symptoms.  Given his remission, I feel that its prudent  to move forward with aortic valve replacement, namely I believe that he would be best suited for transcatheter aortic valve replacement owing in large part to his frailty, his need for a redo sternotomy and comorbidities on captured in the STS mortality risk.  We will need to get a TAVR CT scan.    2. Coronary artery disease involving native coronary artery of native heart without angina pectoris  We will need cardiac catheterization to evaluate exertional dyspnea as an anginal equivalent.    3. Essential hypertension  Well managed on medical therapy    4. Dyspnea on exertion  Multifactorial, owing in largest part to his critical aortic stenosis.         Anderson Galvez MD  03/15/21  .

## 2021-03-15 NOTE — PROGRESS NOTES
Subjective:     Encounter Date:03/15/2021      Patient ID: Navneet Lind is a 75 y.o. male.    Chief Complaint:  Chief Complaint   Patient presents with   • Cardiac Valve Problem     TAVR Eval from Dr. Quinones   • Hypertension   • Hyperlipidemia   • Coronary Artery Disease   • Shortness of Breath       HPI:  Navneet is a pleasant 75-year-old male patient of Dr. Erazo.  He has past medical history significant for hypertension and known CAD status post coronary artery bypass grafting in 1995.  He also has a past medical history significant for sick sinus syndrome requiring pacemaker placement.  He has had worsening exertional dyspnea with class III-IV New York Heart Association symptoms.  His [facility] Q 12 questionnaire reveals a severe decrease in his overall quality of life.  This has been worsening since January 2021.  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    He is being referred for evaluation for transcatheter aortic valve replacement.  Dr. Quinones has already evaluated him and deemed him to be a better candidate for transcatheter aortic valve replacement.  I personally reviewed his echocardiogram from this year which showed severe aortic stenosis with preserved LV systolic function (aortic valve area 0.6 cm² with a V-max of 3.2 m/s.  Transesophageal echo was performed to determine the aortic valve orifice by planimetry.  It was determined to be 0.58 cm² with a V-max of 5.4 m/s.    Regarding his malignancy, per Dr. Dodd, his cancer is in remission and they were continuing chemotherapy as an adjunct for prevention.  His chemotherapy should be finished in May.    The following portions of the patient's history were reviewed and updated as appropriate: allergies, current medications, past family history, past medical history, past social history, past surgical history and problem list.    Problem List:  Patient Active Problem List   Diagnosis   •  Presence of cardiac pacemaker   • Aortic stenosis   • Bundle branch block, right   • Coronary artery disease involving native coronary artery of native heart without angina pectoris   • Dyspnea on exertion   • Heart murmur   • Mixed hyperlipidemia   • Essential hypertension   • Impotence of organic origin   • Premature ventricular contractions   • NATALIA on CPAP   • Class 1 obesity due to excess calories without serious comorbidity with body mass index (BMI) of 31.0 to 31.9 in adult   • Preop cardiovascular exam   • Aortic valve regurgitation   • Anemia   • H/O radical nephrectomy   • Anemia due to chemotherapy       Past Medical History:  Past Medical History:   Diagnosis Date   • Aortic stenosis 9/29/2015    Per Echo 2017   • Benign essential HTN    • Bundle branch block, right 2/7/2013   • CAD (coronary artery disease), native coronary artery    • Cancer (CMS/HCC)     bladder- chemo, new left renal mass   • Chronic kidney disease    • Coronary artery disease involving native coronary artery of native heart without angina pectoris 11/19/2019    CABG 1995; SVG to RCA is occluded, LIMA to LAD, SVG to diag of LAD, SVG to marginal of LCX   • Essential hypertension 11/19/2019   • Heart murmur    • Hyperlipidemia, mixed    • Mixed hyperlipidemia 11/19/2019   • Near syncope    • NATALIA (obstructive sleep apnea)     AHI 11.6/h   • Premature ventricular contractions 2/11/2014   • Presence of cardiac pacemaker 7/5/2019    BS dual chamber PM 11/7/2016   • Shortness of breath    • Sick sinus syndrome (CMS/HCC)    • SSS (sick sinus syndrome) (CMS/HCC) 7/5/2019       Past Surgical History:  Past Surgical History:   Procedure Laterality Date   • CARDIAC CATHETERIZATION  2018   • CARDIOVASCULAR STRESS TEST  2017   • CORONARY ARTERY BYPASS GRAFT  1995   • ECHO - CONVERTED  2017   • NEPHROURETERECTOMY Left 11/2/2020    Procedure: NEPHROURETERECTOMY;  Surgeon: Rogers Bae MD;  Location: [facility];  Service: Urology;   Laterality: Left;   • PACEMAKER IMPLANTATION  2016    bs   • PORTACATH PLACEMENT         Social History:  Social History     Socioeconomic History   • Marital status:      Spouse name: Not on file   • Number of children: Not on file   • Years of education: Not on file   • Highest education level: Not on file   Tobacco Use   • Smoking status: Former Smoker     Quit date:      Years since quittin.2   • Smokeless tobacco: Never Used   Substance and Sexual Activity   • Alcohol use: Yes     Alcohol/week: 1.0 standard drinks     Types: 1 Shots of liquor per week     Comment: occ.   • Drug use: Never   • Sexual activity: Defer       Allergies:  No Known Allergies      Review of Symptoms:  Constitutional: Patient afebrile no chills or unexpected weight changes  Respiratory: No cough, no wheezing with severe exertional dyspnea  Cardiovascular: Today the patient complains of no chest pain, palpitations, with severe exertion dyspnea, orthopnea and lower extremity edema  Gastrointestinal: No nausea, vomiting, constipation or diarrhea.  No melena or dark stools    All other systems reviewed and are negative             Objective:         There were no vitals taken for this visit.    Physical exam  Constitutional: well-nourished, and appears stated age in no acute distress  PERRL: Conjunctiva clear, no pallor, anicteric  HENMT: normocephalic, normal dentition, no cyanosis or pallor  Neck:no bruits, or thrills and bilateral normal carotid upstroke. Normal jugular venous pressure  Cardiovascular: No parasternal heaves an non-displaced focal PMI. Normal rate and rhythm: no rub, gallop, 2 out of 6 systolic ejection murmur late peaking with an audible A2 component of S2 and normal S1; no lower or upper extremity edema.   Lungs: unlabored, no wheezing with no rales or rhonchi on auscultation.  Extremities: Warm, no clubbing, cyanosis. Full and equal peripheral pulses in extremities with no bruits appreciated.   Abdomen:  soft, non-tender, non-distended  Musculoskeletal: no joint tenderness or swelling and no erythema  Skin: Warm and dry, non-erythematous   Neuro:alert and normal affect. Oriented to time, place and person.           In-Office Procedure(s):  Procedures    ASCVD RIsk Score::  The ASCVD Risk score (Lincoln MULLER Jr., et al., 2013) failed to calculate for the following reasons:    Cannot find a previous HDL lab    Cannot find a previous total cholesterol lab    Recent Radiology:  Imaging Results (Most Recent)     None          Lab Review:   Hospital Outpatient Visit on 03/04/2021   Component Date Value   • BB HOLD TUBE 03/04/2021 CONVERTED TO TYPE AND SCREEN    • WBC 03/04/2021 6.90    • RBC 03/04/2021 2.49*   • Hemoglobin 03/04/2021 7.4*   • Hematocrit 03/04/2021 21.4*   • MCV 03/04/2021 85.9    • MCH 03/04/2021 29.8    • MCHC 03/04/2021 34.7    • RDW 03/04/2021 19.2*   • RDW-SD 03/04/2021 57.8*   • MPV 03/04/2021 7.9    • Platelets 03/04/2021 66*   • Product Code 03/05/2021 [id number]    • Unit Number 03/05/2021 [id number]    • UNIT  ABO 03/05/2021 A    • UNIT  RH 03/05/2021 POS    • Crossmatch Interpretation 03/05/2021 Compatible    • Dispense Status 03/05/2021 PT    • Blood Expiration Date 03/05/2021 202104062359    • Blood Type Barcode 03/05/2021 6200    • ABO Type 03/04/2021 A    • RH type 03/04/2021 Positive    • Antibody Screen 03/04/2021 Negative    • T&S Expiration Date 03/04/2021 3/7/2021 11:59:59 PM    Hospital Outpatient Visit on 03/03/2021   Component Date Value   • WBC 03/03/2021 6.50    • RBC 03/03/2021 2.83*   • Hemoglobin 03/03/2021 8.6*   • Hematocrit 03/03/2021 24.4*   • MCV 03/03/2021 86.2    • MCH 03/03/2021 30.3    • MCHC 03/03/2021 35.1    • RDW 03/03/2021 19.2*   • RDW-SD 03/03/2021 57.3*   • MPV 03/03/2021 8.5    • Platelets 03/03/2021 17*   • Product Code 03/05/2021 [id number]    • Unit Number 03/05/2021 [id number]    • UNIT  ABO 03/05/2021 A    • UNIT  RH 03/05/2021 POS    • Dispense Status  03/05/2021 PT    • Blood Expiration Date 03/05/2021 [id number]    • Blood Type Barcode 03/05/2021 6200    Hospital Outpatient Visit on 03/02/2021   Component Date Value   • BSA 03/02/2021 2.0    • LVOT diam 03/02/2021 2.2    • LVOT area 03/02/2021 3.8    • EDV(MOD-sp2) 03/02/2021 81.8    • ESV(MOD-sp2) 03/02/2021 46.9    • EF(MOD-sp2) 03/02/2021 42.7    • SV(MOD-sp2) 03/02/2021 34.9    • SI(MOD-sp2) 03/02/2021 17.9    • Aortic R-R 03/02/2021 0.82    • Aortic HR 03/02/2021 73.0    • Ao pk yessy 03/02/2021 536.4    • Ao max PG 03/02/2021 115.1    • Ao max PG (full) 03/02/2021 111.1    • Ao V2 mean 03/02/2021 372.8    • Ao mean PG 03/02/2021 63.1    • Ao mean PG (full) 03/02/2021 61.1    • Ao V2 VTI 03/02/2021 105.0    • DAVIS(I,A) 03/02/2021 0.58    • DAVIS(I,D) 03/02/2021 0.58    • DAVIS(V,A) 03/02/2021 0.71    • DAVIS(V,D) 03/02/2021 0.71    • LV V1 max PG 03/02/2021 4.0    • LV V1 mean PG 03/02/2021 2.0    • LV V1 max 03/02/2021 99.7    • LV V1 mean 03/02/2021 62.9    • LV V1 VTI 03/02/2021 15.8    • CO(LVOT) 03/02/2021 4.4    • CI(LVOT) 03/02/2021 2.3    • SV(LVOT) 03/02/2021 60.6    • SI(LVOT) 03/02/2021 31.0    • BH CV ECHO ERIC - BZI_BMI 03/02/2021 27.4    •  CV ECHO ERIC - BSA(HA* 03/02/2021 2.0    •  CV ECHO ERIC - BZI_ME* 03/02/2021 81.6    •  CV ECHO ERIC - BZI_ME* 03/02/2021 172.7    Lab on 02/27/2021   Component Date Value   • COVID19 02/27/2021 Not Detected               Invalid input(s): ALKPO4                        Invalid input(s): LDLCALC                Assessment:          Diagnosis Plan   1. Nonrheumatic aortic valve stenosis     2. Coronary artery disease involving native coronary artery of native heart without angina pectoris     3. Essential hypertension     4. Dyspnea on exertion            Plan:         1. Nonrheumatic aortic valve stenosis  75-year-old male patient with critical aortic stenosis with class III-IV New York Heart Association symptoms.  Given his remission, I feel that its prudent  to move forward with aortic valve replacement, namely I believe that he would be best suited for transcatheter aortic valve replacement owing in large part to his frailty, his need for a redo sternotomy and comorbidities on captured in the STS mortality risk.  We will need to get a TAVR CT scan.    2. Coronary artery disease involving native coronary artery of native heart without angina pectoris  We will need cardiac catheterization to evaluate exertional dyspnea as an anginal equivalent.    3. Essential hypertension  Well managed on medical therapy    4. Dyspnea on exertion  Multifactorial, owing in largest part to his critical aortic stenosis.         Anderson Galvez MD  03/15/21  .

## 2021-03-17 PROBLEM — I35.0 NONRHEUMATIC AORTIC VALVE STENOSIS: Status: ACTIVE | Noted: 2021-01-01

## 2021-03-19 PROBLEM — R94.39 ABNORMAL MYOCARDIAL PERFUSION STUDY: Status: ACTIVE | Noted: 2021-01-01

## 2021-03-19 PROBLEM — Z01.818 PRE-OP EXAMINATION: Status: ACTIVE | Noted: 2021-01-01

## 2021-03-22 NOTE — DISCHARGE INSTRUCTIONS
Post Cath Instructions    Call Dr. Erazo’s office to schedule a follow up appointment in 4 weeks at [phone] .    1) Drink plenty of fluids for the next 24 hours.  This helps to eliminate the dye used in your procedure through urination.  You may resume a normal diet; however, try to avoid foods that would cause gas or constipation.    2) Sedative medication given to you during your catheterization may decrease your judgement and reaction time for up to 24-48 hours.  Therefore:  a. DO NOT drive or operate hazardous machinery (48 hours)  b. DO NOT consume alcoholic beverages  c. DO NOT make any important/legal decisions  d. Have someone stay with you for at least 24 hours    3) To allow proper healing and prevent bleeding, the following activities are to be strictly avoided for the next 24-48 hours:  a. Excessive bending at wound site  b. Straining (anything that would tense up muscles around the affected puncture site)  c. Lifting objects greater than 5 pounds, pushing, or pulling for 5 days  i. For Groin Cases:  1. Refrain from sexual activity  2. Refrain from running or vigorous walking  3. No prolonged sitting or standing  4. Limit stair climbing as much as possible    4) Keep the puncture site clean and dry.  You may remove the dressing tomorrow and replace it with a band-aid for at least one additional day.  Gently clean the site with mild soap and water.  No scrubbing/rubbing and lightly pat the area dry.  Showers are acceptable; however, avoid submerging in water (tub baths, hot tubs, swimming pools, dishwater, etc…) for at least one week.  The site should be completely healed before resuming these activities to reduce the risk of infection.  Check the site often.  Watch for signs and symptoms of infection and notify your physician if any of the following occur:  a. Bleeding or an increase in swelling at the puncture site  b. Fever  c. Increased soreness around puncture site  d. Foul odor or significant  drainage from the puncture site  e. Swelling, redness, or warmth at the puncture site    **A bruise or small “pea sized” lump under the skin at the puncture site is not unusual.  This should disappear within 3-4 weeks.**  5) CONTACT YOUR PHYSICIAN OR CALL 911 IF YOU EXPERIENCE ANY OF THE FOLLOWING:  a. Increased angina (chest pain) or frequent sensations of pressure, burning, pain, or other discomfort in the chest, arm, jaws, or stomach  b. Lightheadedness, dizziness, faint feeling, sweating, or difficulty breathing  c. Odd sensation changes like numbness, tingling, coldness, or pain in the arm or leg in which the catheter was inserted  d. Limb in which the catheter was inserted becomes pale/bluish in color    IMPORTANT:  Although this occurs very rarely, if you should develop bright red or excessive bleeding, feel a “pop” inside at the insertion site, or notice a sudden increase in swelling larger than a walnut, you should call 911.  Hold continuous firm pressure to the access site until emergency personnel arrive.  It is best if someone else can do this for you.

## 2021-03-25 NOTE — PROGRESS NOTES
Cardiology Office Visit Note      Referring physician: Uri Cortes MD      Reason For Followup: Cardiac Cath follow up    HPI:  Navneet Lind is a 75 y.o. male who presents for a Cardiac Cath follow up.   Heart catheterization was performed prior to anticipated TAVR scheduled for 3/30/2021 with Dr. Galvez.  Cardiac catheterization revealed sufficient coronary revascularization despite known three-vessel coronary disease with remote 4V CABG.  GABY remains widely patent with RAJESH-3 flow through distal LAD.  203 saphenous vein grafts remain widely patent with only SVG to RCA totally occluded as his native RCA; the RCA is sufficiently collateralized from LAD.  The patient denies any post catheterization complications.    Today he is doing well, having shortness of air with activity, no dizziness or syncope. He does not notice his heart skipping or racing. He denies chest pain.  He has only mild to moderate constitutional symptoms of generalized weakness and some ataxia following his chemotherapy.  He has completed 3 of 4 rounds of chemotherapy for his renal cell carcinoma.    His medication list was reviewed during his visit today.    Past Medical History:   Diagnosis Date   • Aortic stenosis 9/29/2015    Per Echo 2017   • Benign essential HTN    • Bundle branch block, right 2/7/2013   • CAD (coronary artery disease), native coronary artery    • Cancer (CMS/HCC)     bladder- chemo, new left renal mass   • Chronic kidney disease    • Coronary artery disease involving native coronary artery of native heart without angina pectoris 11/19/2019    CABG 1995; SVG to RCA is occluded, LIMA to LAD, SVG to diag of LAD, SVG to marginal of LCX   • Essential hypertension 11/19/2019   • Heart murmur    • Hyperlipidemia, mixed    • Mixed hyperlipidemia 11/19/2019   • Near syncope    • NATALIA (obstructive sleep apnea)     AHI 11.6/h   • Premature ventricular contractions 2/11/2014   • Presence of cardiac pacemaker  2019    BS dual chamber PM 2016   • Shortness of breath    • Sick sinus syndrome (CMS/HCC)    • SSS (sick sinus syndrome) (CMS/HCC) 2019       Past Surgical History:   Procedure Laterality Date   • CARDIAC CATHETERIZATION     • CARDIOVASCULAR STRESS TEST     • CORONARY ARTERY BYPASS GRAFT     • ECHO - CONVERTED     • NEPHROURETERECTOMY Left 2020    Procedure: NEPHROURETERECTOMY;  Surgeon: Rogers Bae MD;  Location: [facility];  Service: Urology;  Laterality: Left;   • PACEMAKER IMPLANTATION      bs   • PORTACATH PLACEMENT           Current Outpatient Medications:   •  acetaminophen (TYLENOL) 500 MG tablet, Take 1,000 mg by mouth Every 6 (Six) Hours As Needed for Mild Pain ., Disp: , Rfl:   •  amLODIPine (NORVASC) 5 MG tablet, Take 1 tablet by mouth Daily., Disp: , Rfl:   •  aspirin 81 MG EC tablet, Take 81 mg by mouth Daily. LD 10/12 stopped for surgery, Disp: , Rfl:   •  folic acid (FOLVITE) 1 MG tablet, Take 1 mg by mouth Daily., Disp: , Rfl:   •  gabapentin (NEURONTIN) 800 MG tablet, Take 800 mg by mouth Every Evening., Disp: , Rfl:   •  ondansetron (ZOFRAN) 8 MG tablet, Take 4 mg by mouth Every 8 (Eight) Hours As Needed for Nausea or Vomiting., Disp: , Rfl:   •  promethazine (PHENERGAN) 25 MG tablet, Take 25 mg by mouth Every 6 (Six) Hours As Needed for Nausea or Vomiting., Disp: , Rfl:   •  simvastatin (ZOCOR) 40 MG tablet, Take 20 mg by mouth Every Night., Disp: , Rfl:     Social History     Socioeconomic History   • Marital status:      Spouse name: Not on file   • Number of children: Not on file   • Years of education: Not on file   • Highest education level: Not on file   Tobacco Use   • Smoking status: Former Smoker     Quit date:      Years since quittin.2   • Smokeless tobacco: Never Used   Vaping Use   • Vaping Use: Never used   Substance and Sexual Activity   • Alcohol use: Yes     Alcohol/week: 1.0 standard drinks     Types: 1 Shots of  "liquor per week     Comment: occ.   • Drug use: Never   • Sexual activity: Defer       Family History   Problem Relation Age of Onset   • No Known Problems Mother    • Heart disease Father    • Heart failure Father    • Heart attack Father    • No Known Problems Sister    • No Known Problems Brother          Review of Systems   General: denies fever, chills, anorexia, weight loss  Eyes: denies blurring, diplopia  Ear/Nose/Throat: denies ear pain, nosebleeds, hoarseness  Cardiovascular: See HPI  Respiratory: denies excessive sputum, hemoptysis, wheezing  Gastrointestinal: denies nausea, vomiting, change in bowel habits, abdominal pain  Genitourinary: Denies hematuria dysuria.  Musculoskeletal: Mild to moderate low back pain and weightbearing arthralgias with mild lower extremity weakness  Skin: denies rashes, itching, suspicious lesions  Neurologic: denies focal neuro deficits  Psychiatric: denies depression, anxiety  Endocrine: denies cold intolerance, heat intolerance  Hematologic/Lymphatic: denies abnormal bruising, bleeding  Allergic/Immunologic: denies urticaria or persistent infections      Objective     Visit Vitals  /70   Pulse 74   Ht 167.6 cm (66\")   Wt 84.8 kg (187 lb)   BMI 30.18 kg/m²           Physical Exam  General:     Mildly Obese, well developed,, in no acute distress.    Head:     normocephalic and atraumatic.    Eyes:    PERRL/EOM intact, conjunctiva and sclera clear with out nystagmus.    Neck:    no jvd or bruits  Chest Wall:    no deformities   Lungs:    clear bilaterally to auscultation with adequate global airflow   Heart:    non-displaced PMI; regular rate and rhythm, normal S1, S2 without murmurs, rubs, or gallops  Abdomen:  Soft, nontender without HSM  Msk:    no deformity; adequate R OM, though has mild generalized lower extremity weakness  Pulses:    pulses normal in all 4 extremities.    Extremities:    no clubbing, cyanosis, edema   Neurologic:    no focal sensory or motor " deficits, though mild ataxia  Skin:    intact without lesions or rashes.    Psych:    alert and cooperative; normal mood and affect; normal attention span and concentration.            ECG 12 Lead    Date/Time: 3/25/2021 9:25 AM  Performed by: ADRIANO Erazo MD  Authorized by: ADRIANO Erazo MD   Comparison: compared with previous ECG from 2/19/2021  Similar to previous ECG  Rhythm: sinus rhythm  Conduction: right bundle branch block    Clinical impression: abnormal EKG              Assessment:   Problems Addressed this Visit       S/ Other    Bundle branch block, right      Coronary artery disease involving native coronary artery of native heart without angina pectoris - Primary  -Status post 4V CABG 1995 with recent cardiac catheterization revealing patent GABY to LAD, patent SVGs to OM and diagonal branches with totally occluded SVG to totally occluded proximal RCA though RCA receives collateral filling from LAD  -Remains clinically stable and angina free      Essential hypertension  -Well compensated on current medications listed and reviewed in detail today      Premature ventricular contractions-improved, asymptomatic on no specific antiarrhythmic therapy at this time      Nonrheumatic aortic valve stenosis-critical calcific aortic stenosis  -Scheduled for TAVR 3/30/2021 at [facility] per Dr. Galvez      Diagnoses       Codes Comments    Coronary artery disease involving native coronary artery of native heart without angina pectoris    -  Primary ICD-10-CM: I25.10  ICD-9-CM: 414.01     Essential hypertension     ICD-10-CM: I10  ICD-9-CM: 401.9     Premature ventricular contractions     ICD-10-CM: I49.3  ICD-9-CM: 427.69     Nonrheumatic aortic valve stenosis     ICD-10-CM: I35.0  ICD-9-CM: 424.1     Bundle branch block, right     ICD-10-CM: I45.10  ICD-9-CM: 426.4             Plan:  Continue current medications as listed and reviewed in detail today.  Scheduled for TAVR 3/30/2021  Return to clinic 3 months or  sooner if needed.    ADRIANO Erazo MD  3/25/2021 10:07 EDT    This report was generated using the Dragon voice recognition system.

## 2021-03-26 PROBLEM — I50.32 CHRONIC DIASTOLIC CONGESTIVE HEART FAILURE (HCC): Status: ACTIVE | Noted: 2021-01-01

## 2021-03-26 NOTE — TELEPHONE ENCOUNTER
Spoke to Mr. Lind RE: TAVR.  He is scheduled for PAT Monday 3/29 @ 0800.  He reports his veins are bad and blood needs to be taken from port.  I spoke to Cameron in PAT.  Arrangements were made for Mr. Lind to go to [facility] for blood work.      All questions answered.  Verbal understanding noted.

## 2021-03-29 NOTE — ANESTHESIA PREPROCEDURE EVALUATION
Anesthesia Evaluation     no history of anesthetic complications:               Airway   Mallampati: II  Neck ROM: full  no difficulty expected  Dental - normal exam     Pulmonary - normal exam   (+) a smoker Former, shortness of breath, sleep apnea on CPAP,   (-) COPD, asthma    PE comment: nonlabored  Cardiovascular - normal exam  Exercise tolerance: poor (<4 METS)    Rhythm: regular  Rate: normal    (+) pacemaker pacemaker, hypertension, valvular problems/murmurs AS and murmur, CAD, CABG (~20yrs ago), dysrhythmias (R BBB; SSS--tx'd with pacer), CHF Diastolic >=55%, ZULETA, hyperlipidemia,   (-) past MI, angina    ROS comment: RAFAEL: EF 55-60%, DAVIS 0.58cm2    Neuro/Psych- negative ROS  (-) seizures, TIA, CVASyncope: pre-syncope.  GI/Hepatic/Renal/Endo    (+) obesity,   renal disease (CKD),   (-) GERD, liver disease, diabetes, no thyroid disorder    Musculoskeletal (-) negative ROS    Abdominal    Substance History      OB/GYN          Other      history of cancer                    Anesthesia Plan    ASA 4     MAC   (A-line & CVL)    Anesthetic plan, all risks, benefits, and alternatives have been provided, discussed and informed consent has been obtained with: patient.

## 2021-03-30 NOTE — ANESTHESIA PROCEDURE NOTES
Central Line      Patient reassessed immediately prior to procedure    Patient location during procedure: OR  Start time: 3/30/2021 7:00 AM  Stop Time:3/30/2021 7:09 AM  Indications: vascular access  Staff  Anesthesiologist: Eric Reilly MD  Preanesthetic Checklist  Completed: patient identified, IV checked, site marked, risks and benefits discussed, surgical consent, monitors and equipment checked, pre-op evaluation and timeout performed  Central Line Prep  Sterile Tech:cap, gloves, gown, mask and sterile barriers  Prep: chloraprep  Patient monitoring: continuous pulse oximetry, blood pressure monitoring and EKG  Central Line Procedure  Laterality:right  Location:internal jugular  Catheter Type:Cordis and single lumen  Catheter Size:6 Fr  Guidance:ultrasound guided  PROCEDURE NOTE/ULTRASOUND INTERPRETATION.  Using ultrasound guidance the potential vascular sites for insertion of the catheter were visualized to determine the patency of the vessel to be used for vascular access.  After selecting the appropriate site for insertion, the needle was visualized under ultrasound being inserted into the internal jugular vein, followed by ultrasound confirmation of wire and catheter placement. There were no abnormalities seen on ultrasound; an image was taken; and the patient tolerated the procedure with no complications. Images: still images not obtained  Assessment  Post procedure:biopatch applied, line sutured and occlusive dressing applied  Assessement:blood return through all ports, free fluid flow, placement verified by x-ray, Garrett Test, no pneumothorax on x-ray and chest x-ray ordered  Complications:no  Patient Tolerance:patient tolerated the procedure well with no apparent complications  Additional Notes  Printer malfunction

## 2021-03-30 NOTE — ANESTHESIA PROCEDURE NOTES
Arterial Line      Patient reassessed immediately prior to procedure    Patient location during procedure: OR  Start time: 3/30/2021 6:47 AM  Stop Time:3/30/2021 6:51 AM       Line placed for hemodynamic monitoring, ABGs/Labs/ISTAT and MD/Surgeon request.  Performed By   Anesthesiologist: Eric Reilly MD  Preanesthetic Checklist  Completed: patient identified, IV checked, site marked, risks and benefits discussed, surgical consent, monitors and equipment checked, pre-op evaluation and timeout performed  Arterial Line Prep   Sterile Tech: cap, gloves, mask and sterile barriers  Prep: ChloraPrep  Patient monitoring: blood pressure monitoring, continuous pulse oximetry and EKG  Arterial Line Procedure   Laterality:left  Location:  radial artery  Catheter size: 20 G   Guidance: ultrasound guided  PROCEDURE NOTE/ULTRASOUND INTERPRETATION.  Using ultrasound guidance the potential vascular sites for insertion of the catheter were visualized to determine the patency of the vessel to be used for vascular access.  After selecting the appropriate site for insertion, the needle was visualized under ultrasound being inserted into the radial artery, followed by ultrasound confirmation of wire and catheter placement. There were no abnormalities seen on ultrasound; an image was taken; and the patient tolerated the procedure with no complications.   Number of attempts: 1  Successful placement: yes  Post Assessment   Dressing Type: wrist guard applied, occlusive dressing applied and secured with tape.   Complications no  Circ/Move/Sens Assessment: normal and unchanged.   Patient Tolerance: patient tolerated the procedure well with no apparent complications

## 2021-03-30 NOTE — ANESTHESIA POSTPROCEDURE EVALUATION
"Patient: Navneet Lind    Procedure Summary     Date: 03/30/21 [facility] / [facility]    Anesthesia Start: 0636 Anesthesia Stop: 0843    Procedures:       TTE TRANSFEMORAL TRANSCATHETER AORTIC VALVE REPLACEMENT PERCUTANEOUS APPROACH (N/A Chest)      Transfemoral Transcatheter Aortic Valve Replacement w/Intra Op TTE and Possible Open Rescue Surgery (N/A ) Diagnosis:       Nonrheumatic aortic valve stenosis      Chronic diastolic congestive heart failure (CMS/HCC)      (Nonrheumatic aortic valve stenosis [I35.0])      (Chronic diastolic congestive heart failure (CMS/HCC) [I50.32])    Surgeons: Hang Martinez MD; Anderson Galvez MD Provider: Eric Reilly MD    Anesthesia Type: MAC ASA Status: 4          Anesthesia Type: MAC    Vitals  Vitals Value Taken Time   BP 98/59 03/30/21 1030   Temp 35.9 °C (96.6 °F) 03/30/21 0900   Pulse 60 03/30/21 1036   Resp 13 03/30/21 0900   SpO2 100 % 03/30/21 1036   Vitals shown include unvalidated device data.        Post Anesthesia Care and Evaluation    Patient location during evaluation: bedside  Patient participation: complete - patient participated  Level of consciousness: awake and alert  Pain management: adequate  Airway patency: patent  Anesthetic complications: No anesthetic complications  PONV Status: none  Cardiovascular status: acceptable  Respiratory status: acceptable  Hydration status: acceptable    Comments: BP (!) 87/52   Pulse 60   Temp 35.9 °C (96.6 °F)   Resp 13   Ht 172.7 cm (68\")   Wt 85.8 kg (189 lb 3.2 oz)   SpO2 96%   BMI 28.77 kg/m²         "

## 2021-04-01 NOTE — OUTREACH NOTE
Prep Survey      Responses   [facility] patient discharged from?  [facility]   Is LACE score < 7 ?  Yes   Emergency Room discharge w/ pulse ox?  No   Eligibility  Readm Mgmt   Discharge diagnosis  severe aortic stenosis s/p TAVR POD#1 Michelle/Lenny   Does the patient have one of the following disease processes/diagnoses(primary or secondary)?  CHF   Does the patient have Home health ordered?  No   Is there a DME ordered?  No   Medication alerts for this patient  Plavix    Prep survey completed?  Yes          Yuliet Orozco RN

## 2021-04-06 NOTE — OUTREACH NOTE
CHF Week 1 Survey      Responses   [facility] patient discharged from?  [facility]   Does the patient have one of the following disease processes/diagnoses(primary or secondary)?  CHF   CHF Week 1 attempt successful?  Yes   Call start time  1545   Rescheduled  Revoked   Revoke  Decline to participate [Instructed on Nurse Call Center.]   Discharge diagnosis  severe aortic stenosis s/p TAVR POD#1 Michelle/Lenny Locke RN

## 2021-04-15 NOTE — TELEPHONE ENCOUNTER
Follow up call after TAVR to discuss cardiac rehab. Patient is very interested in participating in cardiac rehab and after follow up visit with Dr Galvez, will schedule initial phase 2 cardiac rehab visit

## 2021-04-21 NOTE — PROGRESS NOTES
Discharge Summary  Discharge Summary from Physical Therapy Report      Dates  PT visit: 3/5/2021 to 3/15/2021  Number of Visits: 2     Discharge Status of Patient: discharged    Goals: unable to assess    Discharge Plan: Continue with current home exercise program as instructed    Comments: The patient is being discharged today due to department policy of inactivity over 30 days. Patient was given HEP at initial evaluation for continued self care.    Date of Discharge 4/21/2021        Ofelia King, PT  Physical Therapist

## 2021-05-12 NOTE — PROGRESS NOTES
Subjective:     Encounter Date:05/12/2021      Patient ID: Navneet Lind is a 75 y.o. male.    Chief Complaint:  Chief Complaint   Patient presents with   • Cardiac Valve Problem     30 Day TAVR 3/30/2021       HPI:  Navneet is a pleasant 75-year-old male patient of Dr. Erazo.  He has past medical history significant for hypertension and known CAD status post coronary artery bypass grafting in 1995.  He also has a past medical history significant for sick sinus syndrome requiring pacemaker placement.  He has had worsening exertional dyspnea with class III-IV New York Heart Association symptoms.  His KCC Q 12 questionnaire reveals a severe decrease in his overall quality of life.  This has been worsening since January 2021.  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    He is being referred for evaluation for transcatheter aortic valve replacement.  Dr. Quinones has already evaluated him and deemed him to be a better candidate for transcatheter aortic valve replacement.  I personally reviewed his echocardiogram from this year which showed severe aortic stenosis with preserved LV systolic function (aortic valve area 0.6 cm² with a V-max of 3.2 m/s.  Transesophageal echo was performed to determine the aortic valve orifice by planimetry.  It was determined to be 0.58 cm² with a V-max of 5.4 m/s.    Regarding his malignancy, per Dr. Dodd, his cancer is in remission and they were continuing chemotherapy as an adjunct for prevention.  His chemotherapy should be finished in May.    He underwent transcatheter aortic valve replacement 3 32,026 mm Shah GABE transcatheter aortic valve.  He returns today for routine follow-up.  His KCC12Q questionnaire shows a significant improvement in his overall quality of life.  His 15 foot walk test took 5 seconds.  His shortness of air is resolved with New York Heart Association class I symptoms.      The  following portions of the patient's history were reviewed and updated as appropriate: allergies, current medications, past family history, past medical history, past social history, past surgical history and problem list.    Problem List:  Patient Active Problem List   Diagnosis   • Presence of cardiac pacemaker   • Aortic stenosis   • Bundle branch block, right   • Coronary artery disease involving native coronary artery of native heart without angina pectoris   • Dyspnea on exertion   • Heart murmur   • Mixed hyperlipidemia   • Essential hypertension   • Impotence of organic origin   • Premature ventricular contractions   • NATALIA on CPAP   • Class 1 obesity due to excess calories without serious comorbidity with body mass index (BMI) of 31.0 to 31.9 in adult   • Preop cardiovascular exam   • Aortic valve regurgitation   • Anemia   • H/O radical nephrectomy   • Anemia due to chemotherapy   • Nonrheumatic aortic valve stenosis   • Pre-op examination   • Abnormal myocardial perfusion study   • Chronic diastolic congestive heart failure (CMS/HCC)       Past Medical History:  Past Medical History:   Diagnosis Date   • Aortic stenosis 9/29/2015    Per Echo 2017   • Benign essential HTN    • Bundle branch block, right 2/7/2013   • CAD (coronary artery disease), native coronary artery    • Cancer (CMS/HCC)     bladder- chemo, new left renal mass   • Chronic kidney disease    • Coronary artery disease involving native coronary artery of native heart without angina pectoris 11/19/2019    CABG 1995; SVG to RCA is occluded, LIMA to LAD, SVG to diag of LAD, SVG to marginal of LCX   • COVID-19 vaccine administered    • Essential hypertension 11/19/2019   • Heart murmur    • History of transfusion    • Hyperlipidemia, mixed    • Mixed hyperlipidemia 11/19/2019   • Near syncope    • NATALIA (obstructive sleep apnea)     AHI 11.6/h, CPAP   • Premature ventricular contractions 2/11/2014   • Presence of cardiac pacemaker 7/5/2019    BS  dual chamber PM 2016   • Shortness of breath    • Sick sinus syndrome (CMS/HCC)    • SSS (sick sinus syndrome) (CMS/HCC) 2019       Past Surgical History:  Past Surgical History:   Procedure Laterality Date   • AORTIC VALVE REPAIR/REPLACEMENT N/A 3/30/2021    Procedure: TTE TRANSFEMORAL TRANSCATHETER AORTIC VALVE REPLACEMENT PERCUTANEOUS APPROACH;  Surgeon: Hang Martinez MD;  Location: [facility] ;  Service: Cardiothoracic;  Laterality: N/A;   • AORTIC VALVE REPAIR/REPLACEMENT N/A 3/30/2021    Procedure: Transfemoral Transcatheter Aortic Valve Replacement w/Intra Op TTE and Possible Open Rescue Surgery;  Surgeon: Anderson Galvez MD;  [facility] ;  Service: Cardiovascular;  Laterality: N/A;   • CARDIAC CATHETERIZATION     • CARDIAC CATHETERIZATION N/A 3/22/2021    Procedure: Left Heart Cath;  Surgeon: ADRIANO Erazo MD;  Location:  INVASIVE LOCATION;  Service: Cardiovascular;  Laterality: N/A;   • CARDIOVASCULAR STRESS TEST     • CORONARY ARTERY BYPASS GRAFT     • ECHO - CONVERTED     • NEPHROURETERECTOMY Left 2020    Procedure: NEPHROURETERECTOMY;  Surgeon: Rogers Bae MD;  [facility];  Service: Urology;  Laterality: Left;   • PACEMAKER IMPLANTATION      bs   • PORTACATH PLACEMENT         Social History:  Social History     Socioeconomic History   • Marital status:      Spouse name: Not on file   • Number of children: Not on file   • Years of education: Not on file   • Highest education level: Not on file   Tobacco Use   • Smoking status: Former Smoker     Quit date:      Years since quittin.3   • Smokeless tobacco: Never Used   Vaping Use   • Vaping Use: Never used   Substance and Sexual Activity   • Alcohol use: Yes     Alcohol/week: 1.0 standard drinks     Types: 1 Shots of liquor per week     Comment: RARE   • Drug use: Never   • Sexual activity: Defer       Allergies:  No Known  "Allergies      Review of Symptoms:  Constitutional: Patient afebrile no chills or unexpected weight changes  Respiratory: No cough, no wheezing or dyspnea  Cardiovascular: Today the patient complains of no chest pain, palpitations, dyspnea, orthopnea and no edema  Gastrointestinal: No nausea, vomiting, constipation or diarrhea.  No melena or dark stools    All other systems reviewed and are negative             Objective:         /72 (BP Location: Left arm, Patient Position: Sitting, Cuff Size: Large Adult)   Pulse 72   Resp 18   Ht 172.7 cm (68\")   Wt 88.2 kg (194 lb 6.4 oz)   SpO2 97%   BMI 29.56 kg/m²     Physical exam  Constitutional: well-nourished, and appears stated age in no acute distress  PERRL: Conjunctiva clear, no pallor, anicteric  HENMT: normocephalic, normal dentition, no cyanosis or pallor  Neck:no bruits, or thrills and bilateral normal carotid upstroke. Normal jugular venous pressure  Cardiovascular: No parasternal heaves an non-displaced focal PMI. Normal rate and rhythm: no rub, gallop, murmur or click and normal S1 and S2; no lower or upper extremity edema.   Lungs: unlabored, no wheezing with no rales or rhonchi on auscultation.  Extremities: Warm, no clubbing, cyanosis. Full and equal peripheral pulses in extremities with no bruits appreciated.   Abdomen: soft, non-tender, non-distended  Musculoskeletal: no joint tenderness or swelling and no erythema  Skin: Warm and dry, non-erythematous   Neuro:alert and normal affect. Oriented to time, place and person.           In-Office Procedure(s):  Procedures    ASCVD RIsk Score::  The ASCVD Risk score (Kinsale DC Jr., et al., 2013) failed to calculate for the following reasons:    Cannot find a previous HDL lab    Cannot find a previous total cholesterol lab    Recent Radiology:  Imaging Results (Most Recent)     None          Lab Review:   Admission on 03/30/2021, Discharged on 03/31/2021   Component Date Value   • WBC 03/30/2021 8.87    • " RBC 03/30/2021 2.93*   • Hemoglobin 03/30/2021 9.8*   • Hematocrit 03/30/2021 28.4*   • MCV 03/30/2021 96.9    • MCH 03/30/2021 33.4*   • MCHC 03/30/2021 34.5    • RDW 03/30/2021 22.3*   • RDW-SD 03/30/2021 75.3*   • MPV 03/30/2021 10.2    • Platelets 03/30/2021 162    • Glucose 03/30/2021 127*   • BUN 03/30/2021 10    • Creatinine 03/30/2021 0.97    • Sodium 03/30/2021 137    • Potassium 03/30/2021 4.4    • Chloride 03/30/2021 108*   • CO2 03/30/2021 22.0    • Calcium 03/30/2021 8.3*   • Albumin 03/30/2021 2.90*   • Phosphorus 03/30/2021 2.9    • Anion Gap 03/30/2021 7.0    • BUN/Creatinine Ratio 03/30/2021 10.3    • eGFR Non African Amer 03/30/2021 76    • Magnesium 03/30/2021 2.6*   • Activated Clotting Time  03/30/2021 136    • Activated Clotting Time  03/30/2021 252*   • Activated Clotting Time  03/30/2021 169*   • BSA 03/31/2021 2.0    • IVSd 03/31/2021 1.7    • LVIDd 03/31/2021 5.1    • LVIDs 03/31/2021 3.6    • LVPWd 03/31/2021 1.3    • IVS/LVPW 03/31/2021 1.3    • FS 03/31/2021 29.4    • EDV(Teich) 03/31/2021 123.8    • ESV(Teich) 03/31/2021 54.4    • EF(Teich) 03/31/2021 56.0    • EDV(cubed) 03/31/2021 132.7    • ESV(cubed) 03/31/2021 46.7    • EF(cubed) 03/31/2021 64.8    • LV mass(C)d 03/31/2021 332.4    • LV mass(C)dI 03/31/2021 167.7    • SV(Teich) 03/31/2021 69.4    • SI(Teich) 03/31/2021 35.0    • SV(cubed) 03/31/2021 86.0    • SI(cubed) 03/31/2021 43.4    • LVOT diam 03/31/2021 2.1    • LVOT area 03/31/2021 3.5    • LVOT area(traced) 03/31/2021 3.5    • RVOT diam 03/31/2021 1.8    • RVOT area 03/31/2021 2.5    • LVLd ap4 03/31/2021 8.5    • EDV(MOD-sp4) 03/31/2021 123.0    • LVLs ap4 03/31/2021 6.4    • ESV(MOD-sp4) 03/31/2021 43.0    • EF(MOD-sp4) 03/31/2021 65.0    • LVLd ap2 03/31/2021 8.2    • EDV(MOD-sp2) 03/31/2021 94.0    • LVLs ap2 03/31/2021 6.8    • ESV(MOD-sp2) 03/31/2021 35.0    • EF(MOD-sp2) 03/31/2021 62.8    • SV(MOD-sp4) 03/31/2021 80.0    • SI(MOD-sp4) 03/31/2021 40.4    •  SV(MOD-sp2) 03/31/2021 59.0    • SI(MOD-sp2) 03/31/2021 29.8    • LV Blair Vol (BSA correct* 03/31/2021 62.1    • LV Sys Vol (BSA correcte* 03/31/2021 21.7    • EF(MOD-bp) 03/31/2021 63.6    • MV A dur 03/31/2021 0.2    • MV E max yessy 03/31/2021 51.4    • MV A max yessy 03/31/2021 75.8    • MV E/A 03/31/2021 0.68    • MV V2 max 03/31/2021 92.5    • MV max PG 03/31/2021 3.4    • MV V2 mean 03/31/2021 48.7    • MV mean PG 03/31/2021 1.0    • MV V2 VTI 03/31/2021 23.5    • MVA(VTI) 03/31/2021 2.5    • MV P1/2t max yessy 03/31/2021 77.4    • MV P1/2t 03/31/2021 68.1    • MVA(P1/2t) 03/31/2021 3.2    • MV dec slope 03/31/2021 333.0    • MV dec time 03/31/2021 306    • Ao pk yessy 03/31/2021 234.0    • Ao max PG 03/31/2021 21.9    • Ao max PG (full) 03/31/2021 18.0    • Ao V2 mean 03/31/2021 157.0    • Ao mean PG 03/31/2021 11.0    • Ao mean PG (full) 03/31/2021 9.0    • Ao V2 VTI 03/31/2021 40.9    • DAVIS(I,A) 03/31/2021 1.4    • DAVIS(I,D) 03/31/2021 1.4    • DAVIS(V,A) 03/31/2021 1.5    • DAVIS(V,D) 03/31/2021 1.5    • LV V1 max PG 03/31/2021 3.9    • LV V1 mean PG 03/31/2021 2.0    • LV V1 max 03/31/2021 99.2    • LV V1 mean 03/31/2021 63.6    • LV V1 VTI 03/31/2021 16.7    • SV(LVOT) 03/31/2021 57.8    • SV(RVOT) 03/31/2021 30.8    • SI(LVOT) 03/31/2021 29.2    • PA V2 max 03/31/2021 111.0    • PA max PG 03/31/2021 4.9    • PA max PG (full) 03/31/2021 3.0    •  CV ECHO ERIC - PVA(V,* 03/31/2021 1.6    •  CV ECHO ERIC - PVA(V,* 03/31/2021 1.6    • PA acc time 03/31/2021 0.07    • RV V1 max PG 03/31/2021 1.9    • RV V1 mean PG 03/31/2021 1.0    • RV V1 max 03/31/2021 69.2    • RV V1 mean 03/31/2021 47.1    • RV V1 VTI 03/31/2021 12.1    • TR max yessy 03/31/2021 263.0    • PA pr(Accel) 03/31/2021 48.0    • Qp/Qs 03/31/2021 0.53    • MVA P1/2T LCG 03/31/2021 2.8    •  CV ECHO ERIC - BZI_BMI 03/31/2021 28.3    •  CV ECHO ERIC - BSA(HA* 03/31/2021 2.0    •  CV ECHO ERIC - BZI_ME* 03/31/2021 84.4    •  CV ECHO ERIC - BZI_ME*  03/31/2021 172.7    • Target HR (85%) 03/31/2021 124    • Max. Pred. HR (100%) 03/31/2021 146    • BH CV VAS BP RIGHT ARM 03/31/2021 131/54    • Dimensionless Index 03/31/2021 0.40    • RAP systole 03/31/2021 3    • RVSP(TR) 03/31/2021 30    • EF - Contrast (4Ch) 03/31/2021 63.0    • AT 03/31/2021 48    • accel time 03/31/2021 48,000.00    • POC Potassium 03/30/2021 4.4    • Total CO2 03/30/2021 23*   • Hemoglobin 03/30/2021 9.5*   • Hematocrit 03/30/2021 28*   • pH, Arterial 03/30/2021 7.32*   • HCO3, Arterial 03/30/2021 21.8*   • Base Excess 03/30/2021 -4.0000    • O2 Saturation, Arterial 03/30/2021 99*   • pO2, Arterial 03/30/2021 143*   • pCO2, Arterial 03/30/2021 42.6    • Glucose 03/30/2021 117    • POC Potassium 03/30/2021 4.4    • Total CO2 03/30/2021 23*   • Hemoglobin 03/30/2021 9.2*   • Hematocrit 03/30/2021 27*   • pH, Arterial 03/30/2021 7.30*   • HCO3, Arterial 03/30/2021 21.5*   • Base Excess 03/30/2021 -5.0000    • O2 Saturation, Arterial 03/30/2021 99*   • pO2, Arterial 03/30/2021 168*   • pCO2, Arterial 03/30/2021 44.0    • Glucose 03/30/2021 131*   Appointment on 03/29/2021   Component Date Value   • QT Interval 03/29/2021 440    • Glucose 03/29/2021 123*   • BUN 03/29/2021 11    • Creatinine 03/29/2021 1.11    • Sodium 03/29/2021 137    • Potassium 03/29/2021 4.6    • Chloride 03/29/2021 104    • CO2 03/29/2021 25.2    • Calcium 03/29/2021 9.1    • Total Protein 03/29/2021 6.7    • Albumin 03/29/2021 3.40*   • ALT (SGPT) 03/29/2021 11    • AST (SGOT) 03/29/2021 17    • Alkaline Phosphatase 03/29/2021 133*   • Total Bilirubin 03/29/2021 0.4    • eGFR Non  Amer 03/29/2021 65    • Globulin 03/29/2021 3.3    • A/G Ratio 03/29/2021 1.0    • BUN/Creatinine Ratio 03/29/2021 9.9    • Anion Gap 03/29/2021 7.8    • Hemoglobin A1C 03/29/2021 4.90    • proBNP 03/29/2021 275.9    • PTT 03/29/2021 31.0    • Protime 03/29/2021 13.2    • INR 03/29/2021 1.02    • ADP Aggregation, % Plate* 03/29/2021 94     • Color, UA 03/29/2021 Dark Yellow*   • Appearance, UA 03/29/2021 Clear    • pH, UA 03/29/2021 5.5    • Specific Gravity, UA 03/29/2021 1.021    • Glucose, UA 03/29/2021 Negative    • Ketones, UA 03/29/2021 Trace*   • Bilirubin, UA 03/29/2021 Negative    • Blood, UA 03/29/2021 Negative    • Protein, UA 03/29/2021 Trace*   • Leuk Esterase, UA 03/29/2021 Small (1+)*   • Nitrite, UA 03/29/2021 Negative    • Urobilinogen, UA 03/29/2021 1.0 E.U./dL    • ABO Type 03/29/2021 A    • RH type 03/29/2021 Positive    • Antibody Screen 03/29/2021 Negative    • T&S Expiration Date 03/29/2021 4/1/2021 11:59:59 PM    • WBC 03/29/2021 8.68    • RBC 03/29/2021 3.54*   • Hemoglobin 03/29/2021 11.5*   • Hematocrit 03/29/2021 33.7*   • MCV 03/29/2021 95.2    • MCH 03/29/2021 32.5    • MCHC 03/29/2021 34.1    • RDW 03/29/2021 22.3*   • RDW-SD 03/29/2021 75.3*   • MPV 03/29/2021 10.0    • Platelets 03/29/2021 180    • Neutrophil % 03/29/2021 63.9    • Lymphocyte % 03/29/2021 22.7    • Monocyte % 03/29/2021 8.3    • Eosinophil % 03/29/2021 4.5    • Basophil % 03/29/2021 0.3    • Immature Grans % 03/29/2021 0.3    • Neutrophils, Absolute 03/29/2021 5.54    • Lymphocytes, Absolute 03/29/2021 1.97    • Monocytes, Absolute 03/29/2021 0.72    • Eosinophils, Absolute 03/29/2021 0.39    • Basophils, Absolute 03/29/2021 0.03    • Immature Grans, Absolute 03/29/2021 0.03    • nRBC 03/29/2021 0.0    • COVID19 03/29/2021 Not Detected    • RBC, UA 03/29/2021 0-2    • WBC, UA 03/29/2021 13-20*   • Bacteria, UA 03/29/2021 None Seen    • Squamous Epithelial Cell* 03/29/2021 0-2    • Hyaline Casts, UA 03/29/2021 21-30    • Methodology 03/29/2021 Automated Microscopy    • Urine Culture 03/29/2021 No growth    Hospital Outpatient Visit on 03/25/2021   Component Date Value   • Creatinine 03/25/2021 1.20    Lab on 03/22/2021   Component Date Value   • Glucose 03/22/2021 107*   • BUN 03/22/2021 13    • Creatinine 03/22/2021 1.13    • Sodium 03/22/2021 138     • Potassium 03/22/2021 5.0    • Chloride 03/22/2021 104    • CO2 03/22/2021 26.0    • Calcium 03/22/2021 9.2    • Total Protein 03/22/2021 6.4    • Albumin 03/22/2021 3.00*   • ALT (SGPT) 03/22/2021 10    • AST (SGOT) 03/22/2021 17    • Alkaline Phosphatase 03/22/2021 139*   • Total Bilirubin 03/22/2021 0.3    • eGFR Non  Amer 03/22/2021 63    • Globulin 03/22/2021 3.4    • A/G Ratio 03/22/2021 0.9    • BUN/Creatinine Ratio 03/22/2021 11.5    • Anion Gap 03/22/2021 8.0    • Protime 03/22/2021 10.7    • INR 03/22/2021 0.97    • COVID19 03/22/2021 Not Detected    • WBC 03/22/2021 9.30    • RBC 03/22/2021 3.58*   • Hemoglobin 03/22/2021 11.2*   • Hematocrit 03/22/2021 33.4*   • MCV 03/22/2021 93.2    • MCH 03/22/2021 31.3    • MCHC 03/22/2021 33.6    • RDW 03/22/2021 23.8*   • RDW-SD 03/22/2021 75.3*   • MPV 03/22/2021 7.5    • Platelets 03/22/2021 89*   • Neutrophil % 03/22/2021 53.6    • Lymphocyte % 03/22/2021 27.5    • Monocyte % 03/22/2021 15.8*   • Eosinophil % 03/22/2021 1.8    • Basophil % 03/22/2021 1.3    • Neutrophils, Absolute 03/22/2021 5.00    • Lymphocytes, Absolute 03/22/2021 2.60    • Monocytes, Absolute 03/22/2021 1.50*   • Eosinophils, Absolute 03/22/2021 0.20    • Basophils, Absolute 03/22/2021 0.10    • nRBC 03/22/2021 0.1               Invalid input(s): ALKPO4                        Invalid input(s): LDLCALC                Assessment:          Diagnosis Plan   1. Nonrheumatic aortic valve stenosis     2. Coronary artery disease involving native coronary artery of native heart without angina pectoris     3. Dyspnea on exertion     4. Mixed hyperlipidemia     5. Essential hypertension            Plan:         1. Nonrheumatic aortic valve stenosis  Aramis silent post transcatheter aortic valve replacement    2. Coronary artery disease involving native coronary artery of native heart without angina pectoris  Clinically silent.  Continue medical therapy    3. Dyspnea on  exertion  Resolved    4. Mixed hyperlipidemia  Well-controlled on medical therapy    5. Essential hypertension  Well-controlled on medical therapy                 Anderson Galvez MD  05/12/21  .

## 2021-06-15 PROBLEM — I70.1 RENAL ARTERY STENOSIS (HCC): Status: ACTIVE | Noted: 2021-01-01

## 2021-06-22 NOTE — PROGRESS NOTES
"  [facility]  [address] [place] [address]  Phone   Fax       SLEEP CLINIC FOLLOW UP PROGRESS NOTE.    Navneet Lind  1945  75 y.o.  male      PCP: Uri Cortes MD      Date of visit: 6/22/2021    Chief Complaint   Patient presents with   • Sleep Apnea   • Obesity       HPI:  This is a 75 y.o. years old patient who has a history of obstructive sleep apnea is here for  the annual compliance follow-up.  Sleep apnea is mild in severity with a AHI of 11.6/hr. Patient is using positive airway pressure therapy with CPAP 12 and the symptoms of snoring, non-restorative sleep and daytime excessive sleepiness have improved significantly on the therapy. Normally goes to bed at 9 PM and wakes up at 6 AM.  The patient wakes up several time(s) during the night and has no problem going back to sleep.  Feels refreshed after waking up.  Patient also denies headaches and nasal congestion.   Since his last visit in 2020 he had nephrectomy.  He injured his back recently and is in a soft brace.  He is accompanied by his wife.    Mediactions and allergies are reviewed by me and documented in the encounter.     SOCIAL ( habits pertaining to sleep medicine)  History of smoking:No   History of alcohol use: 0 per week  Caffeine use: 2     REVIEW OF SYSTEMS:   [place] Sleepiness Scale :Total score: 16   Nsaal congestion:No   Dry mouth/nose:Yes   Post nasal drip; No   Acid reflux/Heartburn:No   Abd bloating:No   Morining headache:No   Anxiety:No   Depression:No    PHYSICAL EXAMINATION:  CONSTITUTIONAL:  Vitals:    06/22/21 0904   BP: 134/79   Pulse: 68   SpO2: 95%   Weight: 88 kg (194 lb)   Height: 172.7 cm (68\")    Body mass index is 29.5 kg/m².   NOSE: nasal passages are clear, no nasal polyps, septum in the midline.  THROAT: throat is clear, oral airway Mallampati class 3  RESP SYSTEM: Breath sounds are normal, no wheezes or crackles  CARDIOVASULAR: Heart rate is " regular without murmur. No edema      Data reviewed:  The Smart card downloaded on 6/22/2021 has been reviewed independently by me for compliance and discussed the data with the patient.   Compliance; 97%  More than 4 hr use, 97%  Average use of the device 7 hours and 49 minutes per night  Residual AHI: 0.4 /hr (goal < 5.0 /hr)  Mask type: Nasal pillows  DME: [employer]    I have reviewed office notes by other providers dated March 25, 2021       ASSESSMENT AND PLAN:  · Obstructive sleep apnea ( G 47.33).  The symptoms of sleep apnea have improved with the device and the treatment.  Patient's compliance with the device is excellent for treatment of sleep apnea.  I have independently reviewed the smart card down load and discussed with the patient the download data and encouarged the patient to continue to use the device.The residual AHI is acceptable. The device is benefiting the patient and the device is medically necessary.  Without proper control of sleep apnea and good compliance there is a increased risk for hypertension, diabetes mellitus and nonrestorative sleep with hypersomnia which can increase risk for motor vehicle accidents.  Untreated sleep apnea is also a risk factor for development of atrial fibrillation, pulmonary hypertension and stroke. The patient is also instructed to get the supplies from the [employer] and and change them on a regular basis.  A prescription for supplies has been sent to the [employer] company.  I have also discussed the good sleep hygiene habits and adequate amount of sleep needed for good health.  · Obesity, class 1 with BMI is Body mass index is 29.5 kg/m².. I have discuss the relationship between the weight and sleep apnea. The benefit of weight loss in reducing severity of sleep apnea was discussed. Discussed diet and exercise with the patient to archive ideal BMI.  · Left nephrectomy  · Return in about 1 year (around 6/22/2022) for Annual visit with smartcard download. .  Patient's questions were answered.        Cristina Wolfe MD  Sleep Medicine  Medical Director for [facility]  6/22/2021

## 2021-06-22 NOTE — PLAN OF CARE
Goal Outcome Evaluation:  Plan of Care Reviewed With: patient, spouse        Progress: no change  Outcome Summary: Pt. admitted following cardiac procedure for overnight stay

## 2021-06-22 NOTE — H&P
Cardiology H&P      Patient Care Team:  Uri Cortes MD as PCP - General  Andre Veliz MD as Consulting Physician (Nephrology)  Anderson Galvez MD as Consulting Physician (Cardiology)  Ajit Quinones MD as Surgeon (Cardiothoracic Surgery)  Ofelia King, PT as Physical Therapist (Physical Therapy)  Lulu Jordan PT as Physical Therapist (Physical Therapy)  ADRIANO Erazo MD as Consulting Physician (Cardiology)  Cristina Wolfe MD as Consulting Physician (Sleep Medicine)    CHIEF COMPLAINT: Evaluate renal artery stenosis    HISTORY OF PRESENT ILLNESS:    Navneet is a pleasant 75-year-old male patient of Dr. Erazo.  He has past medical history significant for hypertension and known CAD status post coronary artery bypass grafting in 1995.  He also has a past medical history significant for sick sinus syndrome requiring pacemaker placement.  He has had worsening exertional dyspnea with class III-IV New York Heart Association symptoms.  His KC Q 12 questionnaire reveals a severe decrease in his overall quality of life.  This has been worsening since January 2021.  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     He is being referred for evaluation for transcatheter aortic valve replacement.  Dr. Quinones has already evaluated him and deemed him to be a better candidate for transcatheter aortic valve replacement.  I personally reviewed his echocardiogram from this year which showed severe aortic stenosis with preserved LV systolic function (aortic valve area 0.6 cm² with a V-max of 3.2 m/s.  Transesophageal echo was performed to determine the aortic valve orifice by planimetry.  It was determined to be 0.58 cm² with a V-max of 5.4 m/s.     Regarding his malignancy, per Dr. Dodd, his cancer is in remission and they were continuing chemotherapy as an adjunct for prevention.  His chemotherapy should  be finished in May.     He underwent transcatheter aortic valve replacement 3 32,026 mm Shah GABE transcatheter aortic valve.  He returns today for routine follow-up.  His KCC12Q questionnaire shows a significant improvement in his overall quality of life.  His 15 foot walk test took 5 seconds.  His shortness of air is resolved with New York Heart Association class I symptoms.    He is here today for evaluation of renal artery stenosis, nephrology primary cardiologist requested renal angiography, there is no contraindication from CV standpoint at this time.  No other complaints chest pain shortness of breath      Past Medical History:   Diagnosis Date   • Aortic stenosis 9/29/2015    Per Echo 2017   • Benign essential HTN    • Bundle branch block, right 2/7/2013   • CAD (coronary artery disease), native coronary artery    • Cancer (CMS/HCC)     bladder- chemo, new left renal mass   • Chronic kidney disease    • Coronary artery disease involving native coronary artery of native heart without angina pectoris 11/19/2019    CABG 1995; SVG to RCA is occluded, LIMA to LAD, SVG to diag of LAD, SVG to marginal of LCX   • COVID-19 vaccine administered    • Essential hypertension 11/19/2019   • Heart murmur    • History of transfusion    • Hyperlipidemia, mixed    • Mixed hyperlipidemia 11/19/2019   • Near syncope    • NATALIA (obstructive sleep apnea)     AHI 11.6/h, CPAP   • Premature ventricular contractions 2/11/2014   • Presence of cardiac pacemaker 7/5/2019    BS dual chamber PM 11/7/2016   • Shortness of breath    • Sick sinus syndrome (CMS/HCC)    • SSS (sick sinus syndrome) (CMS/HCC) 7/5/2019     Past Surgical History:   Procedure Laterality Date   • AORTIC VALVE REPAIR/REPLACEMENT N/A 3/30/2021    Procedure: TTE TRANSFEMORAL TRANSCATHETER AORTIC VALVE REPLACEMENT PERCUTANEOUS APPROACH;  Surgeon: Hang Martinez MD;  Location: [facility] OR 18/19;  Service: Cardiothoracic;  Laterality: N/A;   • AORTIC VALVE  REPAIR/REPLACEMENT N/A 3/30/2021    Procedure: Transfemoral Transcatheter Aortic Valve Replacement w/Intra Op TTE and Possible Open Rescue Surgery;  Surgeon: Anderson Galvez MD;  [facility] ;  Service: Cardiovascular;  Laterality: N/A;   • CARDIAC CATHETERIZATION     • CARDIAC CATHETERIZATION N/A 3/22/2021    Procedure: Left Heart Cath;  Surgeon: ADRIANO Erazo MD;  Location: [facility] CATH INVASIVE LOCATION;  Service: Cardiovascular;  Laterality: N/A;   • CARDIOVASCULAR STRESS TEST     • CORONARY ARTERY BYPASS GRAFT     • ECHO - CONVERTED     • NEPHROURETERECTOMY Left 2020    Procedure: NEPHROURETERECTOMY;  Surgeon: Rogers Bae MD;  Location: [facility] MAIN OR;  Service: Urology;  Laterality: Left;   • PACEMAKER IMPLANTATION      bs   • PORTACATH PLACEMENT       Family History   Problem Relation Age of Onset   • No Known Problems Mother    • Heart disease Father    • Heart failure Father    • Heart attack Father    • No Known Problems Sister    • No Known Problems Brother    • Malig Hyperthermia Neg Hx      Social History     Tobacco Use   • Smoking status: Former Smoker     Quit date:      Years since quittin.4   • Smokeless tobacco: Never Used   Vaping Use   • Vaping Use: Never used   Substance Use Topics   • Alcohol use: Yes     Alcohol/week: 1.0 standard drinks     Types: 1 Shots of liquor per week     Comment: RARE   • Drug use: Never     Medications Prior to Admission   Medication Sig Dispense Refill Last Dose   • acetaminophen (TYLENOL) 500 MG tablet Take 1,000 mg by mouth Every 6 (Six) Hours As Needed for Mild Pain .   2021 at Unknown time   • amLODIPine (NORVASC) 5 MG tablet Take 1 tablet by mouth Daily.   2021 at Unknown time   • aspirin 81 MG EC tablet Take 81 mg by mouth Daily.   2021 at Unknown time   • clopidogrel (PLAVIX) 75 MG tablet Take 1 tablet by mouth Daily for 90 days. 30 tablet 2 2021 at Unknown time   •  cyclobenzaprine (FLEXERIL) 5 MG tablet Take 5 mg by mouth Every Evening.   6/21/2021 at Unknown time   • folic acid (FOLVITE) 1 MG tablet Take 1 mg by mouth Daily.   6/21/2021 at Unknown time   • gabapentin (NEURONTIN) 800 MG tablet Take 800 mg by mouth 2 (Two) Times a Day.   6/21/2021 at Unknown time   • HYDROcodone-acetaminophen (NORCO) 5-325 MG per tablet Take 1 tablet by mouth 2 (Two) Times a Day.   6/21/2021 at Unknown time   • naproxen (NAPROSYN) 500 MG tablet Take 500 mg by mouth 2 (Two) Times a Day As Needed.   6/21/2021 at Unknown time   • simvastatin (ZOCOR) 20 MG tablet Take 10 mg by mouth Every Night.   6/21/2021 at Unknown time     Allergies:  Patient has no known allergies.    REVIEW OF SYSTEMS:  Please see the above history of present illness for pertinent positives and negatives.  The remainder of the patient's systems have been reviewed and are negative.     Vital Signs  Heart Rate:  [68] 68  BP: (134)/(79) 134/79         Physical Exam:  Physical Exam   Constitutional: Patient appears well-developed and well-nourished and in no acute distress   HEENT:   Head: Normocephalic and atraumatic.   Eyes:  Pupils are equal, round, and reactive to light. EOM are intact. Sclerae are anicteric and noninjected.  Mouth and Throat: Patient has moist mucous membranes. Oropharynx is clear of any erythema or exudate.     Neck: Neck supple. No JVD present. No thyromegaly present. No lymphadenopathy present.  Cardiovascular: Regular rate, regular rhythm, S1 normal and S2 normal.  Exam reveals no gallop and no friction rub.  No murmur heard.  Pulmonary/Chest: Lungs are clear to auscultation bilaterally. No respiratory distress. No wheezes. No rhonchi. No rales.   Abdominal: Soft. Bowel sounds are normal. No distension and no mass. There is no hepatosplenomegaly. There is no tenderness.   Musculoskeletal: Normal muscle tone  Extremities: No edema. Pulses are palpable in all 4 extremities.  Neurological: Patient is alert  and oriented to person, place, and time. Cranial nerves II-XII are grossly intact with no focal deficits.  Skin: Skin is warm. No rash noted. Nails show no clubbing.  No cyanosis or erythema.     Results Review:    I reviewed the patient's new clinical results.  Lab Results (most recent)     None          Imaging Results (Most Recent)     None        reviewed    ECG/EMG Results (most recent)     None        reviewed    Assessment/Plan   1. Nonrheumatic aortic valve stenosis  [place] silent post transcatheter aortic valve replacement     2. Coronary artery disease involving native coronary artery of native heart without angina pectoris  Clinically silent.  Continue medical therapy     3. Dyspnea on exertion  Resolved     4. Mixed hyperlipidemia  Well-controlled on medical therapy     5. Essential hypertension  Well-controlled on medical therapy        Moderate renal artery narrowing by ultrasound, nephrology requested angiogram with possible pressure measurement    Proceed with renal angiography today    Jesus Bhagat MD, PhD    I discussed the patient's findings and my recommendations with patient and staff    Jesus Bhagat MD  06/22/21  14:48 EDT    Addendum  Renal artery stenosis, 60 mm gradient on pressure assessment in the proximal right single remaining renal kidney arterial supply, status post Herculink 6 x 18 balloon expandable stent placement to nominal pressure at 5.9 mm great angiographic results 0% residual stenosis, no complications    Jesus Bhagat MD, PhD    Discharge tomorrow likely continue/  aspirin and Plavix

## 2021-06-23 NOTE — DISCHARGE SUMMARY
Navneet Lind  1945  [id number]        Cardiology discharge Summary    Date of Admission: 6/22/2021  Date of Discharge:  6/23/2021    Primary Discharge Diagnoses: CKD, hypertension, renal artery stenosis  Peripheral vascular disease        PCP  Patient Care Team:  Uri Cortes MD as PCP - General  Andre Veliz MD as Consulting Physician (Nephrology)  Anderson Galvez MD as Consulting Physician (Cardiology)  Ajit Quinones MD as Surgeon (Cardiothoracic Surgery)  Ofelia King, PT as Physical Therapist (Physical Therapy)  Lulu Jordan PT as Physical Therapist (Physical Therapy)  ADRIANO Erazo MD as Consulting Physician (Cardiology)  Cristina Wolfe MD as Consulting Physician (Sleep Medicine)    Consults:   Consults     Date and Time Order Name Status Description    6/22/2021  4:35 PM Inpatient Nephrology Consult            Operations and Procedures Performed:  Procedure(s):  RENAL ANGIOGRAPHY  Stent BMS peripheral  Angioplasty-peripheral     Cardiac Catheterization/Vascular Study    Result Date: 6/22/2021  Narrative:  Jesus Bhagat MD, PhD [facility] cardiology Date of service 6-22-21 Procedure 1.  Distal aortography high and low shots 2. Selective right renal artery angiography and stenting, Herculink 6.0 x 18 mm deployed at nominal pressure, reduction of stenosis from 80 to 90% to 0% residual Indication Renal artery stenosis, advancing CKD and hypertension, request by nephrologist for examination invasively after abnormal ultrasound with elevated velocities I performed consent the patient brought to the catheterization lab sterilely prepped and draped in usual fashion with exposure of the right groin for right common femoral arterial access via micropuncture modified Seldinger technique with placement of a 6 Upper sorbian sheath which was aspirated and flushed with heparinized saline.  An 035 guidewire was advanced to the aorta over  "which an Omni Flush catheter was advanced with selective aortography and high and low shots for localization of takeoff of renals as well as any juxtaposed arterial takeoffs with anatomic landmarks.  After localization of the single remaining kidney on the right with arterial supply and aortography demonstrating renal artery stenosis a 035 guidewire was advanced and a renal double curve was used to select the right renal followed by angiography, the catheter was advanced into the renal demonstrating greater than 60mmHg gradient with significant dampening and distally at least a 6 mm vessel angiographically.  Patient was heparinized ultimately with 90 units/kg for ACT rater than 250, a workhorse wire was placed to the right renal artery distally subsegmental branch over which a 4.5 x 15 mm NC balloon was used to predilate the lesion with no waist on 2 inflations including the ostium, next appropriate landmarks were selected and a 6.0 x 18 mm Herculink balloon expandable stent was deployed extending 1 stent strut past the ostium into the aorta with 2 inflations, first up to 8 to 10 marine followed by pullback of the stent balloon 1 strut with the second to nominal pressure, angiography revealed reduction of stenosis to 0% residual, the catheter was advanced freely through the stent with no observed gradient and after final angiography revealed no complications, no perforations, no dissection no distal wire trauma all catheter\" removed.  There were no complications.  Contrast 75 cc during the case.  6 Welsh Angio-Seal was used to close right common femoral arteriotomy on sheath removal with maintenance of distal pulses. Complications none Blood loss less than 5 cc Contrast 75 cc Moderate conscious sedation time of 30 minutes with IV Versed and fentanyl administered by registered nurse with complete ECG pulse oximetry and hemodynamic 90 throughout the entire the case observed by me Findings 1.  Opening aortic pressure of " 111/65 2.  Closing pressure was unchanged 3.  Patent distal aorta 4.  Single remaining right kidney with proximal greater than 80% stenosis with 60 mm gradient via catheter assessment status post intervention described above 0% residual stenosis Patient with Cath Lab chest pain-free hemodynamically electrically stable, complete hemostasis alert talking to staff neurologically grossly intact bilaterally. Please call with any questions or concerns Results of the case were discussed with ordering nephrologist Patient be observed overnight, nephrology consult, gentle IV fluids will be continued Jesus Bhagat MD, PhD      Allergies:  has No Known Allergies.    Discharge Medications:    amLODIPine, 5 mg, Oral, Daily  aspirin, 81 mg, Oral, Daily  atorvastatin, 20 mg, Oral, Nightly  clopidogrel, 75 mg, Oral, Daily  cyclobenzaprine, 5 mg, Oral, Q PM  folic acid, 1 mg, Oral, Daily  gabapentin, 700 mg, Oral, Q12H        History of Present Illness:  See H&P      Hospital Course  Patient was admitted, renal angiography revealed 80 to 90% renal artery stenosis with elevated gradient of 40 to 60 mmHg, uncomplicated stenting with Herculink 6 x 18 deployed at nominal pressure 2 inflations reduction the stenosis to 0% residual.  There were no complications.  He did well overnight and is ready for discharge today on aspirin and Plavix therapy.  He will follow up with nephrology as well as primary cardiologist as outpatient.    No contraindications to discharge today     medicines reviewed risk and benefits discussed with necessity of each discussed      Last Lab Results:   Lab Results (most recent)     Procedure Component Value Units Date/Time    Basic Metabolic Panel [[id number]]  (Abnormal) Collected: 06/23/21 0255    Specimen: Blood Updated: 06/23/21 0348     Glucose 105 mg/dL      BUN 17 mg/dL      Creatinine 1.11 mg/dL      Sodium 137 mmol/L      Potassium 4.4 mmol/L      Chloride 103 mmol/L      CO2 24.0 mmol/L      Calcium 9.1  mg/dL      eGFR Non African Amer 65 mL/min/1.73      BUN/Creatinine Ratio 15.3     Anion Gap 10.0 mmol/L     Narrative:      GFR Normal >60  Chronic Kidney Disease <60  Kidney Failure <15      CBC (No Diff) [[id number]]  (Abnormal) Collected: 06/23/21 0255    Specimen: Blood Updated: 06/23/21 0329     WBC 9.40 10*3/mm3      RBC 4.00 10*6/mm3      Hemoglobin 13.1 g/dL      Hematocrit 37.7 %      MCV 94.2 fL      MCH 32.7 pg      MCHC 34.7 g/dL      RDW 13.1 %      RDW-SD 43.3 fl      MPV 8.1 fL      Platelets 168 10*3/mm3     POC Activated Clotting Time [[id number]]  (Abnormal) Collected: 06/22/21 1613    Specimen: Arterial Blood Updated: 06/22/21 1630     Activated Clotting Time  285 Seconds      Comment: Serial Number: [id number]:  [id number]           Imaging Results (Most Recent)     None          PROCEDURES  Procedure(s):  RENAL ANGIOGRAPHY  Stent BMS peripheral  Angioplasty-peripheral    Condition on Discharge: Stable and ambulatory    Physical Exam at Discharge  Vital Signs  Temp:  [97.4 °F (36.3 °C)-98 °F (36.7 °C)] 97.6 °F (36.4 °C)  Heart Rate:  [63-85] 76  Resp:  [16-20] 16  BP: (110-158)/(55-83) 116/72    Physical Exam:  Physical Exam   Constitutional: Patient appears well-developed and well-nourished and in no acute distress   HEENT:   Head: Normocephalic and atraumatic.   Eyes:  Pupils are equal, round, and reactive to light. EOM are intact. Sclerae are anicteric and noninjected.  Mouth and Throat: Patient has moist mucous membranes. Oropharynx is clear of any erythema or exudate.     Neck: Neck supple. No JVD present. No thyromegaly present. No lymphadenopathy present.  Cardiovascular: Regular rate, regular rhythm, S1 normal and S2 normal.  Exam reveals no gallop and no friction rub.  No murmur heard.  Pulmonary/Chest: Lungs are clear to auscultation bilaterally. No respiratory distress. No wheezes. No rhonchi. No rales.   Abdominal: Soft. Bowel sounds are normal. No distension and no mass. There is no  hepatosplenomegaly. There is no tenderness.   Musculoskeletal: Normal muscle tone  Extremities: No edema. Pulses are palpable in all 4 extremities.  Neurological: Patient is alert and oriented to person, place, and time. Cranial nerves II-XII are grossly intact with no focal deficits.  Skin: Skin is warm. No rash noted. Nails show no clubbing.  No cyanosis or erythema.    Discharge Disposition  Home with timely follow-up  Discharge Diet:      Dietary Orders (From admission, onward)     Start     Ordered    06/22/21 1636  Diet Cardiac; Healthy Heart  Diet Effective Now     Question Answer Comment   Diet / Texture / Consistency Cardiac    Select Type: Healthy Heart        06/22/21 1635                Activity at Discharge:  As tolerated, no heavy lifting for 5 days    Pre-discharge education  Medications  Restrictions  When to re turn to the hospital for any concerning signs or symptoms  Follow-up      Follow-up Appointments  Future Appointments   Date Time Provider Department Center   7/15/2021  1:10 PM Noel Mauricio MD [facility] None   4/6/2022 10:45 AM Anderson Galvez MD [facility]   6/21/2022  9:15 AM Cristina Wolfe MD [facility]         Test Results Pending at Discharge       Jesus Bhagat MD  06/23/21  09:31 EDT    Greater than 30-minute spent on the discharge with patient encounter, med rec, charting, coordination of care, physical exam, med review with the patient

## 2021-06-23 NOTE — CASE MANAGEMENT/SOCIAL WORK
Case Management Discharge Note      Final Note: Discharged home prior to CM assessment         Selected Continued Care - Discharged on 6/23/2021 Admission date: 6/22/2021 - Discharge disposition: Home or Self Care     Final Discharge Disposition Code: 01 - home or self-care

## 2021-06-23 NOTE — PLAN OF CARE
Goal Outcome Evaluation:  Plan of Care Reviewed With: patient   Pt c/o pain during the night. Pain controlled with norco use. Will continue to monitor.

## 2021-06-23 NOTE — NURSING NOTE
Patient discharged home. Patient forgot angio-seal information here on unit. Notified wife and she will be coming by to get it.

## 2021-07-14 NOTE — PROGRESS NOTES
Chief Complaint  Consult (Ref by Dr. Loredo), Pulmonary Fibrosis, and Sleep Apnea  dyspnea  Subjective        Navneet Lind presents to [facility]  History of Present Illness  C/o dyspne for several months since he was diagnosed with renal CA s/p chemo.  He broke a vertebra June 2021 from osteoporosis  No cough    Patient Active Problem List   Diagnosis   • Presence of cardiac pacemaker   • Aortic stenosis   • Bundle branch block, right   • Coronary artery disease involving native coronary artery of native heart without angina pectoris   • Dyspnea on exertion   • Heart murmur   • Mixed hyperlipidemia   • Essential hypertension   • Impotence of organic origin   • Premature ventricular contractions   • NATALIA on CPAP   • Class 1 obesity due to excess calories without serious comorbidity with body mass index (BMI) of 31.0 to 31.9 in adult   • Preop cardiovascular exam   • Aortic valve regurgitation   • Anemia   • H/O radical nephrectomy   • Anemia due to chemotherapy   • Nonrheumatic aortic valve stenosis   • Pre-op examination   • Abnormal myocardial perfusion study   • Chronic diastolic congestive heart failure (CMS/HCC)   • Renal artery stenosis (CMS/HCC)     Current Outpatient Medications on File Prior to Visit   Medication Sig Dispense Refill   • acetaminophen (TYLENOL) 500 MG tablet Take 1,000 mg by mouth Every 6 (Six) Hours As Needed for Mild Pain .     • amLODIPine (NORVASC) 5 MG tablet Take 5 mg by mouth Daily.     • aspirin 81 MG EC tablet Take 81 mg by mouth Daily.     • clopidogrel (PLAVIX) 75 MG tablet Take 75 mg by mouth Daily.     • cyclobenzaprine (FLEXERIL) 5 MG tablet Take 5 mg by mouth Every Evening.     • docusate sodium (COLACE) 100 MG capsule Take 100 mg by mouth 2 (Two) Times a Day.     • folic acid (FOLVITE) 1 MG tablet Take 1 mg by mouth Daily.     • gabapentin (NEURONTIN) 800 MG tablet Take 800 mg by mouth 2 (Two) Times a Day.     • HYDROcodone-acetaminophen (NORCO)  7.5-325 MG per tablet Take 1 tablet by mouth Every 6 (Six) Hours As Needed.     • losartan (COZAAR) 25 MG tablet Take 25 mg by mouth Daily.     • naproxen (NAPROSYN) 500 MG tablet Take 500 mg by mouth 2 (Two) Times a Day As Needed.     • simvastatin (ZOCOR) 40 MG tablet Take 20 mg by mouth Every Night.     • traMADol (ULTRAM) 50 MG tablet        No current facility-administered medications on file prior to visit.       Objective   Vital Signs:   There were no vitals taken for this visit.    Physical Exam   Alert, no distress  Heart: RRR  Lungs : good effort, no wheezing few crackles in bases  Ext: no CCE  No arthritis or rash      CBC    CBC 6/10/21 6/16/21 6/23/21   WBC 17.02 (A) 11.64 (A) 9.40   RBC 4.67 4.36 4.00 (A)   Hemoglobin 15.2 14.1 13.1   Hematocrit 44.6 41.4 37.7   MCV 95.5 95.0 94.2   MCH 32.5 32.3 32.7   MCHC 34.1 34.1 34.7   RDW 12.1 (A) 11.9 (A) 13.1   Platelets 178 157 168   (A) Abnormal value            Data reviewed: Radiologic studies 2018          Assessment and Plan   Progressive dyspnea: probably due to a combination of ILD/lung fibrosis, vertebral fracture, heart disease and h/o smoking: pt is unable to do PFTs until his back pain is better, we will get high res CT and reassess, check connective tissue disease w/u  NATALIA on CPAP followed by Cristina Wolfe MD  Lung fibrosis  Lung nodule considered benign: 2018 CT 1. Interval progression of chronic interstitial lung disease.  2. Stable 6 mm left lower lobe pulmonary nodule as compared prior study from  2013, therefore considered benign.  No other pulmonary nodules or masses are  identified.  3. Right paratracheal adenopathy similar to prior study from 2013, probably  reactive.    Follow Up 3 wks.  No follow-ups on file.  Patient was given instructions and counseling regarding his condition or for health maintenance advice. Please see specific information pulled into the AVS if appropriate.

## 2021-07-14 NOTE — PROGRESS NOTES
Subjective:     Encounter Date:07/14/2021      Patient ID: Navneet Lind is a 75 y.o. male.    Chief Complaint:  Chief Complaint   Patient presents with   • Follow-up     Peripheral Angioplasty 6/22/2021   • Coronary Artery Disease   • Cardiac Valve Problem   • Shortness of Breath       HPI:  Navneet is a pleasant 75-year-old male patient.  He has past medical history significant for hypertension and known CAD status post coronary artery bypass grafting in 1995.  He also has a past medical history significant for sick sinus syndrome requiring pacemaker placement.  He has had worsening exertional dyspnea with class III-IV New York Heart Association symptoms.  His KCCQ 12 questionnaire reveals a severe decrease in his overall quality of life.  This has been worsening since January 2021.  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    He is being referred for evaluation for transcatheter aortic valve replacement.  Dr. Quinones has already evaluated him and deemed him to be a better candidate for transcatheter aortic valve replacement.  I personally reviewed his echocardiogram from this year which showed severe aortic stenosis with preserved LV systolic function (aortic valve area 0.6 cm² with a V-max of 3.2 m/s.  Transesophageal echo was performed to determine the aortic valve orifice by planimetry.  It was determined to be 0.58 cm² with a V-max of 5.4 m/s.    Regarding his malignancy, per Dr. Dodd, his cancer is in remission and they were continuing chemotherapy as an adjunct for prevention.  His chemotherapy should be finished in May.    He underwent transcatheter aortic valve replacement 3/31/2021 mm Shah GABE transcatheter aortic valve.  He returns today for routine follow-up.  His KCC12Q questionnaire shows a significant improvement in his overall quality of life.  His 15 foot walk test took 5 seconds.      He recently underwent right renal  artery stenting (Herculink bare-metal stent) by Dr. Bhagat 6/22/2021 for severe right renal artery stenosis.  He has been having worsening shortness of breath despite recent aortic valve replacement.  He does have a multifactorial past medical history contributing to shortness of air of which aortic stenosis was sudden large part contributing.  Causing his symptoms it appears that his recent spinal fracture requiring a thoracic harness has significantly compromised his ability to ventilate.  I would expect that he has a lot of atelectasis and has overall compromised ventilation as a primary contributor.  Otherwise he has no complaints from a cardiovascular standpoint.        The following portions of the patient's history were reviewed and updated as appropriate: allergies, current medications, past family history, past medical history, past social history, past surgical history and problem list.    Problem List:  Patient Active Problem List   Diagnosis   • Presence of cardiac pacemaker   • Aortic stenosis   • Bundle branch block, right   • Coronary artery disease involving native coronary artery of native heart without angina pectoris   • Dyspnea on exertion   • Heart murmur   • Mixed hyperlipidemia   • Essential hypertension   • Impotence of organic origin   • Premature ventricular contractions   • NATALIA on CPAP   • Class 1 obesity due to excess calories without serious comorbidity with body mass index (BMI) of 31.0 to 31.9 in adult   • Preop cardiovascular exam   • Aortic valve regurgitation   • Anemia   • H/O radical nephrectomy   • Anemia due to chemotherapy   • Nonrheumatic aortic valve stenosis   • Pre-op examination   • Abnormal myocardial perfusion study   • Chronic diastolic congestive heart failure (CMS/HCC)   • Renal artery stenosis (CMS/HCC)       Past Medical History:  Past Medical History:   Diagnosis Date   • Aortic stenosis 9/29/2015    Per Echo 2017   • Benign essential HTN    • Bundle branch block,  right 2/7/2013   • CAD (coronary artery disease), native coronary artery    • Cancer (CMS/[facility])     bladder- chemo, new left renal mass   • Chronic kidney disease    • Coronary artery disease involving native coronary artery of native heart without angina pectoris 11/19/2019    CABG 1995; SVG to RCA is occluded, LIMA to LAD, SVG to diag of LAD, SVG to marginal of LCX   • COVID-19 vaccine administered    • Essential hypertension 11/19/2019   • Heart murmur    • History of transfusion    • Hyperlipidemia, mixed    • Mixed hyperlipidemia 11/19/2019   • Near syncope    • NATALIA (obstructive sleep apnea)     AHI 11.6/h, CPAP   • Premature ventricular contractions 2/11/2014   • Presence of cardiac pacemaker 7/5/2019    BS dual chamber PM 11/7/2016   • Shortness of breath    • SSS (sick sinus syndrome) (CMS/[facility]) 7/5/2019       Past Surgical History:  Past Surgical History:   Procedure Laterality Date   • AORTIC VALVE REPAIR/REPLACEMENT N/A 3/30/2021    Procedure: TTE TRANSFEMORAL TRANSCATHETER AORTIC VALVE REPLACEMENT PERCUTANEOUS APPROACH;  Surgeon: Hang Martinez MD;  Location: [facility] OR 18/19;  Service: Cardiothoracic;  Laterality: N/A;   • AORTIC VALVE REPAIR/REPLACEMENT N/A 3/30/2021    Procedure: Transfemoral Transcatheter Aortic Valve Replacement w/Intra Op TTE and Possible Open Rescue Surgery;  Surgeon: Anderson Galvez MD;  Location: [facility] OR 18/19;  Service: Cardiovascular;  Laterality: N/A;   • CARDIAC CATHETERIZATION  2018   • CARDIAC CATHETERIZATION N/A 3/22/2021    Procedure: Left Heart Cath;  Surgeon: ADRIANO Erazo MD;  Location: [facility] CATH INVASIVE LOCATION;  Service: Cardiovascular;  Laterality: N/A;   • CARDIAC CATHETERIZATION N/A 6/22/2021    Procedure: RENAL ANGIOGRAPHY;  Surgeon: Jesus Bhagat MD;  Location: [facility] CATH INVASIVE LOCATION;  Service: Cardiovascular;  Laterality: N/A;   • CARDIAC CATHETERIZATION N/A 6/22/2021    Procedure: Stent BMS peripheral;   "Surgeon: Jesus Bhagat MD;  Location: [facility] CATH INVASIVE LOCATION;  Service: Cardiovascular;  Laterality: N/A;   rt renal   • CARDIAC CATHETERIZATION N/A 2021    Procedure: Angioplasty-peripheral;  Surgeon: Jesus Bhagat MD;  Location: [facility] CATH INVASIVE LOCATION;  Service: Cardiovascular;  Laterality: N/A;   rt renal   • CARDIOVASCULAR STRESS TEST     • CORONARY ARTERY BYPASS GRAFT     • ECHO - CONVERTED     • NEPHROURETERECTOMY Left 2020    Procedure: NEPHROURETERECTOMY;  Surgeon: Rogers Bae MD;  Location: [facility] MAIN OR;  Service: Urology;  Laterality: Left;   • PACEMAKER IMPLANTATION      bs   • PORTACATH PLACEMENT         Social History:  Social History     Socioeconomic History   • Marital status:      Spouse name: Not on file   • Number of children: Not on file   • Years of education: Not on file   • Highest education level: Not on file   Tobacco Use   • Smoking status: Former Smoker     Quit date:      Years since quittin.5   • Smokeless tobacco: Never Used   Vaping Use   • Vaping Use: Never used   Substance and Sexual Activity   • Alcohol use: Yes     Alcohol/week: 1.0 standard drinks     Types: 1 Shots of liquor per week     Comment: RARE   • Drug use: Never   • Sexual activity: Defer       Allergies:  No Known Allergies      Review of Symptoms:  Constitutional: Patient afebrile no chills or unexpected weight changes  Respiratory: No cough, no wheezing with worsening dyspnea  Cardiovascular: Today the patient complains of no chest pain, palpitations, with worsening dyspnea, orthopnea and no edema  Gastrointestinal: No nausea, vomiting, constipation or diarrhea.  No melena or dark stools    All other systems reviewed and are negative             Objective:         /78 (BP Location: Left arm, Patient Position: Sitting, Cuff Size: Large Adult)   Pulse 88   Resp 18   Ht 172.7 cm (68\")   Wt 88.5 kg (195 lb)   SpO2 95%   BMI " 29.65 kg/m²     Physical exam  Constitutional: well-nourished, and appears stated age in no acute distress  PERRL: Conjunctiva clear, no pallor, anicteric  HENMT: normocephalic, normal dentition, no cyanosis or pallor  Neck:no bruits, or thrills and bilateral normal carotid upstroke. Normal jugular venous pressure  Cardiovascular: No parasternal heaves an non-displaced focal PMI. Normal rate and rhythm: no rub, gallop, murmur or click and normal S1 and S2; no lower or upper extremity edema.   Lungs: unlabored, no wheezing with more sophia bilaterally but no rales or rhonchi on auscultation.  Extremities: Warm, no clubbing, cyanosis. Full and equal peripheral pulses in extremities with no bruits appreciated.   Abdomen: soft, non-tender, non-distended  Musculoskeletal: no joint tenderness or swelling and no erythema  Skin: Warm and dry, non-erythematous   Neuro:alert and normal affect. Oriented to time, place and person.           In-Office Procedure(s):  Procedures    ASCVD RIsk Score::  The ASCVD Risk score (Lincoln YOHANA Jr., et al., 2013) failed to calculate for the following reasons:    Cannot find a previous HDL lab    Cannot find a previous total cholesterol lab    Recent Radiology:  Imaging Results (Most Recent)     None          Lab Review:   Lab on 07/06/2021   Component Date Value   • Glucose 07/06/2021 117*   • BUN 07/06/2021 14    • Creatinine 07/06/2021 1.21    • Sodium 07/06/2021 139    • Potassium 07/06/2021 4.7    • Chloride 07/06/2021 104    • CO2 07/06/2021 25.9    • Calcium 07/06/2021 9.2    • eGFR Non  Amer 07/06/2021 59*   • BUN/Creatinine Ratio 07/06/2021 11.6    • Anion Gap 07/06/2021 9.1    Admission on 06/22/2021, Discharged on 06/23/2021   Component Date Value   • Activated Clotting Time  06/22/2021 285*   • WBC 06/23/2021 9.40    • RBC 06/23/2021 4.00*   • Hemoglobin 06/23/2021 13.1    • Hematocrit 06/23/2021 37.7    • MCV 06/23/2021 94.2    • MCH 06/23/2021 32.7    • MCHC 06/23/2021 34.7     • RDW 06/23/2021 13.1    • RDW-SD 06/23/2021 43.3    • MPV 06/23/2021 8.1    • Platelets 06/23/2021 168    • Glucose 06/23/2021 105*   • BUN 06/23/2021 17    • Creatinine 06/23/2021 1.11    • Sodium 06/23/2021 137    • Potassium 06/23/2021 4.4    • Chloride 06/23/2021 103    • CO2 06/23/2021 24.0    • Calcium 06/23/2021 9.1    • eGFR Non  Amer 06/23/2021 65    • BUN/Creatinine Ratio 06/23/2021 15.3    • Anion Gap 06/23/2021 10.0    Orders Only on 06/15/2021   Component Date Value   • Glucose 06/16/2021 117*   • BUN 06/16/2021 21    • Creatinine 06/16/2021 1.31*   • Sodium 06/16/2021 140    • Potassium 06/16/2021 4.6    • Chloride 06/16/2021 104    • CO2 06/16/2021 27.2    • Calcium 06/16/2021 9.3    • Total Protein 06/16/2021 6.7    • Albumin 06/16/2021 3.00*   • ALT (SGPT) 06/16/2021 15    • AST (SGOT) 06/16/2021 14    • Alkaline Phosphatase 06/16/2021 117    • Total Bilirubin 06/16/2021 0.2    • eGFR Non  Amer 06/16/2021 53*   • Globulin 06/16/2021 3.7    • A/G Ratio 06/16/2021 0.8    • BUN/Creatinine Ratio 06/16/2021 16.0    • Anion Gap 06/16/2021 8.8    • Protime 06/16/2021 10.9    • INR 06/16/2021 0.98    • WBC 06/16/2021 11.64*   • RBC 06/16/2021 4.36    • Hemoglobin 06/16/2021 14.1    • Hematocrit 06/16/2021 41.4    • MCV 06/16/2021 95.0    • MCH 06/16/2021 32.3    • MCHC 06/16/2021 34.1    • RDW 06/16/2021 11.9*   • RDW-SD 06/16/2021 40.7    • MPV 06/16/2021 10.6    • Platelets 06/16/2021 157    • Neutrophil % 06/16/2021 68.7    • Lymphocyte % 06/16/2021 20.2    • Monocyte % 06/16/2021 7.4    • Eosinophil % 06/16/2021 2.6    • Basophil % 06/16/2021 0.5    • Immature Grans % 06/16/2021 0.6*   • Neutrophils, Absolute 06/16/2021 8.00*   • Lymphocytes, Absolute 06/16/2021 2.35    • Monocytes, Absolute 06/16/2021 0.86    • Eosinophils, Absolute 06/16/2021 0.30    • Basophils, Absolute 06/16/2021 0.06    • Immature Grans, Absolute 06/16/2021 0.07*   • nRBC 06/16/2021 0.0    Lab on 06/10/2021    Component Date Value   • Creatine Kinase 06/10/2021 24    • Glucose 06/10/2021 90    • BUN 06/10/2021 25*   • Creatinine 06/10/2021 1.13    • Sodium 06/10/2021 140    • Potassium 06/10/2021 4.7    • Chloride 06/10/2021 104    • CO2 06/10/2021 28.4    • Calcium 06/10/2021 9.3    • Total Protein 06/10/2021 6.8    • Albumin 06/10/2021 3.00*   • ALT (SGPT) 06/10/2021 21    • AST (SGOT) 06/10/2021 17    • Alkaline Phosphatase 06/10/2021 123*   • Total Bilirubin 06/10/2021 0.2    • eGFR Non  Amer 06/10/2021 63    • Globulin 06/10/2021 3.8    • A/G Ratio 06/10/2021 0.8    • BUN/Creatinine Ratio 06/10/2021 22.1    • Anion Gap 06/10/2021 7.6    • PTH, Intact 06/10/2021 44.5    • TSH 06/10/2021 1.460    • Uric Acid 06/10/2021 5.7    • 25 Hydroxy, Vitamin D 06/10/2021 20.0*   • WBC 06/10/2021 17.02*   • RBC 06/10/2021 4.67    • Hemoglobin 06/10/2021 15.2    • Hematocrit 06/10/2021 44.6    • MCV 06/10/2021 95.5    • MCH 06/10/2021 32.5    • MCHC 06/10/2021 34.1    • RDW 06/10/2021 12.1*   • RDW-SD 06/10/2021 42.5    • MPV 06/10/2021 10.2    • Platelets 06/10/2021 178    • Neutrophil % 06/10/2021 71.7    • Lymphocyte % 06/10/2021 23.2    • Monocyte % 06/10/2021 4.0*   • Eosinophil % 06/10/2021 1.0    • Neutrophils Absolute 06/10/2021 12.20*   • Lymphocytes Absolute 06/10/2021 3.95*   • Monocytes Absolute 06/10/2021 0.68    • Eosinophils Absolute 06/10/2021 0.17    • RBC Morphology 06/10/2021 Normal    • WBC Morphology 06/10/2021 Normal    • Platelet Morphology 06/10/2021 Normal               Invalid input(s): ALKPO4                        Invalid input(s): LDLCALC                Assessment:          Diagnosis Plan   1. Nonrheumatic aortic valve stenosis     2. Coronary artery disease involving native coronary artery of native heart without angina pectoris     3. Renal artery stenosis (CMS/HCC)     4. Essential hypertension     5. Mixed hyperlipidemia            Plan:         1. Nonrheumatic aortic valve  stenosis  Clinically silent post transcatheter aortic valve replacement    2. Coronary artery disease involving native coronary artery of native heart without angina pectoris  Clinically silent.  Continue medical therapy secondary prevention    3. Renal artery stenosis (CMS/HCC)  Status post peripheral stent placement.  Stable creatinine with most recent creatinine post renal artery stenting and from 7/6/2021 1.11 mg/dL and 1.21 mg/dL, respectively.  Being followed by Dr. Veliz    4. Essential hypertension  Well-controlled on medical therapy    5. Mixed hyperlipidemia  Stable         Anderson Galvez MD  07/14/21  .

## 2021-08-23 NOTE — PROGRESS NOTES
PULMONARY/ CRITICAL CARE/ SLEEP MEDICINE OUTPATIENT CONSULT/ FOLLOW UP NOTE        Patient Name:  Navneet Lind    :  1945    Medical Record:  [id number]    PRIMARY CARE PHYSICIAN     Uri Cortes MD    REASON FOR CONSULTATION    Navneet Lind is a 75 y.o. male who is referred for consultation for ild  REVIEW OF SYSTEMS    Constitutional:  Denies fever or chills   Eyes:  Denies change in visual acuity   HENT:  Denies nasal congestion or sore throat   Respiratory:  Denies cough or shortness of breath   Cardiovascular:  Denies chest pain or edema   GI:  Denies abdominal pain, nausea, vomiting, bloody stools or diarrhea   :  Denies dysuria   Musculoskeletal:  Denies back pain or joint pain   Integument:  Denies rash   Neurologic:  Denies headache, focal weakness or sensory changes   Endocrine:  Denies polyuria or polydipsia   Lymphatic:  Denies swollen glands   Psychiatric:  Denies depression or anxiety     MEDICAL HISTORY    Past Medical History:   Diagnosis Date   • Aortic stenosis 2015    Per Echo 2017   • Benign essential HTN    • Bundle branch block, right 2013   • CAD (coronary artery disease), native coronary artery    • Cancer (CMS/[facility])     bladder- chemo, new left renal mass   • Chronic kidney disease    • Coronary artery disease involving native coronary artery of native heart without angina pectoris 2019    CABG 1995; SVG to RCA is occluded, LIMA to LAD, SVG to diag of LAD, SVG to marginal of LCX   • COVID-19 vaccine administered    • Essential hypertension 2019   • Heart murmur    • History of transfusion    • Hyperlipidemia, mixed    • Mixed hyperlipidemia 2019   • Near syncope    • NATALIA (obstructive sleep apnea)     AHI 11.6/h, CPAP   • Premature ventricular contractions 2014   • Presence of cardiac pacemaker 2019    BS dual chamber PM 2016   • Shortness of breath    • SSS (sick sinus syndrome) (CMS/[facility]) 2019        SURGICAL HISTORY     Past Surgical History:   Procedure Laterality Date   • AORTIC VALVE REPAIR/REPLACEMENT N/A 3/30/2021    Procedure: TTE TRANSFEMORAL TRANSCATHETER AORTIC VALVE REPLACEMENT PERCUTANEOUS APPROACH;  Surgeon: Hang Martinez MD;  Location: [facility] OR 18/19;  Service: Cardiothoracic;  Laterality: N/A;   • AORTIC VALVE REPAIR/REPLACEMENT N/A 3/30/2021    Procedure: Transfemoral Transcatheter Aortic Valve Replacement w/Intra Op TTE and Possible Open Rescue Surgery;  Surgeon: Anderson Galvez MD;  Location: [facility] OR 18/19;  Service: Cardiovascular;  Laterality: N/A;   • CARDIAC CATHETERIZATION  2018   • CARDIAC CATHETERIZATION N/A 3/22/2021    Procedure: Left Heart Cath;  Surgeon: ADRIANO Erazo MD;  Location: [facility] CATH INVASIVE LOCATION;  Service: Cardiovascular;  Laterality: N/A;   • CARDIAC CATHETERIZATION N/A 6/22/2021    Procedure: RENAL ANGIOGRAPHY;  Surgeon: Jesus Bhagat MD;  Location: [facility] CATH INVASIVE LOCATION;  Service: Cardiovascular;  Laterality: N/A;   • CARDIAC CATHETERIZATION N/A 6/22/2021    Procedure: Stent BMS peripheral;  Surgeon: Jesus Bhagat MD;  Location: [facility] CATH INVASIVE LOCATION;  Service: Cardiovascular;  Laterality: N/A;   rt renal   • CARDIAC CATHETERIZATION N/A 6/22/2021    Procedure: Angioplasty-peripheral;  Surgeon: Jesus Bhagat MD;  Location: [facility] CATH INVASIVE LOCATION;  Service: Cardiovascular;  Laterality: N/A;   rt renal   • CARDIOVASCULAR STRESS TEST  2017   • CORONARY ARTERY BYPASS GRAFT  1995   • ECHO - CONVERTED  2017   • NEPHROURETERECTOMY Left 11/2/2020    Procedure: NEPHROURETERECTOMY;  Surgeon: Rogers Bae MD;  Location: [facility] OR;  Service: Urology;  Laterality: Left;   • PACEMAKER IMPLANTATION  2016    bs   • PORTACATH PLACEMENT          FAMILY HISTORY    Family History   Problem Relation Age of Onset   • No Known Problems Mother    • Heart disease Father    • Heart failure Father    • Heart  attack Father    • No Known Problems Sister    • No Known Problems Brother    • Malig Hyperthermia Neg Hx        SOCIAL HISTORY    Social History     Tobacco Use   • Smoking status: Former Smoker     Quit date:      Years since quittin.6   • Smokeless tobacco: Never Used   Substance Use Topics   • Alcohol use: Yes     Alcohol/week: 1.0 standard drinks     Types: 1 Shots of liquor per week     Comment: RARE        ALLERGIES    No Known Allergies      MEDICATIONS    Current Outpatient Medications on File Prior to Visit   Medication Sig Dispense Refill   • acetaminophen (TYLENOL) 500 MG tablet Take 1,000 mg by mouth Every 6 (Six) Hours As Needed for Mild Pain .     • amLODIPine (NORVASC) 5 MG tablet Take 5 mg by mouth Daily.     • aspirin 81 MG EC tablet Take 81 mg by mouth Daily.     • clopidogrel (PLAVIX) 75 MG tablet Take 75 mg by mouth Daily.     • cyclobenzaprine (FLEXERIL) 5 MG tablet Take 5 mg by mouth Every Evening.     • docusate sodium (COLACE) 100 MG capsule Take 100 mg by mouth 2 (Two) Times a Day.     • folic acid (FOLVITE) 1 MG tablet Take 1 mg by mouth Daily.     • gabapentin (NEURONTIN) 800 MG tablet Take 800 mg by mouth 2 (Two) Times a Day.     • simvastatin (ZOCOR) 40 MG tablet Take 20 mg by mouth Every Night.     • traMADol (ULTRAM) 50 MG tablet      • [DISCONTINUED] HYDROcodone-acetaminophen (NORCO) 7.5-325 MG per tablet Take 1 tablet by mouth Every 6 (Six) Hours As Needed.     • [DISCONTINUED] losartan (COZAAR) 25 MG tablet Take 25 mg by mouth Daily.     • [DISCONTINUED] naproxen (NAPROSYN) 500 MG tablet Take 500 mg by mouth 2 (Two) Times a Day As Needed.       No current facility-administered medications on file prior to visit.       PHYSICAL EXAM    There were no vitals filed for this visit.     Constitutional:  Well developed, well nourished, no acute distress, non-toxic appearance   Eyes:  PERRL, conjunctiva normal   HENT:  Atraumatic, external ears normal, nose normal, oropharynx  moist, no pharyngeal exudates. mallampatti   Neck- normal range of motion, no tenderness, supple   Respiratory:  No respiratory distress, normal breath sounds, no rales, no wheezing   Cardiovascular:  Normal rate, normal rhythm, no murmurs, no gallops, no rubs   GI:  Soft, nondistended, normal bowel sounds, nontender, no organomegaly, no mass, no rebound, no guarding   :  No costovertebral angle tenderness   Musculoskeletal:  No edema, no tenderness, no deformities. Back- no tenderness  Integument:  Well hydrated, no rash   Lymphatic:  No lymphadenopathy noted   Neurologic:  Alert & oriented x 3, CN 2-12 normal, normal motor function, normal sensory function, no focal deficits noted   Psychiatric:  Speech and behavior appropriate     CT Chest Hi Resolution Diagnostic    Result Date: 7/23/2021  IMPRESSION : 1.Findings compatible with interstitial lung disease, which appears slightly progressed from prior CT. 2.No acute cardiopulmonary process. 3.Severe compression deformity at T7, new from prior CT from August 2018, but otherwise age-indeterminate. Correlate with point tenderness and history of recent trauma.  Electronically Signed By-Rupesh Norwood MD On:7/23/2021 7:38 AM This report was finalized on [id number] by  Rupesh Norwood MD.     Results for orders placed during the hospital encounter of 05/12/21    Adult Transthoracic Echo Complete W/ Cont if Necessary Per Protocol    Interpretation Summary  · Left ventricular wall thickness is consistent with hypertrophy. Sigmoid-shaped ventricular septum is present.  · Calculated left ventricular EF = 66% Estimated left ventricular EF was in agreement with the calculated left ventricular EF. Left ventricular systolic function is normal.  · Left ventricular diastolic function is consistent with (grade I) impaired relaxation.  · There is a TAVR valve present.  · The valve is well-seated. The leaflets appear thin and mobile.  · The maximum gradient across aortic  valve is 13.2 mmHg. The mean gradient is 6.5 mmHg. Aortic valve area is 1.7 cm². Normal parameters  · Mild mitral valve regurgitation is present.  · Mild tricuspid valve regurgitation is present.  · Estimated right ventricular systolic pressure from tricuspid regurgitation is normal (<35 mmHg).      ASSESSMENT & PLAN:        Progressive dyspnea: probably due to a combination of ILD/lung fibrosis, vertebral fracture,   heart disease   h/o smoking:   pt is unable to do PFTs until his back pain is better,   NATALIA on CPAP machine is more than 5 years old however patient does not want new machine is currently on CPAP at 12 cm and the residual AHI 0.3 and compliance is 97%    Plan  Reviewed the CAT scans from 2013 2018 in 2000 2021 as outlined below there is mild progression of interstitial lung disease  We will perform work-up to rule out infection etiology such as histoplasmosis and connective tissue disease and consider starting low-dose steroids on his follow-up          CT scan 7/25/2013       CT scan of the chest 8/27/2018    CT scan of the chest 7/22/2021        This document has been electronically signed by  Noel Mauricio MD  14:32 EDT

## 2021-09-15 NOTE — PROGRESS NOTES
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 Imaging shows a compression fracture at T7. Patient reports that the right leg also feels weak and like it will give out on him. Patient had a heart valve surgery around 2021 and has been doing well with that since.     Patient reports that he is sleeping in a recliner right now because he has a difficult time getting out of bed. Will sometimes need help getting out of the chair. Patient has wife to help him at home. Patient reports that he has neuropathy in both feet and needs help drying off from the shower.     Bed mobility, walking for long periods of time, sitting unsupported or in a plastic chair, standing for long periods of time, and dressing and bathing tend to aggravate symptoms into the thoracic spine and leg. Pain medication, ice, and muscle relaxers tend to reduce symptoms into the thoracic spine.    Patient is on lifting restrictions at the moment due to recent fracture.     Quality of life: fair    Pain  Current pain ratin  At best pain rating: 3  At worst pain rating: 10  Location: around T7 in thoracic spine  Quality: sharp  Relieving factors: ice and medications  Aggravating factors: overhead activity, squatting, lifting, stairs, prolonged positioning, movement, standing and sleeping  Progression: no change    Social Support  Lives with: spouse    Diagnostic Tests  X-ray: abnormal (T7 compression fracture)    Patient Goals  Patient goals for therapy:  decreased pain, improved balance, increased motion, increased strength, independence with ADLs/IADLs and return to sport/leisure activities             Objective          Postural Observations  Seated posture: poor  Standing posture: poor    Additional Postural Observation Details  Hyperkyphosis in sitting and standing; forward head posture, rounded shoulders, flattened lumbar spine    Palpation     Additional Palpation Details  Hypertonic/tender: thoracic multifidi around T7    Tenderness   Cervical Spine   Tenderness in the spinous process.     Additional Tenderness Details  Around T7    Active Range of Motion     Additional Active Range of Motion Details  Excessive thoracic flexion; unable to achieve neutral thoracic    Strength/Myotome Testing     Left Hip   Planes of Motion   Flexion: 4    Right Hip   Planes of Motion   Flexion: 4    Left Knee   Flexion: 4+  Extension: 4+    Right Knee   Flexion: 4+  Extension: 4+    Left Ankle/Foot   Dorsiflexion: 4+    Right Ankle/Foot   Dorsiflexion: 4+    Ambulation     Quality of Movement During Gait   Trunk  Forward lean.     Knee    Knee (Left): Positive increased flexion during stance and stiff knee.     Functional Assessment     Comments  Bed mobility: SBA sit to sidelying  -side to supine: Ingrid  -supine to side: Ingrid  -sidelying to sit: modA          Assessment & Plan     Assessment  Impairments: abnormal coordination, abnormal muscle firing, abnormal muscle tone, abnormal or restricted ROM, activity intolerance, impaired physical strength, lacks appropriate home exercise program and pain with function  Assessment details: The patient is a 75 y.o. male who presents to physical therapy today with orders to address pain in the thoracic spine. Upon initial evaluation, the patient demonstrates the following impairments: increased pain, increased muscle tone, abnormal posture, decreased strength, decreased joint mobility, and decreased ROM. Due to these impairments, the  patient is unable to lay in bed, perform bed mobility, stand or walk for long periods of time, sit unsupported, and dress/bathe without an increase in symptoms. The patient would benefit from skilled PT services to address functional limitations and impairments and to improve patient quality of life.      Prognosis: fair  Functional Limitations: carrying objects, lifting, walking, uncomfortable because of pain, moving in bed, sitting, standing and reaching overhead  Goals  Plan Goals: STG's: 2 weeks  Patient will report a decrease in pain by 25%   Patient will be able to pick a light (<3#) object off the floor without an increase in pain  Patient will be able to perform HEP with minimal verbal cues    LTG's: By discharge  Patient will report a decrease in pain by 75%  Patient will demonstrate an improvement in overall function as measured by an improvement in the Oswestry Disability Index score   Patient will be able to sleep > 5 hours without waking from pain  Patient will be able to tolerate standing for > 30 minutes without increased pain  Patient will be able to tolerate sitting for > 30 minutes without increased pain  Patient will be able to ambulate community distances without increased pain  Patient will be independent with final HEP       Plan  Therapy options: will be seen for skilled physical therapy services  Planned modality interventions: cryotherapy, electrical stimulation/Russian stimulation, TENS and thermotherapy (hydrocollator packs)  Planned therapy interventions: abdominal trunk stabilization, ADL retraining, balance/weight-bearing training, flexibility, functional ROM exercises, home exercise program, IADL retraining, joint mobilization, manual therapy, neuromuscular re-education, postural training, soft tissue mobilization, spinal/joint mobilization, strengthening, stretching, therapeutic activities and body mechanics training  Frequency: 2x week  Duration in visits: 12  Duration in weeks:  6  Treatment plan discussed with: patient        History # of Personal Factors and/or Comorbidities: HIGH (3+)  Examination of Body System(s): # of elements: HIGH (4+)  Clinical Presentation: UNSTABLE   Clinical Decision Making: HIGH      Timed:         Manual Therapy:         mins  30937;     Therapeutic Exercise:    5     mins  40488;     Neuromuscular Mal:        mins  70710;    Therapeutic Activity:     10     mins  07564;     Gait Training:           mins  18028;     Ultrasound:          mins  70056;    Ionto                                   mins   32721  Self Care                            mins   37206  Canalith Repos         mins 44264      Un-Timed:  Electrical Stimulation:         mins  69842 ( );  Dry Needling          mins self-pay  Traction          mins 29497  Low Eval          Mins  68047  Mod Eval          Mins  72372  High Eval                       20     Mins  42266  Re-Eval                               mins  47953        Timed Treatment:   15   mins   Total Treatment:     35   mins    PT SIGNATURE: Ofelia King PT   DATE TREATMENT INITIATED: 9/15/2021    Medicare Initial Certification  Certification Period: 12/14/2021  I certify that the therapy services are furnished while this patient is under my care.  The services outlined above are required by this patient, and will be reviewed every 90 days.     PHYSICIAN: Juan Vega PA      DATE:     Please sign and return via fax to [phone].. Thank you, [facility].

## 2021-09-20 NOTE — PROGRESS NOTES
Physical Therapy Daily Progress Note    VISIT#: 2    Subjective   Navneet Lind reports that his back is very sore today, in the same spot. Patient reports that he can do the other exercises except the ones rocking the knees side to side.   Pain Ratin    Objective     See Exercise, Manual, and Modality Logs for complete treatment.     Patient Education: exercise cueing and rationale, HEP modification    Assessment & Plan     Assessment  Assessment details: The patient's HEP was modified to allow patient to perform lower trunk rotations; patient stated his physician told him not to perform exercises due to twisting restrictions. Patient was educated to move from hips only and to reduce ROM in order to stay within parameters. Patient tolerated added exercises well at today's visit. PT ended treatment time early today due to patient's lack of endurance, PT to reassess at next visit.         Progress per Plan of Care and Progress strengthening /stabilization /functional activity          Timed:         Manual Therapy:         mins  43641;     Therapeutic Exercise:    12     mins  09008;     Neuromuscular Mal:   10     mins  68261;    Therapeutic Activity:     10     mins  44513;     Gait Training:           mins  83837;     Ultrasound:          mins  35627;    Ionto                                   mins   66984  Self Care                            mins   36276  Canalith Repos                   mins  43986    Un-Timed:  Electrical Stimulation:         mins  15292 ( );  Dry Needling          mins self-pay  Traction          mins 04339  Low Eval          Mins  80278  Mod Eval          Mins  74355  High Eval                            Mins  18384  Re-Eval                               mins  69396    Timed Treatment:   32   mins   Total Treatment:     32   mins    Ofelia King, PT  Physical Therapist  [place] License # [id number]

## 2021-09-22 NOTE — PROGRESS NOTES
Physical Therapy Daily Progress Note    VISIT#: 3    Subjective   Navneet Lind reports that he is feeling good today, his back is about the same. He didn't have any soreness after last therapy session.   Pain Ratin    Objective     See Exercise, Manual, and Modality Logs for complete treatment.     Patient Education: exercise cueing and rationale    Assessment & Plan     Assessment  Assessment details: The patient presents to therapy today with no changes in pain in the thoracolumbar spine. Patient tolerated added exercises well today, did attempt supine piriformis stretch. Patient reported discomfort in the lumbar spine and did not wish to attempt exercise. PT to continue progressing per patient tolerance.         Progress per Plan of Care and Progress strengthening /stabilization /functional activity          Timed:         Manual Therapy:         mins  20433;     Therapeutic Exercise:    13     mins  57278;     Neuromuscular Mal:    10    mins  80783;    Therapeutic Activity:     10     mins  92687;     Gait Training:           mins  48712;     Ultrasound:          mins  67443;    Ionto                                   mins   81895  Self Care                            mins   91916  Canalith Repos                   mins  85732    Un-Timed:  Electrical Stimulation:         mins  22136 ( );  Dry Needling          mins self-pay  Traction          mins 56558  Low Eval          Mins  95112  Mod Eval          Mins  58920  High Eval                            Mins  97140  Re-Eval                               mins  25977    Timed Treatment:   33   mins   Total Treatment:     33   mins    Ofelia King PT  Physical Therapist  [place] License # [id number]

## 2021-09-27 NOTE — PROGRESS NOTES
Physical Therapy Daily Progress Note    Pt arrives 18 mins late, thus abbreviated session. Pt educated on attendance policy and offered reminder calls (declines).    VISIT#: 4    Subjective   Navneet Lind reports he feels OK today. Also states that he fell on Saturday when he was attempting to don his shoes. He was in a squatting position when he lost his balance forward and hit his right knee on the ground. A friend was present and assisted with coming to a stand. Did have trouble moving his knee following, but this resolved about 15 mins following the incident.     Pain Ratin    Objective     See Exercise, Manual, and Modality Logs for complete treatment.     Patient Education: exercise cueing and rationale    Assessment & Plan     Assessment  Assessment details: Pt feels as though he is making progress with therapy, but reports remaining sedentary at home. Requires constant cues for technique of ex's and with reps. Encouraged to assist his spouse with light household duties to increase his activity at home. Also educated to perform HEP 1 x/day to increase activity.        Goals  Plan Goals: STG's: 2 weeks  Patient will report a decrease in pain by 25%   Patient will be able to pick a light (<3#) object off the floor without an increase in pain  Patient will be able to perform HEP with minimal verbal cues    LTG's: By discharge  Patient will report a decrease in pain by 75%  Patient will demonstrate an improvement in overall function as measured by an improvement in the Oswestry Disability Index score   Patient will be able to sleep > 5 hours without waking from pain  Patient will be able to tolerate standing for > 30 minutes without increased pain  Patient will be able to tolerate sitting for > 30 minutes without increased pain  Patient will be able to ambulate community distances without increased pain  Patient will be independent with final HEP     Progress per Plan of Care and Progress strengthening  /stabilization /functional activity          Timed:         Manual Therapy:         mins  06552;     Therapeutic Exercise:    30     mins  36441;     Neuromuscular Mal:      mins  98938;    Therapeutic Activity:          mins  49501;     Gait Training:           mins  30546;     Ultrasound:          mins  76917;    Ionto                                   mins   89052  Self Care                            mins   44619  Canalith Repos                   mins  36793    Un-Timed:  Electrical Stimulation:         mins  33614 ( );  Dry Needling          mins self-pay  Traction          mins 49400  Low Eval          Mins  15308  Mod Eval          Mins  65656  High Eval                            Mins  78755  Re-Eval                               mins  03140    Timed Treatment:   30   mins   Total Treatment:     30   mins    Vidhi Daugherty PTA  Physical Therapist Assistant

## 2021-09-29 NOTE — PROGRESS NOTES
Physical Therapy Daily Progress Note      VISIT#: 5    Subjective   Navneet Lind he felt good following last session his calves were a little sore, but that resolved pretty quickly.      Pain Rating: unable to rate.     Objective     See Exercise, Manual, and Modality Logs for complete treatment.     Patient Education: exercise cueing and rationale    Assessment & Plan     Assessment  Assessment details: Mr. Lind presents to PT this date with improved mobility and low pain. He is ambulating at a quicker pace and his transitions from sitting<>standing are a bit smoother today. Addition of recumbent bike as a warm up for session and for cardiovascual wellness. Good tolerance to all with mild fatigue at end of session and no increased levels of pain reported.       Goals  Plan Goals: STG's: 2 weeks  Patient will report a decrease in pain by 25%   Patient will be able to pick a light (<3#) object off the floor without an increase in pain  Patient will be able to perform HEP with minimal verbal cues    LTG's: By discharge  Patient will report a decrease in pain by 75%  Patient will demonstrate an improvement in overall function as measured by an improvement in the Oswestry Disability Index score   Patient will be able to sleep > 5 hours without waking from pain  Patient will be able to tolerate standing for > 30 minutes without increased pain  Patient will be able to tolerate sitting for > 30 minutes without increased pain  Patient will be able to ambulate community distances without increased pain  Patient will be independent with final HEP     Progress per Plan of Care and Progress strengthening /stabilization /functional activity          Timed:         Manual Therapy:         mins  09683;     Therapeutic Exercise:    30     mins  76011;     Neuromuscular Mal:   8   mins  61583;    Therapeutic Activity:     5     mins  07340;     Gait Training:           mins  32474;     Ultrasound:          mins  05996;    Ionto                                    mins   73085  Self Care                            mins   05153  Canalith Repos                   mins  98731    Un-Timed:  Electrical Stimulation:         mins  95721 ( );  Dry Needling          mins self-pay  Traction          mins 96862  Low Eval          Mins  40034  Mod Eval          Mins  52618  High Eval                            Mins  95144  Re-Eval                               mins  78320    Timed Treatment:   43   mins   Total Treatment:     43   mins    Vidhi Daugherty PTA  Physical Therapist Assistant

## 2021-10-04 NOTE — PROGRESS NOTES
"Physical Therapy Daily Progress Note      VISIT#: 6    Subjective   Navneet Lind \"I would like to get flexible enough to put my socks on my feet\"      Pain Rating: \"it's fine\"     Objective     See Exercise, Manual, and Modality Logs for complete treatment.     Patient Education: exercise cueing and rationale    Assessment & Plan     Assessment  Assessment details: Continues to demo improvements with transitions to/from sit/supine and increasing tolerance to activity overall. He is able to reach down toward his feet and bring feet to lap, and is encouraged to attempt don/doff shoes/socks on own with either method while sitting. Reports his back feels \"tight\" at end of session, but does not state if his pain is any worse or better.       Goals  Plan Goals: STG's: 2 weeks  Patient will report a decrease in pain by 25%   Patient will be able to pick a light (<3#) object off the floor without an increase in pain  Patient will be able to perform HEP with minimal verbal cues    LTG's: By discharge  Patient will report a decrease in pain by 75%  Patient will demonstrate an improvement in overall function as measured by an improvement in the Oswestry Disability Index score   Patient will be able to sleep > 5 hours without waking from pain  Patient will be able to tolerate standing for > 30 minutes without increased pain  Patient will be able to tolerate sitting for > 30 minutes without increased pain  Patient will be able to ambulate community distances without increased pain  Patient will be independent with final HEP     Progress per Plan of Care and Progress strengthening /stabilization /functional activity          Timed:         Manual Therapy:         mins  00514;     Therapeutic Exercise:    30     mins  63503;     Neuromuscular Mal:      mins  53263;    Therapeutic Activity:     10     mins  62906;     Gait Training:           mins  56791;     Ultrasound:          mins  05328;    Ionto                              "      mins   43394  Self Care                            mins   84644  Canalith Repos                   mins  54563    Un-Timed:  Electrical Stimulation:         mins  70179 ( );  Dry Needling          mins self-pay  Traction          mins 30201  Low Eval          Mins  57732  Mod Eval          Mins  05966  High Eval                            Mins  44803  Re-Eval                               mins  49953    Timed Treatment:   40   mins   Total Treatment:     40   mins    Vidhi Daugherty PTA  Physical Therapist Assistant

## 2021-10-06 NOTE — PROGRESS NOTES
"Physical Therapy Daily Progress Note      VISIT#: 7    Subjective   Navneet Lind \"My wife says I am walking better, and I feel like I am too\". Reports that he was able to cook a meal for his family and has been a bit more active. He does continue to utilize the motorized cart for grocery shopping and reports that the majority of his time is spent in his recliner chair as that is the most comfortable for him.      Pain Rating: does not rate, increased with unsupported sitting, not as bad with laying or standing.    Objective     See Exercise, Manual, and Modality Logs for complete treatment.     Patient Education: exercise cueing and rationale    Assessment & Plan     Assessment  Assessment details: Addition of thoracic extension and rotation ex's for improved mobility of midspine. Pt is able to perform with some increase in discomfort due to stretch, but otherwise does not increase his pain significantly. Progressed with UE strengthening and postural correction activities. His standing more erect with increasing gait speed and transfers from sit<>stand continue to improve with each session, but supine>sit remains challenging. Goals assessed below and remain appropriate at this time.     Goals  Plan Goals: Goals  Plan Goals: STG's: 2 weeks  Patient will report a decrease in pain by 25%- NOT MET  Patient will be able to pick a light (<3#) object off the floor without an increase in pain - MET  Patient will be able to perform HEP with minimal verbal cues - PROGRESSING    LTG's: By discharge  Patient will report a decrease in pain by 75% - NOT MET  Patient will demonstrate an improvement in overall function as measured by an improvement in the Oswestry Disability Index score   Patient will be able to sleep > 5 hours without waking from pain - NOT MET  Patient will be able to tolerate standing for > 30 minutes without increased pain - NOT MET  Patient will be able to tolerate sitting for > 30 minutes without increased " "pain - PROGRESSING- dependent on the chair, but cannot tolerate >10 mins in a \"normal chair\"  Patient will be able to ambulate community distances without increased pain - NOT MET  Patient will be independent with final HEP- NOT MET      Progress per Plan of Care and Progress strengthening /stabilization /functional activity          Timed:         Manual Therapy:         mins  60489;     Therapeutic Exercise:    30     mins  52716;     Neuromuscular Mal:      mins  86519;    Therapeutic Activity:     10     mins  16669;     Gait Training:           mins  65075;     Ultrasound:          mins  01011;    Ionto                                   mins   32367  Self Care                            mins   62397  Canalith Repos                   mins  32503    Un-Timed:  Electrical Stimulation:         mins  25526 ( );  Dry Needling          mins self-pay  Traction          mins 56494  Low Eval          Mins  94425  Mod Eval          Mins  82083  High Eval                            Mins  02093  Re-Eval                               mins  50822    Timed Treatment:   40   mins   Total Treatment:     40   mins    Vidhi Daugherty PTA  Physical Therapist Assistant   "

## 2021-10-13 NOTE — PROGRESS NOTES
Physical Therapy Daily Progress Note    VISIT#: 8    Subjective   Navneet Lind reports that last night he had a spell of vertigo when he was sitting watching TV. He reports that his vision starting spinning a little bit, no reports of dizziness at all. No reports of stroke symptoms. He reports that he is unable to sleep in the bed right now due to strength, the bed is too soft.     Objective     See Exercise, Manual, and Modality Logs for complete treatment.     Patient Education: portable bed rails, bed mobility, dizziness/vertigo and causes    Assessment & Plan     Assessment  Assessment details: Patient presents to PT today with reports of vertigo the previous day. Patient did not report any stroke like symptoms, was educated on stroke symptoms and going to the ER if he has any of them. Patient and PT discussed bed mobility and purchasing a railing for home to help. PT focused on bed mobility exercises, patient tolerated well.         Progress per Plan of Care and Progress strengthening /stabilization /functional activity    Timed:         Manual Therapy:         mins  85263;     Therapeutic Exercise:    15     mins  33405;     Neuromuscular Mal:    10    mins  12021;    Therapeutic Activity:     15     mins  59200;     Gait Training:           mins  08955;     Ultrasound:          mins  86237;    Ionto                                   mins   00753  Self Care                            mins   48955  Canalith Repos                   mins  34570    Un-Timed:  Electrical Stimulation:         mins  06812 ( );  Dry Needling          mins self-pay  Traction          mins 41784  Low Eval          Mins  22520  Mod Eval          Mins  00676  High Eval                            Mins  08303  Re-Eval                               mins  50191    Timed Treatment:   40   mins   Total Treatment:     40   mins    Ofelia King, PT  Physical Therapist  [place] License # [id number]

## 2021-10-18 NOTE — TELEPHONE ENCOUNTER
PATIENT EITHER CRACKED OR BROKE A RIB LAST WEEK WITH US PRATICING GETTING IN AND OUT OF BED HE SAID HE WILL CALL BACK TO SCHEDULE WHEN FEELING BETTER

## 2021-11-15 NOTE — PROGRESS NOTES
Discharge Summary  Discharge Summary from Physical Therapy Report      Dates  PT visit: 9/15/2021 to 10/13/2021  Number of Visits: 8     Discharge Status of Patient: discharged    Goals: unable to assess    Discharge Plan: Continue with current home exercise program as instructed    Comments: The patient is being discharged today due to department policy of inactivity over 30 days. Patient had been given HEP for continued self care.     Date of Discharge 11/15/2021        Ofelia King, PT  Physical Therapist

## 2021-11-29 NOTE — PROGRESS NOTES
PULMONARY/ CRITICAL CARE/ SLEEP MEDICINE OUTPATIENT CONSULT/ FOLLOW UP NOTE        Patient Name:  Navneet Lind    :  1945    Medical Record:  [id number]    PRIMARY CARE PHYSICIAN     Uri Cortes MD    REASON FOR CONSULTATION    Navneet Lind is a 75 y.o. male who is referred for consultation for interstitial lung disease and NATALIA  REVIEW OF SYSTEMS    Constitutional:  Denies fever or chills   Eyes:  Denies change in visual acuity   HENT:  Denies nasal congestion or sore throat   Respiratory:  Denies cough or shortness of breath   Cardiovascular:  Denies chest pain or edema   GI:  Denies abdominal pain, nausea, vomiting, bloody stools or diarrhea   :  Denies dysuria   Musculoskeletal:  Denies back pain or joint pain   Integument:  Denies rash   Neurologic:  Denies headache, focal weakness or sensory changes   Endocrine:  Denies polyuria or polydipsia   Lymphatic:  Denies swollen glands   Psychiatric:  Denies depression or anxiety     MEDICAL HISTORY    Past Medical History:   Diagnosis Date   • Aortic stenosis 2015    Per Echo 2017   • Benign essential HTN    • Bundle branch block, right 2013   • CAD (coronary artery disease), native coronary artery    • Cancer (HCC)     bladder- chemo, new left renal mass   • Chronic kidney disease    • Coronary artery disease involving native coronary artery of native heart without angina pectoris 2019    CABG ; SVG to RCA is occluded, LIMA to LAD, SVG to diag of LAD, SVG to marginal of LCX   • COVID-19 vaccine administered    • Essential hypertension 2019   • Heart murmur    • History of transfusion    • Hyperlipidemia, mixed    • Injury of back    • Mixed hyperlipidemia 2019   • Near syncope    • NATALIA (obstructive sleep apnea)     AHI 11.6/h, CPAP   • Premature ventricular contractions 2014   • Presence of cardiac pacemaker 2019    BS dual chamber PM 2016   • Shortness of breath    • SSS (sick sinus  syndrome) ([facility]) 7/5/2019        SURGICAL HISTORY    Past Surgical History:   Procedure Laterality Date   • AORTIC VALVE REPAIR/REPLACEMENT N/A 3/30/2021    Procedure: TTE TRANSFEMORAL TRANSCATHETER AORTIC VALVE REPLACEMENT PERCUTANEOUS APPROACH;  Surgeon: Hang Martinez MD;  Location: [facility] OR 18/19;  Service: Cardiothoracic;  Laterality: N/A;   • AORTIC VALVE REPAIR/REPLACEMENT N/A 3/30/2021    Procedure: Transfemoral Transcatheter Aortic Valve Replacement w/Intra Op TTE and Possible Open Rescue Surgery;  Surgeon: Anderson Galvez MD;  Location: [facility] OR 18/19;  Service: Cardiovascular;  Laterality: N/A;   • CARDIAC CATHETERIZATION  2018   • CARDIAC CATHETERIZATION N/A 3/22/2021    Procedure: Left Heart Cath;  Surgeon: ADRIANO Erazo MD;  Location: [facility] CATH INVASIVE LOCATION;  Service: Cardiovascular;  Laterality: N/A;   • CARDIAC CATHETERIZATION N/A 6/22/2021    Procedure: RENAL ANGIOGRAPHY;  Surgeon: Jesus Bhagat MD;  Location: [facility] CATH INVASIVE LOCATION;  Service: Cardiovascular;  Laterality: N/A;   • CARDIAC CATHETERIZATION N/A 6/22/2021    Procedure: Stent BMS peripheral;  Surgeon: Jesus Bhagat MD;  Location: [facility] CATH INVASIVE LOCATION;  Service: Cardiovascular;  Laterality: N/A;   rt renal   • CARDIAC CATHETERIZATION N/A 6/22/2021    Procedure: Angioplasty-peripheral;  Surgeon: Jesus Bhagat MD;  Location: [facility] CATH INVASIVE LOCATION;  Service: Cardiovascular;  Laterality: N/A;   rt renal   • CARDIOVASCULAR STRESS TEST  2017   • CORONARY ARTERY BYPASS GRAFT  1995   • ECHO - CONVERTED  2017   • NEPHROURETERECTOMY Left 11/2/2020    Procedure: NEPHROURETERECTOMY;  Surgeon: Rogers Bae MD;  Location: [facility] OR;  Service: Urology;  Laterality: Left;   • PACEMAKER IMPLANTATION  2016    bs   • PORTACATH PLACEMENT          FAMILY HISTORY    Family History   Problem Relation Age of Onset   • No Known Problems Mother    • Heart  disease Father    • Heart failure Father    • Heart attack Father    • No Known Problems Sister    • No Known Problems Brother    • Malig Hyperthermia Neg Hx        SOCIAL HISTORY    Social History     Tobacco Use   • Smoking status: Former Smoker     Years: 5.00     Quit date:      Years since quittin.9   • Smokeless tobacco: Never Used   Substance Use Topics   • Alcohol use: Yes     Alcohol/week: 1.0 standard drink     Types: 1 Shots of liquor per week     Comment: RARE        ALLERGIES    No Known Allergies      MEDICATIONS    Current Outpatient Medications on File Prior to Visit   Medication Sig Dispense Refill   • acetaminophen (TYLENOL) 500 MG tablet Take 1,000 mg by mouth Every 6 (Six) Hours As Needed for Mild Pain .     • amLODIPine (NORVASC) 5 MG tablet Take 5 mg by mouth Daily.     • aspirin 81 MG EC tablet Take 81 mg by mouth Daily.     • clopidogrel (PLAVIX) 75 MG tablet Take 75 mg by mouth Daily.     • cyclobenzaprine (FLEXERIL) 5 MG tablet Take 5 mg by mouth Every Evening.     • docusate sodium (COLACE) 100 MG capsule Take 100 mg by mouth 2 (Two) Times a Day.     • famotidine (PEPCID) 40 MG tablet Take 1 tablet by mouth Daily As Needed.     • folic acid (FOLVITE) 1 MG tablet Take 1 mg by mouth Daily.     • gabapentin (NEURONTIN) 800 MG tablet Take 800 mg by mouth 2 (Two) Times a Day.     • ondansetron (ZOFRAN) 4 MG tablet Take 1 tablet by mouth As Needed.     • simvastatin (ZOCOR) 40 MG tablet Take 20 mg by mouth Every Night.     • traMADol (ULTRAM) 50 MG tablet      • ipratropium-albuterol (COMBIVENT RESPIMAT)  MCG/ACT inhaler Inhale 1 puff 4 (Four) Times a Day As Needed for Wheezing. 4 g 11     No current facility-administered medications on file prior to visit.       PHYSICAL EXAM    Vitals:    21 1328   BP: 125/75   BP Location: Left arm   Patient Position: Sitting   Cuff Size: Adult   Pulse: 79   Resp: 12   Temp: 98 °F (36.7 °C)   TempSrc: Oral   SpO2: 95%   Weight: 78.9 kg  "(174 lb)   Height: 172.7 cm (68\")        Constitutional:  Well developed, well nourished, no acute distress, non-toxic appearance   Eyes:  PERRL, conjunctiva normal   HENT:  Atraumatic, external ears normal, nose normal, oropharynx moist, no pharyngeal exudates. mallampatti   Neck- normal range of motion, no tenderness, supple   Respiratory:  No respiratory distress, normal breath sounds, no rales, no wheezing   Cardiovascular:  Normal rate, normal rhythm, no murmurs, no gallops, no rubs   GI:  Soft, nondistended, normal bowel sounds, nontender, no organomegaly, no mass, no rebound, no guarding   :  No costovertebral angle tenderness   Musculoskeletal:  No edema, no tenderness, no deformities. Back- no tenderness  Integument:  Well hydrated, no rash   Lymphatic:  No lymphadenopathy noted   Neurologic:  Alert & oriented x 3, CN 2-12 normal, normal motor function, normal sensory function, no focal deficits noted   Psychiatric:  Speech and behavior appropriate     No radiology results for the last 90 days.   Results for orders placed during the hospital encounter of 05/12/21    Adult Transthoracic Echo Complete W/ Cont if Necessary Per Protocol    Interpretation Summary  · Left ventricular wall thickness is consistent with hypertrophy. Sigmoid-shaped ventricular septum is present.  · Calculated left ventricular EF = 66% Estimated left ventricular EF was in agreement with the calculated left ventricular EF. Left ventricular systolic function is normal.  · Left ventricular diastolic function is consistent with (grade I) impaired relaxation.  · There is a TAVR valve present.  · The valve is well-seated. The leaflets appear thin and mobile.  · The maximum gradient across aortic valve is 13.2 mmHg. The mean gradient is 6.5 mmHg. Aortic valve area is 1.7 cm². Normal parameters  · Mild mitral valve regurgitation is present.  · Mild tricuspid valve regurgitation is present.  · Estimated right ventricular systolic pressure " from tricuspid regurgitation is normal (<35 mmHg).      ASSESSMENT & PLAN:        Progressive dyspnea: probably due to a combination of ILD/lung fibrosis, vertebral fracture,   heart disease   h/o smoking:   pt is unable to do PFTs until his back pain is better,   NATALIA on CPAP machine is more than 5 years old currently on CPAP at 12 cm and the residual AHI 0.3 and compliance is 97%     Plan  Reviewed the CAT scans from 2013 2018 in 2000 2021 as outlined below there is mild progression of interstitial lung disease  Start Ofev 100 mg twice daily with monthly CBC and CMP  Prednisone 5 mg daily for 530 days only and monitor response    Pulmonary function test  Work-up for connective tissue disease show borderline C ANCA 1:20 we will repeat the level after 3 months     Ordering new CPAP machine 8-20 auto  Discussed with patient recall  Patient has been compliant with his old machine and benefiting        CT scan 7/25/2013                                            CT scan of the chest 8/27/2018   CT scan of the chest 7/22/2021              This document has been electronically signed by  Noel Mauricio MD  13:33 EST

## 2021-12-06 NOTE — TELEPHONE ENCOUNTER
S/W wife and let her know that I will mail patient assistance forms for Navneet to fill out for his OFEV.

## 2021-12-20 NOTE — TELEPHONE ENCOUNTER
I rcv'd patient assistance application with his signature and will fax to the [facility] support program. Also s/w wife

## 2021-12-22 NOTE — NURSING NOTE
Pt's oxygen turned back down to 3L via N/C. Dr. Triana continuing to obtain multiple core biopsy samples for pathologist.

## 2021-12-22 NOTE — NURSING NOTE
Dr. Triana is using CT fluoro guidance to casie area on left middle back for left juan m-aortic lymph node biopsy.

## 2021-12-22 NOTE — NURSING NOTE
Pt was brought to CT dept via stretcher, on room air and cardiac monitor, with face mask in place. Pt is alert and oriented x3. Pt skin is flesh, warm, and dry. Pt is relaxed and cooperative. Pt was educated on left juan m-aortic lymph node biopsy procedure along with medications used during procedure and voiced understanding. Pt's wife is at bedside talking with physician as well.

## 2021-12-22 NOTE — NURSING NOTE
Dr. Triana has guide needle in place and is beginning to obtain core biopsy samples for pathologist. See MD dictation for procedural specific information.

## 2021-12-22 NOTE — DISCHARGE INSTRUCTIONS
A responsible adult should stay with you and you should rest quietly for the rest of the day. Do not drink alcohol, drive or cook for 24 hours following your procedure.  Progress your diet as tolerated.  Resume your usual medications including aspirin.  When you remove your dressing in 48 hours, a small amount of blood is to be expected. Do not be alarmed.  If you feel it is bleeding excessively apply pressure and proceed to the Emergency room.  Do not shower, bath, or get your dressing wet at all for 48 hours.  You may shower after the dressing is removed. No lifting more that 10 pounds for 48 hours.  If severe pain, increased shortness of air or racing heartbeat occur, seek immediate medical attention.  Follow up with Dr. Loredo for results.

## 2021-12-22 NOTE — NURSING NOTE
Pt's oxygen sat dropped to 88% on 3L oxygen via N/C; oxygen increased to 5L and pt quickly recovered into 90's. Pt will sluggishly respond to verbal cues.

## 2021-12-22 NOTE — NURSING NOTE
Dr. Triana numbed area on left back and is beginning to place guide needle using CT fluoro guidance.

## 2021-12-22 NOTE — NURSING NOTE
Pt transferred back to IR post op for recovery period, via stretcher and on 2L oxygen via N/C, in stable condition. Wife is at bedside. Pt remains on cardiac monitor. Pt will arouse to verbal stimuli but remains drowsy. Dressing is dry and intact.

## 2021-12-22 NOTE — NURSING NOTE
Local dressing of bacitracin, 4x4, and tegaderm applied to left back. Dressing is labeled, dry and intact.

## 2021-12-30 PROBLEM — Z95.2 S/P AVR (AORTIC VALVE REPLACEMENT): Chronic | Status: ACTIVE | Noted: 2021-01-01

## 2021-12-30 PROBLEM — Z98.890 S/P KYPHOPLASTY: Status: ACTIVE | Noted: 2021-01-01

## 2021-12-30 PROBLEM — C67.9 BLADDER CARCINOMA (HCC): Status: ACTIVE | Noted: 2021-01-01

## 2021-12-30 PROBLEM — J18.9 PNEUMONIA OF RIGHT UPPER LOBE DUE TO INFECTIOUS ORGANISM: Status: ACTIVE | Noted: 2021-01-01

## 2021-12-30 PROBLEM — J84.9 ILD (INTERSTITIAL LUNG DISEASE): Status: ACTIVE | Noted: 2021-01-01

## 2022-01-01 ENCOUNTER — READMISSION MANAGEMENT (OUTPATIENT)
Dept: CALL CENTER | Facility: HOSPITAL | Age: 77
End: 2022-01-01

## 2022-01-01 ENCOUNTER — APPOINTMENT (OUTPATIENT)
Dept: GENERAL RADIOLOGY | Facility: HOSPITAL | Age: 77
End: 2022-01-01

## 2022-01-01 ENCOUNTER — HOME CARE VISIT (OUTPATIENT)
Dept: HOME HEALTH SERVICES | Facility: HOME HEALTHCARE | Age: 77
End: 2022-01-01

## 2022-01-01 ENCOUNTER — HOSPITAL ENCOUNTER (INPATIENT)
Facility: HOSPITAL | Age: 77
LOS: 9 days | Discharge: HOME HEALTH | End: 2022-03-03
Attending: EMERGENCY MEDICINE | Admitting: INTERNAL MEDICINE

## 2022-01-01 ENCOUNTER — OFFICE VISIT (OUTPATIENT)
Dept: CARDIOLOGY | Facility: CLINIC | Age: 77
End: 2022-01-01

## 2022-01-01 ENCOUNTER — ANESTHESIA (OUTPATIENT)
Dept: GASTROENTEROLOGY | Facility: HOSPITAL | Age: 77
End: 2022-01-01

## 2022-01-01 ENCOUNTER — HOME HEALTH ADMISSION (OUTPATIENT)
Dept: HOME HEALTH SERVICES | Facility: HOME HEALTHCARE | Age: 77
End: 2022-01-01

## 2022-01-01 ENCOUNTER — ANESTHESIA EVENT (OUTPATIENT)
Dept: GASTROENTEROLOGY | Facility: HOSPITAL | Age: 77
End: 2022-01-01

## 2022-01-01 ENCOUNTER — TRANSCRIBE ORDERS (OUTPATIENT)
Dept: HOME HEALTH SERVICES | Facility: HOME HEALTHCARE | Age: 77
End: 2022-01-01

## 2022-01-01 ENCOUNTER — APPOINTMENT (OUTPATIENT)
Dept: CT IMAGING | Facility: HOSPITAL | Age: 77
End: 2022-01-01

## 2022-01-01 ENCOUNTER — HOSPITAL ENCOUNTER (INPATIENT)
Facility: HOSPITAL | Age: 77
LOS: 3 days | Discharge: HOSPICE | End: 2022-03-11
Attending: EMERGENCY MEDICINE | Admitting: INTERNAL MEDICINE

## 2022-01-01 ENCOUNTER — HOSPITAL ENCOUNTER (OUTPATIENT)
Dept: GENERAL RADIOLOGY | Facility: HOSPITAL | Age: 77
Discharge: HOME | End: 2022-02-02
Admitting: FAMILY MEDICINE

## 2022-01-01 ENCOUNTER — TELEPHONE (OUTPATIENT)
Dept: PULMONOLOGY | Facility: HOSPITAL | Age: 77
End: 2022-01-01

## 2022-01-01 ENCOUNTER — TRANSCRIBE ORDERS (OUTPATIENT)
Dept: ADMINISTRATIVE | Facility: HOSPITAL | Age: 77
End: 2022-01-01

## 2022-01-01 VITALS
HEIGHT: 68 IN | SYSTOLIC BLOOD PRESSURE: 128 MMHG | OXYGEN SATURATION: 100 % | BODY MASS INDEX: 22.92 KG/M2 | HEART RATE: 105 BPM | TEMPERATURE: 96.6 F | DIASTOLIC BLOOD PRESSURE: 75 MMHG | WEIGHT: 151.24 LBS | RESPIRATION RATE: 27 BRPM

## 2022-01-01 VITALS
HEART RATE: 84 BPM | BODY MASS INDEX: 25.39 KG/M2 | RESPIRATION RATE: 19 BRPM | HEIGHT: 66 IN | OXYGEN SATURATION: 95 % | DIASTOLIC BLOOD PRESSURE: 58 MMHG | TEMPERATURE: 98.4 F | WEIGHT: 158 LBS | SYSTOLIC BLOOD PRESSURE: 124 MMHG

## 2022-01-01 VITALS
OXYGEN SATURATION: 94 % | DIASTOLIC BLOOD PRESSURE: 79 MMHG | HEIGHT: 68 IN | WEIGHT: 145.72 LBS | RESPIRATION RATE: 28 BRPM | HEART RATE: 101 BPM | BODY MASS INDEX: 22.09 KG/M2 | TEMPERATURE: 97.7 F | SYSTOLIC BLOOD PRESSURE: 120 MMHG

## 2022-01-01 VITALS
HEART RATE: 85 BPM | OXYGEN SATURATION: 94 % | TEMPERATURE: 97.9 F | DIASTOLIC BLOOD PRESSURE: 76 MMHG | SYSTOLIC BLOOD PRESSURE: 128 MMHG

## 2022-01-01 VITALS
DIASTOLIC BLOOD PRESSURE: 68 MMHG | TEMPERATURE: 97.3 F | SYSTOLIC BLOOD PRESSURE: 116 MMHG | HEART RATE: 95 BPM | OXYGEN SATURATION: 93 %

## 2022-01-01 VITALS
RESPIRATION RATE: 17 BRPM | TEMPERATURE: 98.2 F | OXYGEN SATURATION: 95 % | DIASTOLIC BLOOD PRESSURE: 60 MMHG | HEART RATE: 74 BPM | SYSTOLIC BLOOD PRESSURE: 140 MMHG

## 2022-01-01 VITALS
HEART RATE: 100 BPM | TEMPERATURE: 97.3 F | SYSTOLIC BLOOD PRESSURE: 132 MMHG | DIASTOLIC BLOOD PRESSURE: 82 MMHG | OXYGEN SATURATION: 96 %

## 2022-01-01 VITALS
SYSTOLIC BLOOD PRESSURE: 124 MMHG | TEMPERATURE: 97.3 F | OXYGEN SATURATION: 93 % | DIASTOLIC BLOOD PRESSURE: 78 MMHG | HEART RATE: 83 BPM

## 2022-01-01 VITALS
HEIGHT: 66 IN | HEART RATE: 109 BPM | RESPIRATION RATE: 18 BRPM | BODY MASS INDEX: 24.75 KG/M2 | DIASTOLIC BLOOD PRESSURE: 74 MMHG | OXYGEN SATURATION: 97 % | WEIGHT: 154 LBS | SYSTOLIC BLOOD PRESSURE: 102 MMHG

## 2022-01-01 VITALS — HEART RATE: 67 BPM | DIASTOLIC BLOOD PRESSURE: 70 MMHG | OXYGEN SATURATION: 92 % | SYSTOLIC BLOOD PRESSURE: 118 MMHG

## 2022-01-01 VITALS
SYSTOLIC BLOOD PRESSURE: 124 MMHG | TEMPERATURE: 96.9 F | DIASTOLIC BLOOD PRESSURE: 78 MMHG | HEART RATE: 85 BPM | OXYGEN SATURATION: 96 %

## 2022-01-01 VITALS
HEART RATE: 105 BPM | OXYGEN SATURATION: 95 % | TEMPERATURE: 97.8 F | SYSTOLIC BLOOD PRESSURE: 108 MMHG | DIASTOLIC BLOOD PRESSURE: 80 MMHG

## 2022-01-01 VITALS
DIASTOLIC BLOOD PRESSURE: 80 MMHG | HEART RATE: 97 BPM | OXYGEN SATURATION: 94 % | RESPIRATION RATE: 16 BRPM | SYSTOLIC BLOOD PRESSURE: 112 MMHG | TEMPERATURE: 97.9 F

## 2022-01-01 VITALS
TEMPERATURE: 97.6 F | HEART RATE: 85 BPM | OXYGEN SATURATION: 95 % | DIASTOLIC BLOOD PRESSURE: 62 MMHG | SYSTOLIC BLOOD PRESSURE: 100 MMHG

## 2022-01-01 DIAGNOSIS — J18.9 PNEUMONIA DUE TO INFECTIOUS ORGANISM, UNSPECIFIED LATERALITY, UNSPECIFIED PART OF LUNG: Primary | ICD-10-CM

## 2022-01-01 DIAGNOSIS — J84.9 ILD (INTERSTITIAL LUNG DISEASE): ICD-10-CM

## 2022-01-01 DIAGNOSIS — R04.2 HEMOPTYSIS: ICD-10-CM

## 2022-01-01 DIAGNOSIS — F41.1 GAD (GENERALIZED ANXIETY DISORDER): ICD-10-CM

## 2022-01-01 DIAGNOSIS — I10 ESSENTIAL HYPERTENSION: ICD-10-CM

## 2022-01-01 DIAGNOSIS — Z95.2 S/P AVR (AORTIC VALVE REPLACEMENT): Chronic | ICD-10-CM

## 2022-01-01 DIAGNOSIS — D72.829 LEUKOCYTOSIS, UNSPECIFIED TYPE: ICD-10-CM

## 2022-01-01 DIAGNOSIS — Z47.89 ORTHOPEDIC AFTERCARE: Primary | ICD-10-CM

## 2022-01-01 DIAGNOSIS — G89.4 CHRONIC PAIN SYNDROME: ICD-10-CM

## 2022-01-01 DIAGNOSIS — R04.2 HEMOPTYSIS: Primary | ICD-10-CM

## 2022-01-01 DIAGNOSIS — J18.9 PNEUMONIA DUE TO INFECTIOUS ORGANISM, UNSPECIFIED LATERALITY, UNSPECIFIED PART OF LUNG: ICD-10-CM

## 2022-01-01 DIAGNOSIS — I70.1 RENAL ARTERY STENOSIS: ICD-10-CM

## 2022-01-01 DIAGNOSIS — J84.9 ILD (INTERSTITIAL LUNG DISEASE): Chronic | ICD-10-CM

## 2022-01-01 DIAGNOSIS — A41.9 SEPSIS, DUE TO UNSPECIFIED ORGANISM, UNSPECIFIED WHETHER ACUTE ORGAN DYSFUNCTION PRESENT: Primary | ICD-10-CM

## 2022-01-01 DIAGNOSIS — I25.10 CORONARY ARTERY DISEASE INVOLVING NATIVE CORONARY ARTERY OF NATIVE HEART WITHOUT ANGINA PECTORIS: Primary | ICD-10-CM

## 2022-01-01 LAB
ALBUMIN SERPL-MCNC: 2 G/DL (ref 3.5–5.2)
ALBUMIN SERPL-MCNC: 2 G/DL (ref 3.5–5.2)
ALBUMIN SERPL-MCNC: 2.1 G/DL (ref 3.5–5.2)
ALBUMIN SERPL-MCNC: 2.1 G/DL (ref 3.5–5.2)
ALBUMIN SERPL-MCNC: 2.2 G/DL (ref 3.5–5.2)
ALBUMIN SERPL-MCNC: 2.3 G/DL (ref 3.5–5.2)
ALBUMIN SERPL-MCNC: 2.4 G/DL (ref 3.5–5.2)
ALBUMIN SERPL-MCNC: 2.4 G/DL (ref 3.5–5.2)
ALBUMIN SERPL-MCNC: 2.5 G/DL (ref 3.5–5.2)
ALBUMIN/GLOB SERPL: 0.5 G/DL
ALBUMIN/GLOB SERPL: 0.6 G/DL
ALBUMIN/GLOB SERPL: 0.7 G/DL
ALP SERPL-CCNC: 111 U/L (ref 39–117)
ALP SERPL-CCNC: 114 U/L (ref 39–117)
ALP SERPL-CCNC: 124 U/L (ref 39–117)
ALP SERPL-CCNC: 130 U/L (ref 39–117)
ALP SERPL-CCNC: 156 U/L (ref 39–117)
ALP SERPL-CCNC: 161 U/L (ref 39–117)
ALP SERPL-CCNC: 162 U/L (ref 39–117)
ALP SERPL-CCNC: 168 U/L (ref 39–117)
ALP SERPL-CCNC: 205 U/L (ref 39–117)
ALT SERPL W P-5'-P-CCNC: 122 U/L (ref 1–41)
ALT SERPL W P-5'-P-CCNC: 152 U/L (ref 1–41)
ALT SERPL W P-5'-P-CCNC: 188 U/L (ref 1–41)
ALT SERPL W P-5'-P-CCNC: 220 U/L (ref 1–41)
ALT SERPL W P-5'-P-CCNC: 243 U/L (ref 1–41)
ALT SERPL W P-5'-P-CCNC: 36 U/L (ref 1–41)
ALT SERPL W P-5'-P-CCNC: 78 U/L (ref 1–41)
ALT SERPL W P-5'-P-CCNC: 83 U/L (ref 1–41)
ALT SERPL W P-5'-P-CCNC: 84 U/L (ref 1–41)
ANION GAP SERPL CALCULATED.3IONS-SCNC: 10 MMOL/L (ref 5–15)
ANION GAP SERPL CALCULATED.3IONS-SCNC: 11 MMOL/L (ref 5–15)
ANION GAP SERPL CALCULATED.3IONS-SCNC: 12 MMOL/L (ref 5–15)
ANION GAP SERPL CALCULATED.3IONS-SCNC: 12 MMOL/L (ref 5–15)
ANION GAP SERPL CALCULATED.3IONS-SCNC: 14 MMOL/L (ref 5–15)
ANION GAP SERPL CALCULATED.3IONS-SCNC: 8 MMOL/L (ref 5–15)
ANION GAP SERPL CALCULATED.3IONS-SCNC: 9 MMOL/L (ref 5–15)
ANISOCYTOSIS BLD QL: ABNORMAL
APTT PPP: 26.4 SECONDS (ref 24–31)
APTT PPP: 26.9 SECONDS (ref 24–31)
ARTERIAL PATENCY WRIST A: POSITIVE
ARTERIAL PATENCY WRIST A: POSITIVE
AST SERPL-CCNC: 28 U/L (ref 1–40)
AST SERPL-CCNC: 32 U/L (ref 1–40)
AST SERPL-CCNC: 32 U/L (ref 1–40)
AST SERPL-CCNC: 37 U/L (ref 1–40)
AST SERPL-CCNC: 45 U/L (ref 1–40)
AST SERPL-CCNC: 55 U/L (ref 1–40)
AST SERPL-CCNC: 57 U/L (ref 1–40)
AST SERPL-CCNC: 67 U/L (ref 1–40)
AST SERPL-CCNC: 84 U/L (ref 1–40)
ATMOSPHERIC PRESS: ABNORMAL MM[HG]
ATMOSPHERIC PRESS: ABNORMAL MM[HG]
B PARAPERT DNA SPEC QL NAA+PROBE: NOT DETECTED
B PERT DNA SPEC QL NAA+PROBE: NOT DETECTED
BACTERIA SPEC AEROBE CULT: NORMAL
BACTERIA SPEC RESP CULT: NORMAL
BACTERIA SPEC RESP CULT: NORMAL
BASE EXCESS BLDA CALC-SCNC: -0.3 MMOL/L (ref 0–3)
BASE EXCESS BLDA CALC-SCNC: 1.8 MMOL/L (ref 0–3)
BASOPHILS # BLD AUTO: 0 10*3/MM3 (ref 0–0.2)
BASOPHILS # BLD AUTO: 0.1 10*3/MM3 (ref 0–0.2)
BASOPHILS # BLD AUTO: 0.1 10*3/MM3 (ref 0–0.2)
BASOPHILS NFR BLD AUTO: 0.1 % (ref 0–1.5)
BASOPHILS NFR BLD AUTO: 0.2 % (ref 0–1.5)
BASOPHILS NFR BLD AUTO: 0.2 % (ref 0–1.5)
BASOPHILS NFR BLD AUTO: 0.3 % (ref 0–1.5)
BASOPHILS NFR BLD AUTO: 0.4 % (ref 0–1.5)
BASOPHILS NFR BLD AUTO: 0.5 % (ref 0–1.5)
BDY SITE: ABNORMAL
BDY SITE: ABNORMAL
BILIRUB SERPL-MCNC: 0.4 MG/DL (ref 0–1.2)
BILIRUB SERPL-MCNC: 0.5 MG/DL (ref 0–1.2)
BILIRUB SERPL-MCNC: 0.6 MG/DL (ref 0–1.2)
BILIRUB SERPL-MCNC: 0.8 MG/DL (ref 0–1.2)
BILIRUB SERPL-MCNC: 0.9 MG/DL (ref 0–1.2)
BUN SERPL-MCNC: 18 MG/DL (ref 8–23)
BUN SERPL-MCNC: 20 MG/DL (ref 8–23)
BUN SERPL-MCNC: 21 MG/DL (ref 8–23)
BUN SERPL-MCNC: 22 MG/DL (ref 8–23)
BUN SERPL-MCNC: 23 MG/DL (ref 8–23)
BUN SERPL-MCNC: 23 MG/DL (ref 8–23)
BUN SERPL-MCNC: 24 MG/DL (ref 8–23)
BUN SERPL-MCNC: 29 MG/DL (ref 8–23)
BUN SERPL-MCNC: 30 MG/DL (ref 8–23)
BUN SERPL-MCNC: 34 MG/DL (ref 8–23)
BUN SERPL-MCNC: 41 MG/DL (ref 8–23)
BUN SERPL-MCNC: 42 MG/DL (ref 8–23)
BUN/CREAT SERPL: 24.1 (ref 7–25)
BUN/CREAT SERPL: 28.6 (ref 7–25)
BUN/CREAT SERPL: 33.3 (ref 7–25)
BUN/CREAT SERPL: 35 (ref 7–25)
BUN/CREAT SERPL: 36 (ref 7–25)
BUN/CREAT SERPL: 39 (ref 7–25)
BUN/CREAT SERPL: 40 (ref 7–25)
BUN/CREAT SERPL: 41.4 (ref 7–25)
BUN/CREAT SERPL: 42 (ref 7–25)
BUN/CREAT SERPL: 44.2 (ref 7–25)
BUN/CREAT SERPL: 46.2 (ref 7–25)
BUN/CREAT SERPL: 47.1 (ref 7–25)
BUN/CREAT SERPL: 53.8 (ref 7–25)
BUN/CREAT SERPL: 54.8 (ref 7–25)
C PNEUM DNA NPH QL NAA+NON-PROBE: NOT DETECTED
C-ANCA TITR SER IF: NORMAL TITER
CALCIUM SPEC-SCNC: 7.7 MG/DL (ref 8.6–10.5)
CALCIUM SPEC-SCNC: 7.8 MG/DL (ref 8.6–10.5)
CALCIUM SPEC-SCNC: 7.9 MG/DL (ref 8.6–10.5)
CALCIUM SPEC-SCNC: 8.3 MG/DL (ref 8.6–10.5)
CALCIUM SPEC-SCNC: 8.6 MG/DL (ref 8.6–10.5)
CALCIUM SPEC-SCNC: 8.7 MG/DL (ref 8.6–10.5)
CALCIUM SPEC-SCNC: 8.8 MG/DL (ref 8.6–10.5)
CALCIUM SPEC-SCNC: 8.9 MG/DL (ref 8.6–10.5)
CALCIUM SPEC-SCNC: 9.1 MG/DL (ref 8.6–10.5)
CALCIUM SPEC-SCNC: 9.3 MG/DL (ref 8.6–10.5)
CALCIUM SPEC-SCNC: 9.4 MG/DL (ref 8.6–10.5)
CALCIUM SPEC-SCNC: 9.5 MG/DL (ref 8.6–10.5)
CALCIUM SPEC-SCNC: 9.8 MG/DL (ref 8.6–10.5)
CALCIUM SPEC-SCNC: 9.9 MG/DL (ref 8.6–10.5)
CHLORIDE SERPL-SCNC: 101 MMOL/L (ref 98–107)
CHLORIDE SERPL-SCNC: 103 MMOL/L (ref 98–107)
CHLORIDE SERPL-SCNC: 104 MMOL/L (ref 98–107)
CHLORIDE SERPL-SCNC: 104 MMOL/L (ref 98–107)
CHLORIDE SERPL-SCNC: 105 MMOL/L (ref 98–107)
CHLORIDE SERPL-SCNC: 106 MMOL/L (ref 98–107)
CHLORIDE SERPL-SCNC: 93 MMOL/L (ref 98–107)
CHLORIDE SERPL-SCNC: 97 MMOL/L (ref 98–107)
CHLORIDE SERPL-SCNC: 98 MMOL/L (ref 98–107)
CHLORIDE SERPL-SCNC: 99 MMOL/L (ref 98–107)
CHLORIDE SERPL-SCNC: 99 MMOL/L (ref 98–107)
CO2 BLDA-SCNC: 25.1 MMOL/L (ref 22–29)
CO2 BLDA-SCNC: 25.4 MMOL/L (ref 22–29)
CO2 SERPL-SCNC: 20 MMOL/L (ref 22–29)
CO2 SERPL-SCNC: 21 MMOL/L (ref 22–29)
CO2 SERPL-SCNC: 21 MMOL/L (ref 22–29)
CO2 SERPL-SCNC: 22 MMOL/L (ref 22–29)
CO2 SERPL-SCNC: 24 MMOL/L (ref 22–29)
CO2 SERPL-SCNC: 24 MMOL/L (ref 22–29)
CO2 SERPL-SCNC: 25 MMOL/L (ref 22–29)
CO2 SERPL-SCNC: 27 MMOL/L (ref 22–29)
CREAT SERPL-MCNC: 0.5 MG/DL (ref 0.76–1.27)
CREAT SERPL-MCNC: 0.52 MG/DL (ref 0.76–1.27)
CREAT SERPL-MCNC: 0.55 MG/DL (ref 0.76–1.27)
CREAT SERPL-MCNC: 0.58 MG/DL (ref 0.76–1.27)
CREAT SERPL-MCNC: 0.59 MG/DL (ref 0.76–1.27)
CREAT SERPL-MCNC: 0.6 MG/DL (ref 0.76–1.27)
CREAT SERPL-MCNC: 0.62 MG/DL (ref 0.76–1.27)
CREAT SERPL-MCNC: 0.63 MG/DL (ref 0.76–1.27)
CREAT SERPL-MCNC: 0.65 MG/DL (ref 0.76–1.27)
CREAT SERPL-MCNC: 0.69 MG/DL (ref 0.76–1.27)
CREAT SERPL-MCNC: 0.7 MG/DL (ref 0.76–1.27)
CREAT SERPL-MCNC: 0.78 MG/DL (ref 0.76–1.27)
CREAT SERPL-MCNC: 0.87 MG/DL (ref 0.76–1.27)
CREAT SERPL-MCNC: 0.87 MG/DL (ref 0.76–1.27)
CRP SERPL-MCNC: 0.31 MG/DL (ref 0–0.5)
CRP SERPL-MCNC: 0.95 MG/DL (ref 0–0.5)
CRP SERPL-MCNC: 2.67 MG/DL (ref 0–0.5)
CRP SERPL-MCNC: <0.3 MG/DL (ref 0–0.5)
D DIMER PPP FEU-MCNC: 3.15 MG/L (FEU) (ref 0–0.59)
D-LACTATE SERPL-SCNC: 1.8 MMOL/L (ref 0.5–2)
D-LACTATE SERPL-SCNC: 2 MMOL/L (ref 0.5–2)
D-LACTATE SERPL-SCNC: 3.2 MMOL/L (ref 0.5–2)
DEPRECATED RDW RBC AUTO: 51.2 FL (ref 37–54)
DEPRECATED RDW RBC AUTO: 51.6 FL (ref 37–54)
DEPRECATED RDW RBC AUTO: 52.1 FL (ref 37–54)
DEPRECATED RDW RBC AUTO: 52.9 FL (ref 37–54)
DEPRECATED RDW RBC AUTO: 52.9 FL (ref 37–54)
DEPRECATED RDW RBC AUTO: 54.3 FL (ref 37–54)
DEPRECATED RDW RBC AUTO: 55.1 FL (ref 37–54)
DEPRECATED RDW RBC AUTO: 55.6 FL (ref 37–54)
DEPRECATED RDW RBC AUTO: 58.2 FL (ref 37–54)
DEPRECATED RDW RBC AUTO: 58.2 FL (ref 37–54)
DEPRECATED RDW RBC AUTO: 58.6 FL (ref 37–54)
DEPRECATED RDW RBC AUTO: 59.1 FL (ref 37–54)
DEPRECATED RDW RBC AUTO: 60.8 FL (ref 37–54)
EGFRCR SERPLBLD CKD-EPI 2021: 100.7 ML/MIN/1.73
EGFRCR SERPLBLD CKD-EPI 2021: 103.3 ML/MIN/1.73
EGFRCR SERPLBLD CKD-EPI 2021: 106.4 ML/MIN/1.73
EGFRCR SERPLBLD CKD-EPI 2021: 90 ML/MIN/1.73
EGFRCR SERPLBLD CKD-EPI 2021: 93 ML/MIN/1.73
EGFRCR SERPLBLD CKD-EPI 2021: 96.5 ML/MIN/1.73
EGFRCR SERPLBLD CKD-EPI 2021: 98.3 ML/MIN/1.73
EGFRCR SERPLBLD CKD-EPI 2021: 99.7 ML/MIN/1.73
EOSINOPHIL # BLD AUTO: 0 10*3/MM3 (ref 0–0.4)
EOSINOPHIL # BLD AUTO: 0.1 10*3/MM3 (ref 0–0.4)
EOSINOPHIL NFR BLD AUTO: 0 % (ref 0.3–6.2)
EOSINOPHIL NFR BLD AUTO: 0.1 % (ref 0.3–6.2)
EOSINOPHIL NFR BLD AUTO: 0.1 % (ref 0.3–6.2)
EOSINOPHIL NFR BLD AUTO: 0.5 % (ref 0.3–6.2)
ERYTHROCYTE [DISTWIDTH] IN BLOOD BY AUTOMATED COUNT: 16.1 % (ref 12.3–15.4)
ERYTHROCYTE [DISTWIDTH] IN BLOOD BY AUTOMATED COUNT: 16.2 % (ref 12.3–15.4)
ERYTHROCYTE [DISTWIDTH] IN BLOOD BY AUTOMATED COUNT: 16.4 % (ref 12.3–15.4)
ERYTHROCYTE [DISTWIDTH] IN BLOOD BY AUTOMATED COUNT: 16.5 % (ref 12.3–15.4)
ERYTHROCYTE [DISTWIDTH] IN BLOOD BY AUTOMATED COUNT: 16.6 % (ref 12.3–15.4)
ERYTHROCYTE [DISTWIDTH] IN BLOOD BY AUTOMATED COUNT: 16.8 % (ref 12.3–15.4)
ERYTHROCYTE [DISTWIDTH] IN BLOOD BY AUTOMATED COUNT: 16.9 % (ref 12.3–15.4)
ERYTHROCYTE [DISTWIDTH] IN BLOOD BY AUTOMATED COUNT: 17.3 % (ref 12.3–15.4)
ERYTHROCYTE [DISTWIDTH] IN BLOOD BY AUTOMATED COUNT: 17.9 % (ref 12.3–15.4)
ERYTHROCYTE [DISTWIDTH] IN BLOOD BY AUTOMATED COUNT: 17.9 % (ref 12.3–15.4)
ERYTHROCYTE [DISTWIDTH] IN BLOOD BY AUTOMATED COUNT: 18.1 % (ref 12.3–15.4)
ERYTHROCYTE [DISTWIDTH] IN BLOOD BY AUTOMATED COUNT: 18.3 % (ref 12.3–15.4)
ERYTHROCYTE [DISTWIDTH] IN BLOOD BY AUTOMATED COUNT: 18.4 % (ref 12.3–15.4)
FLUAV H1 2009 PAND RNA NPH QL NAA+PROBE: NOT DETECTED
FLUAV H1 HA GENE NPH QL NAA+PROBE: NOT DETECTED
FLUAV H3 RNA NPH QL NAA+PROBE: NOT DETECTED
FLUAV SUBTYP SPEC NAA+PROBE: NOT DETECTED
FLUBV RNA ISLT QL NAA+PROBE: NOT DETECTED
GFR SERPL CREATININE-BSD FRML MDRD: 110 ML/MIN/1.73
GFR SERPL CREATININE-BSD FRML MDRD: 124 ML/MIN/1.73
GFR SERPL CREATININE-BSD FRML MDRD: 134 ML/MIN/1.73
GFR SERPL CREATININE-BSD FRML MDRD: 137 ML/MIN/1.73
GFR SERPL CREATININE-BSD FRML MDRD: 86 ML/MIN/1.73
GFR SERPL CREATININE-BSD FRML MDRD: >150 ML/MIN/1.73
GLOBULIN UR ELPH-MCNC: 3 GM/DL
GLOBULIN UR ELPH-MCNC: 3 GM/DL
GLOBULIN UR ELPH-MCNC: 3.3 GM/DL
GLOBULIN UR ELPH-MCNC: 3.4 GM/DL
GLOBULIN UR ELPH-MCNC: 4 GM/DL
GLOBULIN UR ELPH-MCNC: 4 GM/DL
GLOBULIN UR ELPH-MCNC: 4.3 GM/DL
GLOBULIN UR ELPH-MCNC: 4.4 GM/DL
GLOBULIN UR ELPH-MCNC: 5.3 GM/DL
GLUCOSE BLDC GLUCOMTR-MCNC: 104 MG/DL (ref 70–105)
GLUCOSE BLDC GLUCOMTR-MCNC: 116 MG/DL (ref 70–105)
GLUCOSE BLDC GLUCOMTR-MCNC: 118 MG/DL (ref 70–105)
GLUCOSE BLDC GLUCOMTR-MCNC: 123 MG/DL (ref 70–105)
GLUCOSE BLDC GLUCOMTR-MCNC: 131 MG/DL (ref 70–105)
GLUCOSE BLDC GLUCOMTR-MCNC: 133 MG/DL (ref 70–105)
GLUCOSE BLDC GLUCOMTR-MCNC: 135 MG/DL (ref 70–105)
GLUCOSE BLDC GLUCOMTR-MCNC: 137 MG/DL (ref 70–105)
GLUCOSE BLDC GLUCOMTR-MCNC: 138 MG/DL (ref 70–105)
GLUCOSE BLDC GLUCOMTR-MCNC: 140 MG/DL (ref 70–105)
GLUCOSE BLDC GLUCOMTR-MCNC: 144 MG/DL (ref 70–105)
GLUCOSE SERPL-MCNC: 106 MG/DL (ref 65–99)
GLUCOSE SERPL-MCNC: 122 MG/DL (ref 65–99)
GLUCOSE SERPL-MCNC: 123 MG/DL (ref 65–99)
GLUCOSE SERPL-MCNC: 125 MG/DL (ref 65–99)
GLUCOSE SERPL-MCNC: 127 MG/DL (ref 65–99)
GLUCOSE SERPL-MCNC: 132 MG/DL (ref 65–99)
GLUCOSE SERPL-MCNC: 140 MG/DL (ref 65–99)
GLUCOSE SERPL-MCNC: 141 MG/DL (ref 65–99)
GLUCOSE SERPL-MCNC: 144 MG/DL (ref 65–99)
GLUCOSE SERPL-MCNC: 145 MG/DL (ref 65–99)
GLUCOSE SERPL-MCNC: 152 MG/DL (ref 65–99)
GLUCOSE SERPL-MCNC: 159 MG/DL (ref 65–99)
GLUCOSE SERPL-MCNC: 162 MG/DL (ref 65–99)
GLUCOSE SERPL-MCNC: 170 MG/DL (ref 65–99)
GRAM STN SPEC: NORMAL
HADV DNA SPEC NAA+PROBE: NOT DETECTED
HAV IGM SERPL QL IA: NORMAL
HBV CORE IGM SERPL QL IA: NORMAL
HBV SURFACE AG SERPL QL IA: NORMAL
HCO3 BLDA-SCNC: 24 MMOL/L (ref 21–28)
HCO3 BLDA-SCNC: 24.4 MMOL/L (ref 21–28)
HCOV 229E RNA SPEC QL NAA+PROBE: NOT DETECTED
HCOV HKU1 RNA SPEC QL NAA+PROBE: NOT DETECTED
HCOV NL63 RNA SPEC QL NAA+PROBE: NOT DETECTED
HCOV OC43 RNA SPEC QL NAA+PROBE: NOT DETECTED
HCT VFR BLD AUTO: 35.2 % (ref 37.5–51)
HCT VFR BLD AUTO: 35.7 % (ref 37.5–51)
HCT VFR BLD AUTO: 36.1 % (ref 37.5–51)
HCT VFR BLD AUTO: 36.3 % (ref 37.5–51)
HCT VFR BLD AUTO: 37.8 % (ref 37.5–51)
HCT VFR BLD AUTO: 38.4 % (ref 37.5–51)
HCT VFR BLD AUTO: 38.8 % (ref 37.5–51)
HCT VFR BLD AUTO: 40.2 % (ref 37.5–51)
HCT VFR BLD AUTO: 43.5 % (ref 37.5–51)
HCT VFR BLD AUTO: 45.1 % (ref 37.5–51)
HCT VFR BLD AUTO: 46.2 % (ref 37.5–51)
HCT VFR BLD AUTO: 46.4 % (ref 37.5–51)
HCT VFR BLD AUTO: 47.6 % (ref 37.5–51)
HCV AB SER DONR QL: NORMAL
HEMODILUTION: NO
HEMODILUTION: NO
HGB BLD-MCNC: 11.8 G/DL (ref 13–17.7)
HGB BLD-MCNC: 11.8 G/DL (ref 13–17.7)
HGB BLD-MCNC: 12 G/DL (ref 13–17.7)
HGB BLD-MCNC: 12.5 G/DL (ref 13–17.7)
HGB BLD-MCNC: 12.7 G/DL (ref 13–17.7)
HGB BLD-MCNC: 12.7 G/DL (ref 13–17.7)
HGB BLD-MCNC: 12.9 G/DL (ref 13–17.7)
HGB BLD-MCNC: 13.2 G/DL (ref 13–17.7)
HGB BLD-MCNC: 14.8 G/DL (ref 13–17.7)
HGB BLD-MCNC: 14.8 G/DL (ref 13–17.7)
HGB BLD-MCNC: 15.2 G/DL (ref 13–17.7)
HGB BLD-MCNC: 15.8 G/DL (ref 13–17.7)
HGB BLD-MCNC: 15.9 G/DL (ref 13–17.7)
HMPV RNA NPH QL NAA+NON-PROBE: NOT DETECTED
HOLD SPECIMEN: NORMAL
HPIV1 RNA ISLT QL NAA+PROBE: NOT DETECTED
HPIV2 RNA SPEC QL NAA+PROBE: NOT DETECTED
HPIV3 RNA NPH QL NAA+PROBE: NOT DETECTED
HPIV4 P GENE NPH QL NAA+PROBE: NOT DETECTED
INHALED O2 CONCENTRATION: 48 %
INHALED O2 CONCENTRATION: <21 %
INR PPP: 1.05 (ref 0.93–1.1)
INR PPP: 1.08 (ref 0.93–1.1)
L PNEUMO1 AG UR QL IA: NEGATIVE
LAB AP CASE REPORT: NORMAL
LAB AP DIAGNOSIS COMMENT: NORMAL
LAB AP DIAGNOSIS COMMENT: NORMAL
LARGE PLATELETS: ABNORMAL
LARGE PLATELETS: ABNORMAL
LYMPHOCYTES # BLD AUTO: 0.4 10*3/MM3 (ref 0.7–3.1)
LYMPHOCYTES # BLD AUTO: 0.6 10*3/MM3 (ref 0.7–3.1)
LYMPHOCYTES # BLD AUTO: 0.7 10*3/MM3 (ref 0.7–3.1)
LYMPHOCYTES # BLD AUTO: 0.7 10*3/MM3 (ref 0.7–3.1)
LYMPHOCYTES # BLD AUTO: 0.8 10*3/MM3 (ref 0.7–3.1)
LYMPHOCYTES # BLD AUTO: 0.9 10*3/MM3 (ref 0.7–3.1)
LYMPHOCYTES # BLD AUTO: 0.9 10*3/MM3 (ref 0.7–3.1)
LYMPHOCYTES # BLD AUTO: 1.2 10*3/MM3 (ref 0.7–3.1)
LYMPHOCYTES # BLD AUTO: 1.7 10*3/MM3 (ref 0.7–3.1)
LYMPHOCYTES # BLD AUTO: 2.6 10*3/MM3 (ref 0.7–3.1)
LYMPHOCYTES # BLD AUTO: 2.7 10*3/MM3 (ref 0.7–3.1)
LYMPHOCYTES # BLD MANUAL: 1.66 10*3/MM3 (ref 0.7–3.1)
LYMPHOCYTES # BLD MANUAL: 2.76 10*3/MM3 (ref 0.7–3.1)
LYMPHOCYTES NFR BLD AUTO: 10.1 % (ref 19.6–45.3)
LYMPHOCYTES NFR BLD AUTO: 10.5 % (ref 19.6–45.3)
LYMPHOCYTES NFR BLD AUTO: 10.5 % (ref 19.6–45.3)
LYMPHOCYTES NFR BLD AUTO: 2.8 % (ref 19.6–45.3)
LYMPHOCYTES NFR BLD AUTO: 4.3 % (ref 19.6–45.3)
LYMPHOCYTES NFR BLD AUTO: 4.5 % (ref 19.6–45.3)
LYMPHOCYTES NFR BLD AUTO: 6.5 % (ref 19.6–45.3)
LYMPHOCYTES NFR BLD AUTO: 6.5 % (ref 19.6–45.3)
LYMPHOCYTES NFR BLD AUTO: 7 % (ref 19.6–45.3)
LYMPHOCYTES NFR BLD AUTO: 8.5 % (ref 19.6–45.3)
LYMPHOCYTES NFR BLD AUTO: 9.1 % (ref 19.6–45.3)
LYMPHOCYTES NFR BLD MANUAL: 2 % (ref 5–12)
LYMPHOCYTES NFR BLD MANUAL: 3 % (ref 5–12)
Lab: NORMAL
M PNEUMO IGG SER IA-ACNC: NOT DETECTED
MAGNESIUM SERPL-MCNC: 1.9 MG/DL (ref 1.6–2.4)
MAGNESIUM SERPL-MCNC: 2 MG/DL (ref 1.6–2.4)
MAGNESIUM SERPL-MCNC: 2.1 MG/DL (ref 1.6–2.4)
MAGNESIUM SERPL-MCNC: 2.2 MG/DL (ref 1.6–2.4)
MAGNESIUM SERPL-MCNC: 2.3 MG/DL (ref 1.6–2.4)
MAGNESIUM SERPL-MCNC: 2.6 MG/DL (ref 1.6–2.4)
MCH RBC QN AUTO: 30.1 PG (ref 26.6–33)
MCH RBC QN AUTO: 30.1 PG (ref 26.6–33)
MCH RBC QN AUTO: 30.3 PG (ref 26.6–33)
MCH RBC QN AUTO: 30.3 PG (ref 26.6–33)
MCH RBC QN AUTO: 30.4 PG (ref 26.6–33)
MCH RBC QN AUTO: 30.8 PG (ref 26.6–33)
MCH RBC QN AUTO: 30.8 PG (ref 26.6–33)
MCH RBC QN AUTO: 31.1 PG (ref 26.6–33)
MCH RBC QN AUTO: 31.1 PG (ref 26.6–33)
MCH RBC QN AUTO: 31.3 PG (ref 26.6–33)
MCH RBC QN AUTO: 31.4 PG (ref 26.6–33)
MCH RBC QN AUTO: 31.7 PG (ref 26.6–33)
MCH RBC QN AUTO: 31.7 PG (ref 26.6–33)
MCHC RBC AUTO-ENTMCNC: 32.7 G/DL (ref 31.5–35.7)
MCHC RBC AUTO-ENTMCNC: 32.8 G/DL (ref 31.5–35.7)
MCHC RBC AUTO-ENTMCNC: 33.5 G/DL (ref 31.5–35.7)
MCHC RBC AUTO-ENTMCNC: 33.5 G/DL (ref 31.5–35.7)
MCHC RBC AUTO-ENTMCNC: 33.6 G/DL (ref 31.5–35.7)
MCHC RBC AUTO-ENTMCNC: 33.6 G/DL (ref 31.5–35.7)
MCHC RBC AUTO-ENTMCNC: 33.7 G/DL (ref 31.5–35.7)
MCHC RBC AUTO-ENTMCNC: 34 G/DL (ref 31.5–35.7)
MCHC RBC AUTO-ENTMCNC: 34.2 G/DL (ref 31.5–35.7)
MCHC RBC AUTO-ENTMCNC: 34.4 G/DL (ref 31.5–35.7)
MCV RBC AUTO: 90.5 FL (ref 79–97)
MCV RBC AUTO: 91.1 FL (ref 79–97)
MCV RBC AUTO: 91.5 FL (ref 79–97)
MCV RBC AUTO: 91.5 FL (ref 79–97)
MCV RBC AUTO: 91.8 FL (ref 79–97)
MCV RBC AUTO: 92 FL (ref 79–97)
MCV RBC AUTO: 92.3 FL (ref 79–97)
MCV RBC AUTO: 92.4 FL (ref 79–97)
MCV RBC AUTO: 92.7 FL (ref 79–97)
MCV RBC AUTO: 92.7 FL (ref 79–97)
MCV RBC AUTO: 93.7 FL (ref 79–97)
MCV RBC AUTO: 93.8 FL (ref 79–97)
MCV RBC AUTO: 94.1 FL (ref 79–97)
MODALITY: ABNORMAL
MODALITY: ABNORMAL
MONOCYTES # BLD AUTO: 0.1 10*3/MM3 (ref 0.1–0.9)
MONOCYTES # BLD AUTO: 0.3 10*3/MM3 (ref 0.1–0.9)
MONOCYTES # BLD AUTO: 0.4 10*3/MM3 (ref 0.1–0.9)
MONOCYTES # BLD AUTO: 0.4 10*3/MM3 (ref 0.1–0.9)
MONOCYTES # BLD AUTO: 0.6 10*3/MM3 (ref 0.1–0.9)
MONOCYTES # BLD AUTO: 0.9 10*3/MM3 (ref 0.1–0.9)
MONOCYTES # BLD AUTO: 1.4 10*3/MM3 (ref 0.1–0.9)
MONOCYTES # BLD AUTO: 1.6 10*3/MM3 (ref 0.1–0.9)
MONOCYTES # BLD: 0.55 10*3/MM3 (ref 0.1–0.9)
MONOCYTES # BLD: 0.83 10*3/MM3 (ref 0.1–0.9)
MONOCYTES NFR BLD AUTO: 1.2 % (ref 5–12)
MONOCYTES NFR BLD AUTO: 1.7 % (ref 5–12)
MONOCYTES NFR BLD AUTO: 1.8 % (ref 5–12)
MONOCYTES NFR BLD AUTO: 2.3 % (ref 5–12)
MONOCYTES NFR BLD AUTO: 2.8 % (ref 5–12)
MONOCYTES NFR BLD AUTO: 3.1 % (ref 5–12)
MONOCYTES NFR BLD AUTO: 3.3 % (ref 5–12)
MONOCYTES NFR BLD AUTO: 4.2 % (ref 5–12)
MONOCYTES NFR BLD AUTO: 5.2 % (ref 5–12)
MONOCYTES NFR BLD AUTO: 5.4 % (ref 5–12)
MONOCYTES NFR BLD AUTO: 5.4 % (ref 5–12)
MRSA DNA SPEC QL NAA+PROBE: NORMAL
MRSA DNA SPEC QL NAA+PROBE: NORMAL
MYELOPEROXIDASE AB SER IA-ACNC: <9 U/ML (ref 0–9)
NEUTROPHILS # BLD AUTO: 24.01 10*3/MM3 (ref 1.7–7)
NEUTROPHILS # BLD AUTO: 25.39 10*3/MM3 (ref 1.7–7)
NEUTROPHILS NFR BLD AUTO: 11.3 10*3/MM3 (ref 1.7–7)
NEUTROPHILS NFR BLD AUTO: 11.4 10*3/MM3 (ref 1.7–7)
NEUTROPHILS NFR BLD AUTO: 11.9 10*3/MM3 (ref 1.7–7)
NEUTROPHILS NFR BLD AUTO: 13.8 10*3/MM3 (ref 1.7–7)
NEUTROPHILS NFR BLD AUTO: 14.1 10*3/MM3 (ref 1.7–7)
NEUTROPHILS NFR BLD AUTO: 14.5 10*3/MM3 (ref 1.7–7)
NEUTROPHILS NFR BLD AUTO: 17.6 10*3/MM3 (ref 1.7–7)
NEUTROPHILS NFR BLD AUTO: 21.1 10*3/MM3 (ref 1.7–7)
NEUTROPHILS NFR BLD AUTO: 25.3 10*3/MM3 (ref 1.7–7)
NEUTROPHILS NFR BLD AUTO: 6.2 10*3/MM3 (ref 1.7–7)
NEUTROPHILS NFR BLD AUTO: 83.7 % (ref 42.7–76)
NEUTROPHILS NFR BLD AUTO: 84 % (ref 42.7–76)
NEUTROPHILS NFR BLD AUTO: 85.1 % (ref 42.7–76)
NEUTROPHILS NFR BLD AUTO: 88 % (ref 42.7–76)
NEUTROPHILS NFR BLD AUTO: 88.2 % (ref 42.7–76)
NEUTROPHILS NFR BLD AUTO: 88.7 % (ref 42.7–76)
NEUTROPHILS NFR BLD AUTO: 9.8 10*3/MM3 (ref 1.7–7)
NEUTROPHILS NFR BLD AUTO: 91 % (ref 42.7–76)
NEUTROPHILS NFR BLD AUTO: 91.6 % (ref 42.7–76)
NEUTROPHILS NFR BLD AUTO: 92 % (ref 42.7–76)
NEUTROPHILS NFR BLD AUTO: 93.8 % (ref 42.7–76)
NEUTROPHILS NFR BLD AUTO: 94.3 % (ref 42.7–76)
NEUTROPHILS NFR BLD MANUAL: 87 % (ref 42.7–76)
NEUTROPHILS NFR BLD MANUAL: 91 % (ref 42.7–76)
NEUTS BAND NFR BLD MANUAL: 1 % (ref 0–5)
NRBC BLD AUTO-RTO: 0 /100 WBC (ref 0–0.2)
NRBC BLD AUTO-RTO: 0.1 /100 WBC (ref 0–0.2)
NRBC BLD AUTO-RTO: 0.5 /100 WBC (ref 0–0.2)
NT-PROBNP SERPL-MCNC: 1100 PG/ML (ref 0–1800)
NT-PROBNP SERPL-MCNC: 1109 PG/ML (ref 0–1800)
NT-PROBNP SERPL-MCNC: 1251 PG/ML (ref 0–1800)
NT-PROBNP SERPL-MCNC: 417.1 PG/ML (ref 0–1800)
P JIROVECII DNA L RESP QL NAA+NON-PROBE: NEGATIVE
P-ANCA ATYPICAL TITR SER IF: NORMAL TITER
P-ANCA TITR SER IF: NORMAL TITER
PATH REPORT.ADDENDUM SPEC: NORMAL
PATH REPORT.FINAL DX SPEC: NORMAL
PATH REPORT.GROSS SPEC: NORMAL
PCO2 BLDA: 32.4 MM HG (ref 35–48)
PCO2 BLDA: 37.1 MM HG (ref 35–48)
PH BLDA: 7.42 PH UNITS (ref 7.35–7.45)
PH BLDA: 7.49 PH UNITS (ref 7.35–7.45)
PHOSPHATE SERPL-MCNC: 1.4 MG/DL (ref 2.5–4.5)
PHOSPHATE SERPL-MCNC: 1.7 MG/DL (ref 2.5–4.5)
PHOSPHATE SERPL-MCNC: 1.9 MG/DL (ref 2.5–4.5)
PHOSPHATE SERPL-MCNC: 1.9 MG/DL (ref 2.5–4.5)
PHOSPHATE SERPL-MCNC: 2.8 MG/DL (ref 2.5–4.5)
PHOSPHATE SERPL-MCNC: 2.8 MG/DL (ref 2.5–4.5)
PHOSPHATE SERPL-MCNC: 3.3 MG/DL (ref 2.5–4.5)
PLATELET # BLD AUTO: 129 10*3/MM3 (ref 140–450)
PLATELET # BLD AUTO: 141 10*3/MM3 (ref 140–450)
PLATELET # BLD AUTO: 152 10*3/MM3 (ref 140–450)
PLATELET # BLD AUTO: 164 10*3/MM3 (ref 140–450)
PLATELET # BLD AUTO: 167 10*3/MM3 (ref 140–450)
PLATELET # BLD AUTO: 198 10*3/MM3 (ref 140–450)
PLATELET # BLD AUTO: 202 10*3/MM3 (ref 140–450)
PLATELET # BLD AUTO: 213 10*3/MM3 (ref 140–450)
PLATELET # BLD AUTO: 227 10*3/MM3 (ref 140–450)
PLATELET # BLD AUTO: 232 10*3/MM3 (ref 140–450)
PLATELET # BLD AUTO: 258 10*3/MM3 (ref 140–450)
PLATELET # BLD AUTO: 291 10*3/MM3 (ref 140–450)
PLATELET # BLD AUTO: 321 10*3/MM3 (ref 140–450)
PMV BLD AUTO: 7.7 FL (ref 6–12)
PMV BLD AUTO: 7.8 FL (ref 6–12)
PMV BLD AUTO: 7.8 FL (ref 6–12)
PMV BLD AUTO: 7.9 FL (ref 6–12)
PMV BLD AUTO: 8 FL (ref 6–12)
PMV BLD AUTO: 8.3 FL (ref 6–12)
PMV BLD AUTO: 8.3 FL (ref 6–12)
PMV BLD AUTO: 8.5 FL (ref 6–12)
PMV BLD AUTO: 8.9 FL (ref 6–12)
PMV BLD AUTO: 9 FL (ref 6–12)
PMV BLD AUTO: 9.4 FL (ref 6–12)
PMV BLD AUTO: 9.4 FL (ref 6–12)
PMV BLD AUTO: 9.9 FL (ref 6–12)
PO2 BLDA: 52.7 MM HG (ref 83–108)
PO2 BLDA: 69.2 MM HG (ref 83–108)
POIKILOCYTOSIS BLD QL SMEAR: ABNORMAL
POTASSIUM SERPL-SCNC: 3.8 MMOL/L (ref 3.5–5.2)
POTASSIUM SERPL-SCNC: 3.9 MMOL/L (ref 3.5–5.2)
POTASSIUM SERPL-SCNC: 3.9 MMOL/L (ref 3.5–5.2)
POTASSIUM SERPL-SCNC: 4.2 MMOL/L (ref 3.5–5.2)
POTASSIUM SERPL-SCNC: 4.2 MMOL/L (ref 3.5–5.2)
POTASSIUM SERPL-SCNC: 4.3 MMOL/L (ref 3.5–5.2)
POTASSIUM SERPL-SCNC: 4.4 MMOL/L (ref 3.5–5.2)
POTASSIUM SERPL-SCNC: 4.4 MMOL/L (ref 3.5–5.2)
POTASSIUM SERPL-SCNC: 4.5 MMOL/L (ref 3.5–5.2)
POTASSIUM SERPL-SCNC: 4.5 MMOL/L (ref 3.5–5.2)
POTASSIUM SERPL-SCNC: 4.6 MMOL/L (ref 3.5–5.2)
POTASSIUM SERPL-SCNC: 4.6 MMOL/L (ref 3.5–5.2)
POTASSIUM SERPL-SCNC: 4.7 MMOL/L (ref 3.5–5.2)
POTASSIUM SERPL-SCNC: 4.7 MMOL/L (ref 3.5–5.2)
POTASSIUM SERPL-SCNC: 4.8 MMOL/L (ref 3.5–5.2)
POTASSIUM SERPL-SCNC: 5.2 MMOL/L (ref 3.5–5.2)
POTASSIUM SERPL-SCNC: 5.2 MMOL/L (ref 3.5–5.2)
POTASSIUM SERPL-SCNC: 5.5 MMOL/L (ref 3.5–5.2)
PROCALCITONIN SERPL-MCNC: 0.19 NG/ML (ref 0–0.25)
PROCALCITONIN SERPL-MCNC: 0.31 NG/ML (ref 0–0.25)
PROT SERPL-MCNC: 5 G/DL (ref 6–8.5)
PROT SERPL-MCNC: 5.1 G/DL (ref 6–8.5)
PROT SERPL-MCNC: 5.4 G/DL (ref 6–8.5)
PROT SERPL-MCNC: 5.7 G/DL (ref 6–8.5)
PROT SERPL-MCNC: 6.3 G/DL (ref 6–8.5)
PROT SERPL-MCNC: 6.4 G/DL (ref 6–8.5)
PROT SERPL-MCNC: 6.4 G/DL (ref 6–8.5)
PROT SERPL-MCNC: 6.6 G/DL (ref 6–8.5)
PROT SERPL-MCNC: 7.8 G/DL (ref 6–8.5)
PROTEINASE3 AB SER IA-ACNC: <3.5 U/ML (ref 0–3.5)
PROTHROMBIN TIME: 11.6 SECONDS (ref 9.6–11.7)
PROTHROMBIN TIME: 11.9 SECONDS (ref 9.6–11.7)
QT INTERVAL: 323 MS
QT INTERVAL: 329 MS
QT INTERVAL: 430 MS
QT INTERVAL: 439 MS
RBC # BLD AUTO: 3.85 10*6/MM3 (ref 4.14–5.8)
RBC # BLD AUTO: 3.89 10*6/MM3 (ref 4.14–5.8)
RBC # BLD AUTO: 3.9 10*6/MM3 (ref 4.14–5.8)
RBC # BLD AUTO: 3.99 10*6/MM3 (ref 4.14–5.8)
RBC # BLD AUTO: 4.08 10*6/MM3 (ref 4.14–5.8)
RBC # BLD AUTO: 4.08 10*6/MM3 (ref 4.14–5.8)
RBC # BLD AUTO: 4.21 10*6/MM3 (ref 4.14–5.8)
RBC # BLD AUTO: 4.33 10*6/MM3 (ref 4.14–5.8)
RBC # BLD AUTO: 4.76 10*6/MM3 (ref 4.14–5.8)
RBC # BLD AUTO: 4.91 10*6/MM3 (ref 4.14–5.8)
RBC # BLD AUTO: 4.98 10*6/MM3 (ref 4.14–5.8)
RBC # BLD AUTO: 5.02 10*6/MM3 (ref 4.14–5.8)
RBC # BLD AUTO: 5.08 10*6/MM3 (ref 4.14–5.8)
RBC MORPH BLD: NORMAL
REF LAB TEST METHOD: NORMAL
RHINOVIRUS RNA SPEC NAA+PROBE: NOT DETECTED
RSV RNA NPH QL NAA+NON-PROBE: NOT DETECTED
S PNEUM AG SPEC QL LA: NEGATIVE
SAO2 % BLDCOA: 87.6 % (ref 94–98)
SAO2 % BLDCOA: 95.1 % (ref 94–98)
SARS-COV-2 RNA NPH QL NAA+NON-PROBE: NOT DETECTED
SARS-COV-2 RNA PNL SPEC NAA+PROBE: NOT DETECTED
SCAN SLIDE: NORMAL
SCAN SLIDE: NORMAL
SODIUM SERPL-SCNC: 129 MMOL/L (ref 136–145)
SODIUM SERPL-SCNC: 131 MMOL/L (ref 136–145)
SODIUM SERPL-SCNC: 132 MMOL/L (ref 136–145)
SODIUM SERPL-SCNC: 133 MMOL/L (ref 136–145)
SODIUM SERPL-SCNC: 134 MMOL/L (ref 136–145)
SODIUM SERPL-SCNC: 135 MMOL/L (ref 136–145)
SODIUM SERPL-SCNC: 135 MMOL/L (ref 136–145)
SODIUM SERPL-SCNC: 136 MMOL/L (ref 136–145)
SODIUM SERPL-SCNC: 137 MMOL/L (ref 136–145)
SODIUM SERPL-SCNC: 140 MMOL/L (ref 136–145)
TROPONIN T SERPL-MCNC: <0.01 NG/ML (ref 0–0.03)
VANCOMYCIN SERPL-MCNC: 10.9 MCG/ML (ref 5–40)
VANCOMYCIN SERPL-MCNC: 12.4 MCG/ML (ref 5–40)
VARIANT LYMPHS NFR BLD MANUAL: 10 % (ref 19.6–45.3)
VARIANT LYMPHS NFR BLD MANUAL: 6 % (ref 19.6–45.3)
VIRUS SPEC CULT: NORMAL
WBC MORPH BLD: NORMAL
WBC MORPH BLD: NORMAL
WBC NRBC COR # BLD: 11.2 10*3/MM3 (ref 3.4–10.8)
WBC NRBC COR # BLD: 12.3 10*3/MM3 (ref 3.4–10.8)
WBC NRBC COR # BLD: 12.5 10*3/MM3 (ref 3.4–10.8)
WBC NRBC COR # BLD: 13.4 10*3/MM3 (ref 3.4–10.8)
WBC NRBC COR # BLD: 15.1 10*3/MM3 (ref 3.4–10.8)
WBC NRBC COR # BLD: 15.4 10*3/MM3 (ref 3.4–10.8)
WBC NRBC COR # BLD: 16.5 10*3/MM3 (ref 3.4–10.8)
WBC NRBC COR # BLD: 19.2 10*3/MM3 (ref 3.4–10.8)
WBC NRBC COR # BLD: 25.1 10*3/MM3 (ref 3.4–10.8)
WBC NRBC COR # BLD: 27.6 10*3/MM3 (ref 3.4–10.8)
WBC NRBC COR # BLD: 27.6 10*3/MM3 (ref 3.4–10.8)
WBC NRBC COR # BLD: 29.7 10*3/MM3 (ref 3.4–10.8)
WBC NRBC COR # BLD: 7.1 10*3/MM3 (ref 3.4–10.8)
WHOLE BLOOD HOLD SPECIMEN: NORMAL
WHOLE BLOOD HOLD SPECIMEN: NORMAL

## 2022-01-01 PROCEDURE — 0W3Q8ZZ CONTROL BLEEDING IN RESPIRATORY TRACT, VIA NATURAL OR ARTIFICIAL OPENING ENDOSCOPIC: ICD-10-PCS | Performed by: INTERNAL MEDICINE

## 2022-01-01 PROCEDURE — 97530 THERAPEUTIC ACTIVITIES: CPT

## 2022-01-01 PROCEDURE — 77334 RADIATION TREATMENT AID(S): CPT | Performed by: RADIOLOGY

## 2022-01-01 PROCEDURE — 25010000002 METHYLPREDNISOLONE PER 40 MG: Performed by: INTERNAL MEDICINE

## 2022-01-01 PROCEDURE — 71046 X-RAY EXAM CHEST 2 VIEWS: CPT

## 2022-01-01 PROCEDURE — 83735 ASSAY OF MAGNESIUM: CPT | Performed by: INTERNAL MEDICINE

## 2022-01-01 PROCEDURE — 86037 ANCA TITER EACH ANTIBODY: CPT

## 2022-01-01 PROCEDURE — 83520 IMMUNOASSAY QUANT NOS NONAB: CPT

## 2022-01-01 PROCEDURE — 80053 COMPREHEN METABOLIC PANEL: CPT | Performed by: INTERNAL MEDICINE

## 2022-01-01 PROCEDURE — 94799 UNLISTED PULMONARY SVC/PX: CPT

## 2022-01-01 PROCEDURE — 25010000002 CEFEPIME PER 500 MG: Performed by: EMERGENCY MEDICINE

## 2022-01-01 PROCEDURE — 80202 ASSAY OF VANCOMYCIN: CPT | Performed by: INTERNAL MEDICINE

## 2022-01-01 PROCEDURE — 71045 X-RAY EXAM CHEST 1 VIEW: CPT

## 2022-01-01 PROCEDURE — 85610 PROTHROMBIN TIME: CPT | Performed by: EMERGENCY MEDICINE

## 2022-01-01 PROCEDURE — 77295 3-D RADIOTHERAPY PLAN: CPT | Performed by: RADIOLOGY

## 2022-01-01 PROCEDURE — 0 IOPAMIDOL PER 1 ML: Performed by: INTERNAL MEDICINE

## 2022-01-01 PROCEDURE — 25010000002 VANCOMYCIN HCL 1.25 G RECONSTITUTED SOLUTION 1 EACH VIAL: Performed by: EMERGENCY MEDICINE

## 2022-01-01 PROCEDURE — 87385 HISTOPLASMA CAPSUL AG IA: CPT | Performed by: INTERNAL MEDICINE

## 2022-01-01 PROCEDURE — 85025 COMPLETE CBC W/AUTO DIFF WBC: CPT | Performed by: NURSE PRACTITIONER

## 2022-01-01 PROCEDURE — 84132 ASSAY OF SERUM POTASSIUM: CPT | Performed by: INTERNAL MEDICINE

## 2022-01-01 PROCEDURE — 80053 COMPREHEN METABOLIC PANEL: CPT | Performed by: NURSE PRACTITIONER

## 2022-01-01 PROCEDURE — 84484 ASSAY OF TROPONIN QUANT: CPT | Performed by: EMERGENCY MEDICINE

## 2022-01-01 PROCEDURE — 87040 BLOOD CULTURE FOR BACTERIA: CPT | Performed by: EMERGENCY MEDICINE

## 2022-01-01 PROCEDURE — 99232 SBSQ HOSP IP/OBS MODERATE 35: CPT | Performed by: INTERNAL MEDICINE

## 2022-01-01 PROCEDURE — G0152 HHCP-SERV OF OT,EA 15 MIN: HCPCS

## 2022-01-01 PROCEDURE — G0151 HHCP-SERV OF PT,EA 15 MIN: HCPCS

## 2022-01-01 PROCEDURE — 25010000002 VANCOMYCIN PER 500 MG: Performed by: INTERNAL MEDICINE

## 2022-01-01 PROCEDURE — 97110 THERAPEUTIC EXERCISES: CPT

## 2022-01-01 PROCEDURE — 25010000002 CEFEPIME PER 500 MG: Performed by: INTERNAL MEDICINE

## 2022-01-01 PROCEDURE — 84100 ASSAY OF PHOSPHORUS: CPT | Performed by: INTERNAL MEDICINE

## 2022-01-01 PROCEDURE — 87899 AGENT NOS ASSAY W/OPTIC: CPT | Performed by: NURSE PRACTITIONER

## 2022-01-01 PROCEDURE — 83880 ASSAY OF NATRIURETIC PEPTIDE: CPT | Performed by: INTERNAL MEDICINE

## 2022-01-01 PROCEDURE — 85025 COMPLETE CBC W/AUTO DIFF WBC: CPT | Performed by: INTERNAL MEDICINE

## 2022-01-01 PROCEDURE — 99233 SBSQ HOSP IP/OBS HIGH 50: CPT | Performed by: INTERNAL MEDICINE

## 2022-01-01 PROCEDURE — 80048 BASIC METABOLIC PNL TOTAL CA: CPT | Performed by: INTERNAL MEDICINE

## 2022-01-01 PROCEDURE — 77263 THER RADIOLOGY TX PLNG CPLX: CPT | Performed by: RADIOLOGY

## 2022-01-01 PROCEDURE — 97162 PT EVAL MOD COMPLEX 30 MIN: CPT

## 2022-01-01 PROCEDURE — 77300 RADIATION THERAPY DOSE PLAN: CPT | Performed by: RADIOLOGY

## 2022-01-01 PROCEDURE — 63710000001 PREDNISONE PER 1 MG: Performed by: INTERNAL MEDICINE

## 2022-01-01 PROCEDURE — 80053 COMPREHEN METABOLIC PANEL: CPT | Performed by: EMERGENCY MEDICINE

## 2022-01-01 PROCEDURE — 85007 BL SMEAR W/DIFF WBC COUNT: CPT | Performed by: INTERNAL MEDICINE

## 2022-01-01 PROCEDURE — 25010000002 PROPOFOL 200 MG/20ML EMULSION

## 2022-01-01 PROCEDURE — 97166 OT EVAL MOD COMPLEX 45 MIN: CPT

## 2022-01-01 PROCEDURE — 85730 THROMBOPLASTIN TIME PARTIAL: CPT | Performed by: EMERGENCY MEDICINE

## 2022-01-01 PROCEDURE — 25010000002 AMPICILLIN-SULBACTAM PER 1.5 G: Performed by: NURSE PRACTITIONER

## 2022-01-01 PROCEDURE — 86140 C-REACTIVE PROTEIN: CPT | Performed by: INTERNAL MEDICINE

## 2022-01-01 PROCEDURE — 36415 COLL VENOUS BLD VENIPUNCTURE: CPT | Performed by: INTERNAL MEDICINE

## 2022-01-01 PROCEDURE — 93005 ELECTROCARDIOGRAM TRACING: CPT | Performed by: EMERGENCY MEDICINE

## 2022-01-01 PROCEDURE — 82962 GLUCOSE BLOOD TEST: CPT

## 2022-01-01 PROCEDURE — 99285 EMERGENCY DEPT VISIT HI MDM: CPT

## 2022-01-01 PROCEDURE — 84145 PROCALCITONIN (PCT): CPT | Performed by: NURSE PRACTITIONER

## 2022-01-01 PROCEDURE — 3E0G76Z INTRODUCTION OF NUTRITIONAL SUBSTANCE INTO UPPER GI, VIA NATURAL OR ARTIFICIAL OPENING: ICD-10-PCS | Performed by: INTERNAL MEDICINE

## 2022-01-01 PROCEDURE — 94664 DEMO&/EVAL PT USE INHALER: CPT

## 2022-01-01 PROCEDURE — G0158 HHC OT ASSISTANT EA 15: HCPCS

## 2022-01-01 PROCEDURE — 87641 MR-STAPH DNA AMP PROBE: CPT | Performed by: NURSE PRACTITIONER

## 2022-01-01 PROCEDURE — 84100 ASSAY OF PHOSPHORUS: CPT | Performed by: NURSE PRACTITIONER

## 2022-01-01 PROCEDURE — 85025 COMPLETE CBC W/AUTO DIFF WBC: CPT

## 2022-01-01 PROCEDURE — 25010000002 VANCOMYCIN 10 G RECONSTITUTED SOLUTION: Performed by: INTERNAL MEDICINE

## 2022-01-01 PROCEDURE — 25010000002 LORAZEPAM PER 2 MG: Performed by: INTERNAL MEDICINE

## 2022-01-01 PROCEDURE — 88341 IMHCHEM/IMCYTCHM EA ADD ANTB: CPT | Performed by: INTERNAL MEDICINE

## 2022-01-01 PROCEDURE — 94640 AIRWAY INHALATION TREATMENT: CPT

## 2022-01-01 PROCEDURE — G0157 HHC PT ASSISTANT EA 15: HCPCS

## 2022-01-01 PROCEDURE — 63710000001 PREDNISONE PER 5 MG: Performed by: INTERNAL MEDICINE

## 2022-01-01 PROCEDURE — 83605 ASSAY OF LACTIC ACID: CPT

## 2022-01-01 PROCEDURE — 25010000002 VANCOMYCIN PER 500 MG: Performed by: EMERGENCY MEDICINE

## 2022-01-01 PROCEDURE — 80202 ASSAY OF VANCOMYCIN: CPT | Performed by: NURSE PRACTITIONER

## 2022-01-01 PROCEDURE — 88305 TISSUE EXAM BY PATHOLOGIST: CPT | Performed by: INTERNAL MEDICINE

## 2022-01-01 PROCEDURE — 0 MORPHINE SULFATE 4 MG/ML SOLUTION: Performed by: INTERNAL MEDICINE

## 2022-01-01 PROCEDURE — 25010000002 CEFEPIME PER 500 MG: Performed by: NURSE PRACTITIONER

## 2022-01-01 PROCEDURE — 87040 BLOOD CULTURE FOR BACTERIA: CPT

## 2022-01-01 PROCEDURE — 25010000002 EPINEPHRINE 1 MG/10ML SOLUTION PREFILLED SYRINGE: Performed by: INTERNAL MEDICINE

## 2022-01-01 PROCEDURE — 80074 ACUTE HEPATITIS PANEL: CPT | Performed by: NURSE PRACTITIONER

## 2022-01-01 PROCEDURE — 88342 IMHCHEM/IMCYTCHM 1ST ANTB: CPT | Performed by: INTERNAL MEDICINE

## 2022-01-01 PROCEDURE — 82803 BLOOD GASES ANY COMBINATION: CPT

## 2022-01-01 PROCEDURE — 87205 SMEAR GRAM STAIN: CPT | Performed by: NURSE PRACTITIONER

## 2022-01-01 PROCEDURE — 87070 CULTURE OTHR SPECIMN AEROBIC: CPT | Performed by: INTERNAL MEDICINE

## 2022-01-01 PROCEDURE — 0202U NFCT DS 22 TRGT SARS-COV-2: CPT | Performed by: INTERNAL MEDICINE

## 2022-01-01 PROCEDURE — 0B9H8ZX DRAINAGE OF LUNG LINGULA, VIA NATURAL OR ARTIFICIAL OPENING ENDOSCOPIC, DIAGNOSTIC: ICD-10-PCS | Performed by: INTERNAL MEDICINE

## 2022-01-01 PROCEDURE — 25010000002 VANCOMYCIN 10 G RECONSTITUTED SOLUTION: Performed by: EMERGENCY MEDICINE

## 2022-01-01 PROCEDURE — 87102 FUNGUS ISOLATION CULTURE: CPT | Performed by: INTERNAL MEDICINE

## 2022-01-01 PROCEDURE — 87116 MYCOBACTERIA CULTURE: CPT | Performed by: INTERNAL MEDICINE

## 2022-01-01 PROCEDURE — 92526 ORAL FUNCTION THERAPY: CPT

## 2022-01-01 PROCEDURE — 87798 DETECT AGENT NOS DNA AMP: CPT | Performed by: INTERNAL MEDICINE

## 2022-01-01 PROCEDURE — 88108 CYTOPATH CONCENTRATE TECH: CPT | Performed by: INTERNAL MEDICINE

## 2022-01-01 PROCEDURE — 87641 MR-STAPH DNA AMP PROBE: CPT | Performed by: INTERNAL MEDICINE

## 2022-01-01 PROCEDURE — 36415 COLL VENOUS BLD VENIPUNCTURE: CPT

## 2022-01-01 PROCEDURE — 84484 ASSAY OF TROPONIN QUANT: CPT | Performed by: NURSE PRACTITIONER

## 2022-01-01 PROCEDURE — 99222 1ST HOSP IP/OBS MODERATE 55: CPT | Performed by: RADIOLOGY

## 2022-01-01 PROCEDURE — 0202U NFCT DS 22 TRGT SARS-COV-2: CPT | Performed by: NURSE PRACTITIONER

## 2022-01-01 PROCEDURE — G0299 HHS/HOSPICE OF RN EA 15 MIN: HCPCS

## 2022-01-01 PROCEDURE — 97167 OT EVAL HIGH COMPLEX 60 MIN: CPT

## 2022-01-01 PROCEDURE — 99214 OFFICE O/P EST MOD 30 MIN: CPT | Performed by: INTERNAL MEDICINE

## 2022-01-01 PROCEDURE — 99239 HOSP IP/OBS DSCHRG MGMT >30: CPT | Performed by: INTERNAL MEDICINE

## 2022-01-01 PROCEDURE — 97535 SELF CARE MNGMENT TRAINING: CPT

## 2022-01-01 PROCEDURE — 36600 WITHDRAWAL OF ARTERIAL BLOOD: CPT

## 2022-01-01 PROCEDURE — 84100 ASSAY OF PHOSPHORUS: CPT

## 2022-01-01 PROCEDURE — 70470 CT HEAD/BRAIN W/O & W/DYE: CPT

## 2022-01-01 PROCEDURE — 87070 CULTURE OTHR SPECIMN AEROBIC: CPT | Performed by: NURSE PRACTITIONER

## 2022-01-01 PROCEDURE — 87205 SMEAR GRAM STAIN: CPT | Performed by: INTERNAL MEDICINE

## 2022-01-01 PROCEDURE — 99222 1ST HOSP IP/OBS MODERATE 55: CPT | Performed by: NURSE PRACTITIONER

## 2022-01-01 PROCEDURE — 97116 GAIT TRAINING THERAPY: CPT

## 2022-01-01 PROCEDURE — 25010000002 METHYLPREDNISOLONE PER 40 MG

## 2022-01-01 PROCEDURE — 84145 PROCALCITONIN (PCT): CPT | Performed by: EMERGENCY MEDICINE

## 2022-01-01 PROCEDURE — 92610 EVALUATE SWALLOWING FUNCTION: CPT

## 2022-01-01 PROCEDURE — 85025 COMPLETE CBC W/AUTO DIFF WBC: CPT | Performed by: EMERGENCY MEDICINE

## 2022-01-01 PROCEDURE — 74018 RADEX ABDOMEN 1 VIEW: CPT

## 2022-01-01 PROCEDURE — 94761 N-INVAS EAR/PLS OXIMETRY MLT: CPT

## 2022-01-01 PROCEDURE — 25010000002 PROPOFOL 200 MG/20ML EMULSION: Performed by: ANESTHESIOLOGY

## 2022-01-01 PROCEDURE — 77290 THER RAD SIMULAJ FIELD CPLX: CPT | Performed by: RADIOLOGY

## 2022-01-01 PROCEDURE — 71275 CT ANGIOGRAPHY CHEST: CPT

## 2022-01-01 PROCEDURE — G0156 HHCP-SVS OF AIDE,EA 15 MIN: HCPCS

## 2022-01-01 PROCEDURE — 83880 ASSAY OF NATRIURETIC PEPTIDE: CPT | Performed by: EMERGENCY MEDICINE

## 2022-01-01 PROCEDURE — 25010000002 METHYLPREDNISOLONE PER 40 MG: Performed by: NURSE PRACTITIONER

## 2022-01-01 PROCEDURE — 93005 ELECTROCARDIOGRAM TRACING: CPT

## 2022-01-01 PROCEDURE — 87206 SMEAR FLUORESCENT/ACID STAI: CPT | Performed by: INTERNAL MEDICINE

## 2022-01-01 PROCEDURE — 87252 VIRUS INOCULATION TISSUE: CPT | Performed by: INTERNAL MEDICINE

## 2022-01-01 PROCEDURE — 85379 FIBRIN DEGRADATION QUANT: CPT | Performed by: EMERGENCY MEDICINE

## 2022-01-01 PROCEDURE — 87635 SARS-COV-2 COVID-19 AMP PRB: CPT | Performed by: EMERGENCY MEDICINE

## 2022-01-01 PROCEDURE — 87071 CULTURE AEROBIC QUANT OTHER: CPT | Performed by: INTERNAL MEDICINE

## 2022-01-01 PROCEDURE — 0202U NFCT DS 22 TRGT SARS-COV-2: CPT | Performed by: EMERGENCY MEDICINE

## 2022-01-01 PROCEDURE — 25010000002 FUROSEMIDE PER 20 MG: Performed by: STUDENT IN AN ORGANIZED HEALTH CARE EDUCATION/TRAINING PROGRAM

## 2022-01-01 PROCEDURE — 80053 COMPREHEN METABOLIC PANEL: CPT

## 2022-01-01 PROCEDURE — 0 LIDOCAINE 1 % SOLUTION: Performed by: INTERNAL MEDICINE

## 2022-01-01 PROCEDURE — 71250 CT THORAX DX C-: CPT

## 2022-01-01 PROCEDURE — 5A0935A ASSISTANCE WITH RESPIRATORY VENTILATION, LESS THAN 24 CONSECUTIVE HOURS, HIGH NASAL FLOW/VELOCITY: ICD-10-PCS | Performed by: INTERNAL MEDICINE

## 2022-01-01 PROCEDURE — 25010000002 VANCOMYCIN 750 MG RECONSTITUTED SOLUTION 1 EACH VIAL: Performed by: NURSE PRACTITIONER

## 2022-01-01 RX ORDER — FLUMAZENIL 0.1 MG/ML
0.2 INJECTION INTRAVENOUS AS NEEDED
Status: CANCELLED | OUTPATIENT
Start: 2022-01-01

## 2022-01-01 RX ORDER — MORPHINE SULFATE 4 MG/ML
2 INJECTION, SOLUTION INTRAMUSCULAR; INTRAVENOUS EVERY 6 HOURS PRN
Status: DISCONTINUED | OUTPATIENT
Start: 2022-01-01 | End: 2022-01-01

## 2022-01-01 RX ORDER — FAMOTIDINE 40 MG/1
40 TABLET, FILM COATED ORAL NIGHTLY
COMMUNITY

## 2022-01-01 RX ORDER — POLYETHYLENE GLYCOL 3350 17 G/17G
17 POWDER, FOR SOLUTION ORAL DAILY
Qty: 510 G | Refills: 2 | Status: ON HOLD | OUTPATIENT
Start: 2022-01-01 | End: 2022-01-01

## 2022-01-01 RX ORDER — MAGNESIUM CARB/ALUMINUM HYDROX 105-160MG
150 TABLET,CHEWABLE ORAL 2 TIMES DAILY PRN
Status: DISCONTINUED | OUTPATIENT
Start: 2022-01-01 | End: 2022-01-01 | Stop reason: HOSPADM

## 2022-01-01 RX ORDER — MEGESTROL ACETATE 40 MG/ML
200 SUSPENSION ORAL
COMMUNITY

## 2022-01-01 RX ORDER — LIDOCAINE 50 MG/G
OINTMENT TOPICAL AS NEEDED
Status: DISCONTINUED | OUTPATIENT
Start: 2022-01-01 | End: 2022-01-01 | Stop reason: HOSPADM

## 2022-01-01 RX ORDER — EPINEPHRINE 0.1 MG/ML
SYRINGE (ML) INJECTION AS NEEDED
Status: DISCONTINUED | OUTPATIENT
Start: 2022-01-01 | End: 2022-01-01 | Stop reason: HOSPADM

## 2022-01-01 RX ORDER — ONDANSETRON 2 MG/ML
4 INJECTION INTRAMUSCULAR; INTRAVENOUS EVERY 6 HOURS PRN
Status: DISCONTINUED | OUTPATIENT
Start: 2022-01-01 | End: 2022-01-01 | Stop reason: HOSPADM

## 2022-01-01 RX ORDER — ESCITALOPRAM OXALATE 5 MG/1
5 TABLET ORAL DAILY
Status: CANCELLED | OUTPATIENT
Start: 2022-01-01

## 2022-01-01 RX ORDER — ACETAMINOPHEN 650 MG/1
650 SUPPOSITORY RECTAL EVERY 4 HOURS PRN
Status: DISCONTINUED | OUTPATIENT
Start: 2022-01-01 | End: 2022-01-01 | Stop reason: HOSPADM

## 2022-01-01 RX ORDER — GABAPENTIN 100 MG/1
100 CAPSULE ORAL 2 TIMES DAILY
Status: CANCELLED | OUTPATIENT
Start: 2022-01-01

## 2022-01-01 RX ORDER — GABAPENTIN 100 MG/1
100 CAPSULE ORAL 2 TIMES DAILY
Status: DISCONTINUED | OUTPATIENT
Start: 2022-01-01 | End: 2022-01-01

## 2022-01-01 RX ORDER — PROMETHAZINE HYDROCHLORIDE 25 MG/1
25 TABLET ORAL EVERY 4 HOURS PRN
Status: DISCONTINUED | OUTPATIENT
Start: 2022-01-01 | End: 2022-01-01 | Stop reason: HOSPADM

## 2022-01-01 RX ORDER — POTASSIUM CHLORIDE 20 MEQ/1
40 TABLET, EXTENDED RELEASE ORAL AS NEEDED
Status: DISCONTINUED | OUTPATIENT
Start: 2022-01-01 | End: 2022-01-01

## 2022-01-01 RX ORDER — LACTULOSE 10 G/15ML
30 SOLUTION ORAL 3 TIMES DAILY
Status: DISCONTINUED | OUTPATIENT
Start: 2022-01-01 | End: 2022-01-01 | Stop reason: HOSPADM

## 2022-01-01 RX ORDER — LORAZEPAM 1 MG/1
0.5 TABLET ORAL EVERY 4 HOURS PRN
Qty: 1 TABLET | Refills: 0 | Status: SHIPPED | OUTPATIENT
Start: 2022-01-01

## 2022-01-01 RX ORDER — PREDNISONE 10 MG/1
10 TABLET ORAL DAILY
Status: DISCONTINUED | OUTPATIENT
Start: 2022-01-01 | End: 2022-01-01 | Stop reason: HOSPADM

## 2022-01-01 RX ORDER — SODIUM CHLORIDE 0.9 % (FLUSH) 0.9 %
10 SYRINGE (ML) INJECTION AS NEEDED
Status: DISCONTINUED | OUTPATIENT
Start: 2022-01-01 | End: 2022-01-01 | Stop reason: HOSPADM

## 2022-01-01 RX ORDER — MAGNESIUM SULFATE 1 G/100ML
1 INJECTION INTRAVENOUS AS NEEDED
Status: DISCONTINUED | OUTPATIENT
Start: 2022-01-01 | End: 2022-01-01 | Stop reason: HOSPADM

## 2022-01-01 RX ORDER — GABAPENTIN 100 MG/1
100 CAPSULE ORAL 2 TIMES DAILY
Status: DISCONTINUED | OUTPATIENT
Start: 2022-01-01 | End: 2022-01-01 | Stop reason: HOSPADM

## 2022-01-01 RX ORDER — PROPOFOL 10 MG/ML
INJECTION, EMULSION INTRAVENOUS AS NEEDED
Status: DISCONTINUED | OUTPATIENT
Start: 2022-01-01 | End: 2022-01-01 | Stop reason: SURG

## 2022-01-01 RX ORDER — PROMETHAZINE HYDROCHLORIDE 25 MG/1
25 TABLET ORAL EVERY 4 HOURS PRN
Status: DISCONTINUED | OUTPATIENT
Start: 2022-01-01 | End: 2022-01-01

## 2022-01-01 RX ORDER — PANTOPRAZOLE SODIUM 40 MG/10ML
40 INJECTION, POWDER, LYOPHILIZED, FOR SOLUTION INTRAVENOUS
Status: DISCONTINUED | OUTPATIENT
Start: 2022-01-01 | End: 2022-01-01

## 2022-01-01 RX ORDER — ESCITALOPRAM OXALATE 10 MG/1
5 TABLET ORAL DAILY
Status: DISCONTINUED | OUTPATIENT
Start: 2022-01-01 | End: 2022-01-01 | Stop reason: HOSPADM

## 2022-01-01 RX ORDER — FAMOTIDINE 20 MG/1
40 TABLET, FILM COATED ORAL NIGHTLY
Status: DISCONTINUED | OUTPATIENT
Start: 2022-01-01 | End: 2022-01-01

## 2022-01-01 RX ORDER — IPRATROPIUM BROMIDE AND ALBUTEROL SULFATE 2.5; .5 MG/3ML; MG/3ML
3 SOLUTION RESPIRATORY (INHALATION)
Status: DISCONTINUED | OUTPATIENT
Start: 2022-01-01 | End: 2022-01-01

## 2022-01-01 RX ORDER — HYDROCODONE BITARTRATE AND ACETAMINOPHEN 7.5; 325 MG/1; MG/1
1 TABLET ORAL EVERY 6 HOURS PRN
Status: CANCELLED | OUTPATIENT
Start: 2022-01-01

## 2022-01-01 RX ORDER — ALBUTEROL SULFATE 2.5 MG/3ML
2.5 SOLUTION RESPIRATORY (INHALATION) ONCE AS NEEDED
Status: CANCELLED | OUTPATIENT
Start: 2022-01-01

## 2022-01-01 RX ORDER — ESCITALOPRAM OXALATE 10 MG/1
5 TABLET ORAL DAILY
Status: DISCONTINUED | OUTPATIENT
Start: 2022-01-01 | End: 2022-01-01

## 2022-01-01 RX ORDER — NALBUPHINE HCL 10 MG/ML
10 AMPUL (ML) INJECTION EVERY 4 HOURS PRN
Status: CANCELLED | OUTPATIENT
Start: 2022-01-01

## 2022-01-01 RX ORDER — MAGNESIUM SULFATE HEPTAHYDRATE 40 MG/ML
2 INJECTION, SOLUTION INTRAVENOUS AS NEEDED
Status: DISCONTINUED | OUTPATIENT
Start: 2022-01-01 | End: 2022-01-01 | Stop reason: HOSPADM

## 2022-01-01 RX ORDER — MORPHINE SULFATE 2 MG/ML
2 INJECTION, SOLUTION INTRAMUSCULAR; INTRAVENOUS EVERY 6 HOURS PRN
Status: DISCONTINUED | OUTPATIENT
Start: 2022-01-01 | End: 2022-01-01

## 2022-01-01 RX ORDER — CALCIUM GLUCONATE 20 MG/ML
2 INJECTION, SOLUTION INTRAVENOUS AS NEEDED
Status: DISCONTINUED | OUTPATIENT
Start: 2022-01-01 | End: 2022-01-01 | Stop reason: HOSPADM

## 2022-01-01 RX ORDER — MIDAZOLAM HYDROCHLORIDE 1 MG/ML
1 INJECTION INTRAMUSCULAR; INTRAVENOUS
Status: CANCELLED | OUTPATIENT
Start: 2022-01-01

## 2022-01-01 RX ORDER — PANTOPRAZOLE SODIUM 40 MG/1
40 TABLET, DELAYED RELEASE ORAL EVERY MORNING
Status: CANCELLED | OUTPATIENT
Start: 2022-01-01

## 2022-01-01 RX ORDER — HYDROMORPHONE HCL 110MG/55ML
0.5 PATIENT CONTROLLED ANALGESIA SYRINGE INTRAVENOUS
Status: CANCELLED | OUTPATIENT
Start: 2022-01-01

## 2022-01-01 RX ORDER — SODIUM CHLORIDE 0.9 % (FLUSH) 0.9 %
10 SYRINGE (ML) INJECTION EVERY 12 HOURS SCHEDULED
Status: DISCONTINUED | OUTPATIENT
Start: 2022-01-01 | End: 2022-01-01 | Stop reason: HOSPADM

## 2022-01-01 RX ORDER — FUROSEMIDE 10 MG/ML
40 INJECTION INTRAMUSCULAR; INTRAVENOUS ONCE
Status: COMPLETED | OUTPATIENT
Start: 2022-01-01 | End: 2022-01-01

## 2022-01-01 RX ORDER — ALUMINA, MAGNESIA, AND SIMETHICONE 2400; 2400; 240 MG/30ML; MG/30ML; MG/30ML
15 SUSPENSION ORAL EVERY 6 HOURS PRN
Status: DISCONTINUED | OUTPATIENT
Start: 2022-01-01 | End: 2022-01-01 | Stop reason: HOSPADM

## 2022-01-01 RX ORDER — MORPHINE SULFATE 4 MG/ML
4 INJECTION, SOLUTION INTRAMUSCULAR; INTRAVENOUS ONCE
Status: COMPLETED | OUTPATIENT
Start: 2022-01-01 | End: 2022-01-01

## 2022-01-01 RX ORDER — PROMETHAZINE HYDROCHLORIDE 25 MG/1
25 TABLET ORAL EVERY 4 HOURS PRN
COMMUNITY

## 2022-01-01 RX ORDER — NITROGLYCERIN 0.4 MG/1
0.4 TABLET SUBLINGUAL
Status: DISCONTINUED | OUTPATIENT
Start: 2022-01-01 | End: 2022-01-01 | Stop reason: HOSPADM

## 2022-01-01 RX ORDER — ACETAMINOPHEN 325 MG/1
650 TABLET ORAL EVERY 4 HOURS PRN
Status: DISCONTINUED | OUTPATIENT
Start: 2022-01-01 | End: 2022-01-01 | Stop reason: HOSPADM

## 2022-01-01 RX ORDER — MORPHINE SULFATE 4 MG/ML
2 INJECTION, SOLUTION INTRAMUSCULAR; INTRAVENOUS EVERY 4 HOURS PRN
Status: DISCONTINUED | OUTPATIENT
Start: 2022-01-01 | End: 2022-01-01 | Stop reason: HOSPADM

## 2022-01-01 RX ORDER — MAGNESIUM HYDROXIDE 1200 MG/15ML
LIQUID ORAL
Status: DISPENSED
Start: 2022-01-01 | End: 2022-01-01

## 2022-01-01 RX ORDER — IPRATROPIUM BROMIDE AND ALBUTEROL SULFATE 2.5; .5 MG/3ML; MG/3ML
3 SOLUTION RESPIRATORY (INHALATION) EVERY 4 HOURS PRN
Status: DISCONTINUED | OUTPATIENT
Start: 2022-01-01 | End: 2022-01-01 | Stop reason: HOSPADM

## 2022-01-01 RX ORDER — ESCITALOPRAM OXALATE 5 MG/1
5 TABLET ORAL DAILY
COMMUNITY

## 2022-01-01 RX ORDER — LIDOCAINE HYDROCHLORIDE 20 MG/ML
INJECTION, SOLUTION EPIDURAL; INFILTRATION; INTRACAUDAL; PERINEURAL
Status: COMPLETED
Start: 2022-01-01 | End: 2022-01-01

## 2022-01-01 RX ORDER — METHYLPREDNISOLONE SODIUM SUCCINATE 40 MG/ML
40 INJECTION, POWDER, LYOPHILIZED, FOR SOLUTION INTRAMUSCULAR; INTRAVENOUS EVERY 8 HOURS
Status: DISCONTINUED | OUTPATIENT
Start: 2022-01-01 | End: 2022-01-01

## 2022-01-01 RX ORDER — GUAIFENESIN 600 MG/1
600 TABLET, EXTENDED RELEASE ORAL EVERY 12 HOURS SCHEDULED
Status: DISCONTINUED | OUTPATIENT
Start: 2022-01-01 | End: 2022-01-01 | Stop reason: HOSPADM

## 2022-01-01 RX ORDER — POTASSIUM CHLORIDE 1.5 G/1.77G
40 POWDER, FOR SOLUTION ORAL AS NEEDED
Status: DISCONTINUED | OUTPATIENT
Start: 2022-01-01 | End: 2022-01-01 | Stop reason: HOSPADM

## 2022-01-01 RX ORDER — MEPERIDINE HYDROCHLORIDE 25 MG/ML
12.5 INJECTION INTRAMUSCULAR; INTRAVENOUS; SUBCUTANEOUS
Status: CANCELLED | OUTPATIENT
Start: 2022-01-01 | End: 2022-01-01

## 2022-01-01 RX ORDER — LIDOCAINE HYDROCHLORIDE 10 MG/ML
INJECTION, SOLUTION EPIDURAL; INFILTRATION; INTRACAUDAL; PERINEURAL AS NEEDED
Status: DISCONTINUED | OUTPATIENT
Start: 2022-01-01 | End: 2022-01-01 | Stop reason: SURG

## 2022-01-01 RX ORDER — ONDANSETRON 4 MG/1
4 TABLET, FILM COATED ORAL EVERY 8 HOURS PRN
Status: DISCONTINUED | OUTPATIENT
Start: 2022-01-01 | End: 2022-01-01 | Stop reason: SDUPTHER

## 2022-01-01 RX ORDER — IPRATROPIUM BROMIDE AND ALBUTEROL SULFATE 2.5; .5 MG/3ML; MG/3ML
3 SOLUTION RESPIRATORY (INHALATION) EVERY 4 HOURS PRN
Status: DISCONTINUED | OUTPATIENT
Start: 2022-01-01 | End: 2022-01-01 | Stop reason: SDUPTHER

## 2022-01-01 RX ORDER — POLYETHYLENE GLYCOL 3350 17 G/17G
17 POWDER, FOR SOLUTION ORAL DAILY
Status: DISCONTINUED | OUTPATIENT
Start: 2022-01-01 | End: 2022-01-01 | Stop reason: HOSPADM

## 2022-01-01 RX ORDER — PANTOPRAZOLE SODIUM 40 MG/1
40 TABLET, DELAYED RELEASE ORAL EVERY MORNING
Status: DISCONTINUED | OUTPATIENT
Start: 2022-01-01 | End: 2022-01-01 | Stop reason: HOSPADM

## 2022-01-01 RX ORDER — CHOLECALCIFEROL (VITAMIN D3) 125 MCG
5 CAPSULE ORAL NIGHTLY PRN
Status: DISCONTINUED | OUTPATIENT
Start: 2022-01-01 | End: 2022-01-01

## 2022-01-01 RX ORDER — LANOLIN ALCOHOL/MO/W.PET/CERES
400 CREAM (GRAM) TOPICAL DAILY
Status: DISCONTINUED | OUTPATIENT
Start: 2022-01-01 | End: 2022-01-01 | Stop reason: HOSPADM

## 2022-01-01 RX ORDER — MEGESTROL ACETATE 40 MG/ML
200 SUSPENSION ORAL
Status: DISCONTINUED | OUTPATIENT
Start: 2022-01-01 | End: 2022-01-01

## 2022-01-01 RX ORDER — MEGESTROL ACETATE 40 MG/ML
200 SUSPENSION ORAL
Status: DISCONTINUED | OUTPATIENT
Start: 2022-01-01 | End: 2022-01-01 | Stop reason: HOSPADM

## 2022-01-01 RX ORDER — AMOXICILLIN AND CLAVULANATE POTASSIUM 875; 125 MG/1; MG/1
1 TABLET, FILM COATED ORAL EVERY 12 HOURS SCHEDULED
Status: DISCONTINUED | OUTPATIENT
Start: 2022-01-01 | End: 2022-01-01 | Stop reason: HOSPADM

## 2022-01-01 RX ORDER — ONDANSETRON 4 MG/1
4 TABLET, FILM COATED ORAL EVERY 8 HOURS PRN
Status: DISCONTINUED | OUTPATIENT
Start: 2022-01-01 | End: 2022-01-01

## 2022-01-01 RX ORDER — LANSOPRAZOLE
30 KIT EVERY MORNING
Status: DISCONTINUED | OUTPATIENT
Start: 2022-01-01 | End: 2022-01-01 | Stop reason: HOSPADM

## 2022-01-01 RX ORDER — LORAZEPAM 2 MG/ML
1 INJECTION INTRAMUSCULAR
Status: CANCELLED | OUTPATIENT
Start: 2022-01-01 | End: 2022-03-19

## 2022-01-01 RX ORDER — PANTOPRAZOLE SODIUM 40 MG/1
40 TABLET, DELAYED RELEASE ORAL EVERY MORNING
Status: DISCONTINUED | OUTPATIENT
Start: 2022-01-01 | End: 2022-01-01

## 2022-01-01 RX ORDER — PREDNISONE 20 MG/1
20 TABLET ORAL DAILY
Status: COMPLETED | OUTPATIENT
Start: 2022-01-01 | End: 2022-01-01

## 2022-01-01 RX ORDER — SODIUM CHLORIDE 9 MG/ML
INJECTION, SOLUTION INTRAVENOUS CONTINUOUS PRN
Status: DISCONTINUED | OUTPATIENT
Start: 2022-01-01 | End: 2022-01-01 | Stop reason: SURG

## 2022-01-01 RX ORDER — DIPHENHYDRAMINE HYDROCHLORIDE 50 MG/ML
12.5 INJECTION INTRAMUSCULAR; INTRAVENOUS
Status: CANCELLED | OUTPATIENT
Start: 2022-01-01

## 2022-01-01 RX ORDER — POTASSIUM CHLORIDE 20 MEQ/1
40 TABLET, EXTENDED RELEASE ORAL AS NEEDED
Status: DISCONTINUED | OUTPATIENT
Start: 2022-01-01 | End: 2022-01-01 | Stop reason: HOSPADM

## 2022-01-01 RX ORDER — HYDROCODONE BITARTRATE AND ACETAMINOPHEN 7.5; 325 MG/1; MG/1
1 TABLET ORAL EVERY 6 HOURS PRN
Status: DISCONTINUED | OUTPATIENT
Start: 2022-01-01 | End: 2022-01-01 | Stop reason: HOSPADM

## 2022-01-01 RX ORDER — CALCIUM GLUCONATE 20 MG/ML
1 INJECTION, SOLUTION INTRAVENOUS AS NEEDED
Status: DISCONTINUED | OUTPATIENT
Start: 2022-01-01 | End: 2022-01-01 | Stop reason: HOSPADM

## 2022-01-01 RX ORDER — LORAZEPAM 2 MG/ML
0.5 INJECTION INTRAMUSCULAR EVERY 4 HOURS PRN
Status: DISCONTINUED | OUTPATIENT
Start: 2022-01-01 | End: 2022-01-01 | Stop reason: HOSPADM

## 2022-01-01 RX ORDER — BUDESONIDE 0.5 MG/2ML
1 INHALANT ORAL
Status: DISCONTINUED | OUTPATIENT
Start: 2022-01-01 | End: 2022-01-01 | Stop reason: HOSPADM

## 2022-01-01 RX ORDER — SODIUM CHLORIDE 9 MG/ML
75 INJECTION, SOLUTION INTRAVENOUS CONTINUOUS
Status: DISCONTINUED | OUTPATIENT
Start: 2022-01-01 | End: 2022-01-01

## 2022-01-01 RX ORDER — METOCLOPRAMIDE 5 MG/1
5 TABLET ORAL 3 TIMES DAILY
COMMUNITY
End: 2022-01-01

## 2022-01-01 RX ORDER — ONDANSETRON 4 MG/1
4 TABLET, FILM COATED ORAL EVERY 6 HOURS PRN
Status: DISCONTINUED | OUTPATIENT
Start: 2022-01-01 | End: 2022-01-01 | Stop reason: HOSPADM

## 2022-01-01 RX ORDER — BUDESONIDE AND FORMOTEROL FUMARATE DIHYDRATE 160; 4.5 UG/1; UG/1
2 AEROSOL RESPIRATORY (INHALATION) 2 TIMES DAILY
Qty: 10.2 G | Refills: 2 | Status: SHIPPED | OUTPATIENT
Start: 2022-01-01 | End: 2022-06-01

## 2022-01-01 RX ORDER — NALOXONE HCL 0.4 MG/ML
0.4 VIAL (ML) INJECTION AS NEEDED
Status: CANCELLED | OUTPATIENT
Start: 2022-01-01

## 2022-01-01 RX ORDER — OMEPRAZOLE 40 MG/1
40 CAPSULE, DELAYED RELEASE ORAL DAILY
COMMUNITY

## 2022-01-01 RX ORDER — DEXTROSE AND SODIUM CHLORIDE 5; .9 G/100ML; G/100ML
75 INJECTION, SOLUTION INTRAVENOUS CONTINUOUS
Status: DISCONTINUED | OUTPATIENT
Start: 2022-01-01 | End: 2022-01-01

## 2022-01-01 RX ORDER — ALBUTEROL SULFATE 2.5 MG/3ML
2.5 SOLUTION RESPIRATORY (INHALATION) EVERY 4 HOURS PRN
Status: DISCONTINUED | OUTPATIENT
Start: 2022-01-01 | End: 2022-01-01 | Stop reason: HOSPADM

## 2022-01-01 RX ORDER — KETOROLAC TROMETHAMINE 15 MG/ML
15 INJECTION, SOLUTION INTRAMUSCULAR; INTRAVENOUS EVERY 6 HOURS PRN
Status: DISCONTINUED | OUTPATIENT
Start: 2022-01-01 | End: 2022-01-01

## 2022-01-01 RX ORDER — CHOLECALCIFEROL (VITAMIN D3) 125 MCG
5 CAPSULE ORAL NIGHTLY PRN
Status: DISCONTINUED | OUTPATIENT
Start: 2022-01-01 | End: 2022-01-01 | Stop reason: HOSPADM

## 2022-01-01 RX ORDER — MAGNESIUM SULFATE HEPTAHYDRATE 40 MG/ML
4 INJECTION, SOLUTION INTRAVENOUS AS NEEDED
Status: DISCONTINUED | OUTPATIENT
Start: 2022-01-01 | End: 2022-01-01 | Stop reason: HOSPADM

## 2022-01-01 RX ORDER — ONDANSETRON 2 MG/ML
4 INJECTION INTRAMUSCULAR; INTRAVENOUS ONCE AS NEEDED
Status: CANCELLED | OUTPATIENT
Start: 2022-01-01

## 2022-01-01 RX ORDER — CHOLECALCIFEROL (VITAMIN D3) 125 MCG
5 CAPSULE ORAL NIGHTLY PRN
COMMUNITY

## 2022-01-01 RX ORDER — BUDESONIDE 0.5 MG/2ML
0.5 INHALANT ORAL
Status: DISCONTINUED | OUTPATIENT
Start: 2022-01-01 | End: 2022-01-01 | Stop reason: HOSPADM

## 2022-01-01 RX ORDER — BUDESONIDE AND FORMOTEROL FUMARATE DIHYDRATE 160; 4.5 UG/1; UG/1
2 AEROSOL RESPIRATORY (INHALATION) 2 TIMES DAILY
Status: DISCONTINUED | OUTPATIENT
Start: 2022-01-01 | End: 2022-01-01

## 2022-01-01 RX ORDER — POTASSIUM CHLORIDE 7.45 MG/ML
10 INJECTION INTRAVENOUS
Status: DISCONTINUED | OUTPATIENT
Start: 2022-01-01 | End: 2022-01-01 | Stop reason: HOSPADM

## 2022-01-01 RX ORDER — ACETAMINOPHEN 160 MG/5ML
650 SOLUTION ORAL EVERY 4 HOURS PRN
Status: DISCONTINUED | OUTPATIENT
Start: 2022-01-01 | End: 2022-01-01 | Stop reason: HOSPADM

## 2022-01-01 RX ORDER — ONDANSETRON 4 MG/1
4 TABLET, FILM COATED ORAL EVERY 8 HOURS PRN
Status: DISCONTINUED | OUTPATIENT
Start: 2022-01-01 | End: 2022-01-01 | Stop reason: HOSPADM

## 2022-01-01 RX ORDER — NALBUPHINE HCL 10 MG/ML
2 AMPUL (ML) INJECTION EVERY 4 HOURS PRN
Status: CANCELLED | OUTPATIENT
Start: 2022-01-01

## 2022-01-01 RX ORDER — HYDROMORPHONE HCL 110MG/55ML
0.5 PATIENT CONTROLLED ANALGESIA SYRINGE INTRAVENOUS
Status: CANCELLED | OUTPATIENT
Start: 2022-01-01 | End: 2022-03-19

## 2022-01-01 RX ORDER — LANOLIN ALCOHOL/MO/W.PET/CERES
400 CREAM (GRAM) TOPICAL DAILY
Status: DISCONTINUED | OUTPATIENT
Start: 2022-01-01 | End: 2022-01-01

## 2022-01-01 RX ORDER — BUDESONIDE 0.5 MG/2ML
0.5 INHALANT ORAL
Status: DISCONTINUED | OUTPATIENT
Start: 2022-01-01 | End: 2022-01-01 | Stop reason: SDUPTHER

## 2022-01-01 RX ORDER — FENTANYL/ROPIVACAINE/NS/PF 2-625MCG/1
15 PLASTIC BAG, INJECTION (ML) EPIDURAL AS NEEDED
Status: DISCONTINUED | OUTPATIENT
Start: 2022-01-01 | End: 2022-01-01 | Stop reason: HOSPADM

## 2022-01-01 RX ORDER — ALBUTEROL SULFATE 90 UG/1
2 AEROSOL, METERED RESPIRATORY (INHALATION) EVERY 4 HOURS PRN
Qty: 18 G | Refills: 2 | Status: SHIPPED | OUTPATIENT
Start: 2022-01-01

## 2022-01-01 RX ORDER — LIDOCAINE 50 MG/G
OINTMENT TOPICAL
Status: DISPENSED
Start: 2022-01-01 | End: 2022-01-01

## 2022-01-01 RX ORDER — LIDOCAINE HYDROCHLORIDE 20 MG/ML
INJECTION, SOLUTION INFILTRATION; PERINEURAL AS NEEDED
Status: DISCONTINUED | OUTPATIENT
Start: 2022-01-01 | End: 2022-01-01 | Stop reason: HOSPADM

## 2022-01-01 RX ORDER — MORPHINE SULFATE 100 MG/5ML
20 SOLUTION ORAL
Qty: 2 ML | Refills: 0 | Status: SHIPPED | OUTPATIENT
Start: 2022-01-01

## 2022-01-01 RX ORDER — BISACODYL 10 MG
10 SUPPOSITORY, RECTAL RECTAL DAILY PRN
Status: DISCONTINUED | OUTPATIENT
Start: 2022-01-01 | End: 2022-01-01 | Stop reason: HOSPADM

## 2022-01-01 RX ORDER — METHYLPREDNISOLONE SODIUM SUCCINATE 40 MG/ML
40 INJECTION, POWDER, LYOPHILIZED, FOR SOLUTION INTRAMUSCULAR; INTRAVENOUS EVERY 12 HOURS
Status: DISCONTINUED | OUTPATIENT
Start: 2022-01-01 | End: 2022-01-01 | Stop reason: HOSPADM

## 2022-01-01 RX ORDER — PREDNISONE 10 MG/1
10 TABLET ORAL DAILY
Qty: 2 TABLET | Refills: 0 | Status: SHIPPED | OUTPATIENT
Start: 2022-01-01 | End: 2022-01-01

## 2022-01-01 RX ORDER — LIDOCAINE HYDROCHLORIDE 10 MG/ML
INJECTION, SOLUTION INFILTRATION; PERINEURAL AS NEEDED
Status: DISCONTINUED | OUTPATIENT
Start: 2022-01-01 | End: 2022-01-01 | Stop reason: HOSPADM

## 2022-01-01 RX ORDER — LIDOCAINE HYDROCHLORIDE 20 MG/ML
INJECTION, SOLUTION EPIDURAL; INFILTRATION; INTRACAUDAL; PERINEURAL
Status: DISPENSED
Start: 2022-01-01 | End: 2022-01-01

## 2022-01-01 RX ADMIN — GUAIFENESIN 600 MG: 600 TABLET, EXTENDED RELEASE ORAL at 14:28

## 2022-01-01 RX ADMIN — BUDESONIDE 1 MG: 0.5 INHALANT RESPIRATORY (INHALATION) at 06:59

## 2022-01-01 RX ADMIN — GUAIFENESIN 600 MG: 600 TABLET, EXTENDED RELEASE ORAL at 09:04

## 2022-01-01 RX ADMIN — ESCITALOPRAM OXALATE 5 MG: 10 TABLET ORAL at 09:01

## 2022-01-01 RX ADMIN — DEXTROSE AND SODIUM CHLORIDE 75 ML/HR: 5; 900 INJECTION, SOLUTION INTRAVENOUS at 14:20

## 2022-01-01 RX ADMIN — Medication 10 ML: at 21:47

## 2022-01-01 RX ADMIN — CEFEPIME HYDROCHLORIDE 2 G: 2 INJECTION, POWDER, FOR SOLUTION INTRAVENOUS at 18:46

## 2022-01-01 RX ADMIN — Medication 10 ML: at 21:38

## 2022-01-01 RX ADMIN — METHYLPREDNISOLONE SODIUM SUCCINATE 40 MG: 40 INJECTION, POWDER, FOR SOLUTION INTRAMUSCULAR; INTRAVENOUS at 12:51

## 2022-01-01 RX ADMIN — AMPICILLIN SODIUM AND SULBACTAM SODIUM 3 G: 2; 1 INJECTION, POWDER, FOR SOLUTION INTRAMUSCULAR; INTRAVENOUS at 10:25

## 2022-01-01 RX ADMIN — PANTOPRAZOLE SODIUM 40 MG: 40 INJECTION, POWDER, LYOPHILIZED, FOR SOLUTION INTRAVENOUS at 05:24

## 2022-01-01 RX ADMIN — VANCOMYCIN HYDROCHLORIDE 1250 MG: 1.25 INJECTION, POWDER, LYOPHILIZED, FOR SOLUTION INTRAVENOUS at 23:20

## 2022-01-01 RX ADMIN — ESCITALOPRAM OXALATE 5 MG: 10 TABLET ORAL at 21:38

## 2022-01-01 RX ADMIN — SODIUM CHLORIDE: 0.9 INJECTION, SOLUTION INTRAVENOUS at 10:39

## 2022-01-01 RX ADMIN — Medication 10 ML: at 21:24

## 2022-01-01 RX ADMIN — IOPAMIDOL 50 ML: 755 INJECTION, SOLUTION INTRAVENOUS at 18:48

## 2022-01-01 RX ADMIN — SODIUM CHLORIDE 1000 ML: 9 INJECTION, SOLUTION INTRAVENOUS at 11:44

## 2022-01-01 RX ADMIN — ESCITALOPRAM OXALATE 5 MG: 10 TABLET ORAL at 09:31

## 2022-01-01 RX ADMIN — BUDESONIDE 0.5 MG: 0.5 INHALANT RESPIRATORY (INHALATION) at 19:32

## 2022-01-01 RX ADMIN — PREDNISONE 20 MG: 20 TABLET ORAL at 09:07

## 2022-01-01 RX ADMIN — BUDESONIDE 1 MG: 0.5 INHALANT RESPIRATORY (INHALATION) at 08:47

## 2022-01-01 RX ADMIN — METHYLPREDNISOLONE SODIUM SUCCINATE 40 MG: 40 INJECTION, POWDER, FOR SOLUTION INTRAMUSCULAR; INTRAVENOUS at 04:27

## 2022-01-01 RX ADMIN — GABAPENTIN 100 MG: 100 CAPSULE ORAL at 21:39

## 2022-01-01 RX ADMIN — IPRATROPIUM BROMIDE AND ALBUTEROL SULFATE 3 ML: 2.5; .5 SOLUTION RESPIRATORY (INHALATION) at 10:58

## 2022-01-01 RX ADMIN — METHYLPREDNISOLONE SODIUM SUCCINATE 40 MG: 40 INJECTION, POWDER, FOR SOLUTION INTRAMUSCULAR; INTRAVENOUS at 09:17

## 2022-01-01 RX ADMIN — ESCITALOPRAM OXALATE 5 MG: 10 TABLET ORAL at 10:17

## 2022-01-01 RX ADMIN — IPRATROPIUM BROMIDE AND ALBUTEROL SULFATE 3 ML: 2.5; .5 SOLUTION RESPIRATORY (INHALATION) at 20:34

## 2022-01-01 RX ADMIN — POLYETHYLENE GLYCOL 3350 17 G: 17 POWDER, FOR SOLUTION ORAL at 07:29

## 2022-01-01 RX ADMIN — IPRATROPIUM BROMIDE AND ALBUTEROL SULFATE 3 ML: 2.5; .5 SOLUTION RESPIRATORY (INHALATION) at 12:31

## 2022-01-01 RX ADMIN — METHYLPREDNISOLONE SODIUM SUCCINATE 40 MG: 40 INJECTION, POWDER, FOR SOLUTION INTRAMUSCULAR; INTRAVENOUS at 20:03

## 2022-01-01 RX ADMIN — FOLIC ACID TAB 400 MCG 400 MCG: 400 TAB at 09:04

## 2022-01-01 RX ADMIN — METHYLPREDNISOLONE SODIUM SUCCINATE 40 MG: 40 INJECTION, POWDER, FOR SOLUTION INTRAMUSCULAR; INTRAVENOUS at 05:03

## 2022-01-01 RX ADMIN — CEFEPIME HYDROCHLORIDE 2 G: 2 INJECTION, POWDER, FOR SOLUTION INTRAVENOUS at 05:11

## 2022-01-01 RX ADMIN — Medication 10 ML: at 09:08

## 2022-01-01 RX ADMIN — BUDESONIDE 1 MG: 0.5 INHALANT RESPIRATORY (INHALATION) at 19:28

## 2022-01-01 RX ADMIN — CEFEPIME HYDROCHLORIDE 2 G: 2 INJECTION, POWDER, FOR SOLUTION INTRAVENOUS at 20:59

## 2022-01-01 RX ADMIN — METHYLPREDNISOLONE SODIUM SUCCINATE 40 MG: 40 INJECTION, POWDER, FOR SOLUTION INTRAMUSCULAR; INTRAVENOUS at 21:46

## 2022-01-01 RX ADMIN — LACTULOSE 17 G: 20 SOLUTION ORAL at 21:51

## 2022-01-01 RX ADMIN — GABAPENTIN 100 MG: 100 CAPSULE ORAL at 09:45

## 2022-01-01 RX ADMIN — Medication 10 ML: at 21:39

## 2022-01-01 RX ADMIN — GABAPENTIN 100 MG: 100 CAPSULE ORAL at 23:17

## 2022-01-01 RX ADMIN — AMPICILLIN SODIUM AND SULBACTAM SODIUM 3 G: 2; 1 INJECTION, POWDER, FOR SOLUTION INTRAMUSCULAR; INTRAVENOUS at 19:15

## 2022-01-01 RX ADMIN — PANTOPRAZOLE SODIUM 40 MG: 40 TABLET, DELAYED RELEASE ORAL at 10:17

## 2022-01-01 RX ADMIN — BUDESONIDE AND FORMOTEROL FUMARATE DIHYDRATE 2 PUFF: 160; 4.5 AEROSOL RESPIRATORY (INHALATION) at 11:14

## 2022-01-01 RX ADMIN — POLYETHYLENE GLYCOL 3350 17 G: 17 POWDER, FOR SOLUTION ORAL at 09:04

## 2022-01-01 RX ADMIN — POLYETHYLENE GLYCOL 3350 17 G: 17 POWDER, FOR SOLUTION ORAL at 09:45

## 2022-01-01 RX ADMIN — GABAPENTIN 100 MG: 100 CAPSULE ORAL at 09:33

## 2022-01-01 RX ADMIN — METHYLPREDNISOLONE SODIUM SUCCINATE 40 MG: 40 INJECTION, POWDER, FOR SOLUTION INTRAMUSCULAR; INTRAVENOUS at 14:33

## 2022-01-01 RX ADMIN — FOLIC ACID TAB 400 MCG 400 MCG: 400 TAB at 07:31

## 2022-01-01 RX ADMIN — FUROSEMIDE 40 MG: 10 INJECTION, SOLUTION INTRAVENOUS at 00:27

## 2022-01-01 RX ADMIN — FOLIC ACID TAB 400 MCG 400 MCG: 400 TAB at 09:07

## 2022-01-01 RX ADMIN — GABAPENTIN 100 MG: 100 CAPSULE ORAL at 09:07

## 2022-01-01 RX ADMIN — Medication 10 ML: at 09:04

## 2022-01-01 RX ADMIN — Medication 10 ML: at 10:26

## 2022-01-01 RX ADMIN — GABAPENTIN 100 MG: 100 CAPSULE ORAL at 21:20

## 2022-01-01 RX ADMIN — PREDNISONE 30 MG: 20 TABLET ORAL at 09:32

## 2022-01-01 RX ADMIN — FOLIC ACID TAB 400 MCG 400 MCG: 400 TAB at 10:17

## 2022-01-01 RX ADMIN — PANTOPRAZOLE SODIUM 40 MG: 40 TABLET, DELAYED RELEASE ORAL at 07:30

## 2022-01-01 RX ADMIN — CEFEPIME HYDROCHLORIDE 2 G: 2 INJECTION, POWDER, FOR SOLUTION INTRAVENOUS at 04:20

## 2022-01-01 RX ADMIN — IPRATROPIUM BROMIDE AND ALBUTEROL SULFATE 3 ML: 2.5; .5 SOLUTION RESPIRATORY (INHALATION) at 07:40

## 2022-01-01 RX ADMIN — AMPICILLIN SODIUM AND SULBACTAM SODIUM 3 G: 2; 1 INJECTION, POWDER, FOR SOLUTION INTRAMUSCULAR; INTRAVENOUS at 23:27

## 2022-01-01 RX ADMIN — CEFEPIME HYDROCHLORIDE 2 G: 2 INJECTION, POWDER, FOR SOLUTION INTRAVENOUS at 12:49

## 2022-01-01 RX ADMIN — GABAPENTIN 100 MG: 100 CAPSULE ORAL at 20:59

## 2022-01-01 RX ADMIN — GUAIFENESIN 600 MG: 600 TABLET, EXTENDED RELEASE ORAL at 21:17

## 2022-01-01 RX ADMIN — PROPOFOL 250 MG: 10 INJECTION, EMULSION INTRAVENOUS at 09:22

## 2022-01-01 RX ADMIN — FOLIC ACID TAB 400 MCG 400 MCG: 400 TAB at 21:38

## 2022-01-01 RX ADMIN — CEFEPIME HYDROCHLORIDE 2 G: 2 INJECTION, POWDER, FOR SOLUTION INTRAVENOUS at 14:34

## 2022-01-01 RX ADMIN — METHYLPREDNISOLONE SODIUM SUCCINATE 40 MG: 40 INJECTION, POWDER, FOR SOLUTION INTRAMUSCULAR; INTRAVENOUS at 21:39

## 2022-01-01 RX ADMIN — GABAPENTIN 100 MG: 100 CAPSULE ORAL at 21:50

## 2022-01-01 RX ADMIN — CEFEPIME HYDROCHLORIDE 2 G: 2 INJECTION, POWDER, FOR SOLUTION INTRAVENOUS at 16:23

## 2022-01-01 RX ADMIN — CEFEPIME HYDROCHLORIDE 2 G: 2 INJECTION, POWDER, FOR SOLUTION INTRAVENOUS at 22:38

## 2022-01-01 RX ADMIN — LIDOCAINE HYDROCHLORIDE 50 MG: 10 INJECTION, SOLUTION EPIDURAL; INFILTRATION; INTRACAUDAL at 09:22

## 2022-01-01 RX ADMIN — POTASSIUM PHOSPHATE, MONOBASIC AND POTASSIUM PHOSPHATE, DIBASIC 30 MMOL: 224; 236 INJECTION, SOLUTION, CONCENTRATE INTRAVENOUS at 03:45

## 2022-01-01 RX ADMIN — FOLIC ACID TAB 400 MCG 400 MCG: 400 TAB at 09:45

## 2022-01-01 RX ADMIN — PANTOPRAZOLE SODIUM 40 MG: 40 INJECTION, POWDER, LYOPHILIZED, FOR SOLUTION INTRAVENOUS at 05:41

## 2022-01-01 RX ADMIN — METHYLPREDNISOLONE SODIUM SUCCINATE 40 MG: 40 INJECTION, POWDER, FOR SOLUTION INTRAMUSCULAR; INTRAVENOUS at 04:19

## 2022-01-01 RX ADMIN — BUDESONIDE 0.5 MG: 0.5 INHALANT RESPIRATORY (INHALATION) at 20:17

## 2022-01-01 RX ADMIN — METHYLPREDNISOLONE SODIUM SUCCINATE 40 MG: 40 INJECTION, POWDER, FOR SOLUTION INTRAMUSCULAR; INTRAVENOUS at 20:43

## 2022-01-01 RX ADMIN — Medication 10 ML: at 14:38

## 2022-01-01 RX ADMIN — METHYLPREDNISOLONE SODIUM SUCCINATE 40 MG: 40 INJECTION, POWDER, FOR SOLUTION INTRAMUSCULAR; INTRAVENOUS at 21:28

## 2022-01-01 RX ADMIN — Medication 10 ML: at 12:50

## 2022-01-01 RX ADMIN — GUAIFENESIN 600 MG: 600 TABLET, EXTENDED RELEASE ORAL at 21:39

## 2022-01-01 RX ADMIN — CEFEPIME HYDROCHLORIDE 2 G: 2 INJECTION, POWDER, FOR SOLUTION INTRAVENOUS at 04:27

## 2022-01-01 RX ADMIN — METHYLPREDNISOLONE SODIUM SUCCINATE 40 MG: 40 INJECTION, POWDER, FOR SOLUTION INTRAMUSCULAR; INTRAVENOUS at 21:38

## 2022-01-01 RX ADMIN — VANCOMYCIN HYDROCHLORIDE 750 MG: 750 INJECTION, POWDER, LYOPHILIZED, FOR SOLUTION INTRAVENOUS at 22:16

## 2022-01-01 RX ADMIN — Medication 10 ML: at 09:00

## 2022-01-01 RX ADMIN — POLYETHYLENE GLYCOL 3350 17 G: 17 POWDER, FOR SOLUTION ORAL at 10:17

## 2022-01-01 RX ADMIN — BUDESONIDE 1 MG: 0.5 INHALANT RESPIRATORY (INHALATION) at 08:00

## 2022-01-01 RX ADMIN — SODIUM CHLORIDE 75 ML/HR: 9 INJECTION, SOLUTION INTRAVENOUS at 03:06

## 2022-01-01 RX ADMIN — PREDNISONE 20 MG: 20 TABLET ORAL at 09:04

## 2022-01-01 RX ADMIN — MEGESTROL ACETATE 200 MG: 40 SUSPENSION ORAL at 21:38

## 2022-01-01 RX ADMIN — IPRATROPIUM BROMIDE AND ALBUTEROL SULFATE 3 ML: 2.5; .5 SOLUTION RESPIRATORY (INHALATION) at 19:28

## 2022-01-01 RX ADMIN — Medication 10 ML: at 10:18

## 2022-01-01 RX ADMIN — IPRATROPIUM BROMIDE AND ALBUTEROL SULFATE 3 ML: .5; 3 SOLUTION RESPIRATORY (INHALATION) at 08:28

## 2022-01-01 RX ADMIN — PANTOPRAZOLE SODIUM 40 MG: 40 TABLET, DELAYED RELEASE ORAL at 13:41

## 2022-01-01 RX ADMIN — CEFEPIME 2 G: 20 INJECTION, POWDER, FOR SOLUTION INTRAVENOUS at 05:44

## 2022-01-01 RX ADMIN — BUDESONIDE AND FORMOTEROL FUMARATE DIHYDRATE 2 PUFF: 160; 4.5 AEROSOL RESPIRATORY (INHALATION) at 07:45

## 2022-01-01 RX ADMIN — GABAPENTIN 100 MG: 100 CAPSULE ORAL at 08:59

## 2022-01-01 RX ADMIN — IPRATROPIUM BROMIDE AND ALBUTEROL SULFATE 3 ML: .5; 3 SOLUTION RESPIRATORY (INHALATION) at 04:22

## 2022-01-01 RX ADMIN — Medication 10 ML: at 21:51

## 2022-01-01 RX ADMIN — METHYLPREDNISOLONE SODIUM SUCCINATE 40 MG: 40 INJECTION, POWDER, FOR SOLUTION INTRAMUSCULAR; INTRAVENOUS at 16:38

## 2022-01-01 RX ADMIN — METHYLPREDNISOLONE SODIUM SUCCINATE 40 MG: 40 INJECTION, POWDER, FOR SOLUTION INTRAMUSCULAR; INTRAVENOUS at 05:12

## 2022-01-01 RX ADMIN — AMPICILLIN SODIUM AND SULBACTAM SODIUM 3 G: 2; 1 INJECTION, POWDER, FOR SOLUTION INTRAMUSCULAR; INTRAVENOUS at 05:25

## 2022-01-01 RX ADMIN — BUDESONIDE 1 MG: 0.5 INHALANT RESPIRATORY (INHALATION) at 19:33

## 2022-01-01 RX ADMIN — GABAPENTIN 100 MG: 100 CAPSULE ORAL at 13:41

## 2022-01-01 RX ADMIN — HYDROCODONE BITARTRATE AND ACETAMINOPHEN 1 TABLET: 7.5; 325 TABLET ORAL at 21:21

## 2022-01-01 RX ADMIN — METHYLPREDNISOLONE SODIUM SUCCINATE 40 MG: 40 INJECTION, POWDER, FOR SOLUTION INTRAMUSCULAR; INTRAVENOUS at 10:33

## 2022-01-01 RX ADMIN — METHYLPREDNISOLONE SODIUM SUCCINATE 40 MG: 40 INJECTION, POWDER, FOR SOLUTION INTRAMUSCULAR; INTRAVENOUS at 04:03

## 2022-01-01 RX ADMIN — DEXTROSE AND SODIUM CHLORIDE 75 ML/HR: 5; 900 INJECTION, SOLUTION INTRAVENOUS at 02:29

## 2022-01-01 RX ADMIN — CEFEPIME HYDROCHLORIDE 2 G: 2 INJECTION, POWDER, FOR SOLUTION INTRAVENOUS at 14:10

## 2022-01-01 RX ADMIN — ESCITALOPRAM OXALATE 5 MG: 10 TABLET ORAL at 09:05

## 2022-01-01 RX ADMIN — PANTOPRAZOLE SODIUM 40 MG: 40 TABLET, DELAYED RELEASE ORAL at 05:36

## 2022-01-01 RX ADMIN — HYDROCODONE BITARTRATE AND ACETAMINOPHEN 1 TABLET: 7.5; 325 TABLET ORAL at 21:39

## 2022-01-01 RX ADMIN — ESCITALOPRAM OXALATE 5 MG: 10 TABLET ORAL at 08:59

## 2022-01-01 RX ADMIN — BUDESONIDE 1 MG: 0.5 INHALANT RESPIRATORY (INHALATION) at 20:38

## 2022-01-01 RX ADMIN — METHYLPREDNISOLONE SODIUM SUCCINATE 40 MG: 40 INJECTION, POWDER, FOR SOLUTION INTRAMUSCULAR; INTRAVENOUS at 21:37

## 2022-01-01 RX ADMIN — ESCITALOPRAM OXALATE 5 MG: 10 TABLET ORAL at 09:45

## 2022-01-01 RX ADMIN — METHYLPREDNISOLONE SODIUM SUCCINATE 40 MG: 40 INJECTION, POWDER, FOR SOLUTION INTRAMUSCULAR; INTRAVENOUS at 10:26

## 2022-01-01 RX ADMIN — BUDESONIDE AND FORMOTEROL FUMARATE DIHYDRATE 2 PUFF: 160; 4.5 AEROSOL RESPIRATORY (INHALATION) at 20:20

## 2022-01-01 RX ADMIN — CEFEPIME HYDROCHLORIDE 2 G: 2 INJECTION, POWDER, FOR SOLUTION INTRAVENOUS at 05:03

## 2022-01-01 RX ADMIN — POLYETHYLENE GLYCOL 3350 17 G: 17 POWDER, FOR SOLUTION ORAL at 09:30

## 2022-01-01 RX ADMIN — PANTOPRAZOLE SODIUM 40 MG: 40 INJECTION, POWDER, LYOPHILIZED, FOR SOLUTION INTRAVENOUS at 10:26

## 2022-01-01 RX ADMIN — IPRATROPIUM BROMIDE AND ALBUTEROL SULFATE 3 ML: 2.5; .5 SOLUTION RESPIRATORY (INHALATION) at 15:51

## 2022-01-01 RX ADMIN — FOLIC ACID TAB 400 MCG 400 MCG: 400 TAB at 13:41

## 2022-01-01 RX ADMIN — METHYLPREDNISOLONE SODIUM SUCCINATE 40 MG: 40 INJECTION, POWDER, FOR SOLUTION INTRAMUSCULAR; INTRAVENOUS at 03:26

## 2022-01-01 RX ADMIN — DEXTROSE AND SODIUM CHLORIDE 75 ML/HR: 5; 900 INJECTION, SOLUTION INTRAVENOUS at 23:19

## 2022-01-01 RX ADMIN — IPRATROPIUM BROMIDE AND ALBUTEROL SULFATE 3 ML: 2.5; .5 SOLUTION RESPIRATORY (INHALATION) at 11:12

## 2022-01-01 RX ADMIN — IPRATROPIUM BROMIDE AND ALBUTEROL SULFATE 3 ML: .5; 3 SOLUTION RESPIRATORY (INHALATION) at 20:30

## 2022-01-01 RX ADMIN — Medication 10 ML: at 21:40

## 2022-01-01 RX ADMIN — PREDNISONE 30 MG: 20 TABLET ORAL at 11:18

## 2022-01-01 RX ADMIN — AMPICILLIN SODIUM AND SULBACTAM SODIUM 3 G: 2; 1 INJECTION, POWDER, FOR SOLUTION INTRAMUSCULAR; INTRAVENOUS at 23:17

## 2022-01-01 RX ADMIN — Medication 10 ML: at 07:30

## 2022-01-01 RX ADMIN — IPRATROPIUM BROMIDE AND ALBUTEROL SULFATE 3 ML: 2.5; .5 SOLUTION RESPIRATORY (INHALATION) at 08:00

## 2022-01-01 RX ADMIN — LORAZEPAM 0.5 MG: 2 INJECTION INTRAMUSCULAR; INTRAVENOUS at 16:20

## 2022-01-01 RX ADMIN — IPRATROPIUM BROMIDE AND ALBUTEROL SULFATE 3 ML: 2.5; .5 SOLUTION RESPIRATORY (INHALATION) at 14:50

## 2022-01-01 RX ADMIN — GUAIFENESIN 600 MG: 600 TABLET, EXTENDED RELEASE ORAL at 09:07

## 2022-01-01 RX ADMIN — BUDESONIDE 0.5 MG: 0.5 SUSPENSION RESPIRATORY (INHALATION) at 08:41

## 2022-01-01 RX ADMIN — GABAPENTIN 100 MG: 100 CAPSULE ORAL at 21:21

## 2022-01-01 RX ADMIN — POLYETHYLENE GLYCOL 3350 17 G: 17 POWDER, FOR SOLUTION ORAL at 09:34

## 2022-01-01 RX ADMIN — METHYLPREDNISOLONE SODIUM SUCCINATE 40 MG: 40 INJECTION, POWDER, FOR SOLUTION INTRAMUSCULAR; INTRAVENOUS at 23:17

## 2022-01-01 RX ADMIN — LIDOCAINE HYDROCHLORIDE: 20 INJECTION, SOLUTION EPIDURAL; INFILTRATION; INTRACAUDAL; PERINEURAL at 13:47

## 2022-01-01 RX ADMIN — IPRATROPIUM BROMIDE AND ALBUTEROL SULFATE 3 ML: 2.5; .5 SOLUTION RESPIRATORY (INHALATION) at 06:55

## 2022-01-01 RX ADMIN — CEFEPIME HYDROCHLORIDE 2 G: 2 INJECTION, POWDER, FOR SOLUTION INTRAVENOUS at 15:06

## 2022-01-01 RX ADMIN — MORPHINE SULFATE 4 MG: 4 INJECTION INTRAVENOUS at 05:11

## 2022-01-01 RX ADMIN — GABAPENTIN 100 MG: 100 CAPSULE ORAL at 07:30

## 2022-01-01 RX ADMIN — PROPOFOL 10 MG: 10 INJECTION, EMULSION INTRAVENOUS at 11:02

## 2022-01-01 RX ADMIN — METHYLPREDNISOLONE SODIUM SUCCINATE 40 MG: 40 INJECTION, POWDER, FOR SOLUTION INTRAMUSCULAR; INTRAVENOUS at 21:02

## 2022-01-01 RX ADMIN — GABAPENTIN 100 MG: 100 CAPSULE ORAL at 09:01

## 2022-01-01 RX ADMIN — PANTOPRAZOLE SODIUM 40 MG: 40 TABLET, DELAYED RELEASE ORAL at 09:07

## 2022-01-01 RX ADMIN — SODIUM PHOSPHATE, MONOBASIC, MONOHYDRATE 15 MMOL: 276; 142 INJECTION, SOLUTION INTRAVENOUS at 05:11

## 2022-01-01 RX ADMIN — IPRATROPIUM BROMIDE AND ALBUTEROL SULFATE 3 ML: 2.5; .5 SOLUTION RESPIRATORY (INHALATION) at 15:08

## 2022-01-01 RX ADMIN — IPRATROPIUM BROMIDE AND ALBUTEROL SULFATE 3 ML: 2.5; .5 SOLUTION RESPIRATORY (INHALATION) at 02:36

## 2022-01-01 RX ADMIN — Medication 10 ML: at 09:30

## 2022-01-01 RX ADMIN — BUDESONIDE 1 MG: 0.5 INHALANT RESPIRATORY (INHALATION) at 08:09

## 2022-01-01 RX ADMIN — GUAIFENESIN 600 MG: 600 TABLET, EXTENDED RELEASE ORAL at 09:33

## 2022-01-01 RX ADMIN — IOPAMIDOL 100 ML: 755 INJECTION, SOLUTION INTRAVENOUS at 12:25

## 2022-01-01 RX ADMIN — METHYLPREDNISOLONE SODIUM SUCCINATE 40 MG: 40 INJECTION, POWDER, FOR SOLUTION INTRAMUSCULAR; INTRAVENOUS at 05:11

## 2022-01-01 RX ADMIN — IPRATROPIUM BROMIDE AND ALBUTEROL SULFATE 3 ML: 2.5; .5 SOLUTION RESPIRATORY (INHALATION) at 22:38

## 2022-01-01 RX ADMIN — Medication 10 ML: at 09:32

## 2022-01-01 RX ADMIN — CEFEPIME HYDROCHLORIDE 2 G: 2 INJECTION, POWDER, FOR SOLUTION INTRAVENOUS at 01:29

## 2022-01-01 RX ADMIN — IPRATROPIUM BROMIDE AND ALBUTEROL SULFATE 3 ML: 2.5; .5 SOLUTION RESPIRATORY (INHALATION) at 19:54

## 2022-01-01 RX ADMIN — PANTOPRAZOLE SODIUM 40 MG: 40 TABLET, DELAYED RELEASE ORAL at 05:13

## 2022-01-01 RX ADMIN — IPRATROPIUM BROMIDE AND ALBUTEROL SULFATE 3 ML: 2.5; .5 SOLUTION RESPIRATORY (INHALATION) at 11:39

## 2022-01-01 RX ADMIN — Medication 10 ML: at 21:03

## 2022-01-01 RX ADMIN — AMPICILLIN SODIUM AND SULBACTAM SODIUM 3 G: 2; 1 INJECTION, POWDER, FOR SOLUTION INTRAMUSCULAR; INTRAVENOUS at 02:48

## 2022-01-01 RX ADMIN — PANTOPRAZOLE SODIUM 40 MG: 40 TABLET, DELAYED RELEASE ORAL at 09:45

## 2022-01-01 RX ADMIN — PANTOPRAZOLE SODIUM 40 MG: 40 TABLET, DELAYED RELEASE ORAL at 04:20

## 2022-01-01 RX ADMIN — FOLIC ACID TAB 400 MCG 400 MCG: 400 TAB at 09:33

## 2022-01-01 RX ADMIN — CEFEPIME HYDROCHLORIDE 2 G: 2 INJECTION, POWDER, FOR SOLUTION INTRAVENOUS at 21:39

## 2022-01-01 RX ADMIN — METHYLPREDNISOLONE SODIUM SUCCINATE 40 MG: 40 INJECTION, POWDER, FOR SOLUTION INTRAMUSCULAR; INTRAVENOUS at 16:23

## 2022-01-01 RX ADMIN — METHYLPREDNISOLONE SODIUM SUCCINATE 40 MG: 40 INJECTION, POWDER, FOR SOLUTION INTRAMUSCULAR; INTRAVENOUS at 16:19

## 2022-01-01 RX ADMIN — GABAPENTIN 100 MG: 100 CAPSULE ORAL at 21:16

## 2022-01-01 RX ADMIN — FOLIC ACID TAB 400 MCG 400 MCG: 400 TAB at 09:00

## 2022-01-01 RX ADMIN — GABAPENTIN 100 MG: 100 CAPSULE ORAL at 09:34

## 2022-01-01 RX ADMIN — Medication 10 ML: at 20:03

## 2022-01-01 RX ADMIN — VANCOMYCIN HYDROCHLORIDE 750 MG: 750 INJECTION, POWDER, LYOPHILIZED, FOR SOLUTION INTRAVENOUS at 10:25

## 2022-01-01 RX ADMIN — IPRATROPIUM BROMIDE AND ALBUTEROL SULFATE 3 ML: 2.5; .5 SOLUTION RESPIRATORY (INHALATION) at 20:15

## 2022-01-01 RX ADMIN — POLYETHYLENE GLYCOL 3350 17 G: 17 POWDER, FOR SOLUTION ORAL at 09:02

## 2022-01-01 RX ADMIN — BUDESONIDE 1 MG: 0.5 INHALANT RESPIRATORY (INHALATION) at 19:58

## 2022-01-01 RX ADMIN — Medication 10 ML: at 09:45

## 2022-01-01 RX ADMIN — CEFEPIME HYDROCHLORIDE 2 G: 2 INJECTION, POWDER, FOR SOLUTION INTRAVENOUS at 21:21

## 2022-01-01 RX ADMIN — IPRATROPIUM BROMIDE AND ALBUTEROL SULFATE 3 ML: 2.5; .5 SOLUTION RESPIRATORY (INHALATION) at 11:03

## 2022-01-01 RX ADMIN — AMPICILLIN SODIUM AND SULBACTAM SODIUM 3 G: 2; 1 INJECTION, POWDER, FOR SOLUTION INTRAMUSCULAR; INTRAVENOUS at 13:59

## 2022-01-01 RX ADMIN — ESCITALOPRAM OXALATE 5 MG: 10 TABLET ORAL at 13:41

## 2022-01-01 RX ADMIN — POLYETHYLENE GLYCOL 3350 17 G: 17 POWDER, FOR SOLUTION ORAL at 08:59

## 2022-01-01 RX ADMIN — BUDESONIDE 0.5 MG: 0.5 INHALANT RESPIRATORY (INHALATION) at 20:10

## 2022-01-01 RX ADMIN — AMPICILLIN SODIUM AND SULBACTAM SODIUM 3 G: 2; 1 INJECTION, POWDER, FOR SOLUTION INTRAMUSCULAR; INTRAVENOUS at 18:41

## 2022-01-01 RX ADMIN — ESCITALOPRAM OXALATE 5 MG: 10 TABLET ORAL at 07:30

## 2022-01-01 RX ADMIN — IPRATROPIUM BROMIDE AND ALBUTEROL SULFATE 3 ML: 2.5; .5 SOLUTION RESPIRATORY (INHALATION) at 23:43

## 2022-01-01 RX ADMIN — Medication 5 MG: at 21:39

## 2022-01-01 RX ADMIN — MELATONIN TAB 5 MG 5 MG: 5 TAB at 22:04

## 2022-01-01 RX ADMIN — CEFEPIME HYDROCHLORIDE 2 G: 2 INJECTION, POWDER, FOR SOLUTION INTRAVENOUS at 21:47

## 2022-01-01 RX ADMIN — METHYLPREDNISOLONE SODIUM SUCCINATE 40 MG: 40 INJECTION, POWDER, FOR SOLUTION INTRAMUSCULAR; INTRAVENOUS at 15:06

## 2022-01-01 RX ADMIN — GABAPENTIN 100 MG: 100 CAPSULE ORAL at 09:15

## 2022-01-01 RX ADMIN — GABAPENTIN 100 MG: 100 CAPSULE ORAL at 21:38

## 2022-01-01 RX ADMIN — VANCOMYCIN HYDROCHLORIDE 1500 MG: 10 INJECTION, POWDER, LYOPHILIZED, FOR SOLUTION INTRAVENOUS at 11:18

## 2022-01-01 RX ADMIN — GABAPENTIN 100 MG: 100 CAPSULE ORAL at 10:17

## 2022-01-01 RX ADMIN — PROPOFOL 50 MG: 10 INJECTION, EMULSION INTRAVENOUS at 10:51

## 2022-01-01 RX ADMIN — IPRATROPIUM BROMIDE AND ALBUTEROL SULFATE 3 ML: 2.5; .5 SOLUTION RESPIRATORY (INHALATION) at 03:00

## 2022-01-01 RX ADMIN — VANCOMYCIN HYDROCHLORIDE 1500 MG: 10 INJECTION, POWDER, LYOPHILIZED, FOR SOLUTION INTRAVENOUS at 15:54

## 2022-01-01 RX ADMIN — BUDESONIDE AND FORMOTEROL FUMARATE DIHYDRATE 2 PUFF: 160; 4.5 AEROSOL RESPIRATORY (INHALATION) at 19:30

## 2022-01-01 RX ADMIN — SODIUM PHOSPHATE, MONOBASIC, MONOHYDRATE 30 MMOL: 276; 142 INJECTION, SOLUTION INTRAVENOUS at 10:16

## 2022-01-01 RX ADMIN — Medication 10 ML: at 21:17

## 2022-01-01 RX ADMIN — IPRATROPIUM BROMIDE AND ALBUTEROL SULFATE 3 ML: 2.5; .5 SOLUTION RESPIRATORY (INHALATION) at 15:07

## 2022-01-01 RX ADMIN — IPRATROPIUM BROMIDE AND ALBUTEROL SULFATE 3 ML: 2.5; .5 SOLUTION RESPIRATORY (INHALATION) at 19:20

## 2022-01-01 RX ADMIN — CEFEPIME HYDROCHLORIDE 2 G: 2 INJECTION, POWDER, FOR SOLUTION INTRAVENOUS at 11:18

## 2022-01-01 RX ADMIN — AMPICILLIN SODIUM AND SULBACTAM SODIUM 3 G: 2; 1 INJECTION, POWDER, FOR SOLUTION INTRAMUSCULAR; INTRAVENOUS at 11:35

## 2022-01-01 RX ADMIN — AMPICILLIN SODIUM AND SULBACTAM SODIUM 3 G: 2; 1 INJECTION, POWDER, FOR SOLUTION INTRAMUSCULAR; INTRAVENOUS at 05:42

## 2022-01-01 RX ADMIN — FOLIC ACID TAB 400 MCG 400 MCG: 400 TAB at 09:34

## 2022-01-01 RX ADMIN — BUDESONIDE 1 MG: 0.5 INHALANT RESPIRATORY (INHALATION) at 07:40

## 2022-01-01 RX ADMIN — PANTOPRAZOLE SODIUM 40 MG: 40 TABLET, DELAYED RELEASE ORAL at 06:13

## 2022-01-01 RX ADMIN — POLYETHYLENE GLYCOL 3350 17 G: 17 POWDER, FOR SOLUTION ORAL at 13:41

## 2022-01-01 RX ADMIN — PROPOFOL 10 MG: 10 INJECTION, EMULSION INTRAVENOUS at 10:57

## 2022-01-01 RX ADMIN — BISACODYL 10 MG: 10 SUPPOSITORY RECTAL at 14:12

## 2022-01-01 RX ADMIN — GUAIFENESIN 600 MG: 600 TABLET, EXTENDED RELEASE ORAL at 21:50

## 2022-01-01 RX ADMIN — LACTULOSE 30 G: 20 SOLUTION ORAL at 16:08

## 2022-01-01 RX ADMIN — AMPICILLIN SODIUM AND SULBACTAM SODIUM 3 G: 2; 1 INJECTION, POWDER, FOR SOLUTION INTRAMUSCULAR; INTRAVENOUS at 21:39

## 2022-01-01 RX ADMIN — ESCITALOPRAM OXALATE 5 MG: 10 TABLET ORAL at 09:34

## 2022-01-01 RX ADMIN — SODIUM CHLORIDE 75 ML/HR: 9 INJECTION, SOLUTION INTRAVENOUS at 03:24

## 2022-01-14 NOTE — HOME HEALTH
Pt is a 76 yowm who recently underwent a kyphoplasty procedure. pt was taken to [facility] on 12.30.21 for a fall in his home. pt was treated for pneumonia and discharged home. pt is alert and oriented with an extensive medical history.     patients primary caregiver is his wife Clifford. she has the basement level set up appropriate for the patinet. first week of feb they are having the bathroom redone to accomodate pt limited ability.     pt is able to walk 20 feet with use of walker and assist of two    pt complaint of pain in his right chest when coughing from possible cracked ribs. unable to verify through reports    history  kyphoplasty  aortic stenosis  pacemaker  BBB  CABG  dyspnea with exertion  heart murmer  hyperlipidemia  hypertension  NATALIA  CPAP  aortic valve regurgitation  l renal mass with nephrectomy  nonrheumatic aortic stenosis  chronic diastolic chf  renal artery stenosis  pneumonia  Aortic valve replacement 12.30.21  bladder cancer  interstitual lung disease

## 2022-01-17 NOTE — HOME HEALTH
Pt is a 77 yo male referred for PT sp hospitalization and rehab stay due to pneumonia.    PLOF Spouse reports pt has had problems ambulating for about a year after a fall with kyphoplasty for wedge compression spinal fx. Pt has been limited to residential ambulation with use of wheeled walker and has required assist since then. Recent hospitalization has made weakness worse and requiring more assist froms spouse.    Environment Pt is staying in basement as this can be accesssed from garage and does not have steps to get in and out. Pt has hospital bed, lift chair, BSC and wc. Spouse able and willing to assist patient.     Pt's main complaint is weakness and back pain. Pt requires max assist for bed mobility and transfers with use of wheeled walker for support. Pt was only able to ambulate x 3 ft at assessment with 2 person assist. Pt wants to get strong enough that he does not need assist from spouse.     Plan for next vist: HEP teaching, bed mobility, transfer training, balance and gait training.

## 2022-01-18 NOTE — CASE COMMUNICATION
Dr Uri Cortes,  Patient's family has requested to discontinue home health aide visits at this time; order for home health aide disconeinued.  PT, OT and SN to continue.

## 2022-01-21 NOTE — HOME HEALTH
Pt is a 76 y.o male who lives in a bilevel home with spouse.  Pt recently hospitalized secondary to pneumonia.    Pt with difficulty with ambulating for several months secondary to fall at home resulting in wedge fx with kyphoplasty      PLOF pt independent with feeding and required MOD to MAX assist with toileting bathing dressing and grooming.  Pts wife maintains the home.    Currently pt independent with feeding self after set up and total assist with all other self care with assist of spouse.    Skilled OT for ther ex dme needs and transfers.  Pt and therapist in agreement with goals and poc.    Pt denies any falls or med changes

## 2022-02-01 NOTE — HOME HEALTH
Patient was in the living room and said his wife answered the door.   Patient rated his pain as 0/10.

## 2022-02-01 NOTE — TELEPHONE ENCOUNTER
Wife called (Clifford) stated that since Navneet has started taking the OFEV he has no appetite, nauseous, diarrhea and stomach cramps.  Per , pt is to stop the OFEV 150 mg BID for 2 weeks then restart taking only once daily.   Clifford verbalized understanding and will keep me updated.

## 2022-02-10 NOTE — HOME HEALTH
The patient's spouse greeted DEAN at the door. Pt was in recliner resting upon DEAN arrival. Initial visit and rapport established. The patient denies any new issues/concerns, falls or changes in medication. The patient was engaged in skilled OT session and is motivated to improve however pt is extremely weak with limited endurance, requiring a lot of rest breaks and inability to complete full HEP for BUE strengthening this date. Next SHAMIKA visit to continue core strengthening challenges, HEP for BUE strengthening and education.

## 2022-02-11 NOTE — PROGRESS NOTES
Subjective:     Encounter Date:02/11/2022      Patient ID: Navneet Lind is a 76 y.o. male.    Chief Complaint:  No chief complaint on file.      HPI:  Navneet is a pleasant 76-year-old male patient.  He has past medical history significant for hypertension and known CAD status post coronary artery bypass grafting in 1995.  He also has a past medical history significant for sick sinus syndrome requiring pacemaker placement.  He has had worsening exertional dyspnea with class III-IV New York Heart Association symptoms.  His KCCQ 12 questionnaire reveals a severe decrease in his overall quality of life.  This has been worsening since January 2021.  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    He is being referred for evaluation for transcatheter aortic valve replacement.  Dr. Quinones has already evaluated him and deemed him to be a better candidate for transcatheter aortic valve replacement.  I personally reviewed his echocardiogram from this year which showed severe aortic stenosis with preserved LV systolic function (aortic valve area 0.6 cm² with a V-max of 3.2 m/s.  Transesophageal echo was performed to determine the aortic valve orifice by planimetry.  It was determined to be 0.58 cm² with a V-max of 5.4 m/s.    Regarding his malignancy, per Dr. Dodd, his cancer is in remission and they were continuing chemotherapy as an adjunct for prevention.  His chemotherapy should be finished in May.    He underwent transcatheter aortic valve replacement 3/31/2021 mm Shah GABE transcatheter aortic valve.  He returns today for routine follow-up.  His KCC12Q questionnaire shows a significant improvement in his overall quality of life.  His 15 foot walk test took 5 seconds.      He recently underwent right renal artery stenting (Herculink bare-metal stent) by Dr. Bhagat 6/22/2021 for severe right renal artery stenosis.  He has been having worsening  shortness of breath despite recent aortic valve replacement.  He does have a multifactorial past medical history contributing to shortness of air of which aortic stenosis was sudden large part contributing.  Causing his symptoms it appears that his recent spinal fracture requiring a thoracic harness has significantly compromised his ability to ventilate.  I would expect that he has a lot of atelectasis and has overall compromised ventilation as a primary contributor.  Otherwise he has no complaints from a cardiovascular standpoint.    He returns today for his 1 year follow-up post TAVR.  He has no complaints from cardiovascular standpoint.  He is wheelchair-bound and unable to perform a 15 foot walk test.  His New York Heart Association symptoms are class I-II.  His [facility] Q12 questionnaire shows no real improvement in quality of life.    The following portions of the patient's history were reviewed and updated as appropriate: allergies, current medications, past family history, past medical history, past social history, past surgical history and problem list.    Problem List:  Patient Active Problem List   Diagnosis   • Presence of cardiac pacemaker   • Aortic stenosis   • Bundle branch block, right   • Coronary artery disease involving native coronary artery of native heart without angina pectoris   • Dyspnea on exertion   • Heart murmur   • Mixed hyperlipidemia   • Essential hypertension   • Near syncope   • Impotence of organic origin   • Premature ventricular contractions   • NATALIA on CPAP   • Class 1 obesity due to excess calories without serious comorbidity with body mass index (BMI) of 31.0 to 31.9 in adult   • Preop cardiovascular exam   • Aortic valve regurgitation   • Anemia   • H/O radical nephrectomy   • Anemia due to chemotherapy   • Nonrheumatic aortic valve stenosis   • Pre-op examination   • Abnormal myocardial perfusion study   • Chronic diastolic congestive heart failure (HCC)   • Renal artery stenosis  ([facility])   • Ventricular premature beats   • Pneumonia of right upper lobe due to infectious organism   • S/P AVR (aortic valve replacement)   • S/P kyphoplasty   • Bladder carcinoma ([facility])   • ILD (interstitial lung disease) ([facility])       Past Medical History:  Past Medical History:   Diagnosis Date   • Aortic stenosis 9/29/2015    Per Echo 2017   • Benign essential HTN    • Bundle branch block, right 2/7/2013   • CAD (coronary artery disease), native coronary artery    • Cancer ([facility])     bladder- chemo, new left renal mass   • Chronic kidney disease    • Coronary artery disease involving native coronary artery of native heart without angina pectoris 11/19/2019    CABG 1995; SVG to RCA is occluded, LIMA to LAD, SVG to diag of LAD, SVG to marginal of LCX   • COVID-19 vaccine administered    • Essential hypertension 11/19/2019   • Heart murmur    • History of transfusion    • Hyperlipidemia, mixed    • ILD (interstitial lung disease) ([facility]) 11/1/2021   • Injury of back    • Mixed hyperlipidemia 11/19/2019   • Near syncope    • NATALIA (obstructive sleep apnea)     AHI 11.6/h, CPAP   • Premature ventricular contractions 2/11/2014   • Presence of cardiac pacemaker 7/5/2019    BS dual chamber PM 11/7/2016   • Shortness of breath    • SSS (sick sinus syndrome) ([facility]) 7/5/2019       Past Surgical History:  Past Surgical History:   Procedure Laterality Date   • AORTIC VALVE REPAIR/REPLACEMENT N/A 3/30/2021    Procedure: TTE TRANSFEMORAL TRANSCATHETER AORTIC VALVE REPLACEMENT PERCUTANEOUS APPROACH;  Surgeon: Hang Martinez MD;  Location: [facility] OR 18/19;  Service: Cardiothoracic;  Laterality: N/A;   • AORTIC VALVE REPAIR/REPLACEMENT N/A 3/30/2021    Procedure: Transfemoral Transcatheter Aortic Valve Replacement w/Intra Op TTE and Possible Open Rescue Surgery;  Surgeon: Anderson Galvez MD;  Location: [facility] OR 18/19;  Service: Cardiovascular;  Laterality: N/A;   • CARDIAC CATHETERIZATION  2018   • CARDIAC  CATHETERIZATION N/A 3/22/2021    Procedure: Left Heart Cath;  Surgeon: ADRIANO Erazo MD;  Location: [facility] CATH INVASIVE LOCATION;  Service: Cardiovascular;  Laterality: N/A;   • CARDIAC CATHETERIZATION N/A 2021    Procedure: RENAL ANGIOGRAPHY;  Surgeon: Jesus Bhagat MD;  Location: [facility] CATH INVASIVE LOCATION;  Service: Cardiovascular;  Laterality: N/A;   • CARDIAC CATHETERIZATION N/A 2021    Procedure: Stent BMS peripheral;  Surgeon: Jesus Bhagat MD;  Location: [facility] CATH INVASIVE LOCATION;  Service: Cardiovascular;  Laterality: N/A;   rt renal   • CARDIAC CATHETERIZATION N/A 2021    Procedure: Angioplasty-peripheral;  Surgeon: Jesus Bhagat MD;  Location: [facility] CATH INVASIVE LOCATION;  Service: Cardiovascular;  Laterality: N/A;   rt renal   • CARDIOVASCULAR STRESS TEST     • CORONARY ARTERY BYPASS GRAFT     • ECHO - CONVERTED     • NEPHROURETERECTOMY Left 2020    Procedure: NEPHROURETERECTOMY;  Surgeon: Rogers Bae MD;  Location: [facility] MAIN OR;  Service: Urology;  Laterality: Left;   • PACEMAKER IMPLANTATION      bs   • PORTACATH PLACEMENT         Social History:  Social History     Socioeconomic History   • Marital status:    Tobacco Use   • Smoking status: Former Smoker     Years: 5.00     Quit date:      Years since quittin.1   • Smokeless tobacco: Never Used   Vaping Use   • Vaping Use: Never used   Substance and Sexual Activity   • Alcohol use: Yes     Alcohol/week: 1.0 standard drink     Types: 1 Shots of liquor per week     Comment: RARE   • Drug use: Never   • Sexual activity: Defer       Allergies:  No Known Allergies        Review of Symptoms:  Constitutional: Patient afebrile no chills or unexpected weight changes  Respiratory: No cough, no wheezing or dyspnea  Cardiovascular: Today the patient complains of no chest pain, palpitations, dyspnea, orthopnea and no edema  Gastrointestinal: No nausea,  vomiting, constipation or diarrhea.  No melena or dark stools    All other systems reviewed and are negative           Objective:         There were no vitals taken for this visit.      Physical exam  Constitutional: well-nourished, and appears stated age in no acute distress  PERRL: Conjunctiva clear, no pallor, anicteric  HENMT: normocephalic, normal dentition, no cyanosis or pallor  Neck:no bruits, or thrills and bilateral normal carotid upstroke. Normal jugular venous pressure  Cardiovascular: No parasternal heaves an non-displaced focal PMI. Normal rate and rhythm: no rub, gallop, murmur or click and normal S1 and S2; no lower or upper extremity edema.   Lungs: unlabored, no wheezing with no rales or rhonchi on auscultation.  Extremities: Warm, no clubbing, cyanosis. Full and equal peripheral pulses in extremities with no bruits appreciated.   Abdomen: soft, non-tender, non-distended  Musculoskeletal: no joint tenderness or swelling and no erythema  Skin: Warm and dry, non-erythematous   Neuro:alert and normal affect. Oriented to time, place and person.       In-Office Procedure(s):  Procedures    ASCVD RIsk Score::  The ASCVD Risk score (Lincoln DC Jr., et al., 2013) failed to calculate for the following reasons:    Cannot find a previous HDL lab    Cannot find a previous total cholesterol lab    Recent Radiology:  Imaging Results (Most Recent)     None          Lab Review:   Admission on 12/29/2021, Discharged on 12/31/2021   Component Date Value   • Glucose 12/30/2021 125*   • BUN 12/30/2021 23    • Creatinine 12/30/2021 0.83    • Sodium 12/30/2021 131*   • Potassium 12/30/2021 4.8    • Chloride 12/30/2021 94*   • CO2 12/30/2021 25.0    • Calcium 12/30/2021 9.6    • Total Protein 12/30/2021 7.5    • Albumin 12/30/2021 2.50*   • ALT (SGPT) 12/30/2021 25    • AST (SGOT) 12/30/2021 19    • Alkaline Phosphatase 12/30/2021 157*   • Total Bilirubin 12/30/2021 0.3    • eGFR Non African Amer 12/30/2021 90    • Globulin  12/30/2021 5.0    • A/G Ratio 12/30/2021 0.5    • BUN/Creatinine Ratio 12/30/2021 27.7*   • Anion Gap 12/30/2021 12.0    • Procalcitonin 12/30/2021 0.23    • ADENOVIRUS, PCR 12/30/2021 Not Detected    • Coronavirus 229E 12/30/2021 Not Detected    • Coronavirus HKU1 12/30/2021 Not Detected    • Coronavirus NL63 12/30/2021 Not Detected    • Coronavirus OC43 12/30/2021 Not Detected    • COVID19 12/30/2021 Not Detected    • Human Metapneumovirus 12/30/2021 Not Detected    • Human Rhinovirus/Enterov* 12/30/2021 Not Detected    • Influenza A PCR 12/30/2021 Not Detected    • Influenza B PCR 12/30/2021 Not Detected    • Parainfluenza Virus 1 12/30/2021 Not Detected    • Parainfluenza Virus 2 12/30/2021 Not Detected    • Parainfluenza Virus 3 12/30/2021 Not Detected    • Parainfluenza Virus 4 12/30/2021 Not Detected    • RSV, PCR 12/30/2021 Not Detected    • Bordetella pertussis pcr 12/30/2021 Not Detected    • Bordetella parapertussis* 12/30/2021 Not Detected    • Chlamydophila pneumoniae* 12/30/2021 Not Detected    • Mycoplasma pneumo by PCR 12/30/2021 Not Detected    • Ammonia 12/30/2021 19    • Color, UA 12/30/2021 Yellow    • Appearance, UA 12/30/2021 Clear    • pH, UA 12/30/2021 7.0    • Specific Gravity, UA 12/30/2021 1.013    • Glucose, UA 12/30/2021 Negative    • Ketones, UA 12/30/2021 Negative    • Bilirubin, UA 12/30/2021 Negative    • Blood, UA 12/30/2021 Negative    • Protein, UA 12/30/2021 Negative    • Leuk Esterase, UA 12/30/2021 Negative    • Nitrite, UA 12/30/2021 Negative    • Urobilinogen, UA 12/30/2021 1.0 E.U./dL    • TSH 12/30/2021 0.982    • Magnesium 12/30/2021 2.1    • WBC 12/30/2021 17.30*   • RBC 12/30/2021 4.43    • Hemoglobin 12/30/2021 13.1    • Hematocrit 12/30/2021 40.2    • MCV 12/30/2021 90.7    • MCH 12/30/2021 29.5    • MCHC 12/30/2021 32.6    • RDW 12/30/2021 13.8    • RDW-SD 12/30/2021 43.8    • MPV 12/30/2021 7.6    • Platelets 12/30/2021 216    • Neutrophil % 12/30/2021 85.1*   •  Lymphocyte % 12/30/2021 7.9*   • Monocyte % 12/30/2021 5.7    • Eosinophil % 12/30/2021 0.2*   • Basophil % 12/30/2021 1.1    • Neutrophils, Absolute 12/30/2021 14.70*   • Lymphocytes, Absolute 12/30/2021 1.40    • Monocytes, Absolute 12/30/2021 1.00*   • Eosinophils, Absolute 12/30/2021 0.00    • Basophils, Absolute 12/30/2021 0.20    • nRBC 12/30/2021 0.1    • Blood Culture 12/30/2021 No growth at 5 days    • Blood Culture 12/30/2021 No growth at 5 days    • Extra Tube 12/30/2021 hold for add-on    • Extra Tube 12/30/2021 d    • Extra Tube 12/30/2021 hold for add-on    • Lactate 12/30/2021 0.9    • Troponin T 12/30/2021 <0.010    • proBNP 12/30/2021 214.8    • Glucose 12/30/2021 102*   • BUN 12/30/2021 20    • Creatinine 12/30/2021 0.87    • Sodium 12/30/2021 136    • Potassium 12/30/2021 4.5    • Chloride 12/30/2021 99    • CO2 12/30/2021 26.0    • Calcium 12/30/2021 9.5    • eGFR Non  Amer 12/30/2021 86    • BUN/Creatinine Ratio 12/30/2021 23.0    • Anion Gap 12/30/2021 11.0    • LEGIONELLA ANTIGEN, URINE 12/30/2021 Negative    • Strep Pneumo Ag 12/30/2021 Negative    • Troponin T 12/30/2021 <0.010    • QT Interval 12/30/2021 439    • QT Interval 12/30/2021 430    • Magnesium 12/30/2021 1.9    • WBC 12/30/2021 16.50*   • RBC 12/30/2021 4.11*   • Hemoglobin 12/30/2021 12.8*   • Hematocrit 12/30/2021 37.7    • MCV 12/30/2021 91.7    • MCH 12/30/2021 31.1    • MCHC 12/30/2021 34.0    • RDW 12/30/2021 13.8    • RDW-SD 12/30/2021 44.2    • MPV 12/30/2021 7.9    • Platelets 12/30/2021 204    • Neutrophil % 12/30/2021 76.1*   • Lymphocyte % 12/30/2021 15.9*   • Monocyte % 12/30/2021 6.5    • Eosinophil % 12/30/2021 0.3    • Basophil % 12/30/2021 1.2    • Neutrophils, Absolute 12/30/2021 12.50*   • Lymphocytes, Absolute 12/30/2021 2.60    • Monocytes, Absolute 12/30/2021 1.10*   • Eosinophils, Absolute 12/30/2021 0.10    • Basophils, Absolute 12/30/2021 0.20    • nRBC 12/30/2021 0.1    • Lactate 12/30/2021 1.4     • Lactate 12/30/2021 1.7    • Glucose 12/31/2021 106*   • BUN 12/31/2021 16    • Creatinine 12/31/2021 0.83    • Sodium 12/31/2021 134*   • Potassium 12/31/2021 4.7    • Chloride 12/31/2021 98    • CO2 12/31/2021 27.0    • Calcium 12/31/2021 8.9    • eGFR Non  Amer 12/31/2021 90    • BUN/Creatinine Ratio 12/31/2021 19.3    • Anion Gap 12/31/2021 9.0    • WBC 12/31/2021 14.50*   • RBC 12/31/2021 3.81*   • Hemoglobin 12/31/2021 11.9*   • Hematocrit 12/31/2021 35.0*   • MCV 12/31/2021 91.9    • MCH 12/31/2021 31.4    • MCHC 12/31/2021 34.2    • RDW 12/31/2021 13.9    • RDW-SD 12/31/2021 44.2    • MPV 12/31/2021 7.2    • Platelets 12/31/2021 200    • Neutrophil % 12/31/2021 74.8    • Lymphocyte % 12/31/2021 16.2*   • Monocyte % 12/31/2021 7.2    • Eosinophil % 12/31/2021 0.8    • Basophil % 12/31/2021 1.0    • Neutrophils, Absolute 12/31/2021 10.80*   • Lymphocytes, Absolute 12/31/2021 2.30    • Monocytes, Absolute 12/31/2021 1.00*   • Eosinophils, Absolute 12/31/2021 0.10    • Basophils, Absolute 12/31/2021 0.10    • nRBC 12/31/2021 0.0    • Magnesium 12/31/2021 2.0    Hospital Outpatient Visit on 12/22/2021   Component Date Value   • WBC 12/22/2021 21.80*   • RBC 12/22/2021 4.26    • Hemoglobin 12/22/2021 13.4    • Hematocrit 12/22/2021 40.2    • MCV 12/22/2021 94.2    • MCH 12/22/2021 31.3    • MCHC 12/22/2021 33.2    • RDW 12/22/2021 14.1    • RDW-SD 12/22/2021 45.9    • MPV 12/22/2021 7.7    • Platelets 12/22/2021 188    • Neutrophil % 12/22/2021 82.3*   • Lymphocyte % 12/22/2021 9.4*   • Monocyte % 12/22/2021 7.1    • Eosinophil % 12/22/2021 0.7    • Basophil % 12/22/2021 0.5    • Neutrophils, Absolute 12/22/2021 18.00*   • Lymphocytes, Absolute 12/22/2021 2.00    • Monocytes, Absolute 12/22/2021 1.60*   • Eosinophils, Absolute 12/22/2021 0.10    • Basophils, Absolute 12/22/2021 0.10    • nRBC 12/22/2021 0.0    • PTT 12/22/2021 41.0*   • Protime 12/22/2021 11.4    • INR 12/22/2021 1.03    • Addendum  12/22/2021                      Value:This result contains rich text formatting which cannot be displayed here.   • Case Report 12/22/2021                      Value:Surgical Pathology Report                         Case: [id number]                                  Authorizing Provider:  Eddie Loredo MD      Collected:           12/22/2021 09:32 AM          Ordering Location:     [facility]    Received:            12/22/2021 10:12 AM          Pathologist:           Rupesh Garrett MD                                                            Intraop:               Rupesh Garrett MD                                                            Specimen:    Lymph Node, left juan m-aortic                                                              • Final Diagnosis 12/22/2021                      Value:This result contains rich text formatting which cannot be displayed here.   • Comment 12/22/2021                      Value:This result contains rich text formatting which cannot be displayed here.   • Intraoperative Consultat* 12/22/2021                      Value:This result contains rich text formatting which cannot be displayed here.   • Gross Description 12/22/2021                      Value:This result contains rich text formatting which cannot be displayed here.   Lab on 12/17/2021   Component Date Value   • COVID19 12/17/2021 Not Detected               Invalid input(s): ALKPO4                        Invalid input(s): LDLCALC                Assessment:          Diagnosis Plan   1. Coronary artery disease involving native coronary artery of native heart without angina pectoris     2. S/P AVR (aortic valve replacement)     3. Renal artery stenosis (HCC)     4. Essential hypertension            Plan:         1. Coronary artery disease involving native coronary artery of native heart without angina pectoris  Clinically silent.  Asymptomatic.    2. S/P AVR (aortic valve replacement)  Clinically silent  post transcatheter aortic valve replacement    3. Renal artery stenosis (HCC)  Status post percutaneous intervention.    4. Essential hypertension  Well-controlled on medical therapy.         Anderson Galvez MD  02/11/22  .

## 2022-02-15 NOTE — TELEPHONE ENCOUNTER
Pt's wife left vm re: cough.  She reports he has cough with thick mucus, and he is sometimes unable to catch his breath due to the cough.  She wondered if Robitussion might be beneficial?    I returned wife's call.  She reports the cough is not new or worse than normal for him.  I recommended he try Mucinex for the cough.  He has an appointment with Dr. Mauricio next week and should be sure to keep that appointment.  If anything worsens, patient may need to consider Covid testing or ER.  Wife (Clifford) voiced understanding.

## 2022-02-16 NOTE — CASE COMMUNICATION
Spoke with Caregiver.  She stated this week was not a good week for OT.  They are having a wedding and requested visit next week

## 2022-02-17 NOTE — HOME HEALTH
PT Reassessment completed. Patient reported no pain and tolerated range  of motion exs to ble in bed. Spouse is assisting safely with bed mobility and transfer from bed to/from chair but has not been able to assist safely with short distance ambulation using rolling walker in the living area. Patient would benefit from further PT services with emphasis on caregiver education for safe gait assistance with rolling walker.

## 2022-02-22 PROBLEM — I35.0 NONRHEUMATIC AORTIC VALVE STENOSIS: Status: RESOLVED | Noted: 2021-01-01 | Resolved: 2022-01-01

## 2022-02-22 PROBLEM — Z99.89 OSA ON CPAP: Chronic | Status: ACTIVE | Noted: 2020-06-09

## 2022-02-22 PROBLEM — D64.81 ANEMIA DUE TO CHEMOTHERAPY: Chronic | Status: ACTIVE | Noted: 2021-03-11

## 2022-02-22 PROBLEM — I25.10 CORONARY ARTERY DISEASE INVOLVING NATIVE CORONARY ARTERY OF NATIVE HEART WITHOUT ANGINA PECTORIS: Chronic | Status: ACTIVE | Noted: 2019-11-19

## 2022-02-22 PROBLEM — T45.1X5A ANEMIA DUE TO CHEMOTHERAPY: Status: RESOLVED | Noted: 2021-03-11 | Resolved: 2022-01-01

## 2022-02-22 PROBLEM — E78.2 MIXED HYPERLIPIDEMIA: Chronic | Status: ACTIVE | Noted: 2019-11-19

## 2022-02-22 PROBLEM — I10 ESSENTIAL HYPERTENSION: Chronic | Status: ACTIVE | Noted: 2019-11-19

## 2022-02-22 PROBLEM — J18.9 PNEUMONIA OF RIGHT UPPER LOBE DUE TO INFECTIOUS ORGANISM: Status: RESOLVED | Noted: 2021-01-01 | Resolved: 2022-01-01

## 2022-02-22 PROBLEM — R06.02 SHORTNESS OF BREATH: Status: ACTIVE | Noted: 2022-01-01

## 2022-02-22 PROBLEM — A41.9 SEPSIS WITHOUT SEPTIC SHOCK (HCC): Status: ACTIVE | Noted: 2022-01-01

## 2022-02-22 PROBLEM — J18.9 PNEUMONIA DUE TO INFECTIOUS ORGANISM: Status: ACTIVE | Noted: 2022-01-01

## 2022-02-22 PROBLEM — R04.2 HEMOPTYSIS: Status: ACTIVE | Noted: 2022-01-01

## 2022-02-22 PROBLEM — T45.1X5A ANEMIA DUE TO CHEMOTHERAPY: Chronic | Status: ACTIVE | Noted: 2021-03-11

## 2022-02-22 PROBLEM — I35.1 AORTIC VALVE REGURGITATION: Status: RESOLVED | Noted: 2020-10-29 | Resolved: 2022-01-01

## 2022-02-22 PROBLEM — Z95.0 PRESENCE OF CARDIAC PACEMAKER: Chronic | Status: ACTIVE | Noted: 2019-07-05

## 2022-02-22 PROBLEM — I50.32 CHRONIC DIASTOLIC CONGESTIVE HEART FAILURE (HCC): Chronic | Status: ACTIVE | Noted: 2021-01-01

## 2022-02-22 PROBLEM — C67.9 BLADDER CARCINOMA (HCC): Chronic | Status: ACTIVE | Noted: 2021-01-01

## 2022-02-22 PROBLEM — E66.09 CLASS 1 OBESITY DUE TO EXCESS CALORIES WITHOUT SERIOUS COMORBIDITY WITH BODY MASS INDEX (BMI) OF 31.0 TO 31.9 IN ADULT: Status: RESOLVED | Noted: 2020-06-09 | Resolved: 2022-01-01

## 2022-02-22 PROBLEM — I70.1 RENAL ARTERY STENOSIS (HCC): Status: RESOLVED | Noted: 2021-01-01 | Resolved: 2022-01-01

## 2022-02-22 PROBLEM — D64.81 ANEMIA DUE TO CHEMOTHERAPY: Status: RESOLVED | Noted: 2021-03-11 | Resolved: 2022-01-01

## 2022-02-22 PROBLEM — G47.33 OSA ON CPAP: Chronic | Status: ACTIVE | Noted: 2020-06-09

## 2022-02-23 NOTE — ANESTHESIA POSTPROCEDURE EVALUATION
Patient: Navneet Lind    Procedure Summary     Date: 02/23/22 Room / Location: [facility] 2 / [facility]    Anesthesia Start: 1039 Anesthesia Stop: 1113    Procedure: BRONCHOSCOPY WITH BRONCHOALVEOLAR LAVAGE AND BIOPSY X1 AREA (N/A Bronchus) Diagnosis:       Hemoptysis      Pneumonia due to infectious organism, unspecified laterality, unspecified part of lung      ILD (interstitial lung disease) (HCC)      (Hemoptysis [R04.2])      (Pneumonia due to infectious organism, unspecified laterality, unspecified part of lung [J18.9])      (ILD (interstitial lung disease) (HCC) [J84.9])    Surgeons: Ronny Jiang MD Provider: Viviana Saucedo MD    Anesthesia Type: MAC ASA Status: 4          Anesthesia Type: MAC    Vitals  Vitals Value Taken Time   /69 02/23/22 1136   Temp     Pulse 98 02/23/22 1151   Resp 22 02/23/22 1145   SpO2 91 % 02/23/22 1151   Vitals shown include unvalidated device data.        Post Anesthesia Care and Evaluation    Patient location during evaluation: PACU  Patient participation: complete - patient participated  Level of consciousness: awake  Airway patency: patent  Anesthetic complications: No anesthetic complications  PONV Status: none  Cardiovascular status: acceptable  Respiratory status: acceptable  Hydration status: acceptable

## 2022-02-23 NOTE — ANESTHESIA PREPROCEDURE EVALUATION
Anesthesia Evaluation     Patient summary reviewed and Nursing notes reviewed   no history of anesthetic complications:  NPO Solid Status: > 8 hours  NPO Liquid Status: > 8 hours           Airway   Mallampati: II  Neck ROM: full  no difficulty expected  Dental - normal exam     Pulmonary - normal exam   (+) a smoker Former, shortness of breath, sleep apnea on CPAP,   (-) COPD, asthma    PE comment: nonlabored  Cardiovascular - normal exam  Exercise tolerance: poor (<4 METS)    ECG reviewed  PT is on anticoagulation therapy  Rhythm: regular  Rate: normal    (+) pacemaker pacemaker, hypertension, valvular problems/murmurs (s/p TAVR in 2021) AS and murmur, CAD, CABG, dysrhythmias (R BBB; SSS--tx'd with pacer), CHF Diastolic >=55%, ZULETA, hyperlipidemia,   (-) past MI, angina    ROS comment: RAFAEL: EF 55-60%, DAVIS 0.58cm2    Neuro/Psych- negative ROS  (-) seizures, TIA, CVASyncope: pre-syncope.  GI/Hepatic/Renal/Endo    (+) obesity,   renal disease (CKD),   (-) GERD, liver disease, diabetes, no thyroid disorder    Musculoskeletal (-) negative ROS    Abdominal    Substance History   (+) alcohol use,   (-) drug use     OB/GYN          Other      history of cancer    ROS/Med Hx Other: CARDS VISIT 2/11/22  1. Coronary artery disease involving native coronary artery of native heart without angina pectoris  Clinically silent.  Asymptomatic.     2. S/P AVR (aortic valve replacement)  Clinically silent post transcatheter aortic valve replacement     3. Renal artery stenosis (HCC)  Status post percutaneous intervention.     4. Essential hypertension  Well-controlled on medical therapy.                    Anesthesia Plan    ASA 4     MAC   (A-line & CVL)    Anesthetic plan, all risks, benefits, and alternatives have been provided, discussed and informed consent has been obtained with: patient.

## 2022-02-24 NOTE — CASE COMMUNICATION
Admitted to [facility] on 2/22/22 due to Hemoptysis    Transfer summary sent    Transfer [facility] completed    Orders discontinued    POC resolved

## 2022-03-01 NOTE — PLAN OF CARE
Goal Outcome Evaluation:  Navneet Lind presents with functional mobility impairments which indicate the need for skilled intervention. Tolerating session today without incident. Bed mobility completed with mod A x2 at the trunk and BLE management. Pt initiates BLE movement upon VC but only minimal movement present. When sitting on EOB, pt demonstrated significantly poor static sitting balance with R anterior leaning. Pt unable to spinal extend despite maximal VC and TC. Maintained full cervical flexion with minimal eye movement laterally upon VC. Required min-mod A to maintain pt safety and balance. Attempted CTS transfer requiring MaxA x 2 and pt was not able to come to erect standing. While sitting on EOB, facilitated BUE movement with lateral reaching. AAROM required for RUE due to minimal movement initiation but pt demonstrated moderate AROM on LUE and shoulder flexed ~30*. Further intervention limited due to pt presentation on this date. Will continue to follow and progress as tolerated.

## 2022-03-01 NOTE — PROGRESS NOTES
[facility] Medicine Services Daily Progress Note    Patient Name: Navneet Lind  : 1945  MRN: [id number]  Primary Care Physician:  Uri Cortes MD  Date of admission: 2022      Subjective      Chief Complaint: Hemoptysis shortness of breath/pneumonia        Patient reports slight improvement in his symptoms.  Patient Reports feeling a lot better.  He is on room air.  Determined to have positive cytology on [facility] for metastatic urothelial cancer. No new complaints today      ROS  negative except as above    Objective      Vitals:   Temp:  [97.9 °F (36.6 °C)-98.4 °F (36.9 °C)] 97.9 °F (36.6 °C)  Heart Rate:  [70-89] 89  Resp:  [16-24] 24  BP: ()/(65-84) 96/65      Vitals reviewed.   Constitutional:       Comments: Wife not at bedside today   on room air today  HENT:      Head: Normocephalic.      Nose: Nose normal.      Mouth/Throat:      Mouth: Mucous membranes are moist.   Eyes:      Pupils: Pupils are equal, round, and reactive to light.   Cardiovascular:      Rate and Rhythm: Normal rate.      Pulses: Normal pulses.   Pulmonary:      Effort: Pulmonary effort is normal.   Abdominal:      General: Abdomen is flat.   Musculoskeletal:         General: Normal range of motion.      Cervical back: Normal range of motion.   Skin:     General: Skin is warm.      Capillary Refill: Capillary refill takes less than 2 seconds.   Neurological:      General: No focal deficit present.      Mental Status: He is alert.   Psychiatric:         Mood and Affect: Mood normal.        Result Review    Result Review:  I have personally reviewed the results from the time of this admission to 3/1/2022 17:12 EST and agree with these findings:  [x]  Laboratory  []  Microbiology  []  Radiology  []  EKG/Telemetry   []  Cardiology/Vascular   []  Pathology  []  Old records  []  Other:      Wounds (last 24 hours)                Assessment/Plan          Active Hospital Problems:             Active Hospital Problems     Diagnosis     • **Hemoptysis     • Shortness of breath     • Sepsis without septic shock (HCC)     • Bladder carcinoma (HCC)     • ILD (interstitial lung disease) (HCC)     • S/P TAVR (transcatheter aortic valve replacement)     • Chronic diastolic congestive heart failure (HCC)     • H/O radical nephrectomy, right     • NATALIA on CPAP     • Coronary artery disease involving native coronary artery of native heart without angina pectoris         CABG 1995; SVG to RCA is occluded, LIMA to LAD, SVG to diag of LAD, SVG to marginal of LCX      • Essential hypertension     • Mixed hyperlipidemia     • Presence of cardiac pacemaker         BS dual chamber PM 11/7/2016         Plan:   Hemoptysis  Shortness of breath  Sepsis without septic shock   Possible pneumonia  ILD (interstitial lung disease)   -initial lactate 3.2; reflex ordered  -given 1 L NS in ER  -blood cultures pending  -Covid-19 negative  -check RVP  -obtain sputum culture  -check urine antigens  -hold aspirin  -pulmonology consulted  -supplemental O2 PRN to keep sat >90%  -empiric coverage with cefepime and vancomycin; de-escalate as above  -IV steroids; taper as able  -DuoNebs q4h PRN  -encourage IS  -Status post bronchoscopy on February 23      Bladder carcinoma   H/O radical nephrectomy, left   -followed as outpatient by oncology  -on immunotherapy q3 weeks, last treatment on 02/10/22  -[facility] cytology positive for metastases     Presence of cardiac pacemaker, h/o SSS  -followed as outpatient by cardiology      Coronary artery disease involving native coronary artery of native heart without angina pectoris, h/o CABG / Mixed hyperlipidemia / Essential hypertension, chronic  -hold aspirin as above  -monitor BP     NATALIA on CPAP     Chronic diastolic congestive heart failure  -appears compensated  -continue home medication once list verified     S/P TAVR (transcatheter aortic valve replacement)     History of renal artery stenosis s/p  stent placement      Follow-up [facility] results     Discussed with patient and wife     DVT prophylaxis:  Mechanical DVT prophylaxis orders are present.     CODE STATUS:    Code Status (Patient has no pulse and is not breathing): CPR (Attempt to Resuscitate)  Medical Interventions (Patient has pulse or is breathing): Full Support     Admission Status:  I believe this patient meets inpatient status.     I discussed the patient's findings and my recommendations with patient.     This patient has been examined wearing appropriate Personal Protective Equipment.   03/01/22      Electronically signed by Navneet De León MD, FACP 03/01/22, 17:12 FLAKO.      [facility] Hospitalist Team

## 2022-03-01 NOTE — PLAN OF CARE
Navneet Lind presents with ADL impairments below baseline abilities which indicate the need for continued skilled intervention while inpatient. Pt very fatigued this date, requiring Max A - dependent for LB care and Min A for UB ADLs. Pt demos flat affect, minimally engaged. Declines EOB sitting this date. Tolerating session today without incident. Will continue to follow and progress as tolerated.

## 2022-03-01 NOTE — PLAN OF CARE
Goal Outcome Evaluation:  Plan of Care Reviewed With: patient        Progress: no change  Outcome Summary: Pt has voiced no complaints and has rested during the night.  Pt taking vanilla Boost to help with nutrition.  Pt not disturbed during the night per his request for sleep.

## 2022-03-01 NOTE — THERAPY TREATMENT NOTE
Subjective: Pt does not verbally express agreement to therapeutic plan of care but does not oppose to it as well. Pt reported extra fatigue on this date with head nodding.     Objective:     Bed mobility - Mod-A and Assist x 2  Transfers - Max-A, Assist x 2 and with rolling walker; CTS transfer from the EOB, unable to come to erect standing  Ambulation - 0 feet N/A or Not attempted.     Lateral reaching BUE while sitting on the EOB x2      Pain:  Unable to determine; pt non-verbal on this date   Education: Provided education on importance of mobility and skilled verbal / tactile cueing throughout intervention.     Assessment: Navneet iLnd presents with functional mobility impairments which indicate the need for skilled intervention. Tolerating session today without incident. Bed mobility completed with mod A x2 at the trunk and BLE management. Pt initiates BLE movement upon VC but only minimal movement present. When sitting on EOB, pt demonstrated significantly poor static sitting balance with R anterior leaning. Pt unable to spinal extend despite maximal VC and TC. Maintained full cervical flexion with minimal eye movement laterally upon VC. Required min-mod A to maintain pt safety and balance. Attempted CTS transfer requiring MaxA x 2 and pt was not able to come to erect standing. While sitting on EOB, facilitated BUE movement with lateral reaching. AAROM required for RUE due to minimal movement initiation but pt demonstrated moderate AROM on LUE and shoulder flexed ~30*. Further intervention limited due to pt presentation on this date. Will continue to follow and progress as tolerated.     Plan/Recommendations:   Pt would benefit from Inpatient Rehabilitation placement at discharge from facility and requires no DME at discharge.   Pt desires Inpatient Rehabilitation placement at discharge. Pt cooperative; agreeable to therapeutic recommendations and plan of care.     Basic Mobility 6-click:  Rollin  = Total, A lot = 2, A little = 3; 4 = None  Supine>Sit:   1 = Total, A lot = 2, A little = 3; 4 = None   Sit>Stand with arms:  1 = Total, A lot = 2, A little = 3; 4 = None  Bed>Chair:   1 = Total, A lot = 2, A little = 3; 4 = None  Ambulate in room:  1 = Total, A lot = 2, A little = 3; 4 = None  3-5 Steps with railin = Total, A lot = 2, A little = 3; 4 = None  Score: 8      Post-Tx Position: Supine with HOB Elevated, Alarms activated and Call light and personal items within reach  PPE: gloves, surgical mask, eyewear protection

## 2022-03-01 NOTE — PROGRESS NOTES
Daily Progress Note        Hemoptysis    Presence of cardiac pacemaker    Coronary artery disease involving native coronary artery of native heart without angina pectoris    Shortness of breath    Mixed hyperlipidemia    Essential hypertension    NATALIA on CPAP    H/O radical nephrectomy, left    Chronic diastolic congestive heart failure (HCC)    S/P TAVR (transcatheter aortic valve replacement)    Bladder carcinoma (HCC)    ILD (interstitial lung disease) (HCC)    Sepsis without septic shock (HCC)    Pneumonia due to infectious organism      Assessment:     Hemoptysis, resolved  Status post bronchoscopy 2/23/2022  Endobronchial biopsy positive for transitional cell carcinoma     Interstitial lung disease, currently on Ofev  Elevation of right hemidiaphragm  Obstructive sleep apnea, on CPAP    Work-up for connective tissue diseases negative including c-ANCA and p-ANCA and myeloperoxidase     Metastatic transitional (urothelial) cell carcinoma, currently being treated with immunotherapy every 3 weeks, last treatment 2/10/2022  -Port in place     bladder cancer, left renal mass, 10/2020  -Radical left nephrectomy     Presence of TAVR valve, 3/20/2021  CABG  Pacemaker in situ  Chronic kidney disease  Essential hypertension  Hyperlipidemia     5/12/2021 Echo  EF 66%  RVSP 21.9 mmHg     Recommendations:    Monitor respiratory status- currently on room air  P.o. prednisone 6-day taper  Pulmicort nebulizer  Duo nebs as needed  Mucinex  GI prophylaxis on Protonix 40 mg daily    Currently not on DVT prophylaxis                 LOS: 7 days     Subjective         Objective     Vital signs for last 24 hours:  Vitals:    02/28/22 1932 02/28/22 1937 02/28/22 2026 03/01/22 0432   BP:   131/83 145/84   BP Location:   Left arm Left arm   Patient Position:   Lying Lying   Pulse: 72 72 74 70   Resp: 16 16 16 16   Temp:   98.4 °F (36.9 °C) 98.1 °F (36.7 °C)   TempSrc:   Oral Oral   SpO2: 97% 97% 95% 94%   Weight:    70.6 kg (155 lb 10.3  oz)   Height:           Intake/Output last 3 shifts:  I/O last 3 completed shifts:  In: 1894 [P.O.:1794; IV Piggyback:100]  Out: -   Intake/Output this shift:  I/O this shift:  In: 477 [P.O.:477]  Out: -       Radiology  Imaging Results (Last 24 Hours)     Procedure Component Value Units Date/Time    XR Chest 1 View [[id number]] Collected: 02/28/22 1307     Updated: 02/28/22 1310    Narrative:      DATE OF EXAM:  2/28/2022 12:56 PM     PROCEDURE:  XR CHEST 1 VW-     INDICATIONS:  congestipn/cough; R04.2-Hemoptysis; J18.9-Pneumonia, unspecified  organism; J84.9-Interstitial pulmonary disease, unspecified     COMPARISON:  02/22/2022     TECHNIQUE:   Single radiographic AP view of the chest was obtained.     FINDINGS:  Cardiac size is stable. There are postoperative changes of sternotomy,  CABG, and TAPVR. A left-sided transvenous pacemaker remains in  appropriate position. A right-sided [id number] is unchanged in  position. There are bilateral pulmonary infiltrates present which appear  predominantly interstitial in character.        Impression:      Continued bilateral probably interstitial infiltrates. No significant  change since the last study.     Electronically Signed By-Eric Ramon MD On:2/28/2022 1:08 PM  This report was finalized on [id number] by  Eric Ramon MD.          Labs:  Results from last 7 days   Lab Units 02/27/22  0254   WBC 10*3/mm3 12.50*   HEMOGLOBIN g/dL 12.7*   HEMATOCRIT % 38.8   PLATELETS 10*3/mm3 213     Results from last 7 days   Lab Units 03/01/22  0050 02/28/22  0715 02/27/22  0254   SODIUM mmol/L  --   --  133*   POTASSIUM mmol/L 4.7   < > 4.4   CHLORIDE mmol/L  --   --  101   CO2 mmol/L  --   --  21.0*   BUN mg/dL  --   --  23   CREATININE mg/dL  --   --  0.52*   CALCIUM mg/dL  --   --  8.6   GLUCOSE mg/dL  --   --  162*    < > = values in this interval not displayed.             Results from last 7 days   Lab Units 02/22/22 2209 02/22/22  1627   TROPONIN T ng/mL <0.010 <0.010          Results from last 7 days   Lab Units 03/01/22  0050   MAGNESIUM mg/dL 2.2                   Meds:   SCHEDULE  budesonide, 1 mg, Nebulization, BID - RT  escitalopram, 5 mg, Oral, Daily  folic acid, 400 mcg, Oral, Daily  gabapentin, 100 mg, Oral, BID  guaiFENesin, 600 mg, Oral, Q12H  pantoprazole, 40 mg, Oral, QAM  polyethylene glycol, 17 g, Oral, Daily  [START ON 3/2/2022] predniSONE, 20 mg, Oral, Daily   Followed by  [START ON 3/4/2022] predniSONE, 10 mg, Oral, Daily  sodium chloride, 10 mL, Intravenous, Q12H      Infusions     PRNs  •  acetaminophen **OR** acetaminophen **OR** acetaminophen  •  aluminum-magnesium hydroxide-simethicone  •  HYDROcodone-acetaminophen  •  ipratropium-albuterol  •  magnesium sulfate **OR** magnesium sulfate in D5W 1g/100mL (PREMIX)  •  melatonin  •  Morphine  •  nitroglycerin  •  ondansetron **OR** ondansetron  •  potassium chloride **OR** potassium chloride **OR** potassium chloride  •  potassium phosphate infusion greater than 15 mMoles **OR** potassium phosphate infusion greater than 15 mMoles **OR** potassium phosphate **OR** sodium phosphate IVPB **OR** sodium phosphate IVPB **OR** sodium phosphate IVPB  •  promethazine  •  [COMPLETED] Insert peripheral IV **AND** sodium chloride    Physical Exam:  Physical Exam  Vitals reviewed.   Pulmonary:      Effort: Pulmonary effort is normal.      Breath sounds: Normal breath sounds.   Skin:     General: Skin is warm and dry.   Neurological:      Mental Status: He is alert and oriented to person, place, and time.   Psychiatric:      Comments: quite         ROS  Review of Systems   Respiratory: Positive for cough and shortness of breath.         Difficulty coughing up secretions             I have reviewed current clinicals.     Electronically signed by MARGARITA Storey, 03/01/22, 12:05 PM EST.     The NP scribed the note for me, I have examined the patient and reviewed and verified the above findings and and I formulated the  assessment and plan as documented

## 2022-03-01 NOTE — CASE MANAGEMENT/SOCIAL WORK
"Continued Stay Note  [facility]     Patient Name: Navneet Lind  MRN: [id number]  Today's Date: 3/1/2022    Admit Date: 2/22/2022     Discharge Plan     Row Name 03/01/22 1252       Plan    Plan DC plan: Anticipate routine home with spouse and [facility] ; [facility] order in place. Hospice provider list provided 3/1.    Provided Post Acute Provider List? Yes    Post Acute Provider List Hospice    Delivered To Patient; Support Person    Method of Delivery In person    Plan Comments Met with patient and spouse at bedside to follow up for any inpt. rehab choices. Patient's spouse reports that she really would prefer to take him home. She reported that \"someone\" mentioned hospice to her and she thinks she would like to try that route. List of hospice agencies was left with the patient and spouse to review.            Met with patient in room wearing PPE: mask/googles    Maintained distance greater than 6 feet and spent less than 15 minutes in the room.     Ching Pinon RN     Office Phone: [phone]  Office Cell: [phone]   "

## 2022-03-01 NOTE — PLAN OF CARE
Goal Outcome Evaluation:  Plan of Care Reviewed With: patient, spouse        Progress: no change  Outcome Summary: No new complaints. Weak & unable to participate in therapy well. Is taking Boost supplement d/t poor intake. remains risk for falls & skin injury.

## 2022-03-01 NOTE — THERAPY TREATMENT NOTE
Subjective: Pt agreeable to therapeutic plan of care.  Cognition: arousal/alertness: Other. Flat affect, minimally responsive unless directly questioned    Objective:     Bed Mobility: Max-A  Functional Transfers: N/A or Not attempted.  Functional Ambulation: N/A or Not attempted.    Feeding: Min-A  ADL Position: sitting up in bed  ADL Comments: Declines solids, Boost only. Noted to cough frequently after drinking boost.    Toileting: Max-A and Dependent  ADL Position: supine  ADL Comments: incontinent    Pain: 6 VAS, lower back  Education: Provided education on importance of mobility and skilled verbal / tactile cueing throughout intervention.     Assessment: Navneet Lind presents with ADL impairments below baseline abilities which indicate the need for continued skilled intervention while inpatient. Pt very fatigued this date, requiring Max A - dependent for LB care and Min A for UB ADLs. Pt demos flat affect, minimally engaged. Declines EOB sitting this date. Tolerating session today without incident. Will continue to follow and progress as tolerated.     Plan/Recommendations:   Pt would benefit from Inpatient Rehabilitation placement at discharge from facility.   Pt desires Home at discharge. Pt cooperative; agreeable to therapeutic recommendations and plan of care.     Modified Tami: 5 = Severe disability (Requires constant nursing care and attention, bedridden, incontinent)    Post-Tx Position: Supine with HOB Elevated, Alarms activated and Call light and personal items within reach  PPE: gloves, surgical mask, eyewear protection

## 2022-03-02 NOTE — PROGRESS NOTES
[facility] Medicine Services Daily Progress Note    Patient Name: Navneet Lind  : 1945  MRN: [id number]  Primary Care Physician:  Uri Cortes MD  Date of admission: 2022      Subjective      Chief Complaint: Hemoptysis shortness of breath/pneumonia        Patient reports no overnight events.  Patient Reports feeling a lot better.  He is on room air.  Determined to have positive cytology on [facility] for metastatic urothelial cancer. No new complaints today      ROS  negative except as above    Objective      Vitals:   Temp:  [97.4 °F (36.3 °C)-98.1 °F (36.7 °C)] 97.4 °F (36.3 °C)  Heart Rate:  [80-95] 95  Resp:  [16-18] 17  BP: (114-136)/(75-92) 123/83      Vitals reviewed.   Constitutional:       Comments: Wife not at bedside today   on room air today  HENT:      Head: Normocephalic.      Nose: Nose normal.      Mouth/Throat:      Mouth: Mucous membranes are moist.   Eyes:      Pupils: Pupils are equal, round, and reactive to light.   Cardiovascular:      Rate and Rhythm: Normal rate.      Pulses: Normal pulses.   Pulmonary:      Effort: Pulmonary effort is normal.   Abdominal:      General: Abdomen is flat.   Musculoskeletal:         General: Normal range of motion.      Cervical back: Normal range of motion.   Skin:     General: Skin is warm.      Capillary Refill: Capillary refill takes less than 2 seconds.   Neurological:      General: No focal deficit present.      Mental Status: He is alert.   Psychiatric:         Mood and Affect: Mood normal.        Result Review    Result Review:  I have personally reviewed the results from the time of this admission to 3/2/2022 18:03 EST and agree with these findings:  [x]  Laboratory  []  Microbiology  []  Radiology  []  EKG/Telemetry   []  Cardiology/Vascular   []  Pathology  []  Old records  []  Other:      Wounds (last 24 hours)                Assessment/Plan          Active Hospital Problems:            Active  Hospital Problems     Diagnosis     • **Hemoptysis     • Shortness of breath     • Sepsis without septic shock (HCC)     • Bladder carcinoma (HCC)     • ILD (interstitial lung disease) (HCC)     • S/P TAVR (transcatheter aortic valve replacement)     • Chronic diastolic congestive heart failure (HCC)     • H/O radical nephrectomy, right     • NATALIA on CPAP     • Coronary artery disease involving native coronary artery of native heart without angina pectoris         CABG 1995; SVG to RCA is occluded, LIMA to LAD, SVG to diag of LAD, SVG to marginal of LCX      • Essential hypertension     • Mixed hyperlipidemia     • Presence of cardiac pacemaker         BS dual chamber PM 11/7/2016         Plan:   Hemoptysis  Shortness of breath  Sepsis without septic shock   Possible pneumonia  ILD (interstitial lung disease)   -initial lactate 3.2; reflex ordered  -given 1 L NS in ER  -blood cultures pending  -Covid-19 negative  -check RVP  -obtain sputum culture  -check urine antigens  -hold aspirin  -pulmonology consulted  -supplemental O2 PRN to keep sat >90%  -empiric coverage with cefepime and vancomycin; de-escalate as above  -IV steroids; taper as able  -DuoNebs q4h PRN  -encourage IS  -Status post bronchoscopy on February 23      Bladder carcinoma   H/O radical nephrectomy, left   -followed as outpatient by oncology  -on immunotherapy q3 weeks, last treatment on 02/10/22  -[facility] cytology positive for metastases     Presence of cardiac pacemaker, h/o SSS  -followed as outpatient by cardiology      Coronary artery disease involving native coronary artery of native heart without angina pectoris, h/o CABG / Mixed hyperlipidemia / Essential hypertension, chronic  -hold aspirin as above  -monitor BP     NATALIA on CPAP     Chronic diastolic congestive heart failure  -appears compensated  -continue home medication once list verified     S/P TAVR (transcatheter aortic valve replacement)     History of renal artery stenosis s/p stent  placement      Follow-up [facility] results     Discussed with patient and wife     DVT prophylaxis:  Mechanical DVT prophylaxis orders are present.     CODE STATUS:    Code Status (Patient has no pulse and is not breathing): CPR (Attempt to Resuscitate)  Medical Interventions (Patient has pulse or is breathing): Full Support     Admission Status:  I believe this patient meets inpatient status.     I discussed the patient's findings and my recommendations with patient.     This patient has been examined wearing appropriate Personal Protective Equipment.   03/02/22      Electronically signed by Navneet DeL eón MD, FACP 03/02/22, 18:03 FLAKO.      [facility] Hospitalist Team

## 2022-03-02 NOTE — DISCHARGE PLACEMENT REQUEST
"Power Bhatti (76 y.o. Male)             Date of Birth Social Security Number Address Home Phone MRN    1945  [address] [place] [address] [phone] [id number]    Druze Marital Status             Tenriism        Admission Date Admission Type Admitting Provider Attending Provider Department, Room/Bed    2/22/22 Emergency Power De León MD Olisa, Charles O, MD [facility] 2B MEDICAL INPATIENT, 235/1    Discharge Date Discharge Disposition Discharge Destination                         Attending Provider: Power De León MD    Allergies: No Known Allergies    Isolation: None   Infection: None   Code Status: No CPR   Advance Care Planning Activity    Ht: 172.7 cm (67.99\")   Wt: 69.8 kg (153 lb 14.1 oz)    Admission Cmt: None   Principal Problem: Hemoptysis [R04.2]                 Active Insurance as of 2/22/2022     Primary Coverage     Payor Plan Insurance Group Employer/Plan Group    MEDICARE MEDICARE A & B      Payor Plan Address Payor Plan Phone Number Payor Plan Fax Number Effective Dates    [address] [phone]  12/1/2011 - None Entered    [facility] [id number]       Subscriber Name Subscriber Birth Date Member ID       POWER BHATTI 1945 [id number]           Secondary Coverage     Payor Plan Insurance Group Employer/Plan Group    [facility] INDEMNITY [facility]      Payor Plan Address Payor Plan Phone Number Payor Plan Fax Number Effective Dates    [address] [phone]  12/1/2014 - None Entered    [facility] [id number]       Subscriber Name Subscriber Birth Date Member ID       POWER BHATTI 1945 [id number]                 Emergency Contacts      (Rel.) Home Phone Work Phone Mobile Phone    Clifford Bhatti (Spouse) [phone] -- [phone]    BordenDimitri caceres (Son) [phone] -- --              "

## 2022-03-02 NOTE — CONSULTS
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 male presented to [facility] on 2022 with hemoptysis that started the morning of admission described as significant in amount covering his shirt and being bright red.  He was on aspirin but no anticoagulation.  PMH was significant for interstitial lung disease/fibrosis on Ofev and metastatic renal cell carcinoma for which he was on immunotherapy.  A CT PE protocol was negative for PE and showed increased prevascular lymphadenopathy as compared with 2021 outside CT with the largest measuring 2.0 x 1.1 cm.  There was evidence of interstitial fibrosis and bronchiectasis, hepatic steatosis, and T7 compression fracture with evidence of kyphoplasty.  CBC revealed WBC 16.5, hemoglobin 14.8, and platelets 321,000.  Creatinine was 0.87 and LFTs were not elevated.  PT was 11.9 (9.6-11.7) and PTT was 26.9 (24-31).  D-dimer was 3.15 (0-0.59).  Respiratory viral panel and COVID-19 screen were negative.  Blood cultures were collected and final results were negative.  ANCA panel was negative.  He was started on antibiotics and supportive care. On 2022 he underwent bronchoscopy by Dr. Jiang with findings of an abnormal lesion in the left mainstem which was biopsied and a blood clot in the left mainstem which was removed prior to BAL.  Pathology on left mainstem lung biopsy revealed metastatic urothelial (transitional) cell carcinoma.  BAL cytology and cultures were negative.  Hemoptysis resolved and post admission hemoglobin had dropped to 11.8 on 2022 with subsequent improvement.    22  Hematology/Oncology was consulted by Dr. De León as the patient is known to our service for metastatic  transitional cell renal carcinoma.  He had been diagnosed with stage III disease in November 2020 at time of left nephrectomy.  He received adjuvant chemotherapy that was complicated by poor tolerance with cytopenias requiring dose reductions and the patient chose to forego day 8 of cycle 4 in order to undergo heart valve surgery.  Cycle 4-day 1 chemotherapy was given 3/9/2021.  He was also anemic at time of diagnosis November 2020 with iron deficiency and folate deficiencies noted on workup..  In July 2021 CT chest was negative for metastatic disease however 11/10/2021 CT chest, abdomen, and pelvis showed mediastinal lymphadenopathy and a left retroperitoneal lymph node.  At that time he also had a T7 compression fracture with significant pain requiring evaluation for kyphoplasty.  He underwent left periaortic lymph node biopsy on 12/22/2021 revealing metastatic transitional cell carcinoma.  He was hospitalized in late December 2021 with pneumonia followed by discharge to rehab.  He was seen in follow-up by us in January 2022 at which time mutual decision with the patient was made to start pembrolizumab as he was not a candidate for aggressive therapy secondary to poor ECOG status with recent hospitalization and weight loss as well as patient did not want to pursue chemotherapy.  His first dose was given 1/21/2022 and second dose 2/10/2022.  He was evaluated with the infusion visit 2/10/2022 at which time he reported no improvement of ECOG status to date.    PCP: Uri Cortes MD    History:  Past Medical History:   Diagnosis Date   • Aortic stenosis 9/29/2015    Per Echo 2017   • Benign essential HTN    • Bundle branch block, right 2/7/2013   • CAD (coronary artery disease), native coronary artery    • Cancer (HCC)     bladder- chemo, new left renal mass   • Chronic kidney disease    • Coronary artery disease involving native coronary artery of native heart without angina pectoris 11/19/2019    CABG  1995; SVG to RCA is occluded, LIMA to LAD, SVG to diag of LAD, SVG to marginal of LCX   • COVID-19 vaccine administered    • Essential hypertension 11/19/2019   • Heart murmur    • History of transfusion    • Hyperlipidemia, mixed    • ILD (interstitial lung disease) ([facility]) 11/1/2021   • Injury of back    • Mixed hyperlipidemia 11/19/2019   • Near syncope    • NATALIA (obstructive sleep apnea)     AHI 11.6/h, CPAP   • Premature ventricular contractions 2/11/2014   • Presence of cardiac pacemaker 7/5/2019    BS dual chamber PM 11/7/2016   • Shortness of breath    • SSS (sick sinus syndrome) ([facility]) 7/5/2019   ,   Past Surgical History:   Procedure Laterality Date   • AORTIC VALVE REPAIR/REPLACEMENT N/A 3/30/2021    Procedure: TTE TRANSFEMORAL TRANSCATHETER AORTIC VALVE REPLACEMENT PERCUTANEOUS APPROACH;  Surgeon: Hang Martinez MD;  [facility];  Service: Cardiothoracic;  Laterality: N/A;   • AORTIC VALVE REPAIR/REPLACEMENT N/A 3/30/2021    Procedure: Transfemoral Transcatheter Aortic Valve Replacement w/Intra Op TTE and Possible Open Rescue Surgery;  Surgeon: Anderson Galvez MD;  [facility];  Service: Cardiovascular;  Laterality: N/A;   • BRONCHOSCOPY N/A 2/23/2022    Procedure: BRONCHOSCOPY WITH BRONCHOALVEOLAR LAVAGE AND BIOPSY X1 AREA;  Surgeon: Ronny Jiang MD;  Location: [facility] ENDOSCOPY;  Service: Pulmonary;  Laterality: N/A;  blood clot in lung, hemoptysis   • CARDIAC CATHETERIZATION  2018   • CARDIAC CATHETERIZATION N/A 3/22/2021    Procedure: Left Heart Cath;  Surgeon: ADRIANO Erazo MD;  Location: [facility] CATH INVASIVE LOCATION;  Service: Cardiovascular;  Laterality: N/A;   • CARDIAC CATHETERIZATION N/A 6/22/2021    Procedure: RENAL ANGIOGRAPHY;  Surgeon: Jesus Bhagat MD;  Location: [facility] CATH INVASIVE LOCATION;  Service: Cardiovascular;  Laterality: N/A;   • CARDIAC CATHETERIZATION N/A 6/22/2021    Procedure: Stent BMS peripheral;   Surgeon: Jesus Bhagat MD;  Location: [facility] CATH INVASIVE LOCATION;  Service: Cardiovascular;  Laterality: N/A;   rt renal   • CARDIAC CATHETERIZATION N/A 2021    Procedure: Angioplasty-peripheral;  Surgeon: Jesus Bhagat MD;  Location: [facility] CATH INVASIVE LOCATION;  Service: Cardiovascular;  Laterality: N/A;   rt renal   • CARDIOVASCULAR STRESS TEST     • CORONARY ARTERY BYPASS GRAFT     • ECHO - CONVERTED     • NEPHROURETERECTOMY Left 2020    Procedure: NEPHROURETERECTOMY;  Surgeon: Rogers Bae MD;  Location: [facility] MAIN OR;  Service: Urology;  Laterality: Left;   • PACEMAKER IMPLANTATION      bs   • PORTACATH PLACEMENT     ,   Family History   Problem Relation Age of Onset   • No Known Problems Mother    • Heart disease Father    • Heart failure Father    • Heart attack Father    • No Known Problems Sister    • No Known Problems Brother    • Malig Hyperthermia Neg Hx    ,   Social History     Tobacco Use   • Smoking status: Former Smoker     Years: 5.00     Quit date:      Years since quittin.1   • Smokeless tobacco: Never Used   Vaping Use   • Vaping Use: Never used   Substance Use Topics   • Alcohol use: Yes     Alcohol/week: 1.0 standard drink     Types: 1 Shots of liquor per week     Comment: RARE   • Drug use: Never   ,   Medications Prior to Admission   Medication Sig Dispense Refill Last Dose   • aspirin 81 MG EC tablet Take 81 mg by mouth Daily.   2022 at Unknown time   • Cholecalciferol (Vitamin D3) 50 MCG ( UT) tablet Take 2,000 Units by mouth Daily.   2022 at Unknown time   • escitalopram (LEXAPRO) 5 MG tablet Take 5 mg by mouth Daily.   2022 at    • famotidine (PEPCID) 40 MG tablet Take 40 mg by mouth Every Night.      • folic acid (FOLVITE) 400 MCG tablet Take 400 mcg by mouth Daily.   2022 at Unknown time   • gabapentin (NEURONTIN) 100 MG capsule Take 100 mg by mouth 2 (Two) Times a Day.   Patient Taking  Differently at Unknown time   • HYDROcodone-acetaminophen (NORCO) 7.5-325 MG per tablet Take 1 tablet by mouth Every 6 (Six) Hours As Needed for Moderate Pain .   Past Month at Unknown time   • megestrol (MEGACE) 40 MG/ML suspension Take 200 mg by mouth 3 (Three) Times a Day With Meals.   2/21/2022 at Unknown time   • melatonin 5 MG tablet tablet Take 5 mg by mouth.   2/21/2022 at Unknown time   • NON FORMULARY / PATIENT SUPPLIED MEDICATION Take 100 mg by mouth Daily. OFEV-Nintedanib      • omeprazole (priLOSEC) 40 MG capsule Take 40 mg by mouth Daily.   2/21/2022 at Unknown time   • ondansetron (ZOFRAN) 4 MG tablet Take 4 mg by mouth Every 8 (Eight) Hours As Needed.   Patient Taking Differently at Unknown time   • polyethylene glycol (MiraLax) 17 g packet Take 17 g by mouth Daily As Needed.   Past Month at Unknown time   • promethazine (PHENERGAN) 25 MG tablet Take 25 mg by mouth Every 4 (Four) Hours As Needed for Nausea or Vomiting.      • acetaminophen (TYLENOL) 500 MG tablet Take 1,000 mg by mouth Every 6 (Six) Hours As Needed for Mild Pain .      • psyllium (METAMUCIL MULTIHEALTH FIBER) 58.12 % packet Take 1 packet by mouth Daily. 30 packet 0    , Scheduled Meds:  budesonide, 1 mg, Nebulization, BID - RT  escitalopram, 5 mg, Oral, Daily  folic acid, 400 mcg, Oral, Daily  gabapentin, 100 mg, Oral, BID  guaiFENesin, 600 mg, Oral, Q12H  pantoprazole, 40 mg, Oral, QAM  polyethylene glycol, 17 g, Oral, Daily  predniSONE, 20 mg, Oral, Daily   Followed by  [START ON 3/4/2022] predniSONE, 10 mg, Oral, Daily  sodium chloride, 10 mL, Intravenous, Q12H    , Continuous Infusions:   , PRN Meds:  •  acetaminophen **OR** acetaminophen **OR** acetaminophen  •  aluminum-magnesium hydroxide-simethicone  •  bisacodyl  •  HYDROcodone-acetaminophen  •  ipratropium-albuterol  •  magnesium sulfate **OR** magnesium sulfate in D5W 1g/100mL (PREMIX)  •  melatonin  •  Morphine  •  nitroglycerin  •  ondansetron **OR** ondansetron  •   "potassium chloride **OR** potassium chloride **OR** potassium chloride  •  potassium phosphate infusion greater than 15 mMoles **OR** potassium phosphate infusion greater than 15 mMoles **OR** potassium phosphate **OR** sodium phosphate IVPB **OR** sodium phosphate IVPB **OR** sodium phosphate IVPB  •  promethazine  •  [COMPLETED] Insert peripheral IV **AND** sodium chloride   Allergies:  Patient has no known allergies.    ROS:  Review of Systems   Constitutional: Negative for activity change, appetite change, chills, fatigue, fever and unexpected weight change.   HENT: Negative for congestion, dental problem, hearing loss, mouth sores, nosebleeds, sore throat and trouble swallowing.    Eyes: Negative for photophobia and visual disturbance.   Respiratory: Negative for cough, chest tightness and shortness of breath.    Cardiovascular: Negative for chest pain, palpitations and leg swelling.   Gastrointestinal: Positive for constipation. Negative for abdominal distention, abdominal pain, blood in stool, diarrhea, nausea and vomiting.        Continued poor appetite   Endocrine: Negative for cold intolerance and heat intolerance.   Genitourinary: Negative for decreased urine volume, difficulty urinating, frequency, hematuria and urgency.   Musculoskeletal: Negative for arthralgias and gait problem.   Skin: Negative for rash and wound.   Neurological: Positive for weakness. Negative for dizziness, tremors, syncope, light-headedness, numbness and headaches.   Hematological: Negative for adenopathy. Does not bruise/bleed easily.   Psychiatric/Behavioral: Negative for confusion and hallucinations. The patient is not nervous/anxious.    All other systems reviewed and are negative.       Objective     Vital Signs:   /83 (BP Location: Left arm, Patient Position: Lying)   Pulse 95   Temp 97.4 °F (36.3 °C) (Oral)   Resp 17   Ht 172.7 cm (67.99\")   Wt 69.8 kg (153 lb 14.1 oz)   SpO2 96%   BMI 23.40 kg/m²     Physical " Exam:  Physical Exam  Vitals and nursing note reviewed.   Constitutional:       General: He is not in acute distress.     Appearance: Normal appearance. He is well-developed. He is not diaphoretic.   HENT:      Head: Normocephalic and atraumatic.      Comments: Alopecia     Right Ear: External ear normal.      Left Ear: External ear normal.      Nose: Nose normal.      Mouth/Throat:      Mouth: Mucous membranes are moist.      Pharynx: Oropharynx is clear. No oropharyngeal exudate or posterior oropharyngeal erythema.      Comments: Dental fillings  Eyes:      General: No scleral icterus.     Extraocular Movements: Extraocular movements intact.      Conjunctiva/sclera: Conjunctivae normal.      Pupils: Pupils are equal, round, and reactive to light.   Cardiovascular:      Rate and Rhythm: Normal rate and regular rhythm.      Heart sounds: Normal heart sounds. No murmur heard.       Comments: Cardiac monitor leads.  Right chest wall Bbyynq-a-Mnis.  Left chest wall pacemaker.  Pulmonary:      Effort: Pulmonary effort is normal. No respiratory distress.      Breath sounds: Normal breath sounds. No wheezing or rales.   Chest:   Breasts:      Right: No supraclavicular adenopathy.      Left: No supraclavicular adenopathy.       Abdominal:      General: Bowel sounds are normal. There is no distension.      Palpations: Abdomen is soft. There is no mass.      Tenderness: There is no abdominal tenderness. There is no guarding.   Genitourinary:     Comments: Deferred   Musculoskeletal:         General: No swelling, tenderness or deformity. Normal range of motion.      Cervical back: Normal range of motion and neck supple.      Right lower leg: No edema.      Left lower leg: No edema.      Comments: BUE IVs   Lymphadenopathy:      Cervical: No cervical adenopathy.      Upper Body:      Right upper body: No supraclavicular adenopathy.      Left upper body: No supraclavicular adenopathy.   Skin:     General: Skin is warm and dry.       Coloration: Skin is not pale.      Findings: Bruising present. No erythema or rash.      Comments: Venous stripping scars LLE.  Stasis changes bilateral shins.   Neurological:      General: No focal deficit present.      Mental Status: He is alert and oriented to person, place, and time.      Coordination: Coordination normal.      Comments: Voice is weak   Psychiatric:         Mood and Affect: Mood normal.         Behavior: Behavior normal.         Thought Content: Thought content normal.          Results Review:  Lab Results (last 48 hours)     Procedure Component Value Units Date/Time    Comprehensive Metabolic Panel [[id number]]  (Abnormal) Collected: 03/02/22 1215    Specimen: Blood Updated: 03/02/22 1323     Glucose 132 mg/dL      BUN 34 mg/dL      Creatinine 0.62 mg/dL      Sodium 131 mmol/L      Potassium 3.9 mmol/L      Chloride 99 mmol/L      CO2 22.0 mmol/L      Calcium 9.1 mg/dL      Total Protein 6.4 g/dL      Albumin 2.40 g/dL      ALT (SGPT) 243 U/L      AST (SGOT) 55 U/L      Alkaline Phosphatase 162 U/L      Total Bilirubin 0.6 mg/dL      Globulin 4.0 gm/dL      A/G Ratio 0.6 g/dL      BUN/Creatinine Ratio 54.8     Anion Gap 10.0 mmol/L      eGFR 99.7 mL/min/1.73      Comment: National Kidney Foundation and American Society of Nephrology (ASN) Task Force recommended calculation based on the Chronic Kidney Disease Epidemiology Collaboration (CKD-EPI) equation refit without adjustment for race.       Narrative:      GFR Normal >60  Chronic Kidney Disease <60  Kidney Failure <15      Manual Differential [[id number]]  (Abnormal) Collected: 03/02/22 1215    Specimen: Blood Updated: 03/02/22 1313     Neutrophil % 91.0 %      Lymphocyte % 6.0 %      Monocyte % 2.0 %      Bands %  1.0 %      Neutrophils Absolute 25.39 10*3/mm3      Lymphocytes Absolute 1.66 10*3/mm3      Monocytes Absolute 0.55 10*3/mm3      Anisocytosis Slight/1+     Poikilocytes Slight/1+     WBC Morphology Normal     Large Platelets  Slight/1+    CBC & Differential [[id number]]  (Abnormal) Collected: 03/02/22 1215    Specimen: Blood Updated: 03/02/22 1313    Narrative:      The following orders were created for panel order CBC & Differential.  Procedure                               Abnormality         Status                     ---------                               -----------         ------                     CBC Auto Differential[[id number]]        Abnormal            Final result               Scan Slide[[id number]]                                       Final result                 Please view results for these tests on the individual orders.    Scan Slide [[id number]] Collected: 03/02/22 1215    Specimen: Blood Updated: 03/02/22 1313     Scan Slide --     Comment: See Manual Differential Results       CBC Auto Differential [[id number]]  (Abnormal) Collected: 03/02/22 1215    Specimen: Blood Updated: 03/02/22 1313     WBC 27.60 10*3/mm3      RBC 4.91 10*6/mm3      Hemoglobin 14.8 g/dL      Hematocrit 45.1 %      MCV 91.8 fL      MCH 30.1 pg      MCHC 32.8 g/dL      RDW 16.9 %      RDW-SD 54.3 fl      MPV 8.5 fL      Platelets 167 10*3/mm3     Narrative:      The previously reported component NRBC is no longer being reported. Previous result was 0.0 /100 WBC (Reference Range: 0.0-0.2 /100 WBC) on 3/2/2022 at 1254 EST.    Fungus Culture - Lavage, Lung, L [[id number]] Collected: 02/23/22 1057    Specimen: Lavage from Lung, L Updated: 03/02/22 1130     Fungus Culture No fungus isolated at 1 week    AFB Culture - Lavage, Lung, L [[id number]] Collected: 02/23/22 1057    Specimen: Lavage from Lung, L Updated: 03/02/22 1130     AFB Culture No AFB isolated at 1 week     AFB Stain No acid fast bacilli seen    Potassium [[id number]]  (Normal) Collected: 03/02/22 0035    Specimen: Blood Updated: 03/02/22 0116     Potassium 4.6 mmol/L      Comment: Slight hemolysis detected by analyzer. Results may be affected.       Magnesium [[id number]]  (Normal) Collected:  03/02/22 0035    Specimen: Blood Updated: 03/02/22 0116     Magnesium 2.1 mg/dL     Virus Culture - Lavage, Lung, L [[id number]] Collected: 02/23/22 1057    Specimen: Lavage from Lung, L Updated: 03/01/22 1211     Viral Culture, General Comment     Comment: Preliminary Report:  No virus isolated at 4 days.  Next report to follow after 7 days.       Narrative:      Performed at:  [address] [facility]  [id number]  : Dominguez Avendano MD, Phone:  [phone]    Potassium [[id number]]  (Normal) Collected: 03/01/22 0050    Specimen: Blood Updated: 03/01/22 0209     Potassium 4.7 mmol/L     Magnesium [[id number]]  (Normal) Collected: 03/01/22 0050    Specimen: Blood Updated: 03/01/22 0209     Magnesium 2.2 mg/dL            Pending Results: CT head with contrast    Imaging Reviewed:   XR Chest 1 View    Result Date: 2/28/2022  Continued bilateral probably interstitial infiltrates. No significant change since the last study.  Electronically Signed By-Eric Ramon MD On:2/28/2022 1:08 PM This report was finalized on [id number] by  Eric Ramon MD.      I have reviewed the patient's labs, imaging, reports, and other clinician documentation.         Assessment/Plan       ASSESSMENT  1. Left transitional cell renal cell carcinoma with metastases-he has received 2 cycles of pembrolizumab to date.  It is too soon to know treatment effects.  Status post bronch showing endobronchial lesion with path positive for TCC.  Discussed patient's condition with him and his spouse and explained no further treatment or if he does not respond would lead to his death in likely a few months however if he responds to treatment he may have a much longer.  His prognosis, however, is poor given his poor performance status.  Will get CT head given significant weakness and intermittent confusion.   2. Hemoptysis/PNA-status post antibiotics and on steroid taper per pulmonary.  Hemoptysis has resolved.     3. Interstitial lung disease/fibrosis-management per pulmonary.  ANCA panel negative.  On Ofev.  4. Anorexia/weight loss/failure to thrive -patient receiving home dosing of Megace.  5. Intermittent confusion/weakness-we will check CT head.  6. Acute elevation of liver enzymes-we will check hepatitis panel.  7. CAD/valvular heart disease/s/p status post pacemaker placement-on aspirin but no other anticoagulation at time of admission.    PLAN  1. CT head with contrast.  2. Hepatitis panel.  3. Discussed patient's current performance status and life expectancy with and without continued treatment in detail.  Will await patient/spouse decision.  Could consider palliative care without hospice admission if they would like to remain on immunotherapy while still getting assistance in the home.    Note prepared by DEBBY Mirza.  Patient seen and examined by Eddie Loredo MD.  Electronically signed by MARGARITA Wright, 03/02/22, 6:04 PM EST.      I have personally performed a face-to-face diagnostic evaluation on this patient.  I have discussed the case with Lianna Lemons NP, have edited/reviewed the note, and agree with the care plan.  The patient was hospitalized with hemoptysis and has been under treatment for a possible pneumonia.  Bronchoscopy had revealed intrabronchial metastatic transitional cell carcinoma.  On examination, he is quite debilitated.  Labs are significant for acute rise in LFTs.  Seems he has failure to thrive which is a poor prognostic sign.  Will check CT brain and hepatitis panel.  If hemoptysis recurs, will consider radiation therapy.          I discussed the patients findings and my recommendations with patient and family.    Thank you for this consult.  We will be happy to follow along in the care of this patient.     Electronically signed by Eddie Loredo MD, 03/02/22, 6:20 PM EST.

## 2022-03-02 NOTE — PROGRESS NOTES
Daily Progress Note        Hemoptysis    Presence of cardiac pacemaker    Coronary artery disease involving native coronary artery of native heart without angina pectoris    Shortness of breath    Mixed hyperlipidemia    Essential hypertension    NATALIA on CPAP    H/O radical nephrectomy, left    Chronic diastolic congestive heart failure (HCC)    S/P TAVR (transcatheter aortic valve replacement)    Bladder carcinoma (HCC)    ILD (interstitial lung disease) (HCC)    Sepsis without septic shock (HCC)    Pneumonia due to infectious organism      Assessment:     Hemoptysis, resolved  Status post bronchoscopy 2/23/2022  Endobronchial biopsy positive for transitional cell carcinoma     Interstitial lung disease, currently on Ofev  Elevation of right hemidiaphragm  Obstructive sleep apnea, on CPAP    Work-up for connective tissue diseases negative including c-ANCA and p-ANCA and myeloperoxidase     Metastatic transitional (urothelial) cell carcinoma, currently being treated with immunotherapy every 3 weeks, last treatment 2/10/2022  -Port in place     bladder cancer, left renal mass, 10/2020  -Radical left nephrectomy     Presence of TAVR valve, 3/20/2021  CABG  Pacemaker in situ  Chronic kidney disease  Essential hypertension  Hyperlipidemia     5/12/2021 Echo  EF 66%  RVSP 21.9 mmHg     Recommendations:    P.o. prednisone 6-day taper  Pulmicort nebulizer  Duo nebs as needed  Mucinex  GI prophylaxis on Protonix 40 mg daily    Currently not on DVT prophylaxis                 LOS: 8 days     Subjective         Objective     Vital signs for last 24 hours:  Vitals:    03/01/22 2115 03/02/22 0500 03/02/22 0847 03/02/22 0853   BP: 114/75 136/92     BP Location: Left arm Left arm     Patient Position: Lying Lying     Pulse: 82 82 80 81   Resp: 18 17 16 16   Temp: 98.1 °F (36.7 °C) 98 °F (36.7 °C)     TempSrc: Axillary Axillary     SpO2: 94% 95% 95% 94%   Weight:  69.8 kg (153 lb 14.1 oz)     Height:           Intake/Output last 3  shifts:  I/O last 3 completed shifts:  In: 1908 [P.O.:1908]  Out: 0   Intake/Output this shift:  No intake/output data recorded.      Radiology  Imaging Results (Last 24 Hours)     ** No results found for the last 24 hours. **          Labs:  Results from last 7 days   Lab Units 02/27/22  0254   WBC 10*3/mm3 12.50*   HEMOGLOBIN g/dL 12.7*   HEMATOCRIT % 38.8   PLATELETS 10*3/mm3 213     Results from last 7 days   Lab Units 03/02/22  0035 02/28/22  0715 02/27/22  0254   SODIUM mmol/L  --   --  133*   POTASSIUM mmol/L 4.6   < > 4.4   CHLORIDE mmol/L  --   --  101   CO2 mmol/L  --   --  21.0*   BUN mg/dL  --   --  23   CREATININE mg/dL  --   --  0.52*   CALCIUM mg/dL  --   --  8.6   GLUCOSE mg/dL  --   --  162*    < > = values in this interval not displayed.                     Results from last 7 days   Lab Units 03/02/22  0035   MAGNESIUM mg/dL 2.1                   Meds:   SCHEDULE  budesonide, 1 mg, Nebulization, BID - RT  escitalopram, 5 mg, Oral, Daily  folic acid, 400 mcg, Oral, Daily  gabapentin, 100 mg, Oral, BID  guaiFENesin, 600 mg, Oral, Q12H  pantoprazole, 40 mg, Oral, QAM  polyethylene glycol, 17 g, Oral, Daily  predniSONE, 20 mg, Oral, Daily   Followed by  [START ON 3/4/2022] predniSONE, 10 mg, Oral, Daily  sodium chloride, 10 mL, Intravenous, Q12H      Infusions     PRNs  •  acetaminophen **OR** acetaminophen **OR** acetaminophen  •  aluminum-magnesium hydroxide-simethicone  •  HYDROcodone-acetaminophen  •  ipratropium-albuterol  •  magnesium sulfate **OR** magnesium sulfate in D5W 1g/100mL (PREMIX)  •  melatonin  •  Morphine  •  nitroglycerin  •  ondansetron **OR** ondansetron  •  potassium chloride **OR** potassium chloride **OR** potassium chloride  •  potassium phosphate infusion greater than 15 mMoles **OR** potassium phosphate infusion greater than 15 mMoles **OR** potassium phosphate **OR** sodium phosphate IVPB **OR** sodium phosphate IVPB **OR** sodium phosphate IVPB  •  promethazine  •   [COMPLETED] Insert peripheral IV **AND** sodium chloride    Physical Exam:  Physical Exam  Vitals reviewed.   Pulmonary:      Effort: Pulmonary effort is normal.      Breath sounds: Normal breath sounds.   Skin:     General: Skin is warm and dry.   Neurological:      Mental Status: He is alert and oriented to person, place, and time.   Psychiatric:      Comments: quite         ROS  Review of Systems   Respiratory: Positive for cough and shortness of breath.         Difficulty coughing up secretions             I have reviewed current clinicals.     Electronically signed by MARGARITA Storey, 03/01/22, 12:05 PM EST.     The NP scribed the note for me, I have examined the patient and reviewed and verified the above findings and and I formulated the assessment and plan as documented

## 2022-03-02 NOTE — SIGNIFICANT NOTE
03/02/22 1555   OTHER   Discipline physical therapist   Rehab Time/Intention   Session Not Performed patient unavailable for treatment  (hospice consultation at time of PT treatment attempt)   Recommendation   PT - Next Appointment 03/03/22

## 2022-03-02 NOTE — CASE MANAGEMENT/SOCIAL WORK
Continued Stay Note  [facility]     Patient Name: Navneet Lind  MRN: [id number]  Today's Date: 3/2/2022    Admit Date: 2/22/2022     Discharge Plan     Row Name 03/02/22 1229       Plan    Plan DC Plan: Anticipate routine to home with [facility] and [facility]; [facility] order in place. Referral to [facility] 3/2.    Plan Comments Met with patient and spouse at bedside to follow up  on choices for hospice. Wife reported that she had been looking into them but had no decision yet. In rounds with MD and RN it was discussed that the wife was inquiring about when the patient would be discharged. MD and RN updated on plan and pending hospice referral based on the patient and spouse's choices. The spouse came out during rounds to notify CM of a decision and request referral to [facility]. Referral made in basket and messsage sent to Bridget with [facility]. She reported that she would be in to see that patient a little later today.            Met with patient in room wearing PPE: mask/googles  Maintained distance greater than 6 feet and spent less than 15 minutes in the room.     Ching Pinon RN     Office Phone: [phone]  Office Cell: [phone]

## 2022-03-02 NOTE — PLAN OF CARE
Problem: Adult Inpatient Plan of Care  Goal: Plan of Care Review  Outcome: Ongoing, Progressing  Flowsheets (Taken 3/2/2022 0448)  Progress: no change  Plan of Care Reviewed With: patient  Outcome Summary: Patient rested well through the night. Has had no complaints. Q2 turns to prevent skin breakdown.   Goal Outcome Evaluation:  Plan of Care Reviewed With: patient        Progress: no change  Outcome Summary: Patient rested well through the night. Has had no complaints. Q2 turns to prevent skin breakdown.

## 2022-03-02 NOTE — PLAN OF CARE
Problem: Adult Inpatient Plan of Care  Goal: Plan of Care Review  Outcome: Ongoing, Progressing  Goal: Patient-Specific Goal (Individualized)  Outcome: Ongoing, Progressing  Goal: Absence of Hospital-Acquired Illness or Injury  Outcome: Ongoing, Progressing  Intervention: Identify and Manage Fall Risk  Recent Flowsheet Documentation  Taken 3/2/2022 1500 by Arielle Conde RN  Safety Promotion/Fall Prevention:   safety round/check completed   clutter free environment maintained   assistive device/personal items within reach   fall prevention program maintained  Taken 3/2/2022 1300 by Arielle Conde RN  Safety Promotion/Fall Prevention: safety round/check completed  Taken 3/2/2022 1100 by Arielle Conde RN  Safety Promotion/Fall Prevention:   clutter free environment maintained   assistive device/personal items within reach   fall prevention program maintained   safety round/check completed  Taken 3/2/2022 0900 by Arielle Conde RN  Safety Promotion/Fall Prevention: safety round/check completed  Taken 3/2/2022 0701 by Arielle Conde RN  Safety Promotion/Fall Prevention: safety round/check completed  Goal: Optimal Comfort and Wellbeing  Outcome: Ongoing, Progressing  Intervention: Provide Person-Centered Care  Recent Flowsheet Documentation  Taken 3/2/2022 0701 by Arielle Conde RN  Trust Relationship/Rapport: care explained  Goal: Readiness for Transition of Care  Outcome: Ongoing, Progressing   Goal Outcome Evaluation:

## 2022-03-03 NOTE — PROGRESS NOTES
Hematology/Oncology Inpatient Progress Note    PATIENT NAME: Navneet Lind  : 1945  MRN: [id number]    CHIEF COMPLAINT: Hemoptysis; PNA; Left transitional cell renal carcinoma with metastases    HISTORY OF PRESENT ILLNESS:    76 y.o. male presented to [facility] on 2022 with hemoptysis that started the morning of admission described as significant in amount covering his shirt and being bright red.  He was on aspirin but no anticoagulation.  PMH was significant for interstitial lung disease/fibrosis on Ofev and metastatic renal cell carcinoma for which he was on immunotherapy.  A CT PE protocol was negative for PE and showed increased prevascular lymphadenopathy as compared with 2021 outside CT with the largest measuring 2.0 x 1.1 cm.  There was evidence of interstitial fibrosis and bronchiectasis, hepatic steatosis, and T7 compression fracture with evidence of kyphoplasty.  CBC revealed WBC 16.5, hemoglobin 14.8, and platelets 321,000.  Creatinine was 0.87 and LFTs were not elevated.  PT was 11.9 (9.6-11.7) and PTT was 26.9 (24-31).  D-dimer was 3.15 (0-0.59).  Respiratory viral panel and COVID-19 screen were negative.  Blood cultures were collected and final results were negative.  ANCA panel was negative.  He was started on antibiotics and supportive care. On 2022 he underwent bronchoscopy by Dr. Jiang with findings of an abnormal lesion in the left mainstem which was biopsied and a blood clot in the left mainstem which was removed prior to BAL.  Pathology on left mainstem lung biopsy revealed metastatic urothelial (transitional) cell carcinoma.  BAL cytology and cultures were negative.  Hemoptysis resolved and post admission hemoglobin had dropped to 11.8 on 2022 with subsequent improvement.     22  Hematology/Oncology was consulted by Dr. De León as the patient is known to our service for metastatic transitional cell renal carcinoma.  He had been diagnosed with  stage III disease in November 2020 at time of left nephrectomy.  He received adjuvant chemotherapy that was complicated by poor tolerance with cytopenias requiring dose reductions and the patient chose to forego day 8 of cycle 4 in order to undergo heart valve surgery.  Cycle 4-day 1 chemotherapy was given 3/9/2021.  He was also anemic at time of diagnosis November 2020 with iron deficiency and folate deficiencies noted on workup..  In July 2021 CT chest was negative for metastatic disease however 11/10/2021 CT chest, abdomen, and pelvis showed mediastinal lymphadenopathy and a left retroperitoneal lymph node.  At that time he also had a T7 compression fracture with significant pain requiring evaluation for kyphoplasty.  He underwent left periaortic lymph node biopsy on 12/22/2021 revealing metastatic transitional cell carcinoma.  He was hospitalized in late December 2021 with pneumonia followed by discharge to rehab.  He was seen in follow-up by us in January 2022 at which time mutual decision with the patient was made to start pembrolizumab as he was not a candidate for aggressive therapy secondary to poor ECOG status with recent hospitalization and weight loss as well as patient did not want to pursue chemotherapy.  His first dose was given 1/21/2022 and second dose 2/10/2022.  He was evaluated with the infusion visit 2/10/2022 at which time he reported no improvement of ECOG status to date.     PCP: Uri Cortes MD      INTERVAL HISTORY:  • 3/3/2022-CT head with and without contrast was negative for metastatic disease with stable atrophy and old right thalamic lacunar infarct.  Hepatitis panel nonreactive    Subjective   He is complaining of some shortness of air    ROS:  Review of Systems   Constitutional: Negative for activity change, chills, fatigue, fever and unexpected weight change.   HENT: Negative for congestion, dental problem, hearing loss, mouth sores, nosebleeds, sore throat and  trouble swallowing.    Eyes: Negative for photophobia and visual disturbance.   Respiratory: Positive for shortness of breath. Negative for cough and chest tightness.    Cardiovascular: Negative for chest pain, palpitations and leg swelling.   Gastrointestinal: Negative for abdominal distention, abdominal pain, blood in stool, constipation, diarrhea, nausea and vomiting.   Endocrine: Negative for cold intolerance and heat intolerance.   Genitourinary: Negative for decreased urine volume, difficulty urinating, dysuria, frequency, hematuria and urgency.   Musculoskeletal: Negative for arthralgias and gait problem.   Skin: Negative for rash and wound.   Neurological: Negative for dizziness, tremors, weakness, light-headedness, numbness and headaches.   Hematological: Negative for adenopathy. Does not bruise/bleed easily.   Psychiatric/Behavioral: Negative for confusion and hallucinations. The patient is not nervous/anxious.    All other systems reviewed and are negative.       MEDICATIONS:    Scheduled Meds:  budesonide, 1 mg, Nebulization, BID - RT  escitalopram, 5 mg, Oral, Daily  folic acid, 400 mcg, Oral, Daily  gabapentin, 100 mg, Oral, BID  guaiFENesin, 600 mg, Oral, Q12H  lactulose, 30 g, Oral, TID  pantoprazole, 40 mg, Oral, QAM  polyethylene glycol, 17 g, Oral, Daily  predniSONE, 20 mg, Oral, Daily   Followed by  [START ON 3/4/2022] predniSONE, 10 mg, Oral, Daily  sodium chloride, 10 mL, Intravenous, Q12H       Continuous Infusions:      PRN Meds:  •  acetaminophen **OR** acetaminophen **OR** acetaminophen  •  aluminum-magnesium hydroxide-simethicone  •  bisacodyl  •  HYDROcodone-acetaminophen  •  ipratropium-albuterol  •  magnesium citrate  •  magnesium sulfate **OR** magnesium sulfate in D5W 1g/100mL (PREMIX)  •  melatonin  •  Morphine  •  nitroglycerin  •  ondansetron **OR** ondansetron  •  potassium chloride **OR** potassium chloride **OR** potassium chloride  •  potassium phosphate infusion greater than  "15 mMoles **OR** potassium phosphate infusion greater than 15 mMoles **OR** potassium phosphate **OR** sodium phosphate IVPB **OR** sodium phosphate IVPB **OR** sodium phosphate IVPB  •  promethazine  •  [COMPLETED] Insert peripheral IV **AND** sodium chloride     ALLERGIES:  No Known Allergies    Objective    VITALS:   /84 (BP Location: Left arm, Patient Position: Lying)   Pulse 91   Temp 97.8 °F (36.6 °C) (Oral)   Resp 16   Ht 172.7 cm (67.99\")   Wt 66.1 kg (145 lb 11.6 oz)   SpO2 95%   BMI 22.16 kg/m²     PHYSICAL EXAM:  Physical Exam  Vitals and nursing note reviewed.   Constitutional:       General: He is not in acute distress.     Appearance: Normal appearance. He is well-developed. He is not diaphoretic.   HENT:      Head: Normocephalic and atraumatic.      Comments: Alopecia     Right Ear: External ear normal.      Left Ear: External ear normal.      Nose: Nose normal.      Mouth/Throat:      Mouth: Mucous membranes are moist.      Pharynx: Oropharynx is clear. No oropharyngeal exudate or posterior oropharyngeal erythema.      Comments: Dental fillings  Eyes:      General: No scleral icterus.     Extraocular Movements: Extraocular movements intact.      Conjunctiva/sclera: Conjunctivae normal.      Pupils: Pupils are equal, round, and reactive to light.   Cardiovascular:      Rate and Rhythm: Normal rate and regular rhythm.      Heart sounds: Normal heart sounds. No murmur heard.       Comments: Midline vertical chest wall scar  Pulmonary:      Effort: Pulmonary effort is normal. No respiratory distress.      Breath sounds: Normal breath sounds. No wheezing or rales.   Chest:   Breasts:      Right: No supraclavicular adenopathy.      Left: No supraclavicular adenopathy.       Abdominal:      General: Bowel sounds are normal. There is no distension.      Palpations: Abdomen is soft. There is no mass.      Tenderness: There is no abdominal tenderness. There is no guarding.   Genitourinary:     " Comments: Deferred   Musculoskeletal:         General: No swelling, tenderness or deformity. Normal range of motion.      Cervical back: Normal range of motion and neck supple.      Right lower leg: No edema.      Left lower leg: No edema.      Comments: BUE IVs.   Lymphadenopathy:      Cervical: No cervical adenopathy.      Upper Body:      Right upper body: No supraclavicular adenopathy.      Left upper body: No supraclavicular adenopathy.   Skin:     General: Skin is warm and dry.      Coloration: Skin is not pale.      Findings: No bruising, erythema or rash.   Neurological:      General: No focal deficit present.      Mental Status: He is alert and oriented to person, place, and time.      Coordination: Coordination normal.      Comments: Voice is at a whisper   Psychiatric:         Mood and Affect: Mood normal.         Behavior: Behavior normal.         Thought Content: Thought content normal.           RECENT LABS:  Lab Results (last 24 hours)     Procedure Component Value Units Date/Time    Potassium [[id number]]  (Normal) Collected: 03/03/22 0325    Specimen: Blood Updated: 03/03/22 0408     Potassium 4.5 mmol/L     Magnesium [[id number]]  (Normal) Collected: 03/03/22 0325    Specimen: Blood Updated: 03/03/22 0408     Magnesium 2.0 mg/dL     Hepatitis Panel, Acute [[id number]]  (Normal) Collected: 03/02/22 2006    Specimen: Blood Updated: 03/02/22 2049     Hepatitis B Surface Ag Non-Reactive     Hep A IgM Non-Reactive     Hep B C IgM Non-Reactive     Hepatitis C Ab Non-Reactive    Narrative:      Results may be falsely decreased if patient taking Biotin.     Comprehensive Metabolic Panel [[id number]]  (Abnormal) Collected: 03/02/22 1215    Specimen: Blood Updated: 03/02/22 1323     Glucose 132 mg/dL      BUN 34 mg/dL      Creatinine 0.62 mg/dL      Sodium 131 mmol/L      Potassium 3.9 mmol/L      Chloride 99 mmol/L      CO2 22.0 mmol/L      Calcium 9.1 mg/dL      Total Protein 6.4 g/dL      Albumin 2.40 g/dL       ALT (SGPT) 243 U/L      AST (SGOT) 55 U/L      Alkaline Phosphatase 162 U/L      Total Bilirubin 0.6 mg/dL      Globulin 4.0 gm/dL      A/G Ratio 0.6 g/dL      BUN/Creatinine Ratio 54.8     Anion Gap 10.0 mmol/L      eGFR 99.7 mL/min/1.73      Comment: National Kidney Foundation and American Society of Nephrology (ASN) Task Force recommended calculation based on the Chronic Kidney Disease Epidemiology Collaboration (CKD-EPI) equation refit without adjustment for race.       Narrative:      GFR Normal >60  Chronic Kidney Disease <60  Kidney Failure <15      Manual Differential [[id number]]  (Abnormal) Collected: 03/02/22 1215    Specimen: Blood Updated: 03/02/22 1313     Neutrophil % 91.0 %      Lymphocyte % 6.0 %      Monocyte % 2.0 %      Bands %  1.0 %      Neutrophils Absolute 25.39 10*3/mm3      Lymphocytes Absolute 1.66 10*3/mm3      Monocytes Absolute 0.55 10*3/mm3      Anisocytosis Slight/1+     Poikilocytes Slight/1+     WBC Morphology Normal     Large Platelets Slight/1+    CBC & Differential [[id number]]  (Abnormal) Collected: 03/02/22 1215    Specimen: Blood Updated: 03/02/22 1313    Narrative:      The following orders were created for panel order CBC & Differential.  Procedure                               Abnormality         Status                     ---------                               -----------         ------                     CBC Auto Differential[[id number]]        Abnormal            Final result               Scan Slide[[id number]]                                       Final result                 Please view results for these tests on the individual orders.    Scan Slide [[id number]] Collected: 03/02/22 1215    Specimen: Blood Updated: 03/02/22 1313     Scan Slide --     Comment: See Manual Differential Results       CBC Auto Differential [[id number]]  (Abnormal) Collected: 03/02/22 1215    Specimen: Blood Updated: 03/02/22 1313     WBC 27.60 10*3/mm3      RBC 4.91 10*6/mm3      Hemoglobin  14.8 g/dL      Hematocrit 45.1 %      MCV 91.8 fL      MCH 30.1 pg      MCHC 32.8 g/dL      RDW 16.9 %      RDW-SD 54.3 fl      MPV 8.5 fL      Platelets 167 10*3/mm3     Narrative:      The previously reported component NRBC is no longer being reported. Previous result was 0.0 /100 WBC (Reference Range: 0.0-0.2 /100 WBC) on 3/2/2022 at 1254 EST.    Fungus Culture - Lavage, Lung, L [[id number]] Collected: 02/23/22 1057    Specimen: Lavage from Lung, L Updated: 03/02/22 1130     Fungus Culture No fungus isolated at 1 week    AFB Culture - Lavage, Lung, L [[id number]] Collected: 02/23/22 1057    Specimen: Lavage from Lung, L Updated: 03/02/22 1130     AFB Culture No AFB isolated at 1 week     AFB Stain No acid fast bacilli seen          PENDING RESULTS: n/a    IMAGING REVIEWED:  CT Head With & Without Contrast    Result Date: 3/2/2022  1. Stable exam, with no acute process demonstrated in the brain. 2. There is stable ventricular dilatation, favored to be due to atrophy. There is minor chronic White matter abnormality and old right thalamic lacunar infarct.  Electronically Signed By-Mayi Lindsay MD On:3/2/2022 10:30 PM This report was finalized on [id number] by  Mayi Lindsay MD.      I have reviewed the patient's labs, imaging, reports, and other clinician documentation.    Assessment/Plan   ASSESSMENT:  1. Left transitional cell renal cell carcinoma with metastases-he has received 2 cycles of pembrolizumab to date.  It is too soon to know treatment effects.  Status post bronch showing endobronchial lesion with path positive for TCC.  Discussed patient's condition with him and his spouse and explained no further treatment or if he does not respond would lead to his death in likely a few months however if he responds to treatment he may have a much longer survival.  His prognosis, however, is poor given his poor performance status.  CT head negative for metastatic disease.  2. Hemoptysis/PNA-status post antibiotics  and on steroid taper per pulmonary.  Hemoptysis has resolved.    3. Interstitial lung disease/fibrosis-management per pulmonary.  ANCA panel negative.  On Ofev.  4. Anorexia/weight loss/failure to thrive -patient receiving home dosing of Megace.  5. Intermittent confusion/weakness-generally debilitated.  6. Acute elevation of liver enzymes-hepatitis panel nonreactive.  7. CAD/valvular heart disease/s/p status post pacemaker placement-on aspirin but no other anticoagulation at time of admission.    PLAN:  1. Though renal cell cancers are not too radiosensitive, will consider radiation therapy if hemoptysis recurs.   2. Will await patient/spouse decision regarding plan of care.  Could consider palliative care without hospice admission if they would like to remain on immunotherapy while still getting assistance in the home.          I have personally performed a face-to-face diagnostic evaluation on this patient.  I have discussed the case with Lianna Lemons NP, have edited/reviewed the note, and agree with the care plan.  The patient is complaining of shortness of air.  On examination, he appears debilitated with a weak voice.  Labs are significant for leukocytosis and high LFTs.  Hemoptysis has resolved.  Immunotherapy is on hold and will be restarted if performance status improves.        I discussed the patients findings and my recommendations with patient and family.    Electronically signed by Eddie Loredo MD, 03/03/22, 2:42 PM EST.

## 2022-03-03 NOTE — PLAN OF CARE
Physical Therapy Evaluation    Visit Count: 1  Plan of Care Dates: Initial: 3/29/2018 Through: 6/21/2018  Insurance Information: medical necessity, allowed 35 visits per lifetime before prior authorization is required.   Next Referring Provider Visit: 4/19/18    Referred by: Riky Cook DO  Medical Diagnosis (from order):  Z96.641 (ICD-10-CM) - Status post total replacement of right hip (patient here for left CLIFTON)  Treatment Diagnosis: Hip Symptoms with Impaired Range of Motion and Impaired Motor Function/Muscle Performance  Insurance: 1. [facility] access hospital AND STATE  2. N/A    Date of Surgery: 2/7/18; Surgery performed: Left CLIFTON; Physician Guidelines: yes  Diagnosis Precautions: posterior total hip arthroplasty precautions for 8 weeks after surgery on 2/7/18  Chart reviewed: Relevant co-morbidities, allergies, tests and medications: HTN, left CLIFTON      SUBJECTIVE   Description of Problem/Mechanism of Injury: Patient states that he has minimal pain but feels like he is walking with a limp. Patient state he was doing fine with walking until a couple of weeks ago when he was finishing a construction job and felt like he over did by walking and going up stairs more than he is accustomed to. Patient states that he has been following the doctors protocol for his hip surgery and has been working certain contractor jobs while still following the doctor's protocol. States that he has also been following the doctors exercises at home and they have now become very easy to complete.     Pain:  Intensity: Now: 0/10; Best: 0/10; Worst: 0/10 (in the last 2 weeks)  Location: left hip   Quality/Description: discomfort   Relieving/Alleviating factors: rest  Since onset, symptoms are: pain is gone just walking with a limp   Function:  Limitations and exacerbating factors (patient reported): difficulty with age appropriate activities, ambulation is painful, transfers including low surfaces, stairs   Prior level (patient reported):  Goal Outcome Evaluation:     Bed mobility - Max-A  Supine to sit to supine. Pt sat at eob only briefly as pt kept pushing posterior and started to slide out of the bed pt quickly returned to supine.  Dependent scooting up in the bed.   Transfers - N/A or Not attempted.  Ambulation - N/A or Not attempted.   Therapeutic exercises:  Supine heel slides, ap's, quad sets, saq's, hip abduction  1/10 reps    Pt would benefit from Inpatient Rehabilitation placement at discharge from facility   Pt desires Home with family assist and and Home Health at discharge. Pt cooperative; agreeable to therapeutic recommendations and plan of care.              able to work, with minimal pain/difficulty    Prior Treatment: no therapies in the past year for current condition. Hospitalization, home health services or skilled nursing facility in the last 30 days: No, per patient.    Home Environment/Social Support: Patient lives with significant other, in a 2 story home, needs to complete stairs for access bedroom.  Patient has assistance as needed from family/friends.      Safety:  Do you feel safe at home, work and/or school? yes, per patient  Falls: balance history in last year (< or equal to 2 falls/near falls and/or reasons) does not indicate further testing    Patient Goals/Concerns:  Wants to get rid of his limp when walking     OBJECTIVE   Posture/Observation:  Lower extremity posture is normal in stance and sitting.    Gait Analysis:  Patient demonstrates slight antalgic gait. Demonstrates right hip drop with lateral trunk lean to the left during single leg stance on left lower extermity.     Range of Motion (degrees)   Norm Left Right   Date  Initial Initial   Hip Flexion 110-120  NT    Hip Extension 10-15     Hip Abduction 30-50      Hip Adduction 30 NT    Hip Internal Rotation 30-40  NT    Hip External Rotation 40-60  NT    standard testing positions unless otherwise noted; Key: ranges are reported in active range of motion unless noted as AA=active assistive or P=passive range of motion, * denotes pain   Comments: All motions within functional/normal limits except as noted.    Strength (out of 5)   Left Right   Date Initial Initial   Hip Flexion 4    Hip Extension     Hip Abduction 4    Hip Gluteus Medius 4    Hip Internal Rotation NT    Hip External Rotation NT    Knee Flexion     Knee Extension     Ankle Dorsiflexion  4+   Ankle Plantar flexion     standard testing positions unless otherwise noted, key: * denotes pain  Comments: Gross muscle strength within functional/normal limits except as noted. Able to complete double leg bridge.     Muscle  Flexibility:  Gluteals - Left: minimal restriction  Hamstring - Left: minimal restriction     Palpation:  No tenderness to palpation of left hip or surrounding structures of left hip joint.     Joint Play Assessment:  Joint mobility normal within available range according to left posterior hip approach protocols.     Special Tests:  Trendelenberg Test: Positive left for weak gluteus medius or unstable hip    Functional Tests:  Single Leg Balance: Left: 20 seconds, Right 30 seconds (eyes open); Foot Position Left: Increased sway and lateral trunk lean to the left , Right normal  Sit To Stand: Able to complete transfer without double upper extermity support.   Stairs: Ascends and descends stiars with recipricol pattern and no use of rail. More cautious and slow when descending stairs.        Outcome Measures:   Lower Extremity Functional Scale: 47 (0=extreme difficulty; 80=no difficulty)     Initial Treatment   Initial evaluation completed.    Therapeutic Exercise:   Side lying clamshell, 1x10 left leg  Double leg bridge, 1x10   Standing single leg stance on left lower extremity with slight right leg abduction, 1x10 with 5 second holds.       Initial Home Program:  * above denotes home program issuance as well  Same as stated above    Plan for next session:   Continue working on left hip strengthening per doctor protocol    Work on balance and proprioception as tolerated       ASSESSMENT   57 year old male presents to therapy with significant decline in prior level of function due to signs and symptoms consistent with left hip pain and abnormal gait pattern s/p left CLIFTON on 2/7/18. Patient continues to follow posterior hip precautions until 8 weeks after surgery. Patient demonstrates decline in left hip abduction strength as well as abnormal gait pattern presenting as a right hip drop and lateral trunk lean to the left. Patient reports no pain to strengthening exercises preformed on this date and able to demonstrate  exercises with proper form. Patient would benefit from skilled PT services in order to address left hip weakness and abnormal gait pattern following left CLIFTON to facilitate improved functional ambulation to properly complete job demands.     Outcome:    Benefit from skilled therapy: yes   Rehab potential is good due to positive factors age, pain level, activity tolerance and negative factors not apparent at this time    Predicted patient presentation: stable and/or uncomplicated characteristics   Plan of care to increase strength/stability, increase range of motion, decrease pain, improve balance/proprioception, improve muscle coordination, improve joint mobility, improve activity tolerance, improve quality of gait, improve coordination, improve body mechanics to address functional limitations listed above.    Goals:  To be obtained by end of this plan of care:  1. Patient independent with modified and progressed home exercise program.  2. Patient will increase involved hip strength to at least 4+/5 to aid in normalization of gait for independent living, returning to community activities, age appropriate activities and squatting for lifting for household independent living.   3. Patient will be able to ascend and descend 1 flight of stairs using reciprocal pattern without  pain/difficulty.  4. Improve single leg standing balance to 30 seconds to improve ability to ambulate on level and unlevel surfaces to improve function in community interaction and reduce risk for falls.    PLAN   Frequency/Duration: 2 times per week for 12 weeks with tapering as the patient progresses  Skilled training and instruction for the following interventions:  Gait Training (12643)  Manual Therapy (04053)  Neuromuscular Re-Education (75983)  Therapeutic Activity (71332)  Therapeutic Exercise (19492)  Electrical Stimulation (37116//63833)   Heat/Cold (15582)  Ultrasound/Phonophoresis (16037)  Vasopneumatic Device (41223)    The plan of  care and goals were established with the patient who concurs.  Patient has been given attendance policy at time of initial evaluation.    Patient Education:  Who will be receiving education: patient  Are they ready to learn: yes  Preferred learning style: written, verbal, demonstration  Barriers to learning: no barriers apparent at this time   Result of initial outlined education: Verbalizes understanding and Demonstrates understanding    THERAPY DAILY BILLING   Primary Insurance: [facility] AND STATE  Secondary Insurance: N/A    Evaluation Procedures:  Physical Therapy Evaluation: Low Complexity    Timed Procedures:  Therapeutic Exercise, 15 minutes    Untimed Procedures:  No untimed codes were used on this date of service    Total Treatment Time: 45 minutes        The referring provider's electronic or written signature on the evaluation authorizes the therapy plan of care and certifies the need for these services, furnished under this plan of care while under their care.  Electronically sent for physician signature

## 2022-03-03 NOTE — PROGRESS NOTES
[facility] Medicine Services Daily Progress Note    Patient Name: Navneet Lind  : 1945  MRN: [id number]  Primary Care Physician:  Uri Cortes MD  Date of admission: 2022      Subjective      Chief Complaint: Hemoptysis shortness of breath/pneumonia        Patient reports moderate improvement in his symptoms.  Patient Reports feeling a lot better.  He is on room air.  Determined to have positive cytology on [facility] for metastatic urothelial cancer. No new complaints today      ROS  negative except as above    Objective      Vitals:   Temp:  [97.7 °F (36.5 °C)-97.8 °F (36.6 °C)] 97.7 °F (36.5 °C)  Heart Rate:  [] 101  Resp:  [16-28] 28  BP: (120-130)/(79-91) 120/79      Vitals reviewed.   Constitutional:       Comments: Wife not at bedside today   on room air today  HENT:      Head: Normocephalic.      Nose: Nose normal.      Mouth/Throat:      Mouth: Mucous membranes are moist.   Eyes:      Pupils: Pupils are equal, round, and reactive to light.   Cardiovascular:      Rate and Rhythm: Normal rate.      Pulses: Normal pulses.   Pulmonary:      Effort: Pulmonary effort is normal.   Abdominal:      General: Abdomen is flat.   Musculoskeletal:         General: Normal range of motion.      Cervical back: Normal range of motion.   Skin:     General: Skin is warm.      Capillary Refill: Capillary refill takes less than 2 seconds.   Neurological:      General: No focal deficit present.      Mental Status: He is alert.   Psychiatric:         Mood and Affect: Mood normal.        Result Review    Result Review:  I have personally reviewed the results from the time of this admission to 3/3/2022 15:29 EST and agree with these findings:  [x]  Laboratory  []  Microbiology  []  Radiology  []  EKG/Telemetry   []  Cardiology/Vascular   []  Pathology  []  Old records  []  Other:      Wounds (last 24 hours)                Assessment/Plan          Active Hospital Problems:             Active Hospital Problems     Diagnosis     • **Hemoptysis     • Shortness of breath     • Sepsis without septic shock (HCC)     • Bladder carcinoma (HCC)     • ILD (interstitial lung disease) (HCC)     • S/P TAVR (transcatheter aortic valve replacement)     • Chronic diastolic congestive heart failure (HCC)     • H/O radical nephrectomy, right     • NATALIA on CPAP     • Coronary artery disease involving native coronary artery of native heart without angina pectoris         CABG 1995; SVG to RCA is occluded, LIMA to LAD, SVG to diag of LAD, SVG to marginal of LCX      • Essential hypertension     • Mixed hyperlipidemia     • Presence of cardiac pacemaker         BS dual chamber PM 11/7/2016         Plan:   Hemoptysis  Shortness of breath  Sepsis without septic shock   Possible pneumonia  ILD (interstitial lung disease)   -initial lactate 3.2; reflex ordered  -given 1 L NS in ER  -blood cultures pending  -Covid-19 negative  -check RVP  -obtain sputum culture  -check urine antigens  -hold aspirin  -pulmonology consulted  -supplemental O2 PRN to keep sat >90%  -empiric coverage with cefepime and vancomycin; de-escalate as above  -IV steroids; taper as able  -DuoNebs q4h PRN  -encourage IS  -Status post bronchoscopy on February 23      Bladder carcinoma   H/O radical nephrectomy, left   -followed as outpatient by oncology  -on immunotherapy q3 weeks, last treatment on 02/10/22  -[facility] cytology positive for metastases     Presence of cardiac pacemaker, h/o SSS  -followed as outpatient by cardiology      Coronary artery disease involving native coronary artery of native heart without angina pectoris, h/o CABG / Mixed hyperlipidemia / Essential hypertension, chronic  -hold aspirin as above  -monitor BP     NATALIA on CPAP     Chronic diastolic congestive heart failure  -appears compensated  -continue home medication once list verified     S/P TAVR (transcatheter aortic valve replacement)     History of renal artery stenosis  s/p stent placement      Follow-up [facility] results     Discussed with patient and wife     DVT prophylaxis:  Mechanical DVT prophylaxis orders are present.     CODE STATUS:    Code Status (Patient has no pulse and is not breathing): CPR (Attempt to Resuscitate)  Medical Interventions (Patient has pulse or is breathing): Full Support     Admission Status:  I believe this patient meets inpatient status.     I discussed the patient's findings and my recommendations with patient.     This patient has been examined wearing appropriate Personal Protective Equipment.   03/03/22      Electronically signed by Navneet De León MD, FACP 03/03/22, 15:29 FLAKO.      [facility] Hospitalist Team

## 2022-03-03 NOTE — PLAN OF CARE
Problem: Adult Inpatient Plan of Care  Goal: Plan of Care Review  3/3/2022 0240 by Lucy Weber RN  Outcome: Ongoing, Progressing  Flowsheets  Taken 3/3/2022 0240  Progress: no change  Outcome Summary: Patient rested well through the night. No complaints of pain or discomfort. CT head negative for metastasis. Q2 turns to prevent skin breakdown. Oral intake promoted.  Taken 3/2/2022 0448  Plan of Care Reviewed With: patient   Goal Outcome Evaluation:  Plan of Care Reviewed With: patient        Progress: no change  Outcome Summary: Patient rested well through the night. No complaints of pain or discomfort. CT head negative for metastasis. Q2 turns to prevent skin breakdown. Oral intake promoted.

## 2022-03-03 NOTE — PLAN OF CARE
Problem: Adult Inpatient Plan of Care  Goal: Plan of Care Review  Outcome: Ongoing, Progressing  Goal: Patient-Specific Goal (Individualized)  Outcome: Ongoing, Progressing  Goal: Absence of Hospital-Acquired Illness or Injury  Outcome: Ongoing, Progressing  Intervention: Identify and Manage Fall Risk  Recent Flowsheet Documentation  Taken 3/3/2022 1600 by Arielle Conde RN  Safety Promotion/Fall Prevention:   safety round/check completed   assistive device/personal items within reach   clutter free environment maintained   fall prevention program maintained  Taken 3/3/2022 1300 by Arielle Conde RN  Safety Promotion/Fall Prevention: safety round/check completed  Taken 3/3/2022 1100 by Arielle Conde RN  Safety Promotion/Fall Prevention:   assistive device/personal items within reach   clutter free environment maintained   safety round/check completed  Taken 3/3/2022 0900 by Arielle Conde RN  Safety Promotion/Fall Prevention: safety round/check completed  Taken 3/3/2022 0701 by Arielle Conde RN  Safety Promotion/Fall Prevention:   assistive device/personal items within reach   clutter free environment maintained   fall prevention program maintained   safety round/check completed  Intervention: Prevent Infection  Recent Flowsheet Documentation  Taken 3/3/2022 0701 by Arielle Conde RN  Infection Prevention: personal protective equipment utilized  Goal: Optimal Comfort and Wellbeing  Outcome: Ongoing, Progressing  Goal: Readiness for Transition of Care  Outcome: Ongoing, Progressing   Goal Outcome Evaluation:

## 2022-03-03 NOTE — PROGRESS NOTES
Daily Progress Note        Hemoptysis    Presence of cardiac pacemaker    Coronary artery disease involving native coronary artery of native heart without angina pectoris    Shortness of breath    Mixed hyperlipidemia    Essential hypertension    NATALIA on CPAP    H/O radical nephrectomy, left    Chronic diastolic congestive heart failure (HCC)    S/P TAVR (transcatheter aortic valve replacement)    Bladder carcinoma (HCC)    ILD (interstitial lung disease) (HCC)    Sepsis without septic shock (HCC)    Pneumonia due to infectious organism      Assessment:     Hemoptysis, resolved  Status post bronchoscopy 2/23/2022  Endobronchial biopsy positive for transitional cell carcinoma     Interstitial lung disease, currently on Ofev  Elevation of right hemidiaphragm  Obstructive sleep apnea, on CPAP    Work-up for connective tissue diseases negative including c-ANCA and p-ANCA and myeloperoxidase     Metastatic transitional (urothelial) cell carcinoma, currently being treated with immunotherapy every 3 weeks, last treatment 2/10/2022  -Port in place     bladder cancer, left renal mass, 10/2020  -Radical left nephrectomy     Presence of TAVR valve, 3/20/2021  CABG  Pacemaker in situ  Chronic kidney disease  Essential hypertension  Hyperlipidemia     5/12/2021 Echo  EF 66%  RVSP 21.9 mmHg     Recommendations:    P.o. prednisone 6-day taper  Pulmicort nebulizer  Duo nebs as needed  Mucinex  GI prophylaxis on Protonix 40 mg daily    Currently not on DVT prophylaxis                 LOS: 9 days     Subjective         Objective     Vital signs for last 24 hours:  Vitals:    03/02/22 2013 03/02/22 2023 03/03/22 0521 03/03/22 0613   BP:  130/91 127/84    BP Location:  Left arm Left arm    Patient Position:  Lying Lying    Pulse: 95 99 95    Resp: 18 18 17    Temp:  97.7 °F (36.5 °C) 97.8 °F (36.6 °C)    TempSrc:  Oral Oral    SpO2: 96% 95% 95%    Weight:   68.6 kg (151 lb 3.8 oz) 66.1 kg (145 lb 11.6 oz)   Height:            Intake/Output last 3 shifts:  I/O last 3 completed shifts:  In: 1908 [P.O.:1908]  Out: 0   Intake/Output this shift:  I/O this shift:  In: 477 [P.O.:477]  Out: -       Radiology  Imaging Results (Last 24 Hours)     Procedure Component Value Units Date/Time    CT Head With & Without Contrast [[id number]] Collected: 03/02/22 2227     Updated: 03/02/22 2232    Narrative:      CT HEAD W WO CONTRAST-     Date of Exam: 3/2/2022 6:43 PM     Indication: Metastatic transitional renal cell carcinoma; weakness and  confusion; R04.2-Hemoptysis; J18.9-Pneumonia, unspecified organism;  J84.9-Interstitial pulmonary disease, unspecified.     Comparison: 2 3021     Technique:  Without contrast, contiguous axial CT images of the head  were obtained from skull base to vertex.  Coronal reconstructions were  performed.  Automated exposure control and iterative reconstruction  methods were used.     FINDINGS  There is no intracranial hemorrhage or mass effect.. There is  mild-moderate sulcal and moderate ventricular enlargement, unchanged.  There is cerebellar atrophy. There is a stable lacunar infarct in the  right thalamus. No focal edema is seen. There is minor decreased density  in the white matter, similar to prior. The gray-white matter  differentiation is preserved. No evidence of extra-axial pathology.     No abnormality is seen in the skull or included paranasal sinuses.       Impression:      1. Stable exam, with no acute process demonstrated in the brain.  2. There is stable ventricular dilatation, favored to be due to atrophy.  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     Electronically Signed By-Mayi Lindsay MD On:3/2/2022 10:30 PM  This report was finalized on [id number] by  Mayi Lindsay MD.          Labs:  Results from last 7 days   Lab Units 03/02/22  1215   WBC 10*3/mm3 27.60*   HEMOGLOBIN g/dL 14.8   HEMATOCRIT % 45.1   PLATELETS 10*3/mm3 167     Results from  last 7 days   Lab Units 03/03/22  0325 03/02/22  1215 03/02/22  1215   SODIUM mmol/L  --   --  131*   POTASSIUM mmol/L 4.5   < > 3.9   CHLORIDE mmol/L  --   --  99   CO2 mmol/L  --   --  22.0   BUN mg/dL  --   --  34*   CREATININE mg/dL  --   --  0.62*   CALCIUM mg/dL  --   --  9.1   BILIRUBIN mg/dL  --   --  0.6   ALK PHOS U/L  --   --  162*   ALT (SGPT) U/L  --   --  243*   AST (SGOT) U/L  --   --  55*   GLUCOSE mg/dL  --   --  132*    < > = values in this interval not displayed.         Results from last 7 days   Lab Units 03/02/22  1215   ALBUMIN g/dL 2.40*             Results from last 7 days   Lab Units 03/03/22  0325   MAGNESIUM mg/dL 2.0                   Meds:   SCHEDULE  budesonide, 1 mg, Nebulization, BID - RT  escitalopram, 5 mg, Oral, Daily  folic acid, 400 mcg, Oral, Daily  gabapentin, 100 mg, Oral, BID  guaiFENesin, 600 mg, Oral, Q12H  lactulose, 30 g, Oral, TID  pantoprazole, 40 mg, Oral, QAM  polyethylene glycol, 17 g, Oral, Daily  predniSONE, 20 mg, Oral, Daily   Followed by  [START ON 3/4/2022] predniSONE, 10 mg, Oral, Daily  sodium chloride, 10 mL, Intravenous, Q12H      Infusions     PRNs  •  acetaminophen **OR** acetaminophen **OR** acetaminophen  •  aluminum-magnesium hydroxide-simethicone  •  bisacodyl  •  HYDROcodone-acetaminophen  •  ipratropium-albuterol  •  magnesium citrate  •  magnesium sulfate **OR** magnesium sulfate in D5W 1g/100mL (PREMIX)  •  melatonin  •  Morphine  •  nitroglycerin  •  ondansetron **OR** ondansetron  •  potassium chloride **OR** potassium chloride **OR** potassium chloride  •  potassium phosphate infusion greater than 15 mMoles **OR** potassium phosphate infusion greater than 15 mMoles **OR** potassium phosphate **OR** sodium phosphate IVPB **OR** sodium phosphate IVPB **OR** sodium phosphate IVPB  •  promethazine  •  [COMPLETED] Insert peripheral IV **AND** sodium chloride    Physical Exam:  Physical Exam  Vitals reviewed.   Constitutional:       Appearance: He  is ill-appearing.   Pulmonary:      Effort: Pulmonary effort is normal.      Breath sounds: Normal breath sounds.   Skin:     General: Skin is warm and dry.   Neurological:      Mental Status: He is alert and oriented to person, place, and time.   Psychiatric:      Comments: quite         ROS  Review of Systems   Constitutional: Positive for fever.   Respiratory: Positive for cough.         Difficulty coughing up secretions   Neurological: Positive for weakness.     Reviewed the patient's new clinical results    Electronically signed by MARGARITA Burks

## 2022-03-03 NOTE — CASE MANAGEMENT/SOCIAL WORK
Continued Stay Note   Marcos     Patient Name: Navneet Lind  MRN: [id number]  Today's Date: 3/3/2022    Admit Date: 2/22/2022     Discharge Plan     Row Name 03/03/22 1413       Plan    Plan DC Plan: Anticipate routine to home with spouse and [facility]; [facility] order in place.    Plan Comments CM met with patient and spouse at bedside to follow up on Hospice referral. Patient's spouse reports that after an explanation of hospice services she does not want hospice at this time. She reports that she will stick to the plan of taking him home with [facility]. She reported that the biggest issue for her is transportation to and from appointments. She reported that their son helps with transport to appointments on days he is off work but it is not possible to get all appointments for days he is off. CM delivered a list of private pay transport services.            Met with patient in room wearing PPE: mask/googles  Maintained distance greater than 6 feet and spent less than 15 minutes in the room.     Ching Pinon RN     Office Phone: [phone]  Office Cell: [phone]

## 2022-03-03 NOTE — THERAPY TREATMENT NOTE
Subjective:  Discussed with patients wife if they want to continue physical therapy as this PTA saw a possible hospice consult. Pt's wife wanted to continue with physical therapy. The patient needed ecouragement    Objective:     Bed mobility - Max-A  Supine to sit to supine. Pt sat at eob only briefly as pt kept pushing posterior and started to slide out of the bed pt quickly returned to supine.  Dependent scooting up in the bed.   Transfers - N/A or Not attempted.  Ambulation - N/A or Not attempted.   Therapeutic exercises:  Supine heel slides, ap's, quad sets, saq's, hip abduction  1/10 reps    Pain: 0 VAS  Education: Provided education on importance of mobility and skilled verbal / tactile cueing throughout intervention.     Assessment: Navneet Lind presents with functional mobility impairments which indicate the need for skilled intervention.  Very little assist with transfers or bed mobility.   Pt did assist with his exercises.  Pt's wife wants to take patient home at d/c and has declined IP rehab. Discussed with pt's wife the difficulty of his care and mostly likely will not be able to get into a car to return home due to pt's  significant weakness and inability to stand.   Will continue to follow and progress as tolerated.     Plan/Recommendations:   Pt would benefit from Inpatient Rehabilitation placement at discharge from facility   Pt desires Home with family assist and and Home Health at discharge. Pt cooperative; agreeable to therapeutic recommendations and plan of care.     Basic Mobility 6-click:  Rollin = Total, A lot = 2, A little = 3; 4 = None  Supine>Sit:   1 = Total, A lot = 2, A little = 3; 4 = None   Sit>Stand with arms:  1 = Total, A lot = 2, A little = 3; 4 = None  Bed>Chair:   1 = Total, A lot = 2, A little = 3; 4 = None  Ambulate in room:  1 = Total, A lot = 2, A little = 3; 4 = None  3-5 Steps with railin = Total, A lot = 2, A little = 3; 4 = None  Score: 8    Modified  Tami: 5 = Severe disability (Requires constant nursing care and attention, bedridden, incontinent)    Post-Tx Position: Supine with HOB Elevated, Alarms activated and Call light and personal items within reach  PPE: gloves, surgical mask, eyewear protection

## 2022-03-03 NOTE — DISCHARGE SUMMARY
[facility]  DISCHARGE SUMMARY    Patient Name: Navneet Lind  : 1945  MRN: [id number]    Date of Admission: 2022  Discharge Diagnosis: Pneumonia due to infectious organism/interstitial lung disease/metastatic bladder carcinoma.  Date of Discharge: 3/3/2022  Primary Care Physician: Uri Cortes MD      Presenting Problem:   ILD (interstitial lung disease) (HCC) [J84.9]  Hemoptysis [R04.2]  Pneumonia due to infectious organism, unspecified laterality, unspecified part of lung [J18.9]    Active and Resolved Hospital Problems:  Active Hospital Problems    Diagnosis POA   • **Hemoptysis [R04.2] Yes     Priority: Medium   • Pneumonia due to infectious organism [J18.9] Yes     Priority: High   • Bladder carcinoma (HCC) [C67.9] Yes     Priority: Medium   • ILD (interstitial lung disease) (HCC) [J84.9] Yes     Priority: Low   • Shortness of breath [R06.02] Yes   • Sepsis without septic shock (HCC) [A41.9] Yes   • S/P TAVR (transcatheter aortic valve replacement) [Z95.2] Not Applicable   • Chronic diastolic congestive heart failure (HCC) [I50.32] Yes   • H/O radical nephrectomy, left [Z90.5] Not Applicable   • NATALIA on CPAP [G47.33, Z99.89] Not Applicable   • Coronary artery disease involving native coronary artery of native heart without angina pectoris [I25.10] Yes   • Primary hypertension [I10] Yes   • Mixed hyperlipidemia [E78.2] Yes   • Presence of cardiac pacemaker [Z95.0] Yes      Resolved Hospital Problems   No resolved problems to display.         Hospital Course     Hospital Course:  Patient is a 76 y.o. male with a history of interstitial lung disease, left renal mass s/p nephrectomy, bladder cancer, HFpEF, CAD, HTN, HLD, TAVR, SSS s/p PPM, and RICK s/p stent placement who presented to the ER at [facility] on 2022 complaining of hemoptysis and shortness of breath. The patient states the shortness of breath started  yesterday. He states today he had 1 episode of coughing up a significant amount of bright red blood. He denies any chest pain or other complaints. He is on aspirin, but denies taking any other blood thinners. He was treated for pneumonia in December 2021. He denies any exacerbating or alleviating factors. He does not wear home oxygen.      Patient was seen in the emergency room and upon arrival to the ER the patient triggered sepsis protocol with WBC 16.5 and lactate 3.2. He was given IV fluids, cultures were obtained, and he was started on cefepime and vancomycin. Pulmonology was consulted. He was admitted to the Hospitalist group for further evaluation.   She was diagnosed with a possible sepsis without a septic shock and was treated with antibiotics.  Pneumonia was treated with antibiotics.  Hypoxia was treated with oxygen therapy.  Patient is status post a bronchoscopy.  Bladder cancer was monitored.  Hematology oncology consult was completed.  Appropriate patient's home medications were resumed in the hospital for other chronic medical conditions.  Patient reported significant improvement in his symptoms after over 9 days in the hospital and requested to be discharged home.  Patient was advised to take his medications as prescribed.  The discharge medications are as per medication reconciliation list.  Patient was advised to follow-up with his primary care physician within 3 to 5 days of discharge.  Patient was advised to follow-up with his hematologist/oncologist within 7 days of discharge.  Patient was advised to follow-up with his pulmonologist within 14 days of discharge.  Patient was advised to return to the emergency department if he experiences any recurrence of his symptoms.  Patient and family agreed with the plan and patient was discharged in stable condition.      DISCHARGE Follow Up Recommendations for labs and diagnostics:    Patient was advised to follow-up with his primary care physician who will  review his current medications.    Patient was advised to follow-up with his oncologist who will reassess at therapy for metastatic endothelial carcinoma/bladder cancer.    Patient was advised to follow-up with his pulmonologist who will reassess his pulmonary function.      Reasons For Change In Medications and Indications for New Medications:      Day of Discharge     Vital Signs:  Temp:  [97.7 °F (36.5 °C)-97.8 °F (36.6 °C)] 97.7 °F (36.5 °C)  Heart Rate:  [] 101  Resp:  [16-28] 28  BP: (120-130)/(79-91) 120/79    Physical Exam:  Physical Exam  Vitals and nursing note reviewed.   Constitutional:       General: He is not in acute distress.  HENT:      Head: Normocephalic.      Nose: Nose normal.      Mouth/Throat:      Mouth: Mucous membranes are dry.      Pharynx: Oropharynx is clear.   Eyes:      Extraocular Movements: Extraocular movements intact.      Conjunctiva/sclera: Conjunctivae normal.      Pupils: Pupils are equal, round, and reactive to light.   Cardiovascular:      Pulses: Normal pulses.      Heart sounds: No murmur heard.  No friction rub. No gallop.       Comments: S1 and S2 present.  No tachycardia.  Pulmonary:      Breath sounds: No stridor. No wheezing or rales.   Chest:      Chest wall: No tenderness.   Abdominal:      General: Bowel sounds are normal. There is no distension.      Palpations: Abdomen is soft.      Tenderness: There is no abdominal tenderness. There is no right CVA tenderness or guarding.   Musculoskeletal:         General: No swelling, tenderness, deformity or signs of injury.      Cervical back: Normal range of motion. No rigidity.      Right lower leg: No edema.      Left lower leg: No edema.   Skin:     General: Skin is warm and dry.      Capillary Refill: Capillary refill takes less than 2 seconds.      Coloration: Skin is not jaundiced.      Findings: No bruising, erythema, lesion or rash.   Neurological:      Comments: No facial asymmetry noted.  Gait and station not  tested.   Psychiatric:      Comments: No agitation.              Pertinent  and/or Most Recent Results     LAB RESULTS:      Lab 03/03/22  1015 03/02/22  1215 02/27/22  0254 02/26/22  0241 02/25/22  0324   WBC 27.60* 27.60* 12.50* 13.40* 11.20*   HEMOGLOBIN 15.8 14.8 12.7* 12.0* 11.8*   HEMATOCRIT 46.2 45.1 38.8 35.7* 35.2*   PLATELETS 152 167 213 227 232   NEUTROS ABS 24.01* 25.39* 11.40* 11.90* 9.80*   LYMPHS ABS  --   --  0.80 0.90 0.90   MONOS ABS  --   --  0.30 0.60 0.30   EOS ABS  --   --  0.00 0.00 0.00   MCV 92.7 91.8 92.0 91.5 90.5   CRP  --   --  <0.30 0.31 0.95*         Lab 03/03/22  1015 03/03/22  0325 03/02/22  1215 03/02/22  0035 03/01/22  0050 02/28/22  0715 02/28/22  0715 02/27/22  0254 02/27/22  0254 02/26/22  0241 02/26/22  0241 02/25/22  1242 02/25/22  0324 02/25/22  0324   SODIUM 131*  --  131*  --   --   --   --   --  133*  --  135*  --   --  134*   POTASSIUM 4.2 4.5 3.9 4.6 4.7   < > 4.7   < > 4.4   < > 5.2  --    < > 4.5   CHLORIDE 99  --  99  --   --   --   --   --  101  --  103  --   --  104   CO2 20.0*  --  22.0  --   --   --   --   --  21.0*  --  22.0  --   --  22.0   ANION GAP 12.0  --  10.0  --   --   --   --   --  11.0  --  10.0  --   --  8.0   BUN 30*  --  34*  --   --   --   --   --  23  --  23  --   --  24*   CREATININE 0.65*  --  0.62*  --   --   --   --   --  0.52*  --  0.59*  --   --  0.58*   EGFR 98.3  --  99.7  --   --   --   --   --   --   --   --   --   --   --    GLUCOSE 170*  --  132*  --   --   --   --   --  162*  --  159*  --   --  144*   CALCIUM 9.4  --  9.1  --   --   --   --   --  8.6  --  8.8  --   --  8.7   MAGNESIUM  --  2.0  --  2.1 2.2  --  2.3  --  2.3   < > 2.6*  --    < > 2.3   PHOSPHORUS  --   --   --   --   --   --   --   --  2.8  --  1.7* 1.9*  --  1.9*    < > = values in this interval not displayed.         Lab 03/03/22  1015 03/02/22  1215   TOTAL PROTEIN 6.4 6.4   ALBUMIN 2.40* 2.40*   GLOBULIN 4.0 4.0   ALT (SGPT) 188* 243*   AST (SGOT) 45* 55*    BILIRUBIN 0.6 0.6   ALK PHOS 156* 162*         Lab 02/26/22  0241   PROBNP 1,100.0                 Brief Urine Lab Results  (Last result in the past 365 days)      Color   Clarity   Blood   Leuk Est   Nitrite   Protein   CREAT   Urine HCG        12/30/21 0205 Yellow   Clear   Negative   Negative   Negative   Negative               Microbiology Results (last 10 days)     Procedure Component Value - Date/Time    Pneumocystis PCR - Lavage, Lung, Left Lower Lobe [[id number]] Collected: 02/23/22 1122    Lab Status: Final result Specimen: Lavage from Lung, Left Lower Lobe Updated: 02/25/22 1507     Reference Lab Report See Attached Report     Pneumocystis Negative    Fungus Culture - Lavage, Lung, L [[id number]]  (Abnormal) Collected: 02/23/22 1057    Lab Status: Preliminary result Specimen: Lavage from Lung, L Updated: 03/03/22 1358     Fungus Culture Candida species    Virus Culture - Lavage, Lung, L [[id number]] Collected: 02/23/22 1057    Lab Status: Preliminary result Specimen: Lavage from Lung, L Updated: 03/01/22 1211     Viral Culture, General Comment     Comment: Preliminary Report:  No virus isolated at 4 days.  Next report to follow after 7 days.       Narrative:      Performed at:  [address] [place] [address]  [id number]  : Dominguez Avendano MD, Phone:  [phone]    AFB Culture - Lavage, Lung, L [[id number]] Collected: 02/23/22 1057    Lab Status: Preliminary result Specimen: Lavage from Lung, L Updated: 03/02/22 1130     AFB Culture No AFB isolated at 1 week     AFB Stain No acid fast bacilli seen    BAL Culture, Quantitative - Lavage, Lung, L [[id number]] Collected: 02/23/22 1057    Lab Status: Final result Specimen: Lavage from Lung, L Updated: 02/25/22 0809     BAL Culture >100,000 CFU/mL Normal respiratory seb. No S. aureus or Pseudomonas aeruginosa detected. Final report.     Gram Stain Moderate (3+) Mixed bacterial morphotypes seen on Gram Stain      Moderate (3+)  WBCs per low power field      Few (2+) Epithelial cells per low power field    Histoplasma Antigen, CSF or BAL - Lavage, Lung, L [[id number]] Collected: 02/23/22 1057    Lab Status: Final result Specimen: Lavage from Lung, L Updated: 02/28/22 1550     Reference Lab Report See Attached Report    Cytomegalovirus (CMV) By PCR - Lavage, Lung, L [[id number]] Collected: 02/23/22 1057    Lab Status: Final result Specimen: Lavage from Lung, L Updated: 02/28/22 1551     Reference Lab Report See Attached Report    Respiratory Panel PCR w/COVID-19(SARS-CoV-2) SHANE/SHAI/FELECIA/PAD/COR/MAD/VALERIA In-House, NP Swab in UTM/VTM, 3-4 HR TAT - Lavage, Lung, L [[id number]]  (Normal) Collected: 02/23/22 1057    Lab Status: Final result Specimen: Lavage from Lung, L Updated: 02/23/22 1226     ADENOVIRUS, PCR Not Detected     Coronavirus 229E Not Detected     Coronavirus HKU1 Not Detected     Coronavirus NL63 Not Detected     Coronavirus OC43 Not Detected     COVID19 Not Detected     Human Metapneumovirus Not Detected     Human Rhinovirus/Enterovirus Not Detected     Influenza A PCR Not Detected     Influenza B PCR Not Detected     Parainfluenza Virus 1 Not Detected     Parainfluenza Virus 2 Not Detected     Parainfluenza Virus 3 Not Detected     Parainfluenza Virus 4 Not Detected     RSV, PCR Not Detected     Bordetella pertussis pcr Not Detected     Bordetella parapertussis PCR Not Detected     Chlamydophila pneumoniae PCR Not Detected     Mycoplasma pneumo by PCR Not Detected    Narrative:      In the setting of a positive respiratory panel with a viral infection PLUS a negative procalcitonin without other underlying concern for bacterial infection, consider observing off antibiotics or discontinuation of antibiotics and continue supportive care. If the respiratory panel is positive for atypical bacterial infection (Bordetella pertussis, Chlamydophila pneumoniae, or Mycoplasma pneumoniae), consider antibiotic de-escalation to target atypical  bacterial infection.    MRSA Screen, PCR (Inpatient) - Swab, Nares [[id number]]  (Normal) Collected: 02/23/22 0805    Lab Status: Final result Specimen: Swab from Nares Updated: 02/23/22 0934     MRSA PCR No MRSA Detected    Legionella Antigen, Urine - Urine, Urine, Clean Catch [[id number]]  (Normal) Collected: 02/22/22 1826    Lab Status: Final result Specimen: Urine, Clean Catch Updated: 02/22/22 1849     LEGIONELLA ANTIGEN, URINE Negative    S. Pneumo Ag Urine or CSF - Urine, Urine, Clean Catch [[id number]]  (Normal) Collected: 02/22/22 1826    Lab Status: Final result Specimen: Urine, Clean Catch Updated: 02/22/22 1848     Strep Pneumo Ag Negative    Respiratory Culture - Sputum, Cough [[id number]] Collected: 02/22/22 1753    Lab Status: Final result Specimen: Sputum from Cough Updated: 02/24/22 0834     Respiratory Culture Light growth (2+) Normal respiratory seb. No S. aureus or Pseudomonas aeruginosa detected. Final report.     Gram Stain Rare (1+) Epithelial cells per low power field      Rare (1+) Mixed bacterial morphotypes seen on Gram Stain    COVID-19,CEPHEID/AKIN,COR/FELECIA/PAD/MOHSEN IN-HOUSE(OR EMERGENT/ADD-ON),NP SWAB IN TRANSPORT MEDIA 3-4 HR TAT, RT-PCR - Swab, Nasopharynx [[id number]]  (Normal) Collected: 02/22/22 1008    Lab Status: Final result Specimen: Swab from Nasopharynx Updated: 02/22/22 1034     COVID19 Not Detected    Narrative:      Fact sheet for providers: https://www.fda.gov/media/285780/download     Fact sheet for patients: https://www.fda.gov/media/554078/download  Fact sheet for providers: https://www.fda.gov/media/565156/download    Fact sheet for patients: https://www.fda.gov/media/932077/download    Test performed by PCR.    Respiratory Panel PCR w/COVID-19(SARS-CoV-2) SHANE/SHAI/FELECIA/PAD/COR/MAD/VALERIA In-House, NP Swab in UTM/VTM, 3-4 HR TAT - Swab, Nasopharynx [[id number]]  (Normal) Collected: 02/22/22 1008    Lab Status: Final result Specimen: Swab from Nasopharynx Updated: 02/22/22 8722      ADENOVIRUS, PCR Not Detected     Coronavirus 229E Not Detected     Coronavirus HKU1 Not Detected     Coronavirus NL63 Not Detected     Coronavirus OC43 Not Detected     COVID19 Not Detected     Human Metapneumovirus Not Detected     Human Rhinovirus/Enterovirus Not Detected     Influenza A PCR Not Detected     Influenza A H1 Not Detected     Influenza A H1 2009 PCR Not Detected     Influenza A H3 Not Detected     Influenza B PCR Not Detected     Parainfluenza Virus 1 Not Detected     Parainfluenza Virus 2 Not Detected     Parainfluenza Virus 3 Not Detected     Parainfluenza Virus 4 Not Detected     RSV, PCR Not Detected     Bordetella pertussis pcr Not Detected     Bordetella parapertussis PCR Not Detected     Chlamydophila pneumoniae PCR Not Detected     Mycoplasma pneumo by PCR Not Detected    Narrative:      In the setting of a positive respiratory panel with a viral infection PLUS a negative procalcitonin without other underlying concern for bacterial infection, consider observing off antibiotics or discontinuation of antibiotics and continue supportive care. If the respiratory panel is positive for atypical bacterial infection (Bordetella pertussis, Chlamydophila pneumoniae, or Mycoplasma pneumoniae), consider antibiotic de-escalation to target atypical bacterial infection.    Blood Culture - Blood, Arm, Left [[id number]]  (Normal) Collected: 02/22/22 1004    Lab Status: Final result Specimen: Blood from Arm, Left Updated: 02/27/22 1015     Blood Culture No growth at 5 days    Blood Culture - Blood, Arm, Right [[id number]]  (Normal) Collected: 02/22/22 1004    Lab Status: Final result Specimen: Blood from Arm, Right Updated: 02/27/22 1015     Blood Culture No growth at 5 days          CT Head With & Without Contrast    Result Date: 3/2/2022  Impression: 1. Stable exam, with no acute process demonstrated in the brain. 2. There is stable ventricular dilatation, favored to be due to atrophy. There is minor  chronic White matter abnormality and old right thalamic lacunar infarct.  Electronically Signed By-Mayi Lindsay MD On:3/2/2022 10:30 PM This report was finalized on [id number] by  Mayi Lindsay MD.    XR Chest 1 View    Result Date: 2/28/2022  Impression: Continued bilateral probably interstitial infiltrates. No significant change since the last study.  Electronically Signed By-Eric Ramon MD On:2/28/2022 1:08 PM This report was finalized on [id number] by  Eric Ramon MD.    XR Chest 1 View    Result Date: 2/22/2022  Impression: Continued bilateral predominantly interstitial infiltrates, unchanged from the previous radiograph. Stable elevation of the right hemidiaphragm.  Electronically Signed By-Eric Ramon MD On:2/22/2022 10:20 AM This report was finalized on [id number] by  Eric Ramon MD.    CT Chest Pulmonary Embolism    Result Date: 2/22/2022  Impression:  1. No acute pulmonary embolic disease. 2. Interval increase in size of the prevascular lymph nodes of questionable significance. 3. Pulmonary interstitial fibrosis. Bronchiectasis is also noted. 4. Hepatic steatosis. 5. T7 compression fracture. There has been an interval kyphoplasty procedure with extravasated bone cement into the anterior epidural space. 6. Coronary artery atherosclerotic vascular disease with postsurgical changes. 7. Additional findings as noted above.  Electronically Signed By-Beck Laura MD On:2/22/2022 12:52 PM This report was finalized on [id number] by  Beck Laura MD.    XR Chest PA & Lateral    Result Date: 2/2/2022  Impression: Chronic interstitial lung disease which has progressed since 2016. Subtle groundglass opacity in the right upper lobe may represent superimposed pneumonia. This is more prominent than on the 12/30/2021 study. Correlate with any clinical worsening of the patient's symptoms  Electronically Signed By-You Haq On:2/2/2022 5:33 PM This report was finalized on [id number] by  You  Eun .              Results for orders placed during the hospital encounter of 05/12/21    Adult Transthoracic Echo Complete W/ Cont if Necessary Per Protocol    Interpretation Summary  · Left ventricular wall thickness is consistent with hypertrophy. Sigmoid-shaped ventricular septum is present.  · Calculated left ventricular EF = 66% Estimated left ventricular EF was in agreement with the calculated left ventricular EF. Left ventricular systolic function is normal.  · Left ventricular diastolic function is consistent with (grade I) impaired relaxation.  · There is a TAVR valve present.  · The valve is well-seated. The leaflets appear thin and mobile.  · The maximum gradient across aortic valve is 13.2 mmHg. The mean gradient is 6.5 mmHg. Aortic valve area is 1.7 cm². Normal parameters  · Mild mitral valve regurgitation is present.  · Mild tricuspid valve regurgitation is present.  · Estimated right ventricular systolic pressure from tricuspid regurgitation is normal (<35 mmHg).      Labs Pending at Discharge:  Pending Labs     Order Current Status    AFB Culture - Lavage, Lung, L Preliminary result    Fungus Culture - Lavage, Lung, L Preliminary result    Virus Culture - Lavage, Lung, L Preliminary result          Procedures Performed  Procedure(s):  BRONCHOSCOPY WITH BRONCHOALVEOLAR LAVAGE AND BIOPSY X1 AREA         Consults:   Consults     Date and Time Order Name Status Description    3/2/2022  2:58 PM Hematology & Oncology Inpatient Consult Completed     2/22/2022 10:58 AM Pulmonology (on-call MD unless specified) Completed             Discharge Details        Discharge Medications      Continue These Medications      Instructions Start Date   acetaminophen 500 MG tablet  Commonly known as: TYLENOL   1,000 mg, Oral, Every 6 Hours PRN      aspirin 81 MG EC tablet   81 mg, Oral, Daily      escitalopram 5 MG tablet  Commonly known as: LEXAPRO   5 mg, Oral, Daily      famotidine 40 MG tablet  Commonly known as:  PEPCID   40 mg, Oral, Nightly      folic acid 400 MCG tablet  Commonly known as: FOLVITE   400 mcg, Oral, Daily      gabapentin 100 MG capsule  Commonly known as: NEURONTIN   100 mg, Oral, 2 Times Daily      HYDROcodone-acetaminophen 7.5-325 MG per tablet  Commonly known as: NORCO   1 tablet, Oral, Every 6 Hours PRN      megestrol 40 MG/ML suspension  Commonly known as: MEGACE   200 mg, Oral, 3 Times Daily With Meals      melatonin 5 MG tablet tablet   5 mg, Oral      MiraLax 17 g packet  Generic drug: polyethylene glycol   17 g, Oral, Daily PRN      NON FORMULARY / PATIENT SUPPLIED MEDICATION   100 mg, Oral, Daily, OFEV-Nintedanib       omeprazole 40 MG capsule  Commonly known as: priLOSEC   40 mg, Oral, Daily      ondansetron 4 MG tablet  Commonly known as: ZOFRAN   4 mg, Oral, Every 8 Hours PRN      promethazine 25 MG tablet  Commonly known as: PHENERGAN   25 mg, Oral, Every 4 Hours PRN      psyllium 58.12 % packet  Commonly known as: METAMUCIL MULTIHEALTH FIBER   1 packet, Oral, Daily      Vitamin D3 50 MCG (2000 UT) tablet   2,000 Units, Oral, Daily             No Known Allergies      Discharge Disposition: Stable.  None Home  Home-Health Care Svc    Diet:  Hospital:  Diet Order   Procedures   • Diet Cardiac; Healthy Heart         Discharge Activity: As tolerated.        CODE STATUS:  Code Status and Medical Interventions:   Ordered at: 02/22/22 1817     Medical Intervention Limits:    NO intubation (DNI)     Level Of Support Discussed With:    Patient    Health Care Surrogate     Code Status (Patient has no pulse and is not breathing):    No CPR (Do Not Attempt to Resuscitate)     Medical Interventions (Patient has pulse or is breathing):    Limited Support         Future Appointments   Date Time Provider Department Center   3/30/2022  1:10 PM Te Thorne MD [facility] None   4/6/2022 10:45 AM Anderson Galvez MD [facility]   4/6/2022 11:30 AM [facility] E[facility] 1[facility]OUTPATIENT [facility]    6/21/2022  9:15 AM Cristina Wolfe MD [facility]       Additional Instructions for the Follow-ups that You Need to Schedule     Ambulatory Referral to Home Health (Hospital)   As directed      Face to Face Visit Date: 2/24/2022    Follow-up provider for Plan of Care?: I treated the patient in an acute care facility and will not continue treatment after discharge.    Follow-up provider: EVERETT PEREZ [[id number]]    Reason/Clinical Findings: RESUME HOME HEALTH ON D/C    Describe mobility limitations that make leaving home difficult: PNEUMONIA BILATERALLY    Nursing/Therapeutic Services Requested: Physical Therapy Home Monitoring    PT orders: Therapeutic exercise Strengthening    Home Monitoring Type: Continuous Monitoring via [facility] ([facility])    Frequency: 1 Week 1               Time spent on Discharge including face to face service: 40 minutes    This patient has been examined wearing appropriate Personal Protective Equipment and discussed with hospital infection control department, [facility] department, infectious disease specialist and pulmonologist. 03/03/22      Signature: Electronically signed by Navneet De León MD, FACP, 03/03/22, 4:59 PM EST.

## 2022-03-04 NOTE — CASE MANAGEMENT/SOCIAL WORK
Case Management Discharge Note      Final Note: Home with [facility]    Provided Post Acute Provider List?: Yes  Post Acute Provider List: Hospice  Provided Post Acute Provider Quality & Resource List?: Yes  Post Acute Provider Quality and Resource List: Inpatient Rehab  Delivered To: Patient, Support Person  Support Person: Spouse, Clifford  Method of Delivery: In person    Selected Continued Care - Discharged on 3/3/2022 Admission date: 2/22/2022 - Discharge disposition: Home-Health Care [facility]        Final Discharge Disposition Code: 06 - home with home health care

## 2022-03-04 NOTE — OUTREACH NOTE
Prep Survey      Responses   [facility] facility patient discharged from? [facility]   Is LACE score < 7 ? No   Emergency Room discharge w/ pulse ox? No   Eligibility Readm Mgmt   Discharge diagnosis Pneumonia due to infectious organism/interstitial lung disease/metastatic bladder carcinoma.   Does the patient have one of the following disease processes/diagnoses(primary or secondary)? Sepsis   Does the patient have Home health ordered? Yes   What is the Home health agency?   [facility]   Is there a DME ordered? No   Comments regarding appointments See AVS   Prep survey completed? Yes          Desiree Webber RN

## 2022-03-04 NOTE — OUTREACH NOTE
Sepsis Week 1 Survey      Responses   [facility] patient discharged from? [facility]   Does the patient have one of the following disease processes/diagnoses(primary or secondary)? Sepsis   Week 1 attempt successful? Yes   Call start time 1459   Call end time 1513   Discharge diagnosis Pneumonia due to infectious organism/interstitial lung disease/metastatic bladder carcinoma.   Person spoke with today (if not patient) and relationship wife   Meds reviewed with patient/caregiver? Yes   Is the patient having any side effects they believe may be caused by any medication additions or changes? No   Does the patient have all medications related to this admission filled (includes all antibiotics, inhalers, nebulizers,steroids,etc.) Yes   Is the patient taking all medications as directed (includes completed medication regime)? Yes   Medication comments Reviewed proper administration of inhalers, Symbicort and Albuterol as per AVS   Comments regarding appointments Pulmonary Clinic appt 03/30/2022   Does the patient have a primary care provider?  No   Comments regarding PCP To call for appt.   Does the patient have an appointment with their PCP within 7 days of discharge? No   What is preventing the patient from scheduling follow up appointments within 7 days of discharge? Haven't had time   Nursing Interventions Educated patient on importance of making appointment   Has the patient kept scheduled appointments due by today? N/A   Comments Wife to call for PCP hospital f/u. Clarify Mucinex which was not dontinued at discharge   What is the Home health agency?   [facility]   Has home health visited the patient within 72 hours of discharge? N/A   Psychosocial issues? No   Did the patient receive a copy of their discharge instructions? Yes   Nursing interventions Reviewed instructions with patient   What is the patient's perception of their health status since discharge? Improving   Nursing interventions Nurse provided patient  education   Is the patient/caregiver able to teach back Sepsis? S - Shivering,fever or very cold,  E - Extreme pain or generalized discomfort (worst ever,especially abdomen),  S - Sleepy, difficult to arouse,confused,  S - Short of breath,  P - Pale or discolored skin   Nursing interventions Nurse provided patient education,  Nurse provided reassurance to patient   Is patient/caregiver able to teach back steps to recovery at home? Set small, achievable goals for return to baseline health,  Rest and regain strength,  Eat a balanced diet,  Exercise as tolerated,  Make a list of questions for PCP appoinment   Is the patient/caregiver able to teach back signs and symptoms of worsening condition: Fever,  Hyperthermia,  Rapid heart rate (>90),  Shortness of breath/rapid respiratory rate,  Altered mental status(confusion/coma),  Edema   If the patient is a current smoker, are they able to teach back resources for cessation? Not a smoker   Is the patient/caregiver able to teach back the hierarchy of who to call/visit for symptoms/problems? PCP, Specialist, Home health nurse, Urgent Care, ED, 911 Yes   Additional teach back comments [facility] number   Week 1 call completed? Yes   Wrap up additional comments Wife states is some better. resting alot. To call for f/u appt with PCP          Desiree Webber RN

## 2022-03-05 NOTE — HOME HEALTH
Pt was recently readmitted to the hospital for PNA w/ sepsis. Pt was treated with antibiotics, steroids, and breathing treatments. Pts symptoms began to improve so he was DC back home after a 9 day stay on 3.3.22. Pt lives at home with his spouse who is primary caregiver. Pt is also currently battling cancer at the same time. Pt is very weak and does not respond much but will answer yes and no questions. Pt is happy to be home and back with his family who are all very supportive and interactive with his care. Pts spouse reports pt does not eat much of anything and they do good to get 3 ensure drinks in him per day. Pt has not finished one of them yet at this time today. Pt refuses any thoughts of feeding tubes. Pt is currently taking megace to help stimulate his appetite. Pt encouraged to drink as much water as possible as well to keep himself hydrated and to help thin and loosen his secreations. Pt did cough up some bloody sputum this morning but nothing since. Family encouraged to monitor and to notify someone if bleeding continues or turns bright red in color. Spouse reports pt does choke sometimes when drinking and is agreeable with SLP eval at this time. Pts weakness makes it very hard for him to cough up anything. Lungs sound clear at this time but rattling in throat noted. Spouse also requesting PT and OT evals at this time as well.     Primary focus of care: PNA    CP assess  PNA assess  PNA education  assess pain  assess sputum  assess appetite

## 2022-03-07 PROBLEM — Z90.5 H/O RADICAL NEPHRECTOMY: Chronic | Status: ACTIVE | Noted: 2021-03-11

## 2022-03-08 PROBLEM — A41.9 SEPSIS, DUE TO UNSPECIFIED ORGANISM, UNSPECIFIED WHETHER ACUTE ORGAN DYSFUNCTION PRESENT (HCC): Status: ACTIVE | Noted: 2022-01-01

## 2022-03-08 PROBLEM — D63.8 ANEMIA OF CHRONIC DISEASE: Status: ACTIVE | Noted: 2021-03-04

## 2022-03-08 PROBLEM — A41.9 SEPSIS WITHOUT SEPTIC SHOCK (HCC): Chronic | Status: ACTIVE | Noted: 2022-01-01

## 2022-03-08 NOTE — PLAN OF CARE
Goal Outcome Evaluation:              Outcome Evaluation: 75 y/o M who presented with increased SOA, cough and recurrent hemoptysis and interstitial lung disease. Pt admitted from home. Pt recommended IP rehab last admission but pt d/c'ed home.  PMHx significant for acute-on-chronic RF, Bladder CA, Sepsis.  He is slighty confused and has difficulty with vocalizations.  Becomes easily fatigued and SOA.  Pt has rattling lungs and cough.  He demos significant weakness and requires mod/max assist for all mobility.  Able to bring drink to mouth with min assist due to weakness.  He will require IP rehab at discharge.

## 2022-03-08 NOTE — THERAPY EVALUATION
Patient Name: Navneet Lind  : 1945    MRN: [id number]                              Today's Date: 3/8/2022       Admit Date: 3/7/2022    Visit Dx:     ICD-10-CM ICD-9-CM   1. Sepsis, due to unspecified organism, unspecified whether acute organ dysfunction present ([facility])  A41.9 038.9     995.91   2. Pneumonia due to infectious organism, unspecified laterality, unspecified part of lung  J18.9 486   3. Hemoptysis  R04.2 786.30   4. Leukocytosis, unspecified type  D72.829 288.60   5. ILD (interstitial lung disease) ([facility])  J84.9 515     Patient Active Problem List   Diagnosis   • Presence of cardiac pacemaker   • Bundle branch block, right   • Coronary artery disease involving native coronary artery of native heart without angina pectoris   • Shortness of breath   • Heart murmur   • Mixed hyperlipidemia   • Primary hypertension   • Impotence of organic origin   • Premature ventricular contractions   • NATALIA on CPAP   • Preop cardiovascular exam   • Anemia   • H/O radical nephrectomy, left   • Pre-op examination   • Abnormal myocardial perfusion study   • Chronic diastolic congestive heart failure (HCC)   • S/P TAVR (transcatheter aortic valve replacement)   • S/P kyphoplasty   • Bladder carcinoma ([facility])   • ILD (interstitial lung disease) ([facility])   • Hemoptysis   • Sepsis without septic shock ([facility])   • Pneumonia due to infectious organism   • Sepsis, due to unspecified organism, unspecified whether acute organ dysfunction present ([facility])     Past Medical History:   Diagnosis Date   • Aortic stenosis 2015    Per Echo 2017   • Benign essential HTN    • Bundle branch block, right 2013   • CAD (coronary artery disease), native coronary artery    • Cancer ([facility])     bladder- chemo, new left renal mass   • Chronic kidney disease    • Coronary artery disease involving native coronary artery of native heart without angina pectoris 2019    CABG ; SVG to RCA is occluded, LIMA to LAD, SVG to diag of LAD, SVG to  marginal of LCX   • COVID-19 vaccine administered    • Essential hypertension 11/19/2019   • Heart murmur    • History of transfusion    • Hyperlipidemia, mixed    • ILD (interstitial lung disease) ([facility]) 11/1/2021   • Injury of back    • Mixed hyperlipidemia 11/19/2019   • Near syncope    • NATALIA (obstructive sleep apnea)     AHI 11.6/h, CPAP   • Premature ventricular contractions 2/11/2014   • Presence of cardiac pacemaker 7/5/2019    BS dual chamber PM 11/7/2016   • Shortness of breath    • SSS (sick sinus syndrome) ([facility]) 7/5/2019     Past Surgical History:   Procedure Laterality Date   • AORTIC VALVE REPAIR/REPLACEMENT N/A 3/30/2021    Procedure: TTE TRANSFEMORAL TRANSCATHETER AORTIC VALVE REPLACEMENT PERCUTANEOUS APPROACH;  Surgeon: Hang Martinez MD;  [facility]  Service: Cardiothoracic;  Laterality: N/A;   • AORTIC VALVE REPAIR/REPLACEMENT N/A 3/30/2021    Procedure: Transfemoral Transcatheter Aortic Valve Replacement w/Intra Op TTE and Possible Open Rescue Surgery;  Surgeon: Anderson Galvez MD;  [facility];  Service: Cardiovascular;  Laterality: N/A;   • BRONCHOSCOPY N/A 2/23/2022    Procedure: BRONCHOSCOPY WITH BRONCHOALVEOLAR LAVAGE AND BIOPSY X1 AREA;  Surgeon: Ronny Jiang MD;  Location: [facility] ENDOSCOPY;  Service: Pulmonary;  Laterality: N/A;  blood clot in lung, hemoptysis   • CARDIAC CATHETERIZATION  2018   • CARDIAC CATHETERIZATION N/A 3/22/2021    Procedure: Left Heart Cath;  Surgeon: ADRIANO Erazo MD;  Location: [facility] CATH INVASIVE LOCATION;  Service: Cardiovascular;  Laterality: N/A;   • CARDIAC CATHETERIZATION N/A 6/22/2021    Procedure: RENAL ANGIOGRAPHY;  Surgeon: Jesus Bhagat MD;  Location: [facility] CATH INVASIVE LOCATION;  Service: Cardiovascular;  Laterality: N/A;   • CARDIAC CATHETERIZATION N/A 6/22/2021    Procedure: Stent BMS peripheral;  Surgeon: Jesus Bhagat MD;  Location: [facility] CATH INVASIVE  LOCATION;  Service: Cardiovascular;  Laterality: N/A;   rt renal   • CARDIAC CATHETERIZATION N/A 6/22/2021    Procedure: Angioplasty-peripheral;  Surgeon: Jesus Bhagat MD;  Location: [facility] CATH INVASIVE LOCATION;  Service: Cardiovascular;  Laterality: N/A;   rt renal   • CARDIOVASCULAR STRESS TEST  2017   • CORONARY ARTERY BYPASS GRAFT  1995   • ECHO - CONVERTED  2017   • NEPHROURETERECTOMY Left 11/2/2020    Procedure: NEPHROURETERECTOMY;  Surgeon: Rogers Bae MD;  Location: [facility] MAIN OR;  Service: Urology;  Laterality: Left;   • PACEMAKER IMPLANTATION  2016    bs   • PORTACATH PLACEMENT        General Information     Row Name 03/08/22 1250          Physical Therapy Time and Intention    Document Type evaluation  -     Mode of Treatment physical therapy  -     Row Name 03/08/22 1306 03/08/22 1250       General Information    Patient Profile Reviewed -- yes  -SS    Prior Level of Function --  unclear after last admission how much assist pt was requiring, pt not able to provide PLOF info  - --    Existing Precautions/Restrictions fall;oxygen therapy device and L/min  - --    Barriers to Rehab medically complex;previous functional deficit;cognitive status  difficulty with voice production  - --    Row Name 03/08/22 1306          Living Environment    People in Home spouse  -     Row Name 03/08/22 1306          Home Main Entrance    Number of Stairs, Main Entrance none  -     Row Name 03/08/22 1306          Cognition    Orientation Status (Cognition) oriented to;person;disoriented to;place;situation;time  -     Row Name 03/08/22 1306          Safety Issues, Functional Mobility    Impairments Affecting Function (Mobility) balance;cognition;endurance/activity tolerance;shortness of breath;strength;postural/trunk control  -           User Key  (r) = Recorded By, (t) = Taken By, (c) = Cosigned By    Initials Name Provider Type    SS Ani Metz PT Physical Therapist                Mobility     Row Name 03/08/22 1307          Bed Mobility    Bed Mobility bed mobility (all) activities  -SS     All Activities, [place] (Bed Mobility) maximum assist (25% patient effort);2 person assist  -SS     Row Name 03/08/22 1307          Sit-Stand Transfer    Sit-Stand [place] (Transfers) moderate assist (50% patient effort);2 person assist  -SS     Assistive Device (Sit-Stand Transfers) walker, front-wheeled  -SS           User Key  (r) = Recorded By, (t) = Taken By, (c) = Cosigned By    Initials Name Provider Type    SS Ani Metz PT Physical Therapist               Obj/Interventions     Row Name 03/08/22 1307          Range of Motion Comprehensive    Comment, General Range of Motion decreased knee extension AROM and PROM- HS tightness noted; decreased ankle DF L ankle- appears to have surgical history L ankle  -SS     Row Name 03/08/22 1307          Strength Comprehensive (MMT)    Comment, General Manual Muscle Testing (MMT) Assessment B LE 3-/5  -SS     Row Name 03/08/22 1307          Balance    Balance Assessment sitting static balance;standing static balance  -SS     Static Sitting Balance moderate assist  -SS     Static Standing Balance maximum assist  -SS     Row Name 03/08/22 1307          Sensory Assessment (Somatosensory)    Sensory Assessment (Somatosensory) --  attempted to assess but pt unable to communicate answers to questions regarding sensation  -           User Key  (r) = Recorded By, (t) = Taken By, (c) = Cosigned By    Initials Name Provider Type    SS Ani Metz PT Physical Therapist               Goals/Plan     Row Name 03/08/22 1310          Bed Mobility Goal 1 (PT)    Activity/Assistive Device (Bed Mobility Goal 1, PT) bed mobility activities, all  -SS     [place] Level/Cues Needed (Bed Mobility Goal 1, PT) modified independence  -SS     Time Frame (Bed Mobility Goal 1, PT) long term goal (LTG);2 weeks  -     Row Name 03/08/22 1310           Transfer Goal 1 (PT)    Activity/Assistive Device (Transfer Goal 1, PT) transfers, all  -SS     [place] Level/Cues Needed (Transfer Goal 1, PT) modified independence  -SS     Time Frame (Transfer Goal 1, PT) long term goal (LTG);2 weeks  -SS     Row Name 03/08/22 1310          Gait Training Goal 1 (PT)    Activity/Assistive Device (Gait Training Goal 1, PT) gait (walking locomotion);walker, rolling  -SS     [place] Level (Gait Training Goal 1, PT) minimum assist (75% or more patient effort)  -SS     Time Frame (Gait Training Goal 1, PT) long term goal (LTG);2 weeks  -SS           User Key  (r) = Recorded By, (t) = Taken By, (c) = Cosigned By    Initials Name Provider Type    SS Ani Metz, PT Physical Therapist               Clinical Impression     Row Name 03/08/22 1309          Pain Scale: FACES Pre/Post-Treatment    Pain: FACES Scale, Pretreatment 2-->hurts little bit  -SS     Posttreatment Pain Rating 2-->hurts little bit  -SS     Row Name 03/08/22 1306          Plan of Care Review    Outcome Evaluation 77 y/o M who presented with increased SOA, cough and recurrent hemoptysis and interstitial lung disease. Pt admitted from home. Pt recommended IP rehab last admission but pt d/c'ed home.  PMHx significant for acute-on-chronic RF, Bladder CA, Sepsis. Pt demonstrating difficulty with voicing audibly and mild increase in SOA with speaking. No family present and not able to obtain history from pt. From last admission PLOF: According to pt, at baseline, pt requires assist with all ADLs from his wife who he resides with. He typically ambulates with RW but unclear when last time pt ambulated was because he did not ambulate with PT during last admission. Patient has no ROSE his home. This date he is only oriented to self. He requires max A of 2 for bed mobility, mod A progressing to CGA for sitting balance. He sat edge of bed for 5 min approx. Mod A of 2 attempted to stand, was able to clear hips from  elevated bed for less than 2 seconds before returning to sit. Max A of 2 to reposition in bed, left in chair position. Patient with significant coughing, sounds productive but not able to expel sputum. Recommending IP rehab upon d/c due to significant weakness and inability to achieve upright standing position or ambulate at this time.  -     Row Name 03/08/22 1309          Therapy Assessment/Plan (PT)    Rehab Potential (PT) good, to achieve stated therapy goals  -SS     Criteria for Skilled Interventions Met (PT) yes;meets criteria  -     Predicted Duration of Therapy Intervention (PT) until d/c  -     Row Name 03/08/22 1309          Positioning and Restraints    Pre-Treatment Position in bed  -SS     Post Treatment Position bed  -SS     In Bed exit alarm on  -           User Key  (r) = Recorded By, (t) = Taken By, (c) = Cosigned By    Initials Name Provider Type    Ani Melendrez PT Physical Therapist               Outcome Measures     Row Name 03/08/22 1311          How much help from another person do you currently need...    Turning from your back to your side while in flat bed without using bedrails? 2  -SS     Moving from lying on back to sitting on the side of a flat bed without bedrails? 2  -SS     Moving to and from a bed to a chair (including a wheelchair)? 1  -SS     Standing up from a chair using your arms (e.g., wheelchair, bedside chair)? 1  -SS     Climbing 3-5 steps with a railing? 1  -SS     To walk in hospital room? 1  -SS     AM-PAC 6 Clicks Score (PT) 8  -     Row Name 03/08/22 1311          Functional Assessment    Outcome Measure Options AM-PAC 6 Clicks Basic Mobility (PT)  -           User Key  (r) = Recorded By, (t) = Taken By, (c) = Cosigned By    Initials Name Provider Type    Ani Melendrez PT Physical Therapist                             Physical Therapy Education                 Title: PT OT SLP Therapies (Done)     Topic: Physical Therapy (Done)      Point: Mobility training (Done)     Learning Progress Summary           Patient Acceptance, E, VU by  at 3/8/2022 1311                               User Key     Initials Effective Dates Name Provider Type Discipline     06/16/21 -  Ani Metz, PT Physical Therapist PT              PT Recommendation and Plan  Planned Therapy Interventions (PT): balance training, bed mobility training, gait training, patient/family education, strengthening, transfer training, home exercise program  Outcome Evaluation: 75 y/o M who presented with increased SOA, cough and recurrent hemoptysis and interstitial lung disease. Pt admitted from home. Pt recommended IP rehab last admission but pt d/c'ed home.  PMHx significant for acute-on-chronic RF, Bladder CA, Sepsis. Pt demonstrating difficulty with voicing audibly and mild increase in SOA with speaking. No family present and not able to obtain history from pt. From last admission PLOF: According to pt, at baseline, pt requires assist with all ADLs from his wife who he resides with. He typically ambulates with RW but unclear when last time pt ambulated was because he did not ambulate with PT during last admission. Patient has no ROSE his home. This date he is only oriented to self. He requires max A of 2 for bed mobility, mod A progressing to CGA for sitting balance. He sat edge of bed for 5 min approx. Mod A of 2 attempted to stand, was able to clear hips from elevated bed for less than 2 seconds before returning to sit. Max A of 2 to reposition in bed, left in chair position. Patient with significant coughing, sounds productive but not able to expel sputum. Recommending IP rehab upon d/c due to significant weakness and inability to achieve upright standing position or ambulate at this time.     Time Calculation:    PT Charges     Row Name 03/08/22 1312             Time Calculation    Start Time 1110  -SS      Stop Time 1140  -SS      Time Calculation (min) 30 min  -SS       PT Received On 03/08/22  -      PT - Next Appointment 03/09/22  -      PT Goal Re-Cert Due Date 03/22/22  -              Time Calculation- PT    Total Timed Code Minutes- PT 0 minute(s)  -            User Key  (r) = Recorded By, (t) = Taken By, (c) = Cosigned By    Initials Name Provider Type     Ani Metz, PT Physical Therapist              Therapy Charges for Today     Code Description Service Date Service Provider Modifiers Qty    82970635957 HC PT EVAL MOD COMPLEXITY 4 3/8/2022 Ani Metz, PT GP 1          PT G-Codes  Outcome Measure Options: AM-PAC 6 Clicks Basic Mobility (PT)  AM-PAC 6 Clicks Score (PT): 8    Ani Metz, PT  3/8/2022

## 2022-03-08 NOTE — PLAN OF CARE
"Goal Outcome Evaluation:   Clinical swallow evaluation was completed on today's date. Pt was lethargic and required verbal cues to maintain alertness. Pt was cooperative. Pt's wife was present during the evaluation. Pt's wife reported that \"he hasn't talked much and he only whispers\" (for about 7 months). Pt's wife also reported his only nutrition comes from boost. He was no eating/drinking anything else prior to hospital admission. Pt was referred to ST d/t swallowing safety concerns. Pt ws only 4L of oxygen via NC during the session. Pt was upright in bed at 90 degrees during trials. The following consistencies were trialed: thin liquids (water via toothette and spoon), and 1 icechip. Pt displayed adequate labial seal around the toothette and spoon. Pt displayed a bite reflex on the toothette and dispayed an ability to pull the liquid from the sponge WFL. During the tootette trials no swallow was initiated on any trials. Pt was cued to swallow and no swallow was initiate. VQ after these triasl appeared \"wet and gurgly\". Pt was given 1 small icechip. Pt displayed good bolus control however no swallow was initiated even when cued by SLP. Pt completed 2 trials of water by spoon (1/2 tsp). Put was able to pull from spoon. Before initiating a swallow on both trials, pt held bolus in mouth. After swallow was initiated pt coughed indicating s/s of penetration/aspiration. No other trials were completed d/t safety concerns and possible s/s of penetration/aspiration couple with pt's history of pneumonia. Alternative methods of nutrition was discussed with pt and pt's wife. Pt did not seem interested and shook his head \"no\".      ST RECOMMENDATIONS: ST recommends that pt remain NPO at this time. Oral care with toothette can be completed PRN with supervision, monitor for s/s of aspiration. ST will continue to follow pt and reassess safety of pt's swallow in anticipation to initiate a PO diet. Plan discussed with pt, pt's " nurse, and pt's wife. All in agreement. Further goals/recommendations to follow.

## 2022-03-08 NOTE — CONSULTS
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 Loredo.  -Hemoptysis and biopsy proven mediastinal mets.    REASON FOR CONSULTATION/CHIEF COMPLAINT:  Hemoptysis.  I was asked to see the patient at the request of the referring provider noted below for advice and recommendations regarding this diagnosis and the role of radiation therapy.                              REQUESTING PHYSICIAN:  Uri Cortes Md  [address] [place] [address]    RECORDS OBTAINED:  Records of the patients history including those obtained from the referring provider were reviewed and summarized in detail.    HISTORY OF PRESENT ILLNESS:  Navneet Lind is a 76 y.o. male who was admitted after ED presentation at [facility] on 03/07/2022 with coughing up black/bloody sputum. He denied fever.  He reported sore throat, shortness of air, and diarrhea.  He reported poor oral intake and his wife reported she was unsure if she could bring him back and forth for outpatient office visits due to his declining physical status.  Chest x-ray was negative for acute findings.  CBC revealed WBC 29.7, hemoglobin 15.2, and platelets 202,000.  Creatinine was normal at 0.87 however BUN was elevated to 41 and sodium was low at 129.  Coags were not elevated. Respiratory viral panel was negative. Pulmonology was consulted with plan for bronchoscopy.  CT chest performed 3/8/2022:    CT chest 03/08/2022: Mediastinal soft tissue AP window mass infiltrating the left mainstem bronchus and indenting the distal thoracic trachea (enlarging since the study on 2/22/22 and 11/10/21).  Prevascular  mediastinal adenopathy has increased compared to 11/10/21.  There is opacification and partial obstruction of the bronchus imtermedius, right middle lobe and right lower lobe bronchi with bronchial wall thickening.  A 5.3 cm mass is seen in the hepatic dome, consistent with metastatic disease.  A 2.3 cm right adrenal nodule is also consistent with adrenal metastasis.Additional CT findings include CABG, TAVR, chronic interstitial  pulmonary fibrosis, left nephrectomy, nonobstructing right renal stone, cholecystectomy, old bilateral rib fractures, old T7 and L1 compression fractures.    CT head 3/2/2022: IMPRESSION: 1. Stable exam, with no acute process demonstrated in the brain.  2. There is stable ventricular dilatation, favored to be due to atrophy.  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    He was admitted from 2/22/2022 for pneumonia and interstitial lung disease with discharge from 3/3/2022.    Bronch on 2/23/2022 with endobronchial biopsy positive for transitional cell carcinoma.      The following portions of the patient's history were reviewed and updated as appropriate: allergies, current medications, past family history, past medical history, past social history, past surgical history and problem list. Reviewed with the patient and remain unchanged.    PAST MEDICAL HISTORY:  he  has a past medical history of Aortic stenosis (9/29/2015), Benign essential HTN, Bundle branch block, right (2/7/2013), CAD (coronary artery disease), native coronary artery, Cancer (HCC), Chronic kidney disease, Coronary artery disease involving native coronary artery of native heart without angina pectoris (11/19/2019), COVID-19 vaccine administered, Essential hypertension (11/19/2019), Heart murmur, History of transfusion, Hyperlipidemia, mixed, ILD (interstitial lung disease) (HCC) (11/1/2021), Injury of back, Mixed hyperlipidemia (11/19/2019), Near syncope, NATALIA (obstructive  sleep apnea), Premature ventricular contractions (2/11/2014), Presence of cardiac pacemaker (7/5/2019), Shortness of breath, and SSS (sick sinus syndrome) ([facility]) (7/5/2019).  MEDICATIONS:   Current Facility-Administered Medications:   •  ampicillin-sulbactam (UNASYN) 3 g in sodium chloride 0.9 % 100 mL IVPB-MBP, 3 g, Intravenous, Q6H, Kat Luz APRN  •  ipratropium-albuterol (DUO-NEB) nebulizer solution 3 mL, 3 mL, Nebulization, Q4H PRN, Massiel Washburn APRN  •  methylPREDNISolone sodium succinate (SOLU-Medrol) injection 40 mg, 40 mg, Intravenous, Q12H, Navneet De León MD  •  pantoprazole (PROTONIX) injection 40 mg, 40 mg, Intravenous, Q AM, Loni Agosto APRN, 40 mg at 03/08/22 1026  •  Pharmacy to dose vancomycin, , Does not apply, Continuous PRN, Massiel Washburn APRN  •  sodium chloride 0.9 % flush 10 mL, 10 mL, Intravenous, PRN, Emergency, Triage Protocol, MD  •  sodium chloride 0.9 % infusion, 75 mL/hr, Intravenous, Continuous, Massiel Washburn APRN, Last Rate: 75 mL/hr at 03/08/22 0306, 75 mL/hr at 03/08/22 0306  •  vancomycin 750 mg in sodium chloride 0.9 % 250 mL IVPB, 750 mg, Intravenous, Q12H, Massiel Washburn APRN, 750 mg at 03/08/22 1025  ALLERGIES: No Known Allergies  PAST SURGICAL HISTORY: he has a past surgical history that includes Pacemaker Implantation (2016); Coronary artery bypass graft (1995); Cardiac catheterization (2018); Echo - Converted (2017); Cardiovascular stress test (2017); nephroureterectomy (Left, 11/2/2020); Portacath placement; Cardiac catheterization (N/A, 3/22/2021); Aortic valve replacement (N/A, 3/30/2021); Aortic valve replacement (N/A, 3/30/2021); Cardiac catheterization (N/A, 6/22/2021); Cardiac catheterization (N/A, 6/22/2021); Cardiac catheterization (N/A, 6/22/2021); and Bronchoscopy (N/A, 2/23/2022).  PREVIOUS RADIOTHERAPY OR CHEMOTHERAPY:Prior chemo and  immunotherapy  FAMILY HISTORY: his family history includes Heart  "attack in his father; Heart disease in his father; Heart failure in his father; No Known Problems in his brother, mother, and sister.  SOCIAL HISTORY: he  reports that he quit smoking about 8 years ago. He quit after 5.00 years of use. He has never used smokeless tobacco. He reports current alcohol use of about 1.0 standard drink of alcohol per week. He reports that he does not use drugs.  PAIN AND PAIN MANAGEMENT: no pain.  Vitals:    03/08/22 0020 03/08/22 0330 03/08/22 0818 03/08/22 1625   BP:  116/78 112/76 126/84   BP Location:  Right arm Right arm Right arm   Patient Position:  Lying Lying Lying   Pulse:  108 105 99   Resp:  27 25 23   Temp:  97.4 °F (36.3 °C)  95.4 °F (35.2 °C)   TempSrc:  Axillary  Oral   SpO2:  95% 97% 93%   Weight: 64.8 kg (142 lb 14.4 oz)      Height: 172.7 cm (67.99\")        NUTRITIONAL STATUS:  no issues  KPS: 60-70  PHQ-9 Total Score: distress tool completed.    Review of Systems: +hemoptysis  General: No fevers, chills, weight change, or drenching night sweats. Skin: No rashes or jaundice.  HEENT: No change in vision or hearing, no headaches.  Neck: No dysphagia or masses.  Heme/Lymph: No easy bruising or bleeding.  Respiratory System: as noted above.  Cardiovascular: No chest pain, palpitations, or dyspnea on exertion. - Pacemaker. GI: No nausea, vomiting, diarrhea, melena, or hematochezia.  : No dysuria or hematuria.  Endocrine: No heat or cold intolerance. Musculoskeletal: No myalgias or arthralgias.  Neuro: No weakness, numbness, syncope, or seizures. Psych: No mood changes or depression. Ext: Denies swelling.        Objective     Vitals:  Vitals:    03/08/22 0020 03/08/22 0330 03/08/22 0818 03/08/22 1625   BP:  116/78 112/76 126/84   BP Location:  Right arm Right arm Right arm   Patient Position:  Lying Lying Lying   Pulse:  108 105 99   Resp:  27 25 23   Temp:  97.4 °F (36.3 °C)  95.4 °F (35.2 °C)   TempSrc:  Axillary  Oral   SpO2:  95% 97% 93%   Weight: 64.8 kg (142 lb 14.4 " "oz)      Height: 172.7 cm (67.99\")          PHYSICAL EXAM:  GENERAL: in no apparent distress, sitting comfortably in room.    HEENT: normocephalic, atraumatic. Pupils are equal, round, reactive to light. Sclera anicteric. Conjunctiva not injected. Oropharynx without erythema, ulcerations or thrush.   NECK: Supple with no masses.  LYMPHATIC: no cervical, supraclavicular or axillary adenopathy appreciated bilaterally.   CARDIOVASCULAR: S1 & S2 detected; no murmurs, rubs or gallops.  CHEST: clear to auscultation bilaterally; no wheezes, crackles or rubs. Work of breathing normal.  ABDOMEN: bowel sounds present. Abdomen is soft, nontender, nondistended.   MUSCULOSKELETAL: no tenderness to palpation along the spine or scapulae. Normal range of motion.  EXTREMITIES: no clubbing, cyanosis, edema.  SKIN: no erythema, rashes, ulcerations noted.   NEUROLOGIC: cranial nerves II-XII grossly intact bilaterally. No focal neurologic deficits.  PSYCHIATRIC:  alert, aware, and appropriate.      PERTINENT IMAGING/PATHOLOGY/LABS (Medical Decision Making):     COORDINATION OF CARE: A copy of this note is sent to the referring provider.    PATHOLOGY (Reviewed): as noted above    IMAGING (Reviewed): as noted above.    LABS (Reviewed):  Hematology WBC   Date Value Ref Range Status   03/08/2022 25.10 (H) 3.40 - 10.80 10*3/mm3 Final     RBC   Date Value Ref Range Status   03/08/2022 5.08 4.14 - 5.80 10*6/mm3 Final     Hemoglobin   Date Value Ref Range Status   03/08/2022 15.9 13.0 - 17.7 g/dL Final     Hematocrit   Date Value Ref Range Status   03/08/2022 47.6 37.5 - 51.0 % Final     Platelets   Date Value Ref Range Status   03/08/2022 164 140 - 450 10*3/mm3 Final      Chemistry   Lab Results   Component Value Date    GLUCOSE 123 (H) 03/08/2022    BUN 42 (H) 03/08/2022    CREATININE 0.78 03/08/2022    EGFRIFNONA >150 02/27/2022    EGFRIFAFRI >60 08/27/2018    BCR 53.8 (H) 03/08/2022    K 5.5 (H) 03/08/2022    CO2 22.0 03/08/2022    CALCIUM " 9.5 03/08/2022    PROTENTOTREF 7.7 08/09/2021    ALBUMIN 2.00 (L) 03/08/2022    LABIL2 0.5 (L) 08/09/2021    AST 32 03/08/2022    ALT 83 (H) 03/08/2022       Assessment/Plan     ASSESSMENT AND PLAN:  1. Sepsis, due to unspecified organism, unspecified whether acute organ dysfunction present (HCC)    2. Pneumonia due to infectious organism, unspecified laterality, unspecified part of lung    3. Hemoptysis    4. Leukocytosis, unspecified type    5. ILD (interstitial lung disease) (HCC)       Stage IV RCC (transitional cell carcinoma)  --Lung mass infiltrating the L main stem, mediastinal mass in the AP window, enlarging.  Bronch 2/23/22 showed endobronchial biopsy positive for transitional cell carcinoma.    -repeat bronch today.  -further hemoptysis    Will offer 30 Gy in 10 fractions to the AP window/L main area to help with bleeding and symptoms.  CT sim over at the hospital on 3/9/2022 with potential first treatment also on 03/09/2022.    Spoke with his wife Clifford by phone and explained all and got a more complete history and she will meet back with him in the AM but does prefer the treatment option.    This assessment comes from my review of the imaging, pathology, physician notes and other pertinent information as mentioned.    DISPOSITION: Palliative XRT recommended.             CC: Uri Cortes* Uri Cortes MD Naveed Chowhan, MD John A Cox, MD  3/8/2022  6:13 PM EST

## 2022-03-08 NOTE — THERAPY EVALUATION
Patient Name: Navneet Lind  : 1945    MRN: [id number]                              Today's Date: 3/8/2022       Admit Date: 3/7/2022    Visit Dx:     ICD-10-CM ICD-9-CM   1. Sepsis, due to unspecified organism, unspecified whether acute organ dysfunction present ([facility])  A41.9 038.9     995.91   2. Pneumonia due to infectious organism, unspecified laterality, unspecified part of lung  J18.9 486   3. Hemoptysis  R04.2 786.30   4. Leukocytosis, unspecified type  D72.829 288.60   5. ILD (interstitial lung disease) ([facility])  J84.9 515     Patient Active Problem List   Diagnosis   • Presence of cardiac pacemaker   • Bundle branch block, right   • Coronary artery disease involving native coronary artery of native heart without angina pectoris   • Shortness of breath   • Heart murmur   • Mixed hyperlipidemia   • Primary hypertension   • Impotence of organic origin   • Premature ventricular contractions   • NATALIA on CPAP   • Preop cardiovascular exam   • Anemia   • H/O radical nephrectomy, left   • Pre-op examination   • Abnormal myocardial perfusion study   • Chronic diastolic congestive heart failure (HCC)   • S/P TAVR (transcatheter aortic valve replacement)   • S/P kyphoplasty   • Bladder carcinoma ([facility])   • ILD (interstitial lung disease) ([facility])   • Hemoptysis   • Sepsis without septic shock ([facility])   • Pneumonia due to infectious organism   • Sepsis, due to unspecified organism, unspecified whether acute organ dysfunction present ([facility])     Past Medical History:   Diagnosis Date   • Aortic stenosis 2015    Per Echo 2017   • Benign essential HTN    • Bundle branch block, right 2013   • CAD (coronary artery disease), native coronary artery    • Cancer ([facility])     bladder- chemo, new left renal mass   • Chronic kidney disease    • Coronary artery disease involving native coronary artery of native heart without angina pectoris 2019    CABG ; SVG to RCA is occluded, LIMA to LAD, SVG to diag of LAD, SVG to  marginal of LCX   • COVID-19 vaccine administered    • Essential hypertension 11/19/2019   • Heart murmur    • History of transfusion    • Hyperlipidemia, mixed    • ILD (interstitial lung disease) ([facility]) 11/1/2021   • Injury of back    • Mixed hyperlipidemia 11/19/2019   • Near syncope    • NATALIA (obstructive sleep apnea)     AHI 11.6/h, CPAP   • Premature ventricular contractions 2/11/2014   • Presence of cardiac pacemaker 7/5/2019    BS dual chamber PM 11/7/2016   • Shortness of breath    • SSS (sick sinus syndrome) ([facility]) 7/5/2019     Past Surgical History:   Procedure Laterality Date   • AORTIC VALVE REPAIR/REPLACEMENT N/A 3/30/2021    Procedure: TTE TRANSFEMORAL TRANSCATHETER AORTIC VALVE REPLACEMENT PERCUTANEOUS APPROACH;  Surgeon: Hang Martinez MD;  Location: [facility] OR 18/19;  Service: Cardiothoracic;  Laterality: N/A;   • AORTIC VALVE REPAIR/REPLACEMENT N/A 3/30/2021    Procedure: Transfemoral Transcatheter Aortic Valve Replacement w/Intra Op TTE and Possible Open Rescue Surgery;  Surgeon: Andesron Galvez MD;  Location: [facility] OR 18/19;  Service: Cardiovascular;  Laterality: N/A;   • BRONCHOSCOPY N/A 2/23/2022    Procedure: BRONCHOSCOPY WITH BRONCHOALVEOLAR LAVAGE AND BIOPSY X1 AREA;  Surgeon: Ronny Jiang MD;  Location: [facility] ENDOSCOPY;  Service: Pulmonary;  Laterality: N/A;  blood clot in lung, hemoptysis   • CARDIAC CATHETERIZATION  2018   • CARDIAC CATHETERIZATION N/A 3/22/2021    Procedure: Left Heart Cath;  Surgeon: ADRIANO Erazo MD;  Location: [facility] CATH INVASIVE LOCATION;  Service: Cardiovascular;  Laterality: N/A;   • CARDIAC CATHETERIZATION N/A 6/22/2021    Procedure: RENAL ANGIOGRAPHY;  Surgeon: Jesus Bhagat MD;  Location: [facility] CATH INVASIVE LOCATION;  Service: Cardiovascular;  Laterality: N/A;   • CARDIAC CATHETERIZATION N/A 6/22/2021    Procedure: Stent BMS peripheral;  Surgeon: Jesus Bhagat MD;  Location: [facility] CATH INVASIVE  LOCATION;  Service: Cardiovascular;  Laterality: N/A;   rt renal   • CARDIAC CATHETERIZATION N/A 6/22/2021    Procedure: Angioplasty-peripheral;  Surgeon: Jesus Bhagat MD;  Location: [facility] CATH INVASIVE LOCATION;  Service: Cardiovascular;  Laterality: N/A;   rt renal   • CARDIOVASCULAR STRESS TEST  2017   • CORONARY ARTERY BYPASS GRAFT  1995   • ECHO - CONVERTED  2017   • NEPHROURETERECTOMY Left 11/2/2020    Procedure: NEPHROURETERECTOMY;  Surgeon: Rogers Bae MD;  Location: [facility] MAIN OR;  Service: Urology;  Laterality: Left;   • PACEMAKER IMPLANTATION  2016    bs   • PORTACATH PLACEMENT        General Information     Row Name 03/08/22 1401          OT Time and Intention    Document Type evaluation  -SR     Mode of Treatment occupational therapy  -SR     Row Name 03/08/22 1401          General Information    Existing Precautions/Restrictions fall;oxygen therapy device and L/min  -SR     Row Name 03/08/22 1401          Occupational Profile    Reason for Services/Referral (Occupational Profile) 77 y/o M who presented with increased SOA, cough and recurrent hemoptysis and interstitial lung disease. Pt admitted from home. PMHx significant for acute-on-chronic RF, Bladder CA, Sepsis.  -SR     Row Name 03/08/22 1401          Cognition    Orientation Status (Cognition) oriented to;person;disoriented to;place;situation;time  -SR     Row Name 03/08/22 1401          Safety Issues, Functional Mobility    Impairments Affecting Function (Mobility) balance;cognition;endurance/activity tolerance;shortness of breath;strength;postural/trunk control  -SR           User Key  (r) = Recorded By, (t) = Taken By, (c) = Cosigned By    Initials Name Provider Type    SR Gladys Carver OT Occupational Therapist                 Mobility/ADL's     Row Name 03/08/22 1402          Bed Mobility    Bed Mobility bed mobility (all) activities  -SR     All Activities, [place] (Bed Mobility) maximum assist (25%  patient effort);2 person assist  -SR     Row Name 03/08/22 1402          Transfers    Sit-Stand [place] (Transfers) moderate assist (50% patient effort);2 person assist  -SR     Row Name 03/08/22 1402          Sit-Stand Transfer    Assistive Device (Sit-Stand Transfers) walker, front-wheeled  -SR     Row Name 03/08/22 1402          Activities of Daily Living    BADL Assessment/Intervention feeding  -SR     Row Name 03/08/22 1402          Self-Feeding Assessment/Training    [place] Level (Feeding) liquids to mouth;minimum assist (75% patient effort)  -SR           User Key  (r) = Recorded By, (t) = Taken By, (c) = Cosigned By    Initials Name Provider Type    SR Gladys Carver OT Occupational Therapist               Obj/Interventions     Row Name 03/08/22 1409          Balance    Static Sitting Balance moderate assist  -SR     Static Standing Balance maximum assist  -SR     Balance Interventions sitting;standing;sit to stand;supported;static;dynamic;minimal challenge  -SR           User Key  (r) = Recorded By, (t) = Taken By, (c) = Cosigned By    Initials Name Provider Type    SR Gladys Carver OT Occupational Therapist               Goals/Plan     Row Name 03/08/22 1414          Toileting Goal 1 (OT)    Activity/Device (Toileting Goal 1, OT) toileting skills, all  -SR     [place] Level/Cues Needed (Toileting Goal 1, OT) moderate assist (50-74% patient effort)  -SR     Time Frame (Toileting Goal 1, OT) 2 weeks  -SR     Row Name 03/08/22 1414          Grooming Goal 1 (OT)    Activity/Device (Grooming Goal 1, OT) grooming skills, all  -SR     [place] (Grooming Goal 1, OT) minimum assist (75% or more patient effort)  -SR     Time Frame (Grooming Goal 1, OT) 2 weeks  -SR     Row Name 03/08/22 1414          Therapy Assessment/Plan (OT)    Planned Therapy Interventions (OT) activity tolerance training;BADL retraining;functional balance retraining;occupation/activity based  interventions;strengthening exercise;transfer/mobility retraining  -SR           User Key  (r) = Recorded By, (t) = Taken By, (c) = Cosigned By    Initials Name Provider Type    SR Gladys Carver OT Occupational Therapist               Clinical Impression     Row Name 03/08/22 1410          Pain Scale: FACES Pre/Post-Treatment    Pain: FACES Scale, Pretreatment 2-->hurts little bit  -SR     Posttreatment Pain Rating 2-->hurts little bit  -SR     Row Name 03/08/22 1410          Plan of Care Review    Outcome Evaluation 77 y/o M who presented with increased SOA, cough and recurrent hemoptysis and interstitial lung disease. Pt admitted from home. Pt recommended IP rehab last admission but pt d/c'ed home.  PMHx significant for acute-on-chronic RF, Bladder CA, Sepsis.  He is slighty confused and has difficulty with vocalizations.  Becomes easily fatigued and SOA.  Pt has rattling lungs and cough.  He demos significant weakness and requires mod/max assist for all mobility.  Able to bring drink to mouth with min assist due to weakness.  He will require IP rehab at discharge.  -SR     Row Name 03/08/22 1410          Therapy Assessment/Plan (OT)    Rehab Potential (OT) good, to achieve stated therapy goals  -SR     Therapy Frequency (OT) 3 times/wk  -SR     Predicted Duration of Therapy Intervention (OT) Until discharge  -SR     Row Name 03/08/22 1410          Therapy Plan Review/Discharge Plan (OT)    Anticipated Discharge Disposition (OT) inpatient rehabilitation facility  -SR     Row Name 03/08/22 1410          Positioning and Restraints    Pre-Treatment Position in bed  -SR     Post Treatment Position bed  -SR     In Bed exit alarm on;call light within reach  -SR           User Key  (r) = Recorded By, (t) = Taken By, (c) = Cosigned By    Initials Name Provider Type    SR Gladys Carver, OT Occupational Therapist               Outcome Measures     Row Name 03/08/22 1415          How much help from another  is currently needed...    Putting on and taking off regular lower body clothing? 1  -SR     Bathing (including washing, rinsing, and drying) 1  -SR     Toileting (which includes using toilet bed pan or urinal) 1  -SR     Putting on and taking off regular upper body clothing 1  -SR     Taking care of personal grooming (such as brushing teeth) 2  -SR     Eating meals 3  -SR     AM-PAC 6 Clicks Score (OT) 9  -SR     Row Name 03/08/22 1311          How much help from another person do you currently need...    Turning from your back to your side while in flat bed without using bedrails? 2  -SS     Moving from lying on back to sitting on the side of a flat bed without bedrails? 2  -SS     Moving to and from a bed to a chair (including a wheelchair)? 1  -SS     Standing up from a chair using your arms (e.g., wheelchair, bedside chair)? 1  -SS     Climbing 3-5 steps with a railing? 1  -SS     To walk in hospital room? 1  -SS     AM-PAC 6 Clicks Score (PT) 8  -SS     Row Name 03/08/22 1415 03/08/22 1311       Functional Assessment    Outcome Measure Options AM-PAC 6 Clicks Daily Activity (OT)  -SR AM-PAC 6 Clicks Basic Mobility (PT)  -SS          User Key  (r) = Recorded By, (t) = Taken By, (c) = Cosigned By    Initials Name Provider Type    SS Ani Metz, PT Physical Therapist    SR Gladys Carver, OT Occupational Therapist                Occupational Therapy Education                 Title: PT OT SLP Therapies (In Progress)     Topic: Occupational Therapy (Not Started)     Point: ADL training (Not Started)     Description:   Instruct learner(s) on proper safety adaptation and remediation techniques during self care or transfers.   Instruct in proper use of assistive devices.              Learner Progress:  Not documented in this visit.          Point: Home exercise program (Not Started)     Description:   Instruct learner(s) on appropriate technique for monitoring, assisting and/or progressing therapeutic  exercises/activities.              Learner Progress:  Not documented in this visit.          Point: Precautions (Not Started)     Description:   Instruct learner(s) on prescribed precautions during self-care and functional transfers.              Learner Progress:  Not documented in this visit.          Point: Body mechanics (Not Started)     Description:   Instruct learner(s) on proper positioning and spine alignment during self-care, functional mobility activities and/or exercises.              Learner Progress:  Not documented in this visit.                          OT Recommendation and Plan  Planned Therapy Interventions (OT): activity tolerance training, BADL retraining, functional balance retraining, occupation/activity based interventions, strengthening exercise, transfer/mobility retraining  Therapy Frequency (OT): 3 times/wk  Plan of Care Review  Outcome Evaluation: 77 y/o M who presented with increased SOA, cough and recurrent hemoptysis and interstitial lung disease. Pt admitted from home. Pt recommended IP rehab last admission but pt d/c'ed home.  PMHx significant for acute-on-chronic RF, Bladder CA, Sepsis.  He is slighty confused and has difficulty with vocalizations.  Becomes easily fatigued and SOA.  Pt has rattling lungs and cough.  He demos significant weakness and requires mod/max assist for all mobility.  Able to bring drink to mouth with min assist due to weakness.  He will require IP rehab at discharge.     Time Calculation:    Time Calculation- OT     Row Name 03/08/22 1416             Time Calculation- OT    OT Start Time 1113  -SR      OT Stop Time 1135  -SR      OT Time Calculation (min) 22 min  -SR      Total Timed Code Minutes- OT 0 minute(s)  -SR      OT Received On 03/08/22  -SR      OT - Next Appointment 03/10/22  -SR      OT Goal Re-Cert Due Date 03/22/22  -SR            User Key  (r) = Recorded By, (t) = Taken By, (c) = Cosigned By    Initials Name Provider Type    SR Carver  Gladys CUNN[place], OT Occupational Therapist              Therapy Charges for Today     Code Description Service Date Service Provider Modifiers Qty    33126789122 HC OT EVAL MOD COMPLEXITY 3 3/8/2022 Gladys Carver, OT GO 1               Gladys Carver OT  3/8/2022

## 2022-03-08 NOTE — PLAN OF CARE
Goal Outcome Evaluation:  Plan of Care Reviewed With: other (see comments)   Patient admitted to room 209 this evening from ED.  Patient transferred from stretcher to bed by staff.  Patient unable to assist with transfer.  Patient is not able to voice wants and needs to nurse.  He is NPO at this time related to swallowing difficulty.  Patient has bilateral crackles in lungs. Patient respirations elevated and patient is currently on supplemental oxygen at 4 Lpm.  Oral care provided and chlorhexidine bath given related to implanted port to the right upper chest.  Member of the Hospital medical team on unit to see patient this evening.  Agrees with nurse determination to have patient NPO and to be evaluated by therapy for swallow evaluation.  Will continue to monitor thru shift.      Progress: declining

## 2022-03-08 NOTE — CONSULTS
Hematology/Oncology Inpatient Consultation    Patient name: Navneet Lind  : 1945  MRN: [id number]  Referring Provider: Navneet De León MD  Reason for Consultation:     Chief complaint: Hemoptysis    History of present illness:    76 y.o. male admitted to [facility] through the ED on 3/7/2022 where he reported coughing up black bloody sputum since recent discharge.  He denied fever.  He reported sore throat, shortness of air, and diarrhea.  He reported poor oral intake and his wife reported she was unsure if she could bring him back and forth for outpatient office visits due to his declining physical status.  Chest x-ray was negative for acute findings.  CBC revealed WBC 29.7, hemoglobin 15.2, and platelets 202,000.  Creatinine was normal at 0.87 however BUN was elevated to 41 and sodium was low at 129.  Coags were not elevated.  Respiratory viral panel was negative. Pulmonology was consulted with plan for bronchoscopy.  CT chest performed 3/8/2022 showed mediastinal AP window soft tissue mass infiltrating the left mainstem bronchus and mildly indenting the distal thoracic trachea felt enlarged and increasing lymphadenopathy since 2021 exam.  There was opacification and partial obstruction of the bronchus intermedius, RML and RLL bronchi with bronchial wall thickening as well as partial atelectasis versus pneumonia of the RML and RLL lobes.  There was a 5.3 cm liver lesion in the hepatic dome, and a 2.3 cm right adrenal metastatic lesion.    22  Hematology/Oncology was consulted by Dr. De León as the patient is known to our service for metastatic transitional cell renal carcinoma.  He had been diagnosed with stage III disease in 2020 at time of left nephrectomy.  He received adjuvant chemotherapy that was complicated by poor tolerance with cytopenias requiring dose reductions and the patient chose to forego day 8 of cycle 4 in order to undergo heart valve surgery.  Cycle  4-day 1 chemotherapy was given 3/9/2021.  He was also anemic at time of diagnosis November 2020 with iron deficiency and folate deficiencies noted on workup..  In July 2021 CT chest was negative for metastatic disease however 11/10/2021 CT chest, abdomen, and pelvis showed mediastinal lymphadenopathy and a left retroperitoneal lymph node.  At that time he also had a T7 compression fracture with significant pain requiring evaluation for kyphoplasty.  He underwent left periaortic lymph node biopsy on 12/22/2021 revealing metastatic transitional cell carcinoma.  He was hospitalized in late December 2021 with pneumonia followed by discharge to rehab.  He was seen in follow-up by us in January 2022 at which time mutual decision with the patient was made to start pembrolizumab as he was not a candidate for aggressive therapy secondary to poor ECOG status with recent hospitalization and weight loss as well as patient did not want to pursue chemotherapy.  His first dose was given 1/21/2022 and second dose 2/10/2022.  He was evaluated with the infusion visit 2/10/2022 at which time he reported no improvement of ECOG status to date.  · He had been seen by our service during recent hospitalization 2/22/2022-3/3/2022 where he was admitted with hemoptysis that started the morning of admission described as significant in amount covering his shirt and being bright red.  He was on aspirin but no anticoagulation. A CT PE protocol was negative for PE and showed increased prevascular lymphadenopathy as compared with November 2021 outside CT with the largest measuring 2.0 x 1.1 cm.  There was evidence of interstitial fibrosis and bronchiectasis, hepatic steatosis, and T7 compression fracture with evidence of kyphoplasty.  CBC revealed WBC 16.5, hemoglobin 14.8, and platelets 321,000.  Creatinine was 0.87 and LFTs were not elevated.  PT was 11.9 (9.6-11.7) and PTT was 26.9 (24-31).  D-dimer was 3.15 (0-0.59).  Respiratory viral panel  and COVID-19 screen were negative.  Blood cultures were collected and final results were negative.  ANCA panel was negative.  He was started on antibiotics and supportive care. On 2/23/2022 he underwent bronchoscopy by Dr. Jiang with findings of an abnormal lesion in the left mainstem which was biopsied and a blood clot in the left mainstem which was removed prior to BAL.  Pathology on left mainstem lung biopsy revealed metastatic urothelial (transitional) cell carcinoma.  BAL cytology and cultures were negative.  Hemoptysis resolved and post admission hemoglobin had dropped to 11.8 on 2/25/2022 with subsequent improvement.  Our service was consulted on 3/2/2022.  Hemoptysis had resolved.  CT head with and without contrast was negative for metastatic disease with stable atrophy and old right thalamic lacunar infarct.  Hepatitis panel performed due to elevation of LFTs was nonreactive.  We had discussed that he had received 2 cycles of pembrolizumab todate and it was too early to know the treatment effects.  We had discussed life expectancy with and without continue treatment and poor prognosis given declining performance status.  Plan was to consider radiation therapy if hemoptysis recurred.  Plan was also to resume outpatient immunotherapy should his performance status improve with consideration of palliative care or hospice.    PCP: Uri Cortes MD    History:  Past Medical History:   Diagnosis Date   • Aortic stenosis 9/29/2015    Per Echo 2017   • Benign essential HTN    • Bundle branch block, right 2/7/2013   • CAD (coronary artery disease), native coronary artery    • Cancer (HCC)     bladder- chemo, new left renal mass   • Chronic kidney disease    • Coronary artery disease involving native coronary artery of native heart without angina pectoris 11/19/2019    CABG 1995; SVG to RCA is occluded, LIMA to LAD, SVG to diag of LAD, SVG to marginal of LCX   • COVID-19 vaccine administered    •  Essential hypertension 11/19/2019   • Heart murmur    • History of transfusion    • Hyperlipidemia, mixed    • ILD (interstitial lung disease) ([facility]) 11/1/2021   • Injury of back    • Mixed hyperlipidemia 11/19/2019   • Near syncope    • NATALIA (obstructive sleep apnea)     AHI 11.6/h, CPAP   • Premature ventricular contractions 2/11/2014   • Presence of cardiac pacemaker 7/5/2019    BS dual chamber PM 11/7/2016   • Shortness of breath    • SSS (sick sinus syndrome) ([facility]) 7/5/2019   ,   Past Surgical History:   Procedure Laterality Date   • AORTIC VALVE REPAIR/REPLACEMENT N/A 3/30/2021    Procedure: TTE TRANSFEMORAL TRANSCATHETER AORTIC VALVE REPLACEMENT PERCUTANEOUS APPROACH;  Surgeon: Hang Martinez MD;  Location: [facility] OR 18/19;  Service: Cardiothoracic;  Laterality: N/A;   • AORTIC VALVE REPAIR/REPLACEMENT N/A 3/30/2021    Procedure: Transfemoral Transcatheter Aortic Valve Replacement w/Intra Op TTE and Possible Open Rescue Surgery;  Surgeon: Anderson Galvez MD;  Location: [facility] OR 18/19;  Service: Cardiovascular;  Laterality: N/A;   • BRONCHOSCOPY N/A 2/23/2022    Procedure: BRONCHOSCOPY WITH BRONCHOALVEOLAR LAVAGE AND BIOPSY X1 AREA;  Surgeon: Ronny Jiang MD;  Location: [facility] ENDOSCOPY;  Service: Pulmonary;  Laterality: N/A;  blood clot in lung, hemoptysis   • CARDIAC CATHETERIZATION  2018   • CARDIAC CATHETERIZATION N/A 3/22/2021    Procedure: Left Heart Cath;  Surgeon: ADRIANO Erazo MD;  Location: [facility] CATH INVASIVE LOCATION;  Service: Cardiovascular;  Laterality: N/A;   • CARDIAC CATHETERIZATION N/A 6/22/2021    Procedure: RENAL ANGIOGRAPHY;  Surgeon: Jesus Bhagat MD;  Location: [facility] CATH INVASIVE LOCATION;  Service: Cardiovascular;  Laterality: N/A;   • CARDIAC CATHETERIZATION N/A 6/22/2021    Procedure: Stent BMS peripheral;  Surgeon: Jesus Bhagat MD;  Location: [facility] CATH INVASIVE LOCATION;  Service: Cardiovascular;  Laterality: N/A;   rt  renal   • CARDIAC CATHETERIZATION N/A 2021    Procedure: Angioplasty-peripheral;  Surgeon: Jesus Bhagat MD;  Location: [facility] CATH INVASIVE LOCATION;  Service: Cardiovascular;  Laterality: N/A;   rt renal   • CARDIOVASCULAR STRESS TEST     • CORONARY ARTERY BYPASS GRAFT     • ECHO - CONVERTED     • NEPHROURETERECTOMY Left 2020    Procedure: NEPHROURETERECTOMY;  Surgeon: Rogers Bae MD;  Location: [facility] MAIN OR;  Service: Urology;  Laterality: Left;   • PACEMAKER IMPLANTATION      bs   • PORTACATH PLACEMENT     ,   Family History   Problem Relation Age of Onset   • No Known Problems Mother    • Heart disease Father    • Heart failure Father    • Heart attack Father    • No Known Problems Sister    • No Known Problems Brother    • Malig Hyperthermia Neg Hx    ,   Social History     Tobacco Use   • Smoking status: Former Smoker     Years: 5.00     Quit date:      Years since quittin.1   • Smokeless tobacco: Never Used   Vaping Use   • Vaping Use: Never used   Substance Use Topics   • Alcohol use: Yes     Alcohol/week: 1.0 standard drink     Types: 1 Shots of liquor per week     Comment: RARE   • Drug use: Never   ,   Medications Prior to Admission   Medication Sig Dispense Refill Last Dose   • acetaminophen (TYLENOL) 500 MG tablet Take 1,000 mg by mouth Every 6 (Six) Hours As Needed for Mild Pain .   Unknown at Unknown time   • albuterol sulfate  (90 Base) MCG/ACT inhaler Inhale 2 puffs Every 4 (Four) Hours As Needed for Wheezing. 18 g 2 Unknown at Unknown time   • aspirin 81 MG EC tablet Take 81 mg by mouth Daily.   Unknown at Unknown time   • budesonide-formoterol (Symbicort) 160-4.5 MCG/ACT inhaler Inhale 2 puffs 2 (Two) Times a Day for 90 days. 10.2 g 2 Unknown at Unknown time   • Cholecalciferol (Vitamin D3) 50 MCG (2000 UT) tablet Take 2,000 Units by mouth Daily.   Unknown at Unknown time   • escitalopram (LEXAPRO) 5 MG tablet Take 5 mg by mouth Daily.    Unknown at Unknown time   • famotidine (PEPCID) 40 MG tablet Take 40 mg by mouth Every Night.   Unknown at Unknown time   • folic acid (FOLVITE) 400 MCG tablet Take 400 mcg by mouth Daily.   Unknown at Unknown time   • gabapentin (NEURONTIN) 100 MG capsule Take 100 mg by mouth 2 (Two) Times a Day.   Unknown at Unknown time   • HYDROcodone-acetaminophen (NORCO) 7.5-325 MG per tablet Take 1 tablet by mouth Every 6 (Six) Hours As Needed for Moderate Pain .   Unknown at Unknown time   • megestrol (MEGACE) 40 MG/ML suspension Take 200 mg by mouth 3 (Three) Times a Day With Meals.   Unknown at Unknown time   • melatonin 5 MG tablet tablet Take 5 mg by mouth.   Unknown at Unknown time   • NON FORMULARY / PATIENT SUPPLIED MEDICATION Take 100 mg by mouth Daily. OFEV-Nintedanib   Unknown at Unknown time   • omeprazole (priLOSEC) 40 MG capsule Take 40 mg by mouth Daily.   Unknown at Unknown time   • ondansetron (ZOFRAN) 4 MG tablet Take 4 mg by mouth Every 8 (Eight) Hours As Needed.   Unknown at Unknown time   • polyethylene glycol (MIRALAX) 17 g packet Take 17 g by mouth Daily As Needed.   Unknown at Unknown time   • polyethylene glycol (MIRALAX) 17 GM/SCOOP powder mix 1 capful (17 g) in liquid by mouth Daily for 90 days. 510 g 2 Unknown at Unknown time   • promethazine (PHENERGAN) 25 MG tablet Take 25 mg by mouth Every 4 (Four) Hours As Needed for Nausea or Vomiting.   Unknown at Unknown time   • psyllium (METAMUCIL MULTIHEALTH FIBER) 58.12 % packet Take 1 packet by mouth Daily. 30 packet 0 Unknown at Unknown time   , Scheduled Meds:  cefepime, 2 g, Intravenous, Q8H  methylPREDNISolone sodium succinate, 40 mg, Intravenous, Q8H  pantoprazole, 40 mg, Intravenous, Q AM  vancomycin, 750 mg, Intravenous, Q12H    , Continuous Infusions:  Pharmacy to dose vancomycin,   sodium chloride, 75 mL/hr, Last Rate: 75 mL/hr (03/08/22 0306)    , PRN Meds:  ipratropium-albuterol  •  Pharmacy to dose vancomycin  •  sodium chloride  "  Allergies:  Patient has no known allergies.    ROS:  Review of Systems   Constitutional: Positive for appetite change (Not eating or drinking much). Negative for activity change, chills, fatigue, fever and unexpected weight change.   HENT: Positive for sore throat. Negative for congestion, dental problem, hearing loss, mouth sores, nosebleeds and trouble swallowing.    Eyes: Negative for photophobia and visual disturbance.   Respiratory: Positive for cough (Productive of black color to hemoptysis since last discharge) and shortness of breath. Negative for chest tightness.    Cardiovascular: Negative for chest pain, palpitations and leg swelling.   Gastrointestinal: Positive for diarrhea. Negative for abdominal distention, abdominal pain, blood in stool, constipation, nausea and vomiting.   Endocrine: Negative for cold intolerance and heat intolerance.   Genitourinary: Negative for decreased urine volume, difficulty urinating, dysuria, frequency, hematuria and urgency.   Musculoskeletal: Negative for arthralgias and gait problem.   Skin: Negative for rash and wound.   Neurological: Positive for weakness. Negative for dizziness, tremors, light-headedness, numbness and headaches.   Hematological: Negative for adenopathy. Does not bruise/bleed easily.   Psychiatric/Behavioral: Negative for confusion and hallucinations. The patient is not nervous/anxious.    All other systems reviewed and are negative.       Objective     Vital Signs:   /76 (BP Location: Right arm, Patient Position: Lying)   Pulse 105   Temp 97.4 °F (36.3 °C) (Axillary)   Resp 25   Ht 172.7 cm (67.99\")   Wt 64.8 kg (142 lb 14.4 oz)   SpO2 97%   BMI 21.73 kg/m²     Physical Exam:  Physical Exam  Vitals and nursing note reviewed.   Constitutional:       General: He is not in acute distress.     Appearance: Normal appearance. He is well-developed. He is not diaphoretic.   HENT:      Head: Normocephalic and atraumatic.      Comments: " Alopecia     Nose: Nose normal.      Comments: O2 per NC     Mouth/Throat:      Mouth: Mucous membranes are moist.      Pharynx: Oropharynx is clear. No oropharyngeal exudate or posterior oropharyngeal erythema.      Comments: Dental fillings  Eyes:      General: No scleral icterus.     Extraocular Movements: Extraocular movements intact.      Conjunctiva/sclera: Conjunctivae normal.      Pupils: Pupils are equal, round, and reactive to light.   Cardiovascular:      Rate and Rhythm: Regular rhythm. Tachycardia present.      Heart sounds: Normal heart sounds. No murmur heard.     Comments: Cardiac monitor leads.  Right chest wall [id number].  Left chest wall pacemaker.  Pulmonary:      Effort: Pulmonary effort is normal. No respiratory distress.      Breath sounds: Normal breath sounds. No wheezing or rales.   Chest:   Breasts:      Right: No supraclavicular adenopathy.      Left: No supraclavicular adenopathy.       Abdominal:      General: Bowel sounds are normal. There is no distension.      Palpations: Abdomen is soft. There is no mass.      Tenderness: There is no abdominal tenderness. There is no guarding.   Genitourinary:     Comments: Deferred   Musculoskeletal:         General: No swelling, tenderness or deformity. Normal range of motion.      Cervical back: Normal range of motion and neck supple.      Right lower leg: No edema.      Left lower leg: No edema.      Comments: Left hand O2 monitor.  BLE SCDs.   Lymphadenopathy:      Cervical: No cervical adenopathy.      Upper Body:      Right upper body: No supraclavicular adenopathy.      Left upper body: No supraclavicular adenopathy.   Skin:     General: Skin is warm and dry.      Coloration: Skin is not pale.      Findings: No bruising, erythema or rash.   Neurological:      General: No focal deficit present.      Mental Status: He is alert and oriented to person, place, and time.      Coordination: Coordination normal.   Psychiatric:         Mood and  "Affect: Mood normal.         Behavior: Behavior normal.         Thought Content: Thought content normal.          Results Review:  Lab Results (last 48 hours)     Procedure Component Value Units Date/Time    Procalcitonin [[id number]]  (Abnormal) Collected: 03/08/22 0224    Specimen: Blood Updated: 03/08/22 0337     Procalcitonin 0.31 ng/mL     Narrative:      As a Marker for Sepsis (Non-Neonates):    1. <0.5 ng/mL represents a low risk of severe sepsis and/or septic shock.  2. >2 ng/mL represents a high risk of severe sepsis and/or septic shock.    As a Marker for Lower Respiratory Tract Infections that require antibiotic therapy:    PCT on Admission    Antibiotic Therapy       6-12 Hrs later    >0.5                Strongly Recommended  >0.25 - <0.5        Recommended   0.1 - 0.25          Discouraged              Remeasure/reassess PCT  <0.1                Strongly Discouraged     Remeasure/reassess PCT    As 28 day mortality risk marker: \"Change in Procalcitonin Result\" (>80% or <=80%) if Day 0 (or Day 1) and Day 4 values are available. Refer to http://[email or web address]-pct-calculator.com    Change in PCT <=80%  A decrease of PCT levels below or equal to 80% defines a positive change in PCT test result representing a higher risk for 28-day all-cause mortality of patients diagnosed with severe sepsis for septic shock.    Change in PCT >80%  A decrease of PCT levels of more than 80% defines a negative change in PCT result representing a lower risk for 28-day all-cause mortality of patients diagnosed with severe sepsis or septic shock.       Comprehensive Metabolic Panel [[id number]]  (Abnormal) Collected: 03/08/22 0224    Specimen: Blood Updated: 03/08/22 0336     Glucose 123 mg/dL      BUN 42 mg/dL      Creatinine 0.78 mg/dL      Sodium 132 mmol/L      Potassium 5.5 mmol/L      Comment: Slight hemolysis detected by analyzer. Results may be affected.        Chloride 98 mmol/L      CO2 22.0 mmol/L      Calcium 9.5 mg/dL      " Total Protein 6.3 g/dL      Albumin 2.00 g/dL      ALT (SGPT) 83 U/L      AST (SGOT) 32 U/L      Alkaline Phosphatase 161 U/L      Total Bilirubin 0.9 mg/dL      Globulin 4.3 gm/dL      A/G Ratio 0.5 g/dL      BUN/Creatinine Ratio 53.8     Anion Gap 12.0 mmol/L      eGFR 93.0 mL/min/1.73      Comment: National Kidney Foundation and American Society of Nephrology (ASN) Task Force recommended calculation based on the Chronic Kidney Disease Epidemiology Collaboration (CKD-EPI) equation refit without adjustment for race.       Narrative:      GFR Normal >60  Chronic Kidney Disease <60  Kidney Failure <15      CBC & Differential [[id number]]  (Abnormal) Collected: 03/08/22 0224    Specimen: Blood Updated: 03/08/22 0319    Narrative:      The following orders were created for panel order CBC & Differential.  Procedure                               Abnormality         Status                     ---------                               -----------         ------                     CBC Auto Differential[[id number]]        Abnormal            Final result                 Please view results for these tests on the individual orders.    CBC Auto Differential [[id number]]  (Abnormal) Collected: 03/08/22 0224    Specimen: Blood Updated: 03/08/22 0319     WBC 25.10 10*3/mm3      RBC 5.08 10*6/mm3      Hemoglobin 15.9 g/dL      Hematocrit 47.6 %      MCV 93.7 fL      MCH 31.3 pg      MCHC 33.5 g/dL      RDW 17.9 %      RDW-SD 58.2 fl      MPV 9.4 fL      Platelets 164 10*3/mm3      Neutrophil % 84.0 %      Lymphocyte % 10.5 %      Monocyte % 5.4 %      Eosinophil % 0.0 %      Basophil % 0.1 %      Neutrophils, Absolute 21.10 10*3/mm3      Lymphocytes, Absolute 2.60 10*3/mm3      Monocytes, Absolute 1.40 10*3/mm3      Eosinophils, Absolute 0.00 10*3/mm3      Basophils, Absolute 0.00 10*3/mm3      nRBC 0.5 /100 WBC     Respiratory Panel PCR w/COVID-19(SARS-CoV-2) SHANE/SHAI/FELECIA/PAD/COR/MAD/VALERIA In-House, NP Swab in UTM/VTM, 3-4 HR TAT -  Swab, Nasopharynx [[id number]]  (Normal) Collected: 03/07/22 2122    Specimen: Swab from Nasopharynx Updated: 03/07/22 2217     ADENOVIRUS, PCR Not Detected     Coronavirus 229E Not Detected     Coronavirus HKU1 Not Detected     Coronavirus NL63 Not Detected     Coronavirus OC43 Not Detected     COVID19 Not Detected     Human Metapneumovirus Not Detected     Human Rhinovirus/Enterovirus Not Detected     Influenza A PCR Not Detected     Influenza B PCR Not Detected     Parainfluenza Virus 1 Not Detected     Parainfluenza Virus 2 Not Detected     Parainfluenza Virus 3 Not Detected     Parainfluenza Virus 4 Not Detected     RSV, PCR Not Detected     Bordetella pertussis pcr Not Detected     Bordetella parapertussis PCR Not Detected     Chlamydophila pneumoniae PCR Not Detected     Mycoplasma pneumo by PCR Not Detected    Narrative:      In the setting of a positive respiratory panel with a viral infection PLUS a negative procalcitonin without other underlying concern for bacterial infection, consider observing off antibiotics or discontinuation of antibiotics and continue supportive care. If the respiratory panel is positive for atypical bacterial infection (Bordetella pertussis, Chlamydophila pneumoniae, or Mycoplasma pneumoniae), consider antibiotic de-escalation to target atypical bacterial infection.    [place] Draw [[id number]] Collected: 03/07/22 2107    Specimen: Blood Updated: 03/07/22 2217    Narrative:      The following orders were created for panel order [place] Draw.  Procedure                               Abnormality         Status                     ---------                               -----------         ------                     Green Top (Gel)[[id number]]                                  Final result               Lavender Top[[id number]]                                     Final result               Gold Top - SST[[id number]]                                   Final result               Light Blue  Top[[id number]]                                   Final result                 Please view results for these tests on the individual orders.    Lavender Top [[id number]] Collected: 03/07/22 2107    Specimen: Blood Updated: 03/07/22 2217     Extra Tube hold for add-on     Comment: Auto resulted       Gold Top - SST [[id number]] Collected: 03/07/22 2107    Specimen: Blood Updated: 03/07/22 2217     Extra Tube Hold for add-ons.     Comment: Auto resulted.       Light Blue Top [[id number]] Collected: 03/07/22 2107    Specimen: Blood Updated: 03/07/22 2217     Extra Tube hold for add-on     Comment: Auto resulted       Green Top (Gel) [[id number]] Collected: 03/07/22 2107    Specimen: Blood Updated: 03/07/22 2153    Comprehensive Metabolic Panel [[id number]]  (Abnormal) Collected: 03/07/22 2107    Specimen: Blood Updated: 03/07/22 2140     Glucose 141 mg/dL      BUN 41 mg/dL      Creatinine 0.87 mg/dL      Sodium 129 mmol/L      Potassium 5.2 mmol/L      Chloride 93 mmol/L      CO2 25.0 mmol/L      Calcium 9.9 mg/dL      Total Protein 6.6 g/dL      Albumin 2.20 g/dL      ALT (SGPT) 84 U/L      AST (SGOT) 32 U/L      Alkaline Phosphatase 168 U/L      Total Bilirubin 0.8 mg/dL      Globulin 4.4 gm/dL      A/G Ratio 0.5 g/dL      BUN/Creatinine Ratio 47.1     Anion Gap 11.0 mmol/L      eGFR 90.0 mL/min/1.73      Comment: National Kidney Foundation and American Society of Nephrology (ASN) Task Force recommended calculation based on the Chronic Kidney Disease Epidemiology Collaboration (CKD-EPI) equation refit without adjustment for race.       Narrative:      GFR Normal >60  Chronic Kidney Disease <60  Kidney Failure <15      Troponin [[id number]]  (Normal) Collected: 03/07/22 2107    Specimen: Blood Updated: 03/07/22 2140     Troponin T <0.010 ng/mL     Narrative:      Troponin T Reference Range:  <= 0.03 ng/mL-   Negative for AMI  >0.03 ng/mL-     Abnormal for myocardial necrosis.  Clinicians would have to utilize clinical  acumen, EKG, Troponin and serial changes to determine if it is an Acute Myocardial Infarction or myocardial injury due to an underlying chronic condition.       Results may be falsely decreased if patient taking Biotin.      Blood Gas, Arterial - [[id number]]  (Abnormal) Collected: 03/07/22 2134    Specimen: Arterial Blood Updated: 03/07/22 2137     Site Right Radial     Truman's Test Positive     pH, Arterial 7.486 pH units      pCO2, Arterial 32.4 mm Hg      pO2, Arterial 69.2 mm Hg      HCO3, Arterial 24.4 mmol/L      Base Excess, Arterial 1.8 mmol/L      Comment: Serial Number: [id number]:  [id number]        O2 Saturation, Arterial 95.1 %      CO2 Content 25.4 mmol/L      Barometric Pressure for Blood Gas --     Comment: N/A        Modality Cannula     FIO2 <21 %      Hemodilution No    BNP [[id number]]  (Normal) Collected: 03/07/22 2107    Specimen: Blood Updated: 03/07/22 2137     proBNP 1,109.0 pg/mL     Narrative:      Among patients with dyspnea, NT-proBNP is highly sensitive for the detection of acute congestive heart failure. In addition NT-proBNP of <300 pg/ml effectively rules out acute congestive heart failure with 99% negative predictive value.    Results may be falsely decreased if patient taking Biotin.      Protime-INR [[id number]]  (Normal) Collected: 03/07/22 2107    Specimen: Blood Updated: 03/07/22 2132     Protime 11.6 Seconds      INR 1.05    aPTT [[id number]]  (Normal) Collected: 03/07/22 2107    Specimen: Blood Updated: 03/07/22 2132     PTT 26.4 seconds     Blood Culture - Blood, Chest, Right [[id number]] Collected: 03/07/22 2122    Specimen: Blood from Chest, Right Updated: 03/07/22 2124    CBC & Differential [[id number]]  (Abnormal) Collected: 03/07/22 2107    Specimen: Blood Updated: 03/07/22 2113    Narrative:      The following orders were created for panel order CBC & Differential.  Procedure                               Abnormality         Status                     ---------                                -----------         ------                     CBC Auto Differential[[id number]]        Abnormal            Final result                 Please view results for these tests on the individual orders.    CBC Auto Differential [[id number]]  (Abnormal) Collected: 03/07/22 2107    Specimen: Blood Updated: 03/07/22 2113     WBC 29.70 10*3/mm3      RBC 5.02 10*6/mm3      Hemoglobin 15.2 g/dL      Hematocrit 46.4 %      MCV 92.3 fL      MCH 30.3 pg      MCHC 32.8 g/dL      RDW 17.9 %      RDW-SD 58.2 fl      MPV 9.0 fL      Platelets 202 10*3/mm3      Neutrophil % 85.1 %      Lymphocyte % 9.1 %      Monocyte % 5.4 %      Eosinophil % 0.1 %      Basophil % 0.3 %      Neutrophils, Absolute 25.30 10*3/mm3      Lymphocytes, Absolute 2.70 10*3/mm3      Monocytes, Absolute 1.60 10*3/mm3      Eosinophils, Absolute 0.00 10*3/mm3      Basophils, Absolute 0.10 10*3/mm3      nRBC 0.1 /100 WBC     POC Lactate [[id number]]  (Normal) Collected: 03/07/22 2110    Specimen: Blood Updated: 03/07/22 2111     Lactate 2.0 mmol/L      Comment: Serial Number: [id number]:  [id number]       Blood Culture - Blood, Chest, Right [[id number]] Collected: 03/07/22 2107    Specimen: Blood from Chest, Right Updated: 03/07/22 2111           Pending Results: n/a    Imaging Reviewed:   CT Head With & Without Contrast    Result Date: 3/2/2022  1. Stable exam, with no acute process demonstrated in the brain. 2. There is stable ventricular dilatation, favored to be due to atrophy. There is minor chronic White matter abnormality and old right thalamic lacunar infarct.  Electronically Signed By-Mayi Lindsay MD On:3/2/2022 10:30 PM This report was finalized on [id number] by  Mayi Lindsay MD.    CT Chest Without Contrast Diagnostic    Result Date: 3/8/2022   1. Mediastinal AP window soft tissue mass appears to infiltrate the left mainstem bronchus and mildly indents the distal thoracic trachea. It has enlarged since the prior examination. It  could represent primary lung malignancy or metastatic disease of unknown primary origin. 2. Prevascular mediastinal adenopathy has increased compared to 11/10/2021. 3. There is opacification and partial obstruction of the bronchus intermedius, right middle lobe and right lower lobe bronchi with bronchial wall thickening. There is partial atelectasis versus developing pneumonia within the right middle lobe and right lower lobe. 4. A 5.3 cm mass is seen within the hepatic dome, consistent with metastatic disease. 5. 2.3 cm right adrenal nodule consistent with adrenal metastasis. 6. Additional CT findings include CABG, TAVR, chronic interstitial pulmonary fibrosis, left nephrectomy, nonobstructing right renal stone, cholecystectomy, old bilateral rib fractures, old T7 and L1 compression fractures.    Electronically Signed By-Monie Tiwari MD On:3/8/2022 9:33 AM This report was finalized on [id number] by  Monie Tiwari MD.    XR Chest 1 View    Result Date: 3/7/2022   Negative portable chest x-ray. No evidence of acute cardiopulmonary disease.  Electronically Signed By-Tylor Lucio On:3/7/2022 9:30 PM This report was finalized on [id number] by  Tylor Lucio, .    XR Chest 1 View    Result Date: 2/28/2022  Continued bilateral probably interstitial infiltrates. No significant change since the last study.  Electronically Signed By-Eric Ramon MD On:2/28/2022 1:08 PM This report was finalized on [id number] by  Eric Ramon MD.      I have reviewed the patient's labs, imaging, reports, and other clinician documentation.         Assessment/Plan       ASSESSMENT  1. 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  Bronch last admit showed endobronchial lesion with path positive.  CT chest showing disease  progression since November 2021.  Given recurrent hemoptysis would recommend palliative radiation consult.  2. Recurrent hemoptysis/right lung pneumonia-on antibiotics and supportive care per pulmonary.  Bronchoscopy planned today.  3. Leukocytosis-secondary to pneumonia and malignancy.  4. Diarrhea-stool panel and C. difficile pending per primary team.  5. Interstitial lung disease/fibrosis-management per pulmonary.  ANCA panel negative last admission.   6. Elevated transaminases-acute hepatitis panel last admit was negative.  Liver met noted on CT.  7. Anorexia/failure to thrive-due to progressive cancer.  8. CAD/valvular heart disease/s/p status post pacemaker placement-on aspirin but no other anticoagulation at time of admission.      PLAN  1. Await today's bronchoscopy findings.  2. Although renal cell carcinomas are not typically very radiosensitive would consider palliative radiation consult to control hemoptysis.  3. Recommend comfort care.    Note prepared by DEBBY Mirza.  Patient seen and examined by Eddie Loredo MD.  Electronically signed by MARGARITA Wright, 03/08/22, 11:27 AM EST.    I have personally performed a face-to-face diagnostic evaluation on this patient.  I have discussed the case with Lianna Lemons NP, have edited/reviewed the note, and agree with the care plan.  The patient is complaining of hemoptysis shortness of air and diarrhea.  On examination, he appears debilitated with left chest wall pacemaker and right chest wall [id number].  Labs are significant for leukocytosis.  His appetite has been poor and he is a poor candidate for aggressive treatment for his renal cell carcinoma.  Hemoptysis seems to be coming from metastatic disease to the lungs and palliative local radiation therapy should be considered.          I discussed the patients findings and my recommendations with patient and family.    Thank you for this consult.  We will be happy to follow along in the care of  this patient.     Electronically signed by Eddie Loredo MD, 03/08/22, 12:54 PM EST.

## 2022-03-08 NOTE — ED PROVIDER NOTES
Subjective   Chief complaint: Shortness of breath    76-year-old male presents with shortness of breath.  Patient was recently in the hospital for pneumonia.  Family states symptoms have been getting progressively worse over the last few days.  Today he has had a productive cough with hemoptysis.  Family reports bright red blood in his sputum.  He had similar symptoms when he presented recently and was admitted for pneumonia.  He has had no fever.  No vomiting or diarrhea.  He denies chest pain.  No known alleviating or exacerbating factors.      History provided by:  Patient      Review of Systems   Constitutional: Negative for fever.   HENT: Negative for congestion and sore throat.    Eyes: Negative for redness.   Respiratory: Positive for cough and shortness of breath.    Cardiovascular: Negative for chest pain.   Gastrointestinal: Negative for abdominal pain, diarrhea and vomiting.   Genitourinary: Negative for dysuria.   Musculoskeletal: Negative for back pain.   Skin: Negative for rash.   Neurological: Negative for dizziness and headaches.   Psychiatric/Behavioral: Negative for confusion.       Past Medical History:   Diagnosis Date   • Aortic stenosis 9/29/2015    Per Echo 2017   • Benign essential HTN    • Bundle branch block, right 2/7/2013   • CAD (coronary artery disease), native coronary artery    • Cancer (HCC)     bladder- chemo, new left renal mass   • Chronic kidney disease    • Coronary artery disease involving native coronary artery of native heart without angina pectoris 11/19/2019    CABG 1995; SVG to RCA is occluded, LIMA to LAD, SVG to diag of LAD, SVG to marginal of LCX   • COVID-19 vaccine administered    • Essential hypertension 11/19/2019   • Heart murmur    • History of transfusion    • Hyperlipidemia, mixed    • ILD (interstitial lung disease) (HCC) 11/1/2021   • Injury of back    • Mixed hyperlipidemia 11/19/2019   • Near syncope    • NATALIA (obstructive sleep apnea)     AHI 11.6/h, CPAP    • Premature ventricular contractions 2/11/2014   • Presence of cardiac pacemaker 7/5/2019    BS dual chamber PM 11/7/2016   • Shortness of breath    • SSS (sick sinus syndrome) ([facility]) 7/5/2019       No Known Allergies    Past Surgical History:   Procedure Laterality Date   • AORTIC VALVE REPAIR/REPLACEMENT N/A 3/30/2021    Procedure: TTE TRANSFEMORAL TRANSCATHETER AORTIC VALVE REPLACEMENT PERCUTANEOUS APPROACH;  Surgeon: Hang Martinez MD;  Location: [facility] OR 18/19;  Service: Cardiothoracic;  Laterality: N/A;   • AORTIC VALVE REPAIR/REPLACEMENT N/A 3/30/2021    Procedure: Transfemoral Transcatheter Aortic Valve Replacement w/Intra Op TTE and Possible Open Rescue Surgery;  Surgeon: Anderson Galvez MD;  Location: [facility] OR 18/19;  Service: Cardiovascular;  Laterality: N/A;   • BRONCHOSCOPY N/A 2/23/2022    Procedure: BRONCHOSCOPY WITH BRONCHOALVEOLAR LAVAGE AND BIOPSY X1 AREA;  Surgeon: Ronny Jiang MD;  Location: [facility] ENDOSCOPY;  Service: Pulmonary;  Laterality: N/A;  blood clot in lung, hemoptysis   • CARDIAC CATHETERIZATION  2018   • CARDIAC CATHETERIZATION N/A 3/22/2021    Procedure: Left Heart Cath;  Surgeon: ADRIANO Erazo MD;  Location: [facility] CATH INVASIVE LOCATION;  Service: Cardiovascular;  Laterality: N/A;   • CARDIAC CATHETERIZATION N/A 6/22/2021    Procedure: RENAL ANGIOGRAPHY;  Surgeon: Jesus Bhagat MD;  Location: [facility] CATH INVASIVE LOCATION;  Service: Cardiovascular;  Laterality: N/A;   • CARDIAC CATHETERIZATION N/A 6/22/2021    Procedure: Stent BMS peripheral;  Surgeon: Jesus Bhagat MD;  Location: [facility] CATH INVASIVE LOCATION;  Service: Cardiovascular;  Laterality: N/A;   rt renal   • CARDIAC CATHETERIZATION N/A 6/22/2021    Procedure: Angioplasty-peripheral;  Surgeon: Jesus Bhagat MD;  Location: [facility] CATH INVASIVE LOCATION;  Service: Cardiovascular;  Laterality: N/A;   rt renal   • CARDIOVASCULAR STRESS TEST  2017   •  "CORONARY ARTERY BYPASS GRAFT     • ECHO - CONVERTED  2017   • NEPHROURETERECTOMY Left 2020    Procedure: NEPHROURETERECTOMY;  Surgeon: Rogers Bae MD;  Location: [facility] MAIN OR;  Service: Urology;  Laterality: Left;   • PACEMAKER IMPLANTATION      bs   • PORTACATH PLACEMENT         Family History   Problem Relation Age of Onset   • No Known Problems Mother    • Heart disease Father    • Heart failure Father    • Heart attack Father    • No Known Problems Sister    • No Known Problems Brother    • Malig Hyperthermia Neg Hx        Social History     Socioeconomic History   • Marital status:    Tobacco Use   • Smoking status: Former Smoker     Years: 5.00     Quit date:      Years since quittin.1   • Smokeless tobacco: Never Used   Vaping Use   • Vaping Use: Never used   Substance and Sexual Activity   • Alcohol use: Yes     Alcohol/week: 1.0 standard drink     Types: 1 Shots of liquor per week     Comment: RARE   • Drug use: Never   • Sexual activity: Defer       /83   Pulse 111   Temp 99.4 °F (37.4 °C) (Rectal)   Resp 28   Ht 172.7 cm (68\")   Wt 65.8 kg (145 lb)   SpO2 95%   BMI 22.05 kg/m²       Objective   Physical Exam  Vitals and nursing note reviewed.   Constitutional:       Comments: Chronically ill-appearing   HENT:      Head: Normocephalic and atraumatic.   Eyes:      Pupils: Pupils are equal, round, and reactive to light.   Cardiovascular:      Rate and Rhythm: Regular rhythm. Tachycardia present.      Heart sounds: Normal heart sounds.   Pulmonary:      Effort: Tachypnea present.      Breath sounds: Rhonchi and rales present.   Abdominal:      General: Abdomen is flat. Bowel sounds are normal.      Palpations: Abdomen is soft.      Tenderness: There is no abdominal tenderness.   Skin:     General: Skin is warm and dry.   Neurological:      Mental Status: He is alert and oriented to person, place, and time.      Comments: General weakness with no focal deficit. "         Procedures           ED Course      My interpretation of EKG shows sinus tachycardia, rate 118, right bundle branch block and left posterior fascicular block, no ST elevation     Results for orders placed or performed during the hospital encounter of 03/07/22   Respiratory Panel PCR w/COVID-19(SARS-CoV-2) SHANE/SHAI/FELECIA/PAD/COR/MAD/VALERIA In-House, NP Swab in UTM/VTM, 3-4 HR TAT - Swab, Nasopharynx    Specimen: Nasopharynx; Swab   Result Value Ref Range    ADENOVIRUS, PCR Not Detected Not Detected    Coronavirus 229E Not Detected Not Detected    Coronavirus HKU1 Not Detected Not Detected    Coronavirus NL63 Not Detected Not Detected    Coronavirus OC43 Not Detected Not Detected    COVID19 Not Detected Not Detected - Ref. Range    Human Metapneumovirus Not Detected Not Detected    Human Rhinovirus/Enterovirus Not Detected Not Detected    Influenza A PCR Not Detected Not Detected    Influenza B PCR Not Detected Not Detected    Parainfluenza Virus 1 Not Detected Not Detected    Parainfluenza Virus 2 Not Detected Not Detected    Parainfluenza Virus 3 Not Detected Not Detected    Parainfluenza Virus 4 Not Detected Not Detected    RSV, PCR Not Detected Not Detected    Bordetella pertussis pcr Not Detected Not Detected    Bordetella parapertussis PCR Not Detected Not Detected    Chlamydophila pneumoniae PCR Not Detected Not Detected    Mycoplasma pneumo by PCR Not Detected Not Detected   Comprehensive Metabolic Panel    Specimen: Blood   Result Value Ref Range    Glucose 141 (H) 65 - 99 mg/dL    BUN 41 (H) 8 - 23 mg/dL    Creatinine 0.87 0.76 - 1.27 mg/dL    Sodium 129 (L) 136 - 145 mmol/L    Potassium 5.2 3.5 - 5.2 mmol/L    Chloride 93 (L) 98 - 107 mmol/L    CO2 25.0 22.0 - 29.0 mmol/L    Calcium 9.9 8.6 - 10.5 mg/dL    Total Protein 6.6 6.0 - 8.5 g/dL    Albumin 2.20 (L) 3.50 - 5.20 g/dL    ALT (SGPT) 84 (H) 1 - 41 U/L    AST (SGOT) 32 1 - 40 U/L    Alkaline Phosphatase 168 (H) 39 - 117 U/L    Total Bilirubin 0.8 0.0  - 1.2 mg/dL    Globulin 4.4 gm/dL    A/G Ratio 0.5 g/dL    BUN/Creatinine Ratio 47.1 (H) 7.0 - 25.0    Anion Gap 11.0 5.0 - 15.0 mmol/L    eGFR 90.0 >60.0 mL/min/1.73   CBC Auto Differential    Specimen: Blood   Result Value Ref Range    WBC 29.70 (H) 3.40 - 10.80 10*3/mm3    RBC 5.02 4.14 - 5.80 10*6/mm3    Hemoglobin 15.2 13.0 - 17.7 g/dL    Hematocrit 46.4 37.5 - 51.0 %    MCV 92.3 79.0 - 97.0 fL    MCH 30.3 26.6 - 33.0 pg    MCHC 32.8 31.5 - 35.7 g/dL    RDW 17.9 (H) 12.3 - 15.4 %    RDW-SD 58.2 (H) 37.0 - 54.0 fl    MPV 9.0 6.0 - 12.0 fL    Platelets 202 140 - 450 10*3/mm3    Neutrophil % 85.1 (H) 42.7 - 76.0 %    Lymphocyte % 9.1 (L) 19.6 - 45.3 %    Monocyte % 5.4 5.0 - 12.0 %    Eosinophil % 0.1 (L) 0.3 - 6.2 %    Basophil % 0.3 0.0 - 1.5 %    Neutrophils, Absolute 25.30 (H) 1.70 - 7.00 10*3/mm3    Lymphocytes, Absolute 2.70 0.70 - 3.10 10*3/mm3    Monocytes, Absolute 1.60 (H) 0.10 - 0.90 10*3/mm3    Eosinophils, Absolute 0.00 0.00 - 0.40 10*3/mm3    Basophils, Absolute 0.10 0.00 - 0.20 10*3/mm3    nRBC 0.1 0.0 - 0.2 /100 WBC   Protime-INR    Specimen: Blood   Result Value Ref Range    Protime 11.6 9.6 - 11.7 Seconds    INR 1.05 0.93 - 1.10   aPTT    Specimen: Blood   Result Value Ref Range    PTT 26.4 24.0 - 31.0 seconds   BNP    Specimen: Blood   Result Value Ref Range    proBNP 1,109.0 0.0 - 1,800.0 pg/mL   Troponin    Specimen: Blood   Result Value Ref Range    Troponin T <0.010 0.000 - 0.030 ng/mL   Blood Gas, Arterial -    Specimen: Arterial Blood   Result Value Ref Range    Site Right Radial     Truman's Test Positive     pH, Arterial 7.486 (H) 7.350 - 7.450 pH units    pCO2, Arterial 32.4 (L) 35.0 - 48.0 mm Hg    pO2, Arterial 69.2 (L) 83.0 - 108.0 mm Hg    HCO3, Arterial 24.4 21.0 - 28.0 mmol/L    Base Excess, Arterial 1.8 0.0 - 3.0 mmol/L    O2 Saturation, Arterial 95.1 94.0 - 98.0 %    CO2 Content 25.4 22 - 29 mmol/L    Barometric Pressure for Blood Gas      Modality Cannula     FIO2 <21 %     Hemodilution No    POC Lactate    Specimen: Blood   Result Value Ref Range    Lactate 2.0 0.5 - 2.0 mmol/L   ECG 12 Lead   Result Value Ref Range    QT Interval 329 ms   Lavender Top   Result Value Ref Range    Extra Tube hold for add-on    Gold Top - SST   Result Value Ref Range    Extra Tube Hold for add-ons.    Light Blue Top   Result Value Ref Range    Extra Tube hold for add-on      XR Chest 1 View    Result Date: 3/7/2022   Negative portable chest x-ray. No evidence of acute cardiopulmonary disease.  Electronically Signed By-Tylor Lucio On:3/7/2022 9:30 PM This report was finalized on [id number] by  Tylor Lucio, .                                          MDM   Patient had the above evaluation.  Results were discussed with patient and family.  Patient did meet sepsis criteria and sepsis protocol was initiated.  Lactic acid was 2.0.  White blood cell count was 29.7.  It was 27.6 when he was discharged 4 days ago.  Blood cultures were obtained.  Patient was started on cefepime and vancomycin.  Chest x-ray shows no acute disease however patient does clinically have pneumonia.  Troponin is negative.  BNP is normal.  Respiratory panel is negative.  I discussed with the nurse practitioner on-call for the hospitalist and the patient will be admitted for further evaluation and management.      Final diagnoses:   Sepsis, due to unspecified organism, unspecified whether acute organ dysfunction present (HCC)   Pneumonia due to infectious organism, unspecified laterality, unspecified part of lung   Hemoptysis   Leukocytosis, unspecified type       ED Disposition  ED Disposition     ED Disposition   Decision to Admit    Condition   --    Comment   Level of Care: Telemetry [5]   Admitting Physician: FREDA LEIJA [[id number]]               No follow-up provider specified.       Medication List      ASK your doctor about these medications    predniSONE 10 MG tablet  Commonly known as: DELTASONE  Take 1 tablet by mouth  Daily for 2 doses.  Ask about: Should I take this medication?             Mani Bruce MD  03/07/22 1932

## 2022-03-08 NOTE — H&P
[facility] Medicine Services      Patient Name: Navneet Lind  : 1945  MRN: [id number]  Primary Care Physician:  Uri Cortes MD  Date of admission: 3/7/2022      Subjective      Chief Complaint: Pulses, shortness of breath, and cough    History of Present Illness: Navneet Lind is a 76 y.o. male with multiple medical problems including chronic diastolic CHF, CAD, radical nephrectomy (left).  Bladder cancer, HLD, NATALIA on CPAP, cardiac pacemaker, s/p TAVR, anemia, RBBB, recent hospitalization for sepsis pneumonia with hemoptysis and interstitial lung disease who presented to [facility] on 3/7/2022 complaining of 2-day history of increasing shortness of breath, cough and recurring hemoptysis today.  Patient was just discharged from [facility] on March 3, 2022 after admission for pneumonia and interstitial lung disease on 2022.  Patient is lethargic and appears very weak he nods appropriately to some questions but not all, making it impossible to complete for an accurate HPI at this time.  When asked if he had coughed up a large or small amount of bloody sputum patient nodded yes he did not verbalize the amount.  When patient asked if he wished to remain DNR/DNI he did nod head yes.  Patient asked if he was in pain patient shook her head no.    Vital signs upon arrival to emergency department today blood pressure 135/87, heart rate 111, oxygen saturation 98% room air, respiratory rate 28, patient afebrile with T-max of 97.9.    Initial evaluation in the emergency department included:  Initial ABG showing pH 7.486, PCO2 32.4, PO2 69.2, HCO3 24.4, base excess 1.82, oxygen saturation 95.1%.  Drawn on room air.  Initial troponin negative 0.010,  Initial BNP 1109.  Notable abnormal labs: Glucose 141, sodium 129, BUN 41, BUN creatinine ratio 47.1, alkaline phosphatase 168 ALT 84, albumin 2.20, WBCs 29.7, 85.1% neutrophils,  Blood cultures  drawn with results pending  Respiratory panel collected negative for COVID-19 and other viral infections.  X-ray completed showing chronic interstitial opacities with no significant change from previous exam. left chest pacemaker noted in the right hemidiaphragm is noted  EKG completed shows sinus tachycardia RBBB, LP FB heart rate 118    While in the emergency department patient was administered 2 g cefepime, and 1250 mg  of vancomycin.  Patient will be admitted to hospitalist group for further evaluation and treatment of sepsis, hemoptysis, interstitial lung disease, diarrhea along with other acute and/or chronic conditions.          Review of Systems   Unable to perform ROS: acuity of condition        Personal History     Past Medical History:   Diagnosis Date   • Aortic stenosis 9/29/2015    Per Echo 2017   • Benign essential HTN    • Bundle branch block, right 2/7/2013   • CAD (coronary artery disease), native coronary artery    • Cancer ([facility])     bladder- chemo, new left renal mass   • Chronic kidney disease    • Coronary artery disease involving native coronary artery of native heart without angina pectoris 11/19/2019    CABG 1995; SVG to RCA is occluded, LIMA to LAD, SVG to diag of LAD, SVG to marginal of LCX   • COVID-19 vaccine administered    • Essential hypertension 11/19/2019   • Heart murmur    • History of transfusion    • Hyperlipidemia, mixed    • ILD (interstitial lung disease) ([facility]) 11/1/2021   • Injury of back    • Mixed hyperlipidemia 11/19/2019   • Near syncope    • NATALIA (obstructive sleep apnea)     AHI 11.6/h, CPAP   • Premature ventricular contractions 2/11/2014   • Presence of cardiac pacemaker 7/5/2019    BS dual chamber PM 11/7/2016   • Shortness of breath    • SSS (sick sinus syndrome) ([facility]) 7/5/2019       Past Surgical History:   Procedure Laterality Date   • AORTIC VALVE REPAIR/REPLACEMENT N/A 3/30/2021    Procedure: TTE TRANSFEMORAL TRANSCATHETER AORTIC VALVE REPLACEMENT PERCUTANEOUS  APPROACH;  Surgeon: Hang Martinez MD;  Location: [facility] OR 18/19;  Service: Cardiothoracic;  Laterality: N/A;   • AORTIC VALVE REPAIR/REPLACEMENT N/A 3/30/2021    Procedure: Transfemoral Transcatheter Aortic Valve Replacement w/Intra Op TTE and Possible Open Rescue Surgery;  Surgeon: Anderson Galvez MD;  Location: [facility] OR 18/19;  Service: Cardiovascular;  Laterality: N/A;   • BRONCHOSCOPY N/A 2/23/2022    Procedure: BRONCHOSCOPY WITH BRONCHOALVEOLAR LAVAGE AND BIOPSY X1 AREA;  Surgeon: Ronny Jiang MD;  Location: [facility] ENDOSCOPY;  Service: Pulmonary;  Laterality: N/A;  blood clot in lung, hemoptysis   • CARDIAC CATHETERIZATION  2018   • CARDIAC CATHETERIZATION N/A 3/22/2021    Procedure: Left Heart Cath;  Surgeon: ADRIANO Erazo MD;  Location: [facility] CATH INVASIVE LOCATION;  Service: Cardiovascular;  Laterality: N/A;   • CARDIAC CATHETERIZATION N/A 6/22/2021    Procedure: RENAL ANGIOGRAPHY;  Surgeon: Jesus Bhagat MD;  Location: [facility] CATH INVASIVE LOCATION;  Service: Cardiovascular;  Laterality: N/A;   • CARDIAC CATHETERIZATION N/A 6/22/2021    Procedure: Stent BMS peripheral;  Surgeon: Jesus Bhagat MD;  Location: [facility] CATH INVASIVE LOCATION;  Service: Cardiovascular;  Laterality: N/A;   rt renal   • CARDIAC CATHETERIZATION N/A 6/22/2021    Procedure: Angioplasty-peripheral;  Surgeon: Jesus Bhagat MD;  Location: [facility] CATH INVASIVE LOCATION;  Service: Cardiovascular;  Laterality: N/A;   rt renal   • CARDIOVASCULAR STRESS TEST  2017   • CORONARY ARTERY BYPASS GRAFT  1995   • ECHO - CONVERTED  2017   • NEPHROURETERECTOMY Left 11/2/2020    Procedure: NEPHROURETERECTOMY;  Surgeon: Rogers Bae MD;  Location: [facility] OR;  Service: Urology;  Laterality: Left;   • PACEMAKER IMPLANTATION  2016    bs   • PORTACATH PLACEMENT         Family History: family history includes Heart attack in his father; Heart disease in his father; Heart  failure in his father; No Known Problems in his brother, mother, and sister. Otherwise pertinent FHx was reviewed and not pertinent to current issue.    Social History:  reports that he quit smoking about 8 years ago. He quit after 5.00 years of use. He has never used smokeless tobacco. He reports current alcohol use of about 1.0 standard drink of alcohol per week. He reports that he does not use drugs.    Home Medications:  Prior to Admission Medications     Prescriptions Last Dose Informant Patient Reported? Taking?    acetaminophen (TYLENOL) 500 MG tablet  Self Yes No    Take 1,000 mg by mouth Every 6 (Six) Hours As Needed for Mild Pain .    albuterol sulfate  (90 Base) MCG/ACT inhaler   No No    Inhale 2 puffs Every 4 (Four) Hours As Needed for Wheezing.    aspirin 81 MG EC tablet  Self Yes No    Take 81 mg by mouth Daily.    budesonide-formoterol (Symbicort) 160-4.5 MCG/ACT inhaler   No No    Inhale 2 puffs 2 (Two) Times a Day for 90 days.    Cholecalciferol (Vitamin D3) 50 MCG (2000 UT) tablet  Spouse/Significant Other Yes No    Take 2,000 Units by mouth Daily.    escitalopram (LEXAPRO) 5 MG tablet   Yes No    Take 5 mg by mouth Daily.    famotidine (PEPCID) 40 MG tablet   Yes No    Take 40 mg by mouth Every Night.    folic acid (FOLVITE) 400 MCG tablet  Self Yes No    Take 400 mcg by mouth Daily.    gabapentin (NEURONTIN) 100 MG capsule  Spouse/Significant Other Yes No    Take 100 mg by mouth 2 (Two) Times a Day.    HYDROcodone-acetaminophen (NORCO) 7.5-325 MG per tablet  Spouse/Significant Other Yes No    Take 1 tablet by mouth Every 6 (Six) Hours As Needed for Moderate Pain .    megestrol (MEGACE) 40 MG/ML suspension   Yes No    Take 200 mg by mouth 3 (Three) Times a Day With Meals.    melatonin 5 MG tablet tablet   Yes No    Take 5 mg by mouth.    NON FORMULARY / PATIENT SUPPLIED MEDICATION   Yes No    Take 100 mg by mouth Daily. OFEV-Nintedanib    omeprazole (priLOSEC) 40 MG capsule   Yes No     Take 40 mg by mouth Daily.    ondansetron (ZOFRAN) 4 MG tablet   Yes No    Take 4 mg by mouth Every 8 (Eight) Hours As Needed.    polyethylene glycol (MiraLax) 17 g packet   Yes No    Take 17 g by mouth Daily As Needed.    polyethylene glycol (MIRALAX) 17 GM/SCOOP powder   No No    mix 1 capful (17 g) in liquid by mouth Daily for 90 days.    predniSONE (DELTASONE) 10 MG tablet   No No    Take 1 tablet by mouth Daily for 2 doses.    promethazine (PHENERGAN) 25 MG tablet   Yes No    Take 25 mg by mouth Every 4 (Four) Hours As Needed for Nausea or Vomiting.    psyllium (METAMUCIL MULTIHEALTH FIBER) 58.12 % packet   No No    Take 1 packet by mouth Daily.            Allergies:  No Known Allergies    Objective      Vitals:   Temp:  [97.9 °F (36.6 °C)-99.4 °F (37.4 °C)] 97.9 °F (36.6 °C)  Heart Rate:  [109-111] 109  Resp:  [25-28] 27  BP: (109-135)/(78-87) 109/78  Flow (L/min):  [4-6] 4    Physical Exam  Vitals reviewed.   Constitutional:       Appearance: He is ill-appearing.      Interventions: Nasal cannula in place.      Comments: 4L NC   HENT:      Head: Normocephalic and atraumatic.      Right Ear: External ear normal.      Left Ear: External ear normal.      Nose: Nose normal.      Mouth/Throat:      Mouth: Mucous membranes are dry.   Eyes:      Pupils: Pupils are equal, round, and reactive to light.   Cardiovascular:      Rate and Rhythm: Regular rhythm. Tachycardia present.      Pulses:           Carotid pulses are 2+ on the right side and 2+ on the left side.       Radial pulses are 2+ on the right side and 2+ on the left side.        Dorsalis pedis pulses are 1+ on the right side and 1+ on the left side.        Posterior tibial pulses are 1+ on the right side and 1+ on the left side.      Heart sounds: Normal heart sounds.   Pulmonary:      Breath sounds: Examination of the right-upper field reveals wheezing and rhonchi. Examination of the left-upper field reveals wheezing and rhonchi. Examination of the  right-middle field reveals rhonchi. Examination of the right-lower field reveals rhonchi. Examination of the left-lower field reveals rhonchi. Wheezing and rhonchi present.   Abdominal:      General: Bowel sounds are normal.      Palpations: Abdomen is soft.   Musculoskeletal:         General: Normal range of motion.      Cervical back: Normal range of motion and neck supple.   Skin:     General: Skin is warm and dry.      Capillary Refill: Capillary refill takes 2 to 3 seconds.      Coloration: Skin is pale.      Findings: Bruising present.   Neurological:      General: No focal deficit present.      Mental Status: He is lethargic.      GCS: GCS eye subscore is 4. GCS verbal subscore is 3. GCS motor subscore is 6.   Psychiatric:         Mood and Affect: Affect is flat.         Behavior: Behavior is cooperative.         Result Review    Result Review:  I have personally reviewed the results from the time of this admission to 3/8/2022 03:04 EST and agree with these findings:  [x]  Laboratory  [x]  Microbiology  [x]  Radiology  [x]  EKG/Telemetry   []  Cardiology/Vascular   []  Pathology  [x]  Old records  []  Other:  Most notable findings include:       Assessment/Plan        Active Hospital Problems:  Active Hospital Problems    Diagnosis    • **Hemoptysis    • Sepsis, due to unspecified organism, unspecified whether acute organ dysfunction present (HCC)    • Shortness of breath    • Sepsis without septic shock (HCC)    • Bladder carcinoma (HCC)    • ILD (interstitial lung disease) (HCC)    • S/P TAVR (transcatheter aortic valve replacement)    • Chronic diastolic congestive heart failure (HCC)    • H/O radical nephrectomy, left    • NATALIA on CPAP    • Coronary artery disease involving native coronary artery of native heart without angina pectoris      CABG 1995; SVG to RCA is occluded, LIMA to LAD, SVG to diag of LAD, SVG to marginal of LCX     • Mixed hyperlipidemia    • Primary hypertension    • Presence of cardiac  pacemaker      BS dual chamber PM 11/7/2016       Plan:   Hemoptysis  Shortness of breath  ILD (interstitial lung disease)  -Sepsis due to unspecified organism  -Pneumonia  -Shortness of breath  -Blood cultures pending  -UA pending  -Respiratory panel negative COVID-19 and other viral illnesses.  -Check sputum culture  -Check urine Legionella strep strep pneumo  -pulmonal oxygen as needed for hypoxia/respiratory distress maintain oxygen saturation greater than 90%.  -Continue vancomycin and cefepime  -As needed DuoNeb for wheezing/shortness of breath  -Solu-Medrol ordered  -Initial lactic 2.0  -Check procalcitonin  -Encourage pulmonary hygiene-TCDB. /I-S  -Hold ASA  -Consult pulmonology  -Continuous cardiac monitoring pulse oximetry  -Vital signs per policy  -Recent bronchoscopy last admission.    Diarrhea  -High risk for C. difficile with recent hospitalization and frequent antibiotic use  -Check for C. Difficile  -Check stool PCR  -Initiate contact/spore isolation.  Per policy    NATALIA-On Home CPAP  -patient may use home CPAP  -CPAP ordered    Primary hypertension  Chronic diastolic congestive heart failure  -Vital signs per policy  -Chronic/controlled  -Strict I's and O's  -Daily weights  -Continue home medications pending verification    Hyperlipidemia/coronary artery disease involving native coronary artery of native heart without angina pectoris  S/P TAVAR (transcatheter aortic valve replacement)  -Patient free of chest pain at time of admission  -Chronic /controlled-hold ASA  -Continous cardiac monitoring    Presence of cardiac pacemaker  -Continuous cardiac monitoring  -Patient followed by Dr. Bhagat with cardiology outpatient.    Bladder Carcinoma  H/O radical nephrectomy, left  H/Orenal artery stenosis s/p stent placement  -Patient followed by oncology Dr. Loredo   -On immunotherapy every 3 weeks  -Consider hematology oncology consult if clinically appropriate.      DVT prophylaxis: SCDs     CODE STATUS:     Medical Intervention Limits: NO intubation (DNI)  Code Status (Patient has no pulse and is not breathing): No CPR (Do Not Attempt to Resuscitate)  Medical Interventions (Patient has pulse or is breathing): Limited Support    Admission Status:  I believe this patient meets observtation status.    I discussed the patient's findings and my recommendations with patient and family.    This patient has been examined wearing appropriate Personal Protective Equipment . 03/08/22      Signature: Electronically signed by MARGARITA Worthington, 03/07/22, 10:31 PM EST.

## 2022-03-08 NOTE — PROGRESS NOTES
Group: [facility]         CONSULT NOTE    Patient Identification:  Navneet Lind  76 y.o.  male  1945  [id number]            Requesting physician: Sarthak    Reason for Consultation: Hemoptysis      History of Present Illness:  76 y.o. male known to us from prior admission, with multiple past medical problems including chronic diastolic CHF, CAD, radical nephrectomy (left).  Bladder cancer, HLD, NATALIA on CPAP, cardiac pacemaker, s/p TAVR, anemia, RBBB, recent hospitalization for sepsis pneumonia with hemoptysis and interstitial lung disease who presented to [facility] on 3/7/2022 complaining of 2-day history of increasing shortness of breath, cough and recurring hemoptysis today.  Patient was just discharged from [facility] on March 3, 2022 after admission for pneumonia and interstitial lung disease on February 22, 2022.      Today he is still having hemoptysis      Assessment:  Recurrent hemoptysis  Lung mass infiltrating L main stem  Mediastinal mass in AP window , enlarged since 2 weeks ago  s/p bronch 2/23/2022 with endobronchial biopsy positive for transitional cell carcinoma    Interstitial Lung Disease- on OFEV  PNA  NATALIA- home cpap  Diarrhea  HTN  Chronic diastolic CHF  HLD  CAD  S/P TAVAR  Presence of pacemaker  Bladder Cancer- On immunotherapy every 3 weeks  H/O Radical left nephrectomy  H/O artery stenosis s/p stent placement    Bronchoscopy from 2/23/22- cultures negative    Respiratory panel negative  Blood cultures pending    ABG 3/7/22 at 9:30PM on NC- ph 7.48, CO2 32.4, PO2 69.2, HCO 24.4    Recommendations:  CT chest without today results noted  Bronchoscopy tomorrow morning    Continue to titrate oxygen- Currently on 4L NC  Cefepime for sepsis- finishes 3/15/22  Vancomycin for sepsis- finishes 3/15/22  Solu-medrol 40mg q8hrs  Bronchodilators PRN  Electrolyte/Glycemic control  GI Prophylaxis- Protonix    3/8/22                                                                2/22/22      Review of Sytems:  Review of Systems   Constitutional: Positive for fatigue.   Respiratory: Positive for cough.         Family at bedside reports he continues to have hemoptysis and that it is worse than last admission.       Past Medical History:  Past Medical History:   Diagnosis Date   • Aortic stenosis 9/29/2015    Per Echo 2017   • Benign essential HTN    • Bundle branch block, right 2/7/2013   • CAD (coronary artery disease), native coronary artery    • Cancer (Formerly [facility])     bladder- chemo, new left renal mass   • Chronic kidney disease    • Coronary artery disease involving native coronary artery of native heart without angina pectoris 11/19/2019    CABG 1995; SVG to RCA is occluded, LIMA to LAD, SVG to diag of LAD, SVG to marginal of LCX   • COVID-19 vaccine administered    • Essential hypertension 11/19/2019   • Heart murmur    • History of transfusion    • Hyperlipidemia, mixed    • ILD (interstitial lung disease) (Formerly [facility]) 11/1/2021   • Injury of back    • Mixed hyperlipidemia 11/19/2019   • Near syncope    • NATALIA (obstructive sleep apnea)     AHI 11.6/h, CPAP   • Premature ventricular contractions 2/11/2014   • Presence of cardiac pacemaker 7/5/2019    BS dual chamber PM 11/7/2016   • Shortness of breath    • SSS (sick sinus syndrome) (Formerly [facility]) 7/5/2019       Past Surgical History:  Past Surgical History:   Procedure Laterality Date   • AORTIC VALVE REPAIR/REPLACEMENT N/A 3/30/2021    Procedure: TTE TRANSFEMORAL TRANSCATHETER AORTIC VALVE REPLACEMENT PERCUTANEOUS APPROACH;  Surgeon: Hang Martinez MD;  Location: [facility] OR 18/19;  Service: Cardiothoracic;  Laterality: N/A;   • AORTIC VALVE REPAIR/REPLACEMENT N/A 3/30/2021    Procedure: Transfemoral Transcatheter Aortic Valve Replacement w/Intra Op TTE and Possible Open Rescue Surgery;  Surgeon: Anderson Galvez MD;  Location: [facility] OR 18/19;  Service: Cardiovascular;  Laterality: N/A;   • BRONCHOSCOPY N/A 2/23/2022     Procedure: BRONCHOSCOPY WITH BRONCHOALVEOLAR LAVAGE AND BIOPSY X1 AREA;  Surgeon: Ronny Jiang MD;  Location: [facility] ENDOSCOPY;  Service: Pulmonary;  Laterality: N/A;  blood clot in lung, hemoptysis   • CARDIAC CATHETERIZATION  2018   • CARDIAC CATHETERIZATION N/A 3/22/2021    Procedure: Left Heart Cath;  Surgeon: ADRIANO Erazo MD;  Location: [facility] CATH INVASIVE LOCATION;  Service: Cardiovascular;  Laterality: N/A;   • CARDIAC CATHETERIZATION N/A 6/22/2021    Procedure: RENAL ANGIOGRAPHY;  Surgeon: Jesus Bhagat MD;  Location: [facility] CATH INVASIVE LOCATION;  Service: Cardiovascular;  Laterality: N/A;   • CARDIAC CATHETERIZATION N/A 6/22/2021    Procedure: Stent BMS peripheral;  Surgeon: Jesus Bhagat MD;  Location: [facility] CATH INVASIVE LOCATION;  Service: Cardiovascular;  Laterality: N/A;   rt renal   • CARDIAC CATHETERIZATION N/A 6/22/2021    Procedure: Angioplasty-peripheral;  Surgeon: Jesus Bhagat MD;  Location: [facility] CATH INVASIVE LOCATION;  Service: Cardiovascular;  Laterality: N/A;   rt renal   • CARDIOVASCULAR STRESS TEST  2017   • CORONARY ARTERY BYPASS GRAFT  1995   • ECHO - CONVERTED  2017   • NEPHROURETERECTOMY Left 11/2/2020    Procedure: NEPHROURETERECTOMY;  Surgeon: Rogers Bae MD;  Location: [facility] MAIN OR;  Service: Urology;  Laterality: Left;   • PACEMAKER IMPLANTATION  2016    bs   • PORTACATH PLACEMENT          Home Meds:  Medications Prior to Admission   Medication Sig Dispense Refill Last Dose   • acetaminophen (TYLENOL) 500 MG tablet Take 1,000 mg by mouth Every 6 (Six) Hours As Needed for Mild Pain .   Unknown at Unknown time   • albuterol sulfate  (90 Base) MCG/ACT inhaler Inhale 2 puffs Every 4 (Four) Hours As Needed for Wheezing. 18 g 2 Unknown at Unknown time   • aspirin 81 MG EC tablet Take 81 mg by mouth Daily.   Unknown at Unknown time   • budesonide-formoterol (Symbicort) 160-4.5 MCG/ACT inhaler Inhale 2 puffs 2 (Two) Times a Day  for 90 days. 10.2 g 2 Unknown at Unknown time   • Cholecalciferol (Vitamin D3) 50 MCG (2000 UT) tablet Take 2,000 Units by mouth Daily.   Unknown at Unknown time   • escitalopram (LEXAPRO) 5 MG tablet Take 5 mg by mouth Daily.   Unknown at Unknown time   • famotidine (PEPCID) 40 MG tablet Take 40 mg by mouth Every Night.   Unknown at Unknown time   • folic acid (FOLVITE) 400 MCG tablet Take 400 mcg by mouth Daily.   Unknown at Unknown time   • gabapentin (NEURONTIN) 100 MG capsule Take 100 mg by mouth 2 (Two) Times a Day.   Unknown at Unknown time   • HYDROcodone-acetaminophen (NORCO) 7.5-325 MG per tablet Take 1 tablet by mouth Every 6 (Six) Hours As Needed for Moderate Pain .   Unknown at Unknown time   • megestrol (MEGACE) 40 MG/ML suspension Take 200 mg by mouth 3 (Three) Times a Day With Meals.   Unknown at Unknown time   • melatonin 5 MG tablet tablet Take 5 mg by mouth.   Unknown at Unknown time   • NON FORMULARY / PATIENT SUPPLIED MEDICATION Take 100 mg by mouth Daily. OFEV-Nintedanib   Unknown at Unknown time   • omeprazole (priLOSEC) 40 MG capsule Take 40 mg by mouth Daily.   Unknown at Unknown time   • ondansetron (ZOFRAN) 4 MG tablet Take 4 mg by mouth Every 8 (Eight) Hours As Needed.   Unknown at Unknown time   • polyethylene glycol (MIRALAX) 17 g packet Take 17 g by mouth Daily As Needed.   Unknown at Unknown time   • polyethylene glycol (MIRALAX) 17 GM/SCOOP powder mix 1 capful (17 g) in liquid by mouth Daily for 90 days. 510 g 2 Unknown at Unknown time   • promethazine (PHENERGAN) 25 MG tablet Take 25 mg by mouth Every 4 (Four) Hours As Needed for Nausea or Vomiting.   Unknown at Unknown time   • psyllium (METAMUCIL MULTIHEALTH FIBER) 58.12 % packet Take 1 packet by mouth Daily. 30 packet 0 Unknown at Unknown time       Allergies:  No Known Allergies    Social History:   Social History     Socioeconomic History   • Marital status:    Tobacco Use   • Smoking status: Former Smoker     Years:  "5.00     Quit date:      Years since quittin.1   • Smokeless tobacco: Never Used   Vaping Use   • Vaping Use: Never used   Substance and Sexual Activity   • Alcohol use: Yes     Alcohol/week: 1.0 standard drink     Types: 1 Shots of liquor per week     Comment: RARE   • Drug use: Never   • Sexual activity: Defer       Family History:  Family History   Problem Relation Age of Onset   • No Known Problems Mother    • Heart disease Father    • Heart failure Father    • Heart attack Father    • No Known Problems Sister    • No Known Problems Brother    • Malig Hyperthermia Neg Hx        Physical Exam:  /76 (BP Location: Right arm, Patient Position: Lying)   Pulse 105   Temp 97.4 °F (36.3 °C) (Axillary)   Resp 25   Ht 172.7 cm (67.99\")   Wt 64.8 kg (142 lb 14.4 oz)   SpO2 97%   BMI 21.73 kg/m²  Body mass index is 21.73 kg/m². 97% 64.8 kg (142 lb 14.4 oz)  Physical Exam  Vitals reviewed.   Constitutional:       Appearance: He is ill-appearing.   Pulmonary:      Effort: Pulmonary effort is normal.      Breath sounds: Rales present.   Skin:     General: Skin is warm and dry.   Neurological:      Mental Status: He is alert.         LABS:  Lab Results   Component Value Date    CALCIUM 9.5 2022    PHOS 2.8 2022     Results from last 7 days   Lab Units 22  0224 22  2107 22  1015 22  0325 22  1215 22  0035   MAGNESIUM mg/dL  --   --   --  2.0  --  2.1   SODIUM mmol/L 132* 129* 131*  --    < >  --    POTASSIUM mmol/L 5.5* 5.2 4.2 4.5   < > 4.6   CHLORIDE mmol/L 98 93* 99  --    < >  --    CO2 mmol/L 22.0 25.0 20.0*  --    < >  --    BUN mg/dL 42* 41* 30*  --    < >  --    CREATININE mg/dL 0.78 0.87 0.65*  --    < >  --    GLUCOSE mg/dL 123* 141* 170*  --    < >  --    CALCIUM mg/dL 9.5 9.9 9.4  --    < >  --    WBC 10*3/mm3 25.10* 29.70* 27.60*  --    < >  --    HEMOGLOBIN g/dL 15.9 15.2 15.8  --    < >  --    PLATELETS 10*3/mm3 164 202 152  --    < >  --    ALT " (SGPT) U/L 83* 84* 188*  --    < >  --    AST (SGOT) U/L 32 32 45*  --    < >  --    PROBNP pg/mL  --  1,109.0  --   --   --   --    PROCALCITONIN ng/mL 0.31*  --   --   --   --   --     < > = values in this interval not displayed.     Lab Results   Component Value Date    CKTOTAL 24 06/10/2021    TROPONINT <0.010 03/07/2022     Results from last 7 days   Lab Units 03/07/22 2107   TROPONIN T ng/mL <0.010         Results from last 7 days   Lab Units 03/08/22 0224 03/07/22 2110   PROCALCITONIN ng/mL 0.31*  --    LACTATE mmol/L  --  2.0     Results from last 7 days   Lab Units 03/07/22 2134   PH, ARTERIAL pH units 7.486*   PCO2, ARTERIAL mm Hg 32.4*   PO2 ART mm Hg 69.2*   O2 SATURATION ART % 95.1   MODALITY  Cannula     Results from last 7 days   Lab Units 03/07/22 2122   ADENOVIRUS DETECTION BY PCR  Not Detected   CORONAVIRUS 229E  Not Detected   CORONAVIRUS HKU1  Not Detected   CORONAVIRUS NL63  Not Detected   CORONAVIRUS OC43  Not Detected   HUMAN METAPNEUMOVIRUS  Not Detected   HUMAN RHINOVIRUS/ENTEROVIRUS  Not Detected   INFLUENZA B PCR  Not Detected   PARAINFLUENZA 1  Not Detected   PARAINFLUENZA VIRUS 2  Not Detected   PARAINFLUENZA VIRUS 3  Not Detected   PARAINFLUENZA VIRUS 4  Not Detected   BORDETELLA PERTUSSIS PCR  Not Detected   CHLAMYDOPHILA PNEUMONIAE PCR  Not Detected   MYCOPLAMA PNEUMO PCR  Not Detected   INFLUENZA A PCR  Not Detected   RSV, PCR  Not Detected     Results from last 7 days   Lab Units 03/07/22 2107   INR  1.05         Lab Results   Component Value Date    TSH 0.982 12/30/2021     Estimated Creatinine Clearance: 73.1 mL/min (by C-G formula based on SCr of 0.78 mg/dL).         Imaging:  Imaging Results (Last 24 Hours)     Procedure Component Value Units Date/Time    CT Chest Without Contrast Diagnostic [[id number]] Resulted: 03/08/22 0911     Updated: 03/08/22 0904    XR Chest 1 View [[id number]] Collected: 03/07/22 2128     Updated: 03/07/22 2132    Narrative:      EXAM: XR CHEST 1  VW-     DATE OF EXAM: 3/7/2022 9:17 PM     INDICATION: cough.       COMPARISONS: 02/28/2022 and 02/21/2014     FINDINGS:     Chronic appearing interstitial opacities without definitive significant  change. No pneumothorax or pleural effusion. No focal consolidation.  Stable appearance of the mediastinal structures from the most recent  comparison. Right-sided port in good position. Left 2-lead pacemaker  device and replacement valve material again noted. Elevation of the  right diaphragm unchanged from the most recent comparison, very similar  to 2014.       Impression:         Negative portable chest x-ray. No evidence of acute cardiopulmonary  disease.     Electronically Signed By-Tylor Lucio On:3/7/2022 9:30 PM  This report was finalized on [id number] by  Tylor Lucio, .            Current Meds:   SCHEDULE  cefepime, 2 g, Intravenous, Q8H  methylPREDNISolone sodium succinate, 40 mg, Intravenous, Q8H  vancomycin, 750 mg, Intravenous, Q12H      Infusions  Pharmacy to dose vancomycin,   sodium chloride, 75 mL/hr, Last Rate: 75 mL/hr (03/08/22 0306)      PRNs  ipratropium-albuterol  •  Pharmacy to dose vancomycin  •  sodium chloride        Loni Agosto, APRN  3/8/2022  09:31 EST    The NP scribed the note for me, I have examined the patient and reviewed and verified the above findings and and I formulated the assessment and plan as documented  Much of this encounter note is an electronic transcription/translation of spoken language to printed text using Dragon Software.

## 2022-03-08 NOTE — OUTREACH NOTE
Sepsis Week 2 Survey    Flowsheet Row Responses   Alevism facility patient discharged from? [facility]   Does the patient have one of the following disease processes/diagnoses(primary or secondary)? Sepsis   Week 2 attempt successful? No   Revoke Readmitted          SUMA NUNEZ - Registered Nurse

## 2022-03-08 NOTE — PROGRESS NOTES
[facility] Medicine Services Daily Progress Note    Patient Name: Navneet Lind  : 1945  MRN: [id number]  Primary Care Physician:  Uri Cortes MD  Date of admission: 3/7/2022      Subjective      Chief Complaint: Shortness of breath and cough.      Patient Reports no overnight events.    Review of Systems   Constitutional: Negative.   HENT: Negative.    Eyes: Negative.    Cardiovascular: Negative.    Respiratory: Negative.    Endocrine: Negative.    Hematologic/Lymphatic: Negative.    Skin: Negative.    Musculoskeletal: Negative.    Gastrointestinal: Negative.    Genitourinary: Negative.    Neurological: Negative.    Psychiatric/Behavioral: Negative.    Allergic/Immunologic: Negative.             Objective      Vitals:   Temp:  [97.4 °F (36.3 °C)-99.4 °F (37.4 °C)] 97.4 °F (36.3 °C)  Heart Rate:  [105-111] 105  Resp:  [25-28] 25  BP: (109-135)/(76-87) 112/76  Flow (L/min):  [3-6] 3    Physical Exam  Vitals and nursing note reviewed.   Constitutional:       General: He is not in acute distress.  HENT:      Head: Normocephalic.      Nose: Nose normal.      Mouth/Throat:      Mouth: Mucous membranes are dry.      Pharynx: Oropharynx is clear.   Eyes:      Extraocular Movements: Extraocular movements intact.      Conjunctiva/sclera: Conjunctivae normal.      Pupils: Pupils are equal, round, and reactive to light.   Cardiovascular:      Pulses: Normal pulses.      Heart sounds: No murmur heard.    No friction rub. No gallop.      Comments: S1 and S2 present.  No tachycardia.  Pulmonary:      Breath sounds: No stridor. No wheezing or rales.      Comments: Decreased air entry bilaterally.  Positive crackles.  Chest:      Chest wall: No tenderness.   Abdominal:      General: Bowel sounds are normal. There is no distension.      Palpations: Abdomen is soft.      Tenderness: There is no abdominal tenderness. There is no right CVA tenderness or guarding.   Musculoskeletal:          General: No swelling, tenderness, deformity or signs of injury.      Cervical back: Normal range of motion. No rigidity.      Right lower leg: No edema.      Left lower leg: No edema.   Skin:     General: Skin is warm and dry.      Capillary Refill: Capillary refill takes less than 2 seconds.      Coloration: Skin is not jaundiced.      Findings: No bruising, erythema, lesion or rash.   Neurological:      Comments: No facial asymmetry noted.  Gait and station not tested.   Psychiatric:      Comments: No agitation.                Result Review    Result Review:  I have personally reviewed the results from the time of this admission to 3/8/2022 15:13 EST and agree with these findings:  [x]  Laboratory  [x]  Microbiology  [x]  Radiology  []  EKG/Telemetry   []  Cardiology/Vascular   []  Pathology  []  Old records  []  Other:  Most notable findings include:     CT scan of the chest showed:       left mainstem bronchus (series 2 image 41). This has increased when   compared to the outside CT chest of 11/10/2021.       Heart size is normal. Dense native coronary artery calcific lesions are   present, and there are signs of prior CABG. Left chest wall pacemaker   device is in place. Right chest wall kingsley catheter tip terminates at   the lower SVC level. There are signs of prior TAVR. No effusion or   pleural effusion is seen. Thyroid gland is unremarkable.       Ill-defined low-density mass in the hepatic dome is suspicious for   metastatic disease measuring 5.1 x 5.3 cm. Cholecystectomy changes are   present. 2.3 x 1.4 cm right adrenal nodule is a new finding when   compared to the CT chest 11/10/2021, and is consistent with metastatic   disease. The left adrenal gland is unremarkable. Left nephrectomy   changes are present. Right renal artery stent is noted. A cyst is seen   within the right kidney, measuring 12 mm. A 3 mm nonobstructing right   renal stone is noted. The remainder the imaged upper abdominal organs    have a normal noncontrast appearance.       There are old bilateral rib fractures. Chronic T7 compression fracture   with vertebroplasty. Some of the vertebroplasty cement has chronic   extravasation into the anterior epidural space of the thoracic spinal   canal, with moderate to severe canal stenosis. There is a chronic   compression fracture of the L1 vertebral body inferior endplate,   unchanged from prior. No acute osseous abnormalities identified.           Impression:         1. Mediastinal AP window soft tissue mass appears to infiltrate the left   mainstem bronchus and mildly indents the distal thoracic trachea. It has   enlarged since the prior examination. It could represent primary lung   malignancy or metastatic disease of unknown primary origin.   2. Prevascular mediastinal adenopathy has increased compared to   11/10/2021.   3. There is opacification and partial obstruction of the bronchus   intermedius, right middle lobe and right lower lobe bronchi with   bronchial wall thickening. There is partial atelectasis versus   developing pneumonia within the right middle lobe and right lower lobe.   4. A 5.3 cm mass is seen within the hepatic dome, consistent with   metastatic disease.   5. 2.3 cm right adrenal nodule consistent with adrenal metastasis.   6. Additional CT findings include CABG, TAVR, chronic interstitial   pulmonary fibrosis, left nephrectomy, nonobstructing right renal stone,   cholecystectomy, old bilateral rib fractures, old T7 and L1 compression   fractures.               Electronically Signed By-Monie Tiwari MD On:3/8/2022 9:33 AM   This report was finalized on [id number] by  Monie Tiwari MD.           Assessment/Plan    From previous notes and with minor updates.  Brief Patient Summary:  Patient is a 76 y.o. male with multiple medical problems including chronic diastolic CHF, CAD, radical nephrectomy (left).  Bladder cancer, HLD, NATALIA on CPAP, cardiac pacemaker, s/p TAVR,  anemia, RBBB, recent hospitalization for sepsis pneumonia with hemoptysis and interstitial lung disease who presented to [facility] on 3/7/2022 complaining of 2-day history of increasing shortness of breath, cough and recurring hemoptysis today.  Patient was just discharged from [facility] on March 3, 2022 after admission for pneumonia and interstitial lung disease on February 22, 2022.  Patient is lethargic and appears very weak he nods appropriately to some questions but not all, making it impossible to complete for an accurate HPI at this time.  When asked if he had coughed up a large or small amount of bloody sputum patient nodded yes he did not verbalize the amount.  When patient asked if he wished to remain DNR/DNI he did nod head yes.  Patient asked if he was in pain patient shook her head no.  Patient was seen in the emergency room and vital signs upon arrival to emergency department today blood pressure 135/87, heart rate 111, oxygen saturation 98% room air, respiratory rate 28, patient afebrile with T-max of 97.9.  Initial evaluation in the emergency department included:  Initial ABG showing pH 7.486, PCO2 32.4, PO2 69.2, HCO3 24.4, base excess 1.82, oxygen saturation 95.1%.  Drawn on room air.  Initial troponin negative 0.010,  Initial BNP 1109.  Notable abnormal labs: Glucose 141, sodium 129, BUN 41, BUN creatinine ratio 47.1, alkaline phosphatase 168 ALT 84, albumin 2.20, WBCs 29.7, 85.1% neutrophils,  Blood cultures drawn with results pending  Respiratory panel collected negative for COVID-19 and other viral infections.  X-ray completed showing chronic interstitial opacities with no significant change from previous exam. left chest pacemaker noted in the right hemidiaphragm is noted  EKG completed shows sinus tachycardia RBBB, LP FB heart rate 118  While in the emergency department patient was administered 2 g cefepime, and 1250 mg  of vancomycin.  Patient will be admitted to  hospitalist group for further evaluation and treatment of sepsis, hemoptysis, interstitial lung disease, diarrhea along with other acute and/or chronic conditions.    cefepime, 2 g, Intravenous, Q8H  methylPREDNISolone sodium succinate, 40 mg, Intravenous, Q12H  pantoprazole, 40 mg, Intravenous, Q AM  vancomycin, 750 mg, Intravenous, Q12H       Pharmacy to dose vancomycin,   sodium chloride, 75 mL/hr, Last Rate: 75 mL/hr (03/08/22 0306)         Active Hospital Problems:  Active Hospital Problems    Diagnosis    • **Hemoptysis  Follow pulmonary recommendations.      • Sepsis, due to unspecified organism, unspecified whether acute organ dysfunction present (HCC)  Follow ID recommendations.  Follow cultures.      • Sepsis without septic shock (HCC)    • ILD (interstitial lung disease) (HCC)  Follow pulmonary recommendations.      • Bladder carcinoma (HCC)  Follow hematology/oncology recommendations.      • Shortness of breath  Treat with oxygen therapy as needed.      • S/P TAVR (transcatheter aortic valve replacement)    • Chronic diastolic congestive heart failure (HCC)    • H/O radical nephrectomy, left    • Anemia of chronic disease  Monitor hemoglobin and hematocrit.      • NATALIA on CPAP    • Coronary artery disease involving native coronary artery of native heart without angina pectoris      CABG 1995; SVG to RCA is occluded, LIMA to LAD, SVG to diag of LAD, SVG to marginal of LCX     • Mixed hyperlipidemia  Treat with Lipitor.      • Primary hypertension  Stable.      • Presence of cardiac pacemaker      BS dual chamber PM 11/7/2016       Continue appropriate patient's home medications for other chronic medical conditions.  Continue the present level of care.  Patient and family agreed with the plan of care.      DVT prophylaxis:  Mechanical DVT prophylaxis orders are present.    CODE STATUS:    Medical Intervention Limits: NO intubation (DNI)  Code Status (Patient has no pulse and is not breathing): No CPR (Do  Not Attempt to Resuscitate)  Medical Interventions (Patient has pulse or is breathing): Limited Support      Disposition:      This patient has been examined wearing appropriate Personal Protective Equipment and discussed with hospital infection control department, [facility] department, infectious disease specialist and pulmonologist. 03/08/22      Electronically signed by Navneet De León MD, FACP, 03/08/22, 15:13 EST.      [facility] Hospitalist Team

## 2022-03-08 NOTE — PROGRESS NOTES
Pt is current with [facility]. Please call [phone] with any questions or concerns. Thank you

## 2022-03-08 NOTE — PLAN OF CARE
Goal Outcome Evaluation:                77 y/o M who presented with increased SOA, cough and recurrent hemoptysis and interstitial lung disease. Pt admitted from home. Pt recommended IP rehab last admission but pt d/c'ed home.  PMHx significant for acute-on-chronic RF, Bladder CA, Sepsis. Pt demonstrating difficulty with voicing audibly and mild increase in SOA with speaking. No family present and not able to obtain history from pt. From last admission PLOF: According to pt, at baseline, pt requires assist with all ADLs from his wife who he resides with. He typically ambulates with RW but unclear when last time pt ambulated was because he did not ambulate with PT during last admission. Patient has no ROSE his home. This date he is only oriented to self. He requires max A of 2 for bed mobility, mod A progressing to CGA for sitting balance. He sat edge of bed for 5 min approx. Mod A of 2 attempted to stand, was able to clear hips from elevated bed for less than 2 seconds before returning to sit. Max A of 2 to reposition in bed, left in chair position. Patient with significant coughing, sounds productive but not able to expel sputum. Recommending IP rehab upon d/c due to significant weakness and inability to achieve upright standing position or ambulate at this time.

## 2022-03-08 NOTE — PROGRESS NOTES
"Pharmacy Antimicrobial Dosing Service    Subjective:  Navneet Lind is a 76 y.o.male admitted with hemoptysis. Pharmacy has been consulted to dose Vancomycin for possible sepsis.    PMH: hx radical nephrectomy, s/p TAVR, interstitial lung disease, pacemaker      Assessment/Plan    1. Day #1 Vancomycin: Goal -600 mcg*h/mL. Vancomycin 1250mg (~19.3 mg/kg ABW) given in ED. Will schedule vancomycin 750mg (~11.6 mg/kg ABW) q12h. Will obtain a level prior to fourth total dose on 3/9.    2. Day #1 Cefepime: 2g IV q8h for estCrCl > 60 mL/min.    Will continue to monitor drug levels, renal function, culture and sensitivities, and patient clinical status.       Objective:  Relevant clinical data and objective history reviewed:  172.7 cm (67.99\")   64.8 kg (142 lb 14.4 oz)   Ideal body weight: 68.4 kg (150 lb 12.1 oz)  Body mass index is 21.73 kg/m².        Results from last 7 days   Lab Units 03/07/22 2107 03/03/22  1015 03/02/22  1215   CREATININE mg/dL 0.87 0.65* 0.62*     Estimated Creatinine Clearance: 67.2 mL/min (by C-G formula based on SCr of 0.87 mg/dL).  No intake/output data recorded.    Results from last 7 days   Lab Units 03/07/22 2107 03/03/22  1015 03/02/22  1215   WBC 10*3/mm3 29.70* 27.60* 27.60*     Temperature    03/07/22 2152 03/08/22 0016   Temp: 99.4 °F (37.4 °C) 97.9 °F (36.6 °C)     Baseline culture/source/susceptibility:  Microbiology Results (last 10 days)       Procedure Component Value - Date/Time    Respiratory Panel PCR w/COVID-19(SARS-CoV-2) SHANE/SHAI/FELECIA/PAD/COR/MAD/VALERIA In-House, NP Swab in UTM/VTM, 3-4 HR TAT - Swab, Nasopharynx [[id number]]  (Normal) Collected: 03/07/22 2122    Lab Status: Final result Specimen: Swab from Nasopharynx Updated: 03/07/22 2217     ADENOVIRUS, PCR Not Detected     Coronavirus 229E Not Detected     Coronavirus HKU1 Not Detected     Coronavirus NL63 Not Detected     Coronavirus OC43 Not Detected     COVID19 Not Detected     Human Metapneumovirus Not Detected "     Human Rhinovirus/Enterovirus Not Detected     Influenza A PCR Not Detected     Influenza B PCR Not Detected     Parainfluenza Virus 1 Not Detected     Parainfluenza Virus 2 Not Detected     Parainfluenza Virus 3 Not Detected     Parainfluenza Virus 4 Not Detected     RSV, PCR Not Detected     Bordetella pertussis pcr Not Detected     Bordetella parapertussis PCR Not Detected     Chlamydophila pneumoniae PCR Not Detected     Mycoplasma pneumo by PCR Not Detected    Narrative:      In the setting of a positive respiratory panel with a viral infection PLUS a negative procalcitonin without other underlying concern for bacterial infection, consider observing off antibiotics or discontinuation of antibiotics and continue supportive care. If the respiratory panel is positive for atypical bacterial infection (Bordetella pertussis, Chlamydophila pneumoniae, or Mycoplasma pneumoniae), consider antibiotic de-escalation to target atypical bacterial infection.            Anti-Infectives (From admission, onward)      Ordered     Dose/Rate Route Frequency Start Stop    03/08/22 0219  vancomycin 750 mg in sodium chloride 0.9 % 250 mL IVPB        Ordering Provider: Massiel Washburn APRN    750 mg  over 60 Minutes Intravenous Every 12 Hours 03/08/22 1100 03/15/22 1059    03/08/22 0126  cefepime 2 gm IVPB in 100 ml NS (MBP)        Note to Pharmacy: 1st dose given in ED   Ordering Provider: Massiel Washburn APRN    2 g  over 4 Hours Intravenous Every 8 Hours 03/08/22 0630 03/15/22 0629    03/08/22 0126  Pharmacy to dose vancomycin        Ordering Provider: Massiel Washburn APRN     Does not apply Continuous PRN 03/08/22 0121 03/15/22 0220    03/07/22 2219  cefepime 2 gm IVPB in 100 ml NS (MBP)        Ordering Provider: Mani Bruce MD    2 g  over 30 Minutes Intravenous Once 03/07/22 2221 03/07/22 2320    03/07/22 2219  Vancomycin HCl 1,250 mg in sodium chloride 0.9 % 250 mL IVPB        Ordering Provider:  Mani Bruce MD    20 mg/kg × 65.8 kg Intravenous Once 03/07/22 2221 03/07/22 2320            Ashia Ortiz [facility]  03/08/22 02:24 EST

## 2022-03-08 NOTE — CONSULTS
Infectious Diseases Consult Note    Referring Provider: Navneet De León MD    Reason for Consultation: Pneumonia, sepsis    Patient Care Team:  Uri Cortes MD as PCP - General  Andre Veliz MD as Consulting Physician (Nephrology)  Anderson Galvez MD as Consulting Physician (Cardiology)  Ajit Quinones MD as Surgeon (Cardiothoracic Surgery)  Cristina Wolfe MD as Consulting Physician (Sleep Medicine)  Juan Vega PA as Physician Assistant (Physician Assistant)  Eddie Loredo MD as Consulting Physician (Hematology and Oncology)    Chief complaint shortness of breath, lethargy, hemoptysis    Subjective     The patient has been afebrile for the last 24 hours.  The patient is on 4 L of oxygen by nasal cannula, hemodynamically stable, and is tolerating antimicrobial therapy.    History of present illness:      This is a 76-year-old male who presents the hospital on 2022-03-07 with complaints of shortness of breath, lethargy and hemoptysis.  Wife says patient is also been having multiple bouts of diarrhea that is very dark in color.  Patient was recently diagnosed with metastatic lung cancer and started on immunotherapy.  Also has pulmonary fibrosis and is on OFEV cording to his wife.  CT performed today shows a soft tissue mass at the mediastinal AP window that is infiltrating the left mainstem bronchus, increasing lymphadenopathy and partial obstruction of the bronchus intermedius, right middle lobe and left lower lobe bronchi with concern for pneumonia in the right middle lobe and right lower lobes.  A liver lesion and adrenal metastatic lesion were also seen she was diagnosed with stage III renal carcinoma in 2020 and has also been diagnosed with bladder cancer.  Patient did not complete full chemotherapy for the renal/bladder cancer in order to have a aortic valve replacement in March 2021.  Patient denies fever, chills, diaphoresis, GI issues other than  the diarrhea or urinary issues.  Patient is not dealing with any open wounds at this time.  He does have a port.  Patient has been more lethargic with increased weakness and the wife states that he has had some bouts of confusion    Patient is currently on 4 L of oxygen by nasal cannula but does not appear to be in acute distress.  Patient's been afebrile since admission.  Patient has a creatinine of 0.78, white blood cell count of 25.1, hemoglobin 15.9 and platelets 164.  Liver enzymes were elevated at admission and hepatitis panel was negative.  Blood cultures are pending and respiratory viral DNA panel COVID-19 screen is negative.  Patient is currently on IV cefepime and IV vancomycin has no known allergies    Along with the above-mentioned history the patient has a history of hypertension right bundle branch block, CAD, chronic kidney disease hyperlipidemia obstructive sleep apnea, pacemaker placement CABG in 1995.  Patient is a former smoker and denies any alcohol or illicit drug abuse      Review of Systems   Review of Systems   Constitutional: Positive for fatigue.   HENT: Negative.    Eyes: Negative.    Respiratory: Positive for cough and shortness of breath.         Hemoptysis   Cardiovascular: Negative.    Gastrointestinal: Positive for blood in stool and diarrhea.        Anorexia   Endocrine: Negative.    Genitourinary: Negative.    Musculoskeletal: Negative.    Skin: Negative.    Neurological: Positive for weakness.   Psychiatric/Behavioral: Negative.    All other systems reviewed and are negative.      Medications  Medications Prior to Admission   Medication Sig Dispense Refill Last Dose    acetaminophen (TYLENOL) 500 MG tablet Take 1,000 mg by mouth Every 6 (Six) Hours As Needed for Mild Pain .   Unknown at Unknown time    albuterol sulfate  (90 Base) MCG/ACT inhaler Inhale 2 puffs Every 4 (Four) Hours As Needed for Wheezing. 18 g 2 Unknown at Unknown time    aspirin 81 MG EC tablet Take 81 mg  by mouth Daily.   Unknown at Unknown time    budesonide-formoterol (Symbicort) 160-4.5 MCG/ACT inhaler Inhale 2 puffs 2 (Two) Times a Day for 90 days. 10.2 g 2 Unknown at Unknown time    Cholecalciferol (Vitamin D3) 50 MCG (2000 UT) tablet Take 2,000 Units by mouth Daily.   Unknown at Unknown time    escitalopram (LEXAPRO) 5 MG tablet Take 5 mg by mouth Daily.   Unknown at Unknown time    famotidine (PEPCID) 40 MG tablet Take 40 mg by mouth Every Night.   Unknown at Unknown time    folic acid (FOLVITE) 400 MCG tablet Take 400 mcg by mouth Daily.   Unknown at Unknown time    gabapentin (NEURONTIN) 100 MG capsule Take 100 mg by mouth 2 (Two) Times a Day.   Unknown at Unknown time    HYDROcodone-acetaminophen (NORCO) 7.5-325 MG per tablet Take 1 tablet by mouth Every 6 (Six) Hours As Needed for Moderate Pain .   Unknown at Unknown time    megestrol (MEGACE) 40 MG/ML suspension Take 200 mg by mouth 3 (Three) Times a Day With Meals.   Unknown at Unknown time    melatonin 5 MG tablet tablet Take 5 mg by mouth.   Unknown at Unknown time    NON FORMULARY / PATIENT SUPPLIED MEDICATION Take 100 mg by mouth Daily. OFEV-Nintedanib   Unknown at Unknown time    omeprazole (priLOSEC) 40 MG capsule Take 40 mg by mouth Daily.   Unknown at Unknown time    ondansetron (ZOFRAN) 4 MG tablet Take 4 mg by mouth Every 8 (Eight) Hours As Needed.   Unknown at Unknown time    polyethylene glycol (MIRALAX) 17 g packet Take 17 g by mouth Daily As Needed.   Unknown at Unknown time    polyethylene glycol (MIRALAX) 17 GM/SCOOP powder mix 1 capful (17 g) in liquid by mouth Daily for 90 days. 510 g 2 Unknown at Unknown time    promethazine (PHENERGAN) 25 MG tablet Take 25 mg by mouth Every 4 (Four) Hours As Needed for Nausea or Vomiting.   Unknown at Unknown time    psyllium (METAMUCIL MULTIHEALTH FIBER) 58.12 % packet Take 1 packet by mouth Daily. 30 packet 0 Unknown at Unknown time       History  Past Medical History:   Diagnosis Date    Aortic  stenosis 9/29/2015    Per Echo 2017    Benign essential HTN     Bundle branch block, right 2/7/2013    CAD (coronary artery disease), native coronary artery     Cancer ([facility])     bladder- chemo, new left renal mass    Chronic kidney disease     Coronary artery disease involving native coronary artery of native heart without angina pectoris 11/19/2019    CABG 1995; SVG to RCA is occluded, LIMA to LAD, SVG to diag of LAD, SVG to marginal of LCX    COVID-19 vaccine administered     Essential hypertension 11/19/2019    Heart murmur     History of transfusion     Hyperlipidemia, mixed     ILD (interstitial lung disease) ([facility]) 11/1/2021    Injury of back     Mixed hyperlipidemia 11/19/2019    Near syncope     NATALIA (obstructive sleep apnea)     AHI 11.6/h, CPAP    Premature ventricular contractions 2/11/2014    Presence of cardiac pacemaker 7/5/2019    BS dual chamber PM 11/7/2016    Shortness of breath     SSS (sick sinus syndrome) ([facility]) 7/5/2019     Past Surgical History:   Procedure Laterality Date    AORTIC VALVE REPAIR/REPLACEMENT N/A 3/30/2021    Procedure: TTE TRANSFEMORAL TRANSCATHETER AORTIC VALVE REPLACEMENT PERCUTANEOUS APPROACH;  Surgeon: Hang Martinez MD;  Location: [facility] OR 18/19;  Service: Cardiothoracic;  Laterality: N/A;    AORTIC VALVE REPAIR/REPLACEMENT N/A 3/30/2021    Procedure: Transfemoral Transcatheter Aortic Valve Replacement w/Intra Op TTE and Possible Open Rescue Surgery;  Surgeon: Anderson Galvez MD;  Location: [facility] OR 18/19;  Service: Cardiovascular;  Laterality: N/A;    BRONCHOSCOPY N/A 2/23/2022    Procedure: BRONCHOSCOPY WITH BRONCHOALVEOLAR LAVAGE AND BIOPSY X1 AREA;  Surgeon: Ronny Jiang MD;  Location: [facility] ENDOSCOPY;  Service: Pulmonary;  Laterality: N/A;  blood clot in lung, hemoptysis    CARDIAC CATHETERIZATION  2018    CARDIAC CATHETERIZATION N/A 3/22/2021    Procedure: Left Heart Cath;  Surgeon: ADRIANO Erazo MD;  Location: [facility] CATH INVASIVE  LOCATION;  Service: Cardiovascular;  Laterality: N/A;    CARDIAC CATHETERIZATION N/A 6/22/2021    Procedure: RENAL ANGIOGRAPHY;  Surgeon: Jesus Bhagat MD;  Location: [facility] CATH INVASIVE LOCATION;  Service: Cardiovascular;  Laterality: N/A;    CARDIAC CATHETERIZATION N/A 6/22/2021    Procedure: Stent BMS peripheral;  Surgeon: Jesus Bhagat MD;  Location: [facility] CATH INVASIVE LOCATION;  Service: Cardiovascular;  Laterality: N/A;   rt renal    CARDIAC CATHETERIZATION N/A 6/22/2021    Procedure: Angioplasty-peripheral;  Surgeon: Jesus Bhagat MD;  Location: [facility] CATH INVASIVE LOCATION;  Service: Cardiovascular;  Laterality: N/A;   rt renal    CARDIOVASCULAR STRESS TEST  2017    CORONARY ARTERY BYPASS GRAFT  1995    ECHO - CONVERTED  2017    NEPHROURETERECTOMY Left 11/2/2020    Procedure: NEPHROURETERECTOMY;  Surgeon: Rogers Bae MD;  Location: [facility] MAIN OR;  Service: Urology;  Laterality: Left;    PACEMAKER IMPLANTATION  2016    bs    PORTACATH PLACEMENT         Family History  Family History   Problem Relation Age of Onset    No Known Problems Mother     Heart disease Father     Heart failure Father     Heart attack Father     No Known Problems Sister     No Known Problems Brother     Jameson Hyperthermia Neg Hx        Social History   reports that he quit smoking about 8 years ago. He quit after 5.00 years of use. He has never used smokeless tobacco. He reports current alcohol use of about 1.0 standard drink of alcohol per week. He reports that he does not use drugs.    Allergies  Patient has no known allergies.    Objective     Vital Signs   Vital Signs (last 24 hours)         03/07 0700  03/08 0659 03/08 0700  03/08 1550   Most Recent      Temp (°F) 97.4 -  99.4       97.4 (36.3) 03/08 0330    Heart Rate 108 -  111      105     105 03/08 0818    Resp 25 -  28      25     25 03/08 0818    /78 -  135/87      112/76     112/76 03/08 0818    SpO2 (%) 91 -  98      97      97 03/08 0818            Physical Exam:  Physical Exam  Vitals and nursing note reviewed.   Constitutional:       General: He is not in acute distress.     Appearance: He is well-developed and normal weight. He is ill-appearing. He is not diaphoretic.   HENT:      Head: Normocephalic and atraumatic.   Eyes:      Extraocular Movements: Extraocular movements intact.      Conjunctiva/sclera: Conjunctivae normal.      Pupils: Pupils are equal, round, and reactive to light.   Cardiovascular:      Rate and Rhythm: Normal rate and regular rhythm.      Heart sounds: Normal heart sounds, S1 normal and S2 normal.   Pulmonary:      Effort: Pulmonary effort is normal. No respiratory distress.      Breath sounds: Normal breath sounds. No stridor. No wheezing or rales.      Comments: Very diminished on the right side  Abdominal:      General: Bowel sounds are normal. There is no distension.      Palpations: Abdomen is soft. There is no mass.      Tenderness: There is no abdominal tenderness. There is no guarding.   Musculoskeletal:         General: No deformity. Normal range of motion.      Cervical back: Neck supple.   Skin:     General: Skin is warm and dry.      Coloration: Skin is not pale.      Findings: No erythema or rash.      Comments: Port in place   Neurological:      Mental Status: He is alert and oriented to person, place, and time.      Cranial Nerves: No cranial nerve deficit.   Psychiatric:         Mood and Affect: Mood normal.         Microbiology  Microbiology Results (last 10 days)       Procedure Component Value - Date/Time    Respiratory Panel PCR w/COVID-19(SARS-CoV-2) SHANE/SHAI/FELECIA/PAD/COR/MAD/VALERIA In-House, NP Swab in UTM/VTM, 3-4 HR TAT - Swab, Nasopharynx [[id number]]  (Normal) Collected: 03/07/22 2122    Lab Status: Final result Specimen: Swab from Nasopharynx Updated: 03/07/22 2217     ADENOVIRUS, PCR Not Detected     Coronavirus 229E Not Detected     Coronavirus HKU1 Not Detected     Coronavirus NL63 Not  Detected     Coronavirus OC43 Not Detected     COVID19 Not Detected     Human Metapneumovirus Not Detected     Human Rhinovirus/Enterovirus Not Detected     Influenza A PCR Not Detected     Influenza B PCR Not Detected     Parainfluenza Virus 1 Not Detected     Parainfluenza Virus 2 Not Detected     Parainfluenza Virus 3 Not Detected     Parainfluenza Virus 4 Not Detected     RSV, PCR Not Detected     Bordetella pertussis pcr Not Detected     Bordetella parapertussis PCR Not Detected     Chlamydophila pneumoniae PCR Not Detected     Mycoplasma pneumo by PCR Not Detected    Narrative:      In the setting of a positive respiratory panel with a viral infection PLUS a negative procalcitonin without other underlying concern for bacterial infection, consider observing off antibiotics or discontinuation of antibiotics and continue supportive care. If the respiratory panel is positive for atypical bacterial infection (Bordetella pertussis, Chlamydophila pneumoniae, or Mycoplasma pneumoniae), consider antibiotic de-escalation to target atypical bacterial infection.            Laboratory  Results from last 7 days   Lab Units 03/08/22 0224   WBC 10*3/mm3 25.10*   HEMOGLOBIN g/dL 15.9   HEMATOCRIT % 47.6   PLATELETS 10*3/mm3 164     Results from last 7 days   Lab Units 03/08/22 0224   SODIUM mmol/L 132*   POTASSIUM mmol/L 5.5*   CHLORIDE mmol/L 98   CO2 mmol/L 22.0   BUN mg/dL 42*   CREATININE mg/dL 0.78   GLUCOSE mg/dL 123*   CALCIUM mg/dL 9.5     Results from last 7 days   Lab Units 03/08/22 0224   SODIUM mmol/L 132*   POTASSIUM mmol/L 5.5*   CHLORIDE mmol/L 98   CO2 mmol/L 22.0   BUN mg/dL 42*   CREATININE mg/dL 0.78   GLUCOSE mg/dL 123*   CALCIUM mg/dL 9.5                   Radiology  Imaging Results (Last 72 Hours)       Procedure Component Value Units Date/Time    CT Chest Without Contrast Diagnostic [[id number]] Collected: 03/08/22 0911     Updated: 03/08/22 0935    Narrative:      CT CHEST WO CONTRAST DIAGNOSTIC-      Date of Exam: 3/8/2022 9:01 AM     Indication: Shortness of breath (Ped 0-18y)  . HISTORY of renal  carcinoma with left nephrectomy. HISTORY of bladder cancer treated with  chemotherapy. Former smoker.     Comparison: AP portable chest 3/7/2022. CT chest 2/22/2022. Outside CT  chest from [facility] radiology 11/10/2021.     Technique: Serial and axial CT images of the chest were obtained.  Reconstructions in the coronal and sagittal planes were performed.   Automated exposure control and iterative reconstruction methods were  used.     FINDINGS:     Interlobular and intralobular interstitial septal thickening is seen  within both lungs, greatest peripherally, greatest in the right  perihilar region, consistent with chronic interstitial fibrosis. There  is a cylindrical traction bronchiectasis within the perihilar regions of  both lungs, right greater than left.     There is opacification of the bronchus intermedius and right lower lobe  bronchus, partial opacification the right middle lobe bronchus. There is  bronchial wall thickening in the same distribution. There is increased  airspace disease within the right middle lobe and right lower lobe which  may represent partial atelectasis or developing pneumonia. The left lung  remains free of acute airspace disease.     Abnormally enlarged prevascular lymph nodes are again noted, largest  measuring 11 mm short axis     AP window adenopathy or soft tissue mass is seen, measuring  approximately 2.2 x 3.0 cm. There is extrinsic compression upon the  posterior lateral wall of the lower thoracic trachea, and mild narrowing  of the left neck mainstem bronchus. This mass appears to infiltrate into  the left mainstem bronchus (series 2 image 41). This has increased when  compared to the outside CT chest of 11/10/2021.     Heart size is normal. Dense native coronary artery calcific lesions are  present, and there are signs of prior CABG. Left chest wall pacemaker  device is in  place. Right chest wall kingsley catheter tip terminates at  the lower SVC level. There are signs of prior TAVR. No effusion or  pleural effusion is seen. Thyroid gland is unremarkable.     Ill-defined low-density mass in the hepatic dome is suspicious for  metastatic disease measuring 5.1 x 5.3 cm. Cholecystectomy changes are  present. 2.3 x 1.4 cm right adrenal nodule is a new finding when  compared to the CT chest 11/10/2021, and is consistent with metastatic  disease. The left adrenal gland is unremarkable. Left nephrectomy  changes are present. Right renal artery stent is noted. A cyst is seen  within the right kidney, measuring 12 mm. A 3 mm nonobstructing right  renal stone is noted. The remainder the imaged upper abdominal organs  have a normal noncontrast appearance.     There are old bilateral rib fractures. Chronic T7 compression fracture  with vertebroplasty. Some of the vertebroplasty cement has chronic  extravasation into the anterior epidural space of the thoracic spinal  canal, with moderate to severe canal stenosis. There is a chronic  compression fracture of the L1 vertebral body inferior endplate,  unchanged from prior. No acute osseous abnormalities identified.          Impression:         1. Mediastinal AP window soft tissue mass appears to infiltrate the left  mainstem bronchus and mildly indents the distal thoracic trachea. It has  enlarged since the prior examination. It could represent primary lung  malignancy or metastatic disease of unknown primary origin.  2. Prevascular mediastinal adenopathy has increased compared to  11/10/2021.  3. There is opacification and partial obstruction of the bronchus  intermedius, right middle lobe and right lower lobe bronchi with  bronchial wall thickening. There is partial atelectasis versus  developing pneumonia within the right middle lobe and right lower lobe.  4. A 5.3 cm mass is seen within the hepatic dome, consistent with  metastatic disease.  5. 2.3  cm right adrenal nodule consistent with adrenal metastasis.  6. Additional CT findings include CABG, TAVR, chronic interstitial  pulmonary fibrosis, left nephrectomy, nonobstructing right renal stone,  cholecystectomy, old bilateral rib fractures, old T7 and L1 compression  fractures.           Electronically Signed By-Monie Tiwari MD On:3/8/2022 9:33 AM  This report was finalized on [id number] by  Monie Tiwari MD.    XR Chest 1 View [[id number]] Collected: 03/07/22 2128     Updated: 03/07/22 2132    Narrative:      EXAM: XR CHEST 1 VW-     DATE OF EXAM: 3/7/2022 9:17 PM     INDICATION: cough.       COMPARISONS: 02/28/2022 and 02/21/2014     FINDINGS:     Chronic appearing interstitial opacities without definitive significant  change. No pneumothorax or pleural effusion. No focal consolidation.  Stable appearance of the mediastinal structures from the most recent  comparison. Right-sided port in good position. Left 2-lead pacemaker  device and replacement valve material again noted. Elevation of the  right diaphragm unchanged from the most recent comparison, very similar  to 2014.       Impression:         Negative portable chest x-ray. No evidence of acute cardiopulmonary  disease.     Electronically Signed By-Tylor Lucio On:3/7/2022 9:30 PM  This report was finalized on [id number] by  Tylor Lucio, .            Cardiology      Results Review:  I have reviewed all clinical data, test, lab, and imaging results.       Schedule Meds  cefepime, 2 g, Intravenous, Q8H  methylPREDNISolone sodium succinate, 40 mg, Intravenous, Q12H  pantoprazole, 40 mg, Intravenous, Q AM  vancomycin, 750 mg, Intravenous, Q12H        Infusion Meds  Pharmacy to dose vancomycin,   sodium chloride, 75 mL/hr, Last Rate: 75 mL/hr (03/08/22 0306)        PRN Meds  ipratropium-albuterol    Pharmacy to dose vancomycin    sodium chloride      Assessment/Plan       Assessment    Possible subtle right-sided postobstructive pneumonia.  Patient  has recently been diagnosed with lung cancer and a recent CT shows a soft tissue mass at the mediastinal AP window that is infiltrating the left mainstem bronchus, increasing lymphadenopathy and partial obstruction of the bronchus intermedius, right middle lobe and left lower lobe bronchi with concern for pneumonia in the right middle lobe and right lower lobes.    -Patient has been on immunotherapy  -Patient has reported increased shortness of breath hemoptysis  -Plan 4 L of oxygen by nasal cannula  -COVID-19 screen and respiratory viral DNA panel are negative  -Patient recently had a bronchoscopy on 2022-02-23 and BAL was negative including a negative PCP PCR    Leukocytosis-could be related to metastasis/pneumonia but also need to rule out C. difficile colitis since patient is having diarrhea  -Wife reports multiple bouts of dark liquid stool  -Patient was just recently discharged on 3/3/21 for pneumonia and was on antibiotics at that time  -Patient is also currently on steroids    Elevated liver transaminase -trending down  -Hepatitis panel was negative    Pulmonary fibrosis patient is on Ofev according to his wife    Recent diagnosis of renal and bladder cancer in 2020.  Patient to have some chemotherapy at that time and a left nephrectomy    S/P Aortic valve replacement in 2021    S/P Pacemaker placement 2016    Obstructive sleep apnea    Chronic kidney disease    Plan    Discontinue IV cefepime-patient has had bouts of confusion according to his wife and cefepime can worsen confusion  Start IV Unasyn 3 g every 6 hours  Continue IV vancomycin for now  Patient is scheduled for bronchoscopy tomorrow  Waiting on blood cultures  MRSA the nares screen  C. difficile screen and GI panel have been ordered-pending collection  Continue supportive care  A.m. labs  Case discussed with patient and wife at bedside  Overall prognosis is very guarded    Kat Luz, APRN  03/08/22  15:50 EST    Note is dictated  utilizing voice recognition software/Dragon

## 2022-03-08 NOTE — CASE MANAGEMENT/SOCIAL WORK
Discharge Planning Assessment  [facility]     Patient Name: Navneet Lind  MRN: [id number]  Today's Date: 3/8/2022    Admit Date: 3/7/2022     Discharge Needs Assessment     Row Name 03/08/22 1430       Living Environment    People in Home spouse    Name(s) of People in Home Spouse Mine Horton    Current Living Arrangements home    Primary Care Provided by spouse/significant other    Provides Primary Care For no one, unable/limited ability to care for self    Family Caregiver if Needed child(marlein), adult;spouse    Family Caregiver Names Spouse - Clifford, Son - Dimitri    Quality of Family Relationships supportive;involved;helpful    Able to Return to Prior Arrangements yes       Resource/Environmental Concerns    Resource/Environmental Concerns none    Transportation Concerns no car       Transition Planning    Patient/Family Anticipates Transition to home with family    Patient/Family Anticipated Services at Transition other (see comments)  patient was previously discharged home with [facility] ,  spopuse states she is not sure she is ready to go the hospice route yet    Transportation Anticipated family or friend will provide;health plan transportation       Discharge Needs Assessment    Readmission Within the Last 30 Days previous discharge plan unsuccessful    Equipment Currently Used at Home cpap;hospital bed;walker, rolling;wheelchair    Concerns to be Addressed denies needs/concerns at this time    Anticipated Changes Related to Illness none    Equipment Needed After Discharge none               Discharge Plan     Row Name 03/08/22 1438       Plan    Plan DC Plan: Pending clinical course. Patient is from home with [facility].    Patient/Family in Agreement with Plan yes    Plan Comments CM met with patient and spouse, Clifford  to discuss care goals. Patient's spouse reports that she has a lot of questions for Dr Loredo before making any decision. CM discussed following up with [facility] since they had seen the patient  during the previous hospitalization. Patient 's spouse reports that she would like to speak with Dr Loredo again tomorrow before she makes a decision.            Demographic Summary     Row Name 03/08/22 1429       General Information    Admission Type observation    Arrived From emergency department    Referral Source admission list    Reason for Consult discharge planning    Preferred Language English       Contact Information    Permission Granted to Share Info With                Functional Status     Row Name 03/08/22 1430       Functional Status    Usual Activity Tolerance fair    Current Activity Tolerance poor       Functional Status, IADL    Medications assistive equipment and person    Meal Preparation assistive equipment and person    Housekeeping assistive equipment and person    Laundry assistive equipment and person    Shopping assistive equipment and person       Mental Status Summary    Recent Changes in Mental Status/Cognitive Functioning no changes                Case Management Readmission Assessment (all recorded)       Readmission Interview       Row Name 03/08/22 1436             Readmission Indications    Is this hospitalization related to the prior hospital diagnosis? Yes      What was the reason you were admitted? hemoptysis        Row Name 03/08/22 1437             Recommendation for rehospitalization    Did you speak with your physician prior to coming to the hospital No      Did you seek care elsewhere prior to coming to the hospital? No        Row Name 03/08/22 1432             Follow up appointment    Do you have a PCP? Yes      Did you have an appointment with PCP/specialist after hospitalization within 7 days? No  not was there at previous discharge to follow up with PCP but no scheduled appt        Row Name 03/08/22 1431             Medications    Did you have newly prescribed medications at discharge? Yes      Did you understand the reasons for your medications at  discharge and how to take them? Yes      Did you understand the side effects of your medications? Yes      Are you taking all of you prescribed medications? Yes        Row Name 03/08/22 1435             Discharge Instructions    Did you understand your discharge instructions? Yes      Did your family/caregiver hear your instructions? Yes      Were you told to eat a special diet? No        Row Name 03/08/22 1435             Index discharge location/services    Where did you go upon discharge? Home with services      Do you have supportive family or friends in the home? Yes      What services were arranged at discharge? Home Health        Row Name 03/08/22 1435             Discharge Readiness    On a scale of 1-5 (5 being well prepared), how ready were you for discharge 3      Recommendation based on interview Education on diagnosis/self management;Goals of care discussion/advanced care planning

## 2022-03-08 NOTE — THERAPY EVALUATION
Acute Care - Speech Language Pathology   Swallow Initial Evaluation  [facility]     Patient Name: Navneet Lind  : 1945  MRN: [id number]  Today's Date: 3/8/2022               Admit Date: 3/7/2022    Visit Dx:     ICD-10-CM ICD-9-CM   1. Sepsis, due to unspecified organism, unspecified whether acute organ dysfunction present ([facility])  A41.9 038.9     995.91   2. Pneumonia due to infectious organism, unspecified laterality, unspecified part of lung  J18.9 486   3. Hemoptysis  R04.2 786.30   4. Leukocytosis, unspecified type  D72.829 288.60   5. ILD (interstitial lung disease) ([facility])  J84.9 515     Patient Active Problem List   Diagnosis   • Presence of cardiac pacemaker   • Bundle branch block, right   • Coronary artery disease involving native coronary artery of native heart without angina pectoris   • Shortness of breath   • Heart murmur   • Mixed hyperlipidemia   • Primary hypertension   • Impotence of organic origin   • Premature ventricular contractions   • NATALIA on CPAP   • Preop cardiovascular exam   • Anemia   • H/O radical nephrectomy, left   • Pre-op examination   • Abnormal myocardial perfusion study   • Chronic diastolic congestive heart failure (HCC)   • S/P TAVR (transcatheter aortic valve replacement)   • S/P kyphoplasty   • Bladder carcinoma ([facility])   • ILD (interstitial lung disease) ([facility])   • Hemoptysis   • Sepsis without septic shock ([facility])   • Pneumonia due to infectious organism   • Sepsis, due to unspecified organism, unspecified whether acute organ dysfunction present ([facility])     Past Medical History:   Diagnosis Date   • Aortic stenosis 2015    Per Echo 2017   • Benign essential HTN    • Bundle branch block, right 2013   • CAD (coronary artery disease), native coronary artery    • Cancer (HCC)     bladder- chemo, new left renal mass   • Chronic kidney disease    • Coronary artery disease involving native coronary artery of native heart without angina pectoris 2019    CABG ; SVG  to RCA is occluded, LIMA to LAD, SVG to diag of LAD, SVG to marginal of LCX   • COVID-19 vaccine administered    • Essential hypertension 11/19/2019   • Heart murmur    • History of transfusion    • Hyperlipidemia, mixed    • ILD (interstitial lung disease) ([facility]) 11/1/2021   • Injury of back    • Mixed hyperlipidemia 11/19/2019   • Near syncope    • NATALIA (obstructive sleep apnea)     AHI 11.6/h, CPAP   • Premature ventricular contractions 2/11/2014   • Presence of cardiac pacemaker 7/5/2019    BS dual chamber PM 11/7/2016   • Shortness of breath    • SSS (sick sinus syndrome) ([facility]) 7/5/2019     Past Surgical History:   Procedure Laterality Date   • AORTIC VALVE REPAIR/REPLACEMENT N/A 3/30/2021    Procedure: TTE TRANSFEMORAL TRANSCATHETER AORTIC VALVE REPLACEMENT PERCUTANEOUS APPROACH;  Surgeon: Hang Martinez MD;  Location: [facility] OR 18/19;  Service: Cardiothoracic;  Laterality: N/A;   • AORTIC VALVE REPAIR/REPLACEMENT N/A 3/30/2021    Procedure: Transfemoral Transcatheter Aortic Valve Replacement w/Intra Op TTE and Possible Open Rescue Surgery;  Surgeon: Anderson Galvez MD;  Location: [facility] OR 18/19;  Service: Cardiovascular;  Laterality: N/A;   • BRONCHOSCOPY N/A 2/23/2022    Procedure: BRONCHOSCOPY WITH BRONCHOALVEOLAR LAVAGE AND BIOPSY X1 AREA;  Surgeon: Ronny Jiang MD;  Location: [facility] ENDOSCOPY;  Service: Pulmonary;  Laterality: N/A;  blood clot in lung, hemoptysis   • CARDIAC CATHETERIZATION  2018   • CARDIAC CATHETERIZATION N/A 3/22/2021    Procedure: Left Heart Cath;  Surgeon: ADRIANO Erazo MD;  Location: [facility] CATH INVASIVE LOCATION;  Service: Cardiovascular;  Laterality: N/A;   • CARDIAC CATHETERIZATION N/A 6/22/2021    Procedure: RENAL ANGIOGRAPHY;  Surgeon: Jesus Bhagat MD;  Location: [facility] CATH INVASIVE LOCATION;  Service: Cardiovascular;  Laterality: N/A;   • CARDIAC CATHETERIZATION N/A 6/22/2021    Procedure: Stent BMS peripheral;  Surgeon: Olayinka  Jesus Tavares MD;  Location: [facility] CATH INVASIVE LOCATION;  Service: Cardiovascular;  Laterality: N/A;   rt renal   • CARDIAC CATHETERIZATION N/A 6/22/2021    Procedure: Angioplasty-peripheral;  Surgeon: Jesus Bhagat MD;  Location: [facility] CATH INVASIVE LOCATION;  Service: Cardiovascular;  Laterality: N/A;   rt renal   • CARDIOVASCULAR STRESS TEST  2017   • CORONARY ARTERY BYPASS GRAFT  1995   • ECHO - CONVERTED  2017   • NEPHROURETERECTOMY Left 11/2/2020    Procedure: NEPHROURETERECTOMY;  Surgeon: Rogers Bae MD;  Location: [facility] MAIN OR;  Service: Urology;  Laterality: Left;   • PACEMAKER IMPLANTATION  2016    bs   • PORTACATH PLACEMENT         SLP Recommendation and Plan  SLP Swallowing Diagnosis: (P) mild-moderate, oral dysphagia, suspected pharyngeal dysphagia (03/08/22 1000)  SLP Diet Recommendation: (P) NPO (03/08/22 1000)     SLP Rec. for Method of Medication Administration: (P) meds via alternate route (03/08/22 1000)     Monitor for Signs of Aspiration: (P) yes, notify SLP if any concerns, elevated WBC count, cough, gurgly voice, throat clearing, fever, upper respiratory, pneumonia, right lower lobe infiltrates (03/08/22 1000)  Recommended Diagnostics: (P) other (see comments), reassess via clinical swallow evaluation, VFSS (MBS) (VFSS if indicated) (03/08/22 1000)  Swallow Criteria for Skilled Therapeutic Interventions Met: (P) demonstrates skilled criteria (03/08/22 1000)     Rehab Potential/Prognosis, Swallowing: (P) adequate, monitor progress closely (03/08/22 1000)  Therapy Frequency (Swallow): (P) PRN (03/08/22 1000)  Predicted Duration Therapy Intervention (Days): (P) until discharge (03/08/22 1000)        Patient was not wearing a face mask during this therapy encounter. Therapist used appropriate personal protective equipment including gown, eye protection, mask and gloves.  Mask used was standard procedure mask. Appropriate PPE was worn during the entire therapy session.  Hand hygiene was completed before and after therapy session. Patient is not in enhanced droplet precautions.       SWALLOW EVALUATION (last 72 hours)     SLP Adult Swallow Evaluation     Row Name 03/08/22 1000          Document Type evaluation (P)   -RZ    Subjective Information no complaints (P)   -RZ    Patient Observations lethargic (P)   -RZ    Patient/Family/Caregiver Comments/Observations Pt appeared lethargic and required verbal cues to keep eyes open and remain. Pt's wife was present during the evaluation. She reports that he doesn't talk much (for the past 7 months) he only whispers and he only drinks boost. (P)   -RZ    Patient Effort adequate (P)   -RZ               Patient Profile Reviewed yes (P)   -RZ    Pertinent History Of Current Problem Per H&P:   Pt is a 76 y.o male with multiple medical problems including chronic diastolic CHF, CAD, radical nephrectomy (left).  Bladder cancer, HLD, NATALIA on CPAP, cardiac pacemaker, s/p TAVR, anemia, RBBB, recent hospitalization for sepsis pneumonia with hemoptysis and interstitial lung disease who presented to [facility] on 3/7/2022 complaining of 2-day history of increasing shortness of breath, cough and recurring hemoptysis today.  Patient was just discharged from [facility] on March 3, 2022 after admission for pneumonia and interstitial lung disease on February 22, 2022.  Patient is lethargic and appears very weak he nods appropriately to some questions but not all, making it impossible to complete for an accurate HPI at this time.  When asked if he had coughed up a large or small amount of bloody sputum patient nodded yes he did not verbalize the amount.  When patient asked if he wished to remain DNR/DNI he did nod head yes.  Patient asked if he was in pain patient shook her head no. (P)   -RZ    Current Method of Nutrition NPO (P)   -RZ    Precautions/Limitations, Vision WFL;for purposes of eval (P)   -RZ    Precautions/Limitations, Hearing  "WFL;for purposes of eval (P)   -RZ    Prior Level of Function-Communication other (see comments) (P)   Pt's wife reports that \"he hasn't really talked ,he only whispers\" (for the past 7 months).  -RZ    Prior Level of Function-Swallowing other (see comments) (P)   Pt's wife reports that he has only been drinking boost and not eating anything else prior to hospital admission  -RZ               Additional Documentation Pain Scale: FACES Pre/Post-Treatment (Group) (P)   -RZ               Pain: FACES Scale, Pretreatment 0-->no hurt (P)   -RZ    Posttreatment Pain Rating 0-->no hurt (P)   -RZ               Oral Lesions or Structural Abnormalities and/or variants none noted (P)   -RZ    Dentition Assessment natural, present and adequate (P)   -RZ    Secretion Management WNL/WFL (P)   -RZ    Mucosal Quality dry (P)   -RZ    Volitional Swallow delayed;other (see comments);unable to elicit (P)   When cued to swallow, pt either dispayed a delayed swallow or was not able to elict one.  -RZ               Respiratory Support Currently in Use nasal cannula (P)   4L  -RZ    Eating/Swallowing Skills fed by SLP (P)   -RZ    Positioning During Eating upright 90 degree;upright in bed (P)   -RZ    Utensils Used other (see comments);spoon (P)   toothette  -RZ    Consistencies Trialed thin liquids;ice chips (P)   -RZ               Clinical Swallow Evaluation Summary Clinical swallow evaluation was completed on today's date. Pt was lethargic and required verbal cues to maintain alertness. Pt was cooperative. Pt's wife was present during the evaluation. Pt's wife reported that \"he hasn't talked much and he only whispers\" (for about 7 months). Pt's wife also reported his only nutrition comes from boost. He was no eating/drinking anything else prior to hospital admission. Pt was referred to  d/t swallowing safety concerns. Pt ws only 4L of oxygen via NC during the session. Pt was upright in bed at 90 degrees during trials. The following " "consistencies were trialed: thin liquids (water via toothette and spoon), and 1 icechip. Pt displayed adequate labial seal around the toothette and spoon. Pt displayed a bite reflex on the toothette and dispayed an ability to pull the liquid from the sponge WFL. During the tootette trials no swallow was initiated on any trials. Pt was cued to swallow and no swallow was initiate. VQ after these triasl appeared \"wet and gurgly\". Pt was given 1 small icechip. Pt displayed good bolus control however no swallow was initiated even when cued by SLP. Pt completed 2 trials of water by spoon (1/2 tsp). Put was able to pull from spoon. Before initiating a swallow on both trials, pt held bolus in mouth. After swallow was initiated pt coughed indicating s/s of penetration/aspiration. No other trials were completed d/t safety concerns and possible s/s of penetration/aspiration couple with pt's history of pneumonia. Alternative methods of nutrition was discussed with pt and pt's wife. Pt did not seem interested and shook his head \"no\".     ST RECOMMENDATIONS: ST recommends that pt remain NPO at this time. Oral care with toothette can be completed PRN with supervision, monitor for s/s of aspiration. ST will continue to follow pt and reassess safety of pt's swallow in anticipation to initiate a PO diet. Plan discussed with pt, pt's nurse, and pt's wife. All in agreement. Further goals/recommendations to follow.  (P)   -RZ               SLP Swallowing Diagnosis mild-moderate;oral dysphagia;suspected pharyngeal dysphagia (P)   -RZ    Functional Impact risk of aspiration/pneumonia;risk of malnutrition;risk of dehydration (P)   -RZ    Rehab Potential/Prognosis, Swallowing adequate, monitor progress closely (P)   -RZ    Swallow Criteria for Skilled Therapeutic Interventions Met demonstrates skilled criteria (P)   -RZ               Therapy Frequency (Swallow) PRN (P)   -RZ    Predicted Duration Therapy Intervention (Days) until discharge " (P)   -RZ    SLP Diet Recommendation NPO (P)   -RZ    Recommended Diagnostics other (see comments);reassess via clinical swallow evaluation;VFSS (MBS) (P)   VFSS if indicated  -RZ    Oral Care Recommendations Oral Care BID/PRN (P)   -RZ    SLP Rec. for Method of Medication Administration meds via alternate route (P)   -RZ    Monitor for Signs of Aspiration yes;notify SLP if any concerns;elevated WBC count;cough;gurgly voice;throat clearing;fever;upper respiratory;pneumonia;right lower lobe infiltrates (P)   -RZ               Oral Nutrition/Hydration Goal Selection (SLP) oral nutrition/hydration, SLP goal 1;oral nutrition/hydration, SLP goal 2 (P)   -RZ               Oral Nutrition/Hydration Goal 1, SLP the pt will participate in an ongoing assessment of swallow including re-evaluation cliically and/or including instrumental assessment of swallow, if indicated, to further assess sswallow fuction in anticipation to initiate a PO diet. (P)   -RZ    Time Frame (Oral Nutrition/Hydration Goal 1, SLP) 2 days;3 days (P)   -RZ               Oral Nutrition/Hydration Goal 2, SLP the patient will maximize swallow function for least restricitve PO diet, exhibiting no complications associated with dysphagia, adequate PO intake, and demonstrating independent use of swallow compensations. (P)   -RZ    Time Frame (Oral Nutrition/Hydration Goal 2, SLP) by discharge (P)   -RZ          User Key  (r) = Recorded By, (t) = Taken By, (c) = Cosigned By    Initials Name Effective Dates    RZack Patel, Speech Therapy Student 01/10/22 -                 EDUCATION  The patient has been educated in the following areas:   Dysphagia (Swallowing Impairment).        SLP GOALS     Row Name 03/08/22 1000          Oral Nutrition/Hydration Goal 1, SLP the pt will participate in an ongoing assessment of swallow including re-evaluation cliically and/or including instrumental assessment of swallow, if indicated, to further assess sswallow fuction  in anticipation to initiate a PO diet. (P)   -RZ    Time Frame (Oral Nutrition/Hydration Goal 1, SLP) 2 days;3 days (P)   -RZ               Oral Nutrition/Hydration Goal 2, SLP the patient will maximize swallow function for least restricitve PO diet, exhibiting no complications associated with dysphagia, adequate PO intake, and demonstrating independent use of swallow compensations. (P)   -RZ    Time Frame (Oral Nutrition/Hydration Goal 2, SLP) by discharge (P)   -RZ          User Key  (r) = Recorded By, (t) = Taken By, (c) = Cosigned By    Initials Name Provider Type    RZ Zack Lewis, Speech Therapy Student Speech Therapy Student                   Time Calculation:                Zack Lewis, Speech Therapy Student  3/8/2022

## 2022-03-09 NOTE — CASE MANAGEMENT/SOCIAL WORK
Continued Stay Note  [facility]     Patient Name: Navneet Lind  MRN: [id number]  Today's Date: 3/9/2022    Admit Date: 3/7/2022     Discharge Plan     Row Name 03/09/22 1117       Plan    Plan Pending clinical course.  [facility]. Pt from home with spouse with [facility], will need [facility] order if pt wants to resume.    Plan Comments Met with pt and spouse in room.  Pt spouse would like referral with [facility], errol notified by text message, and she will see patient today.                          Khalida Blunt RN

## 2022-03-09 NOTE — ANESTHESIA POSTPROCEDURE EVALUATION
Patient: Navneet Lind    Procedure Summary     Date: 03/09/22 Room / Location: [facility] ENDOSCOPY 2 / [facility] ENDOSCOPY    Anesthesia Start: 0917 Anesthesia Stop: 0946    Procedure: BRONCHOSCOPY WITH ARGON PLASMA COAGULATION OF LUNG MASS (N/A Bronchus) Diagnosis:       Hemoptysis      ILD (interstitial lung disease) (HCC)      (Hemoptysis [R04.2])      (ILD (interstitial lung disease) (HCC) [J84.9])    Surgeons: Te Thorne MD Provider: Trenton Rubin MD    Anesthesia Type: MAC ASA Status: 3          Anesthesia Type: MAC    Vitals  Vitals Value Taken Time   /72 03/09/22 0959   Temp     Pulse 94 03/09/22 0959   Resp 26 03/09/22 0954   SpO2 98 % 03/09/22 0959           Post Anesthesia Care and Evaluation    Patient location during evaluation: PHASE II  Patient participation: complete - patient participated  Level of consciousness: awake  Pain scale: See nurse's notes for pain score.  Pain management: adequate  Airway patency: patent  Anesthetic complications: No anesthetic complications  PONV Status: none  Cardiovascular status: acceptable  Respiratory status: acceptable  Hydration status: acceptable    Comments: Patient seen and examined postoperatively; vital signs stable; SpO2 greater than or equal to 90%; cardiopulmonary status stable; nausea/vomiting adequately controlled; pain adequately controlled; no apparent anesthesia complications; patient discharged from anesthesia care when discharge criteria were met

## 2022-03-09 NOTE — DISCHARGE PLACEMENT REQUEST
"Power Lind (76 y.o. Male)             Date of Birth   1945    Social Security Number       Address   [address] [place] [facility]    Home Phone   [phone]    MRN   [id number]       [employer]   Congregation    Marital Status                               Admission Date   3/7/22    Admission Type   Emergency    Admitting Provider   Power De León MD    Attending Provider   Power De León MD    Department, Room/Bed   [address] PEDIATRICS, 209/1       Discharge Date       Discharge Disposition       Discharge Destination                               Attending Provider: Power De León MD    Allergies: No Known Allergies    Isolation: Spore   Infection: C.difficile (rule out) (03/08/22)   Code Status: No CPR   Advance Care Planning Activity    Ht: 172.7 cm (67.99\")   Wt: 68.6 kg (151 lb 3.8 oz)    Admission Cmt: None   Principal Problem: Hemoptysis [R04.2]                 Active Insurance as of 3/7/2022     Primary Coverage     Payor Plan Insurance Group Employer/Plan Group    MEDICARE MEDICARE A & B      Payor Plan Address Payor Plan Phone Number Payor Plan Fax Number Effective Dates    [address] [phone]  12/1/2011 - None Entered    [place] [facility] [id number]       Subscriber Name Subscriber Birth Date Member ID       POWER LIND 1945 [id number]           Secondary Coverage     Payor Plan Insurance Group Employer/Plan Group    [facility]      Payor Plan Address Payor Plan Phone Number Payor Plan Fax Number Effective Dates    [address] [phone]  12/1/2014 - None Entered    [facility] [id number]       Subscriber Name Subscriber Birth Date Member ID       POWER LIND 1945 [id number]                 Emergency Contacts      (Rel.) Home Phone Work Phone Mobile Phone    LeloClifford (Spouse) [phone] -- [phone]    Dimitri Lind (Son) [phone] -- --              "

## 2022-03-09 NOTE — PLAN OF CARE
Goal Outcome Evaluation:     Attempted to re-evaluate patient's swallowing status. According to family they report the doctor did not feel swallowing status would not likely change. Family was in agreement with this assessment. Family does not feel re-evaluation is indicated at this time. Please contact [facility] if anything changes and/or patient demonstrates improvement that warrants further evaluation.

## 2022-03-09 NOTE — PROGRESS NOTES
Infectious Diseases Progress Note      LOS: 1 day   Patient Care Team:  Uri Cortes MD as PCP - General  Andre Veliz MD as Consulting Physician (Nephrology)  Anderson Galvez MD as Consulting Physician (Cardiology)  Ajit Quinones MD as Surgeon (Cardiothoracic Surgery)  Cristina Wolfe MD as Consulting Physician (Sleep Medicine)  Juan Vega PA as Physician Assistant (Physician Assistant)  Eddie Loredo MD as Consulting Physician (Hematology and Oncology)    Chief Complaint: Shortness of breath, lethargy, hemoptysis at discharge    Subjective       The patient has been afebrile for the last 24 hours.  The patient is on 3 L of oxygen by nasal cannula, hemodynamically stable, and is tolerating antimicrobial therapy.  Patient is back from a bronchoscopy.  Patient has not had a bowel movement for the last 24 hours      Review of Systems:   Review of Systems   Constitutional: Positive for fatigue.   HENT: Negative.    Eyes: Negative.    Respiratory: Positive for cough and shortness of breath.    Cardiovascular: Negative.    Gastrointestinal: Negative.    Endocrine: Negative.    Genitourinary: Negative.    Musculoskeletal: Negative.    Skin: Negative.    Neurological: Positive for weakness.   Psychiatric/Behavioral: Negative.    All other systems reviewed and are negative.       Objective     Vital Signs  Temp:  [95.4 °F (35.2 °C)-97.5 °F (36.4 °C)] 97.4 °F (36.3 °C)  Heart Rate:  [85-99] 95  Resp:  [17-30] 25  BP: (101-129)/(64-88) 105/72    Physical Exam:  Physical Exam  Vitals and nursing note reviewed.   Constitutional:       General: He is not in acute distress.     Appearance: He is well-developed and normal weight. He is ill-appearing. He is not diaphoretic.   HENT:      Head: Normocephalic and atraumatic.   Eyes:      Extraocular Movements: Extraocular movements intact.      Conjunctiva/sclera: Conjunctivae normal.      Pupils: Pupils are equal, round,  and reactive to light.   Cardiovascular:      Rate and Rhythm: Normal rate and regular rhythm.      Heart sounds: Normal heart sounds, S1 normal and S2 normal.   Pulmonary:      Effort: Pulmonary effort is normal. No respiratory distress.      Breath sounds: Normal breath sounds. No stridor. No wheezing or rales.      Comments: Very diminished on the right side  Abdominal:      General: Bowel sounds are normal. There is no distension.      Palpations: Abdomen is soft. There is no mass.      Tenderness: There is no abdominal tenderness. There is no guarding.   Musculoskeletal:         General: No deformity. Normal range of motion.      Cervical back: Neck supple.   Skin:     General: Skin is warm and dry.      Coloration: Skin is not pale.      Findings: No erythema or rash.   Neurological:      Mental Status: He is alert and oriented to person, place, and time.      Cranial Nerves: No cranial nerve deficit.      Comments: Appears very fatigued   Psychiatric:         Mood and Affect: Mood normal.          Results Review:    I have reviewed all clinical data, test, lab, and imaging results.     Radiology  No Radiology Exams Resulted Within Past 24 Hours    Cardiology    Laboratory    Results from last 7 days   Lab Units 03/09/22 0322 03/08/22 0224 03/07/22 2107 03/03/22  1015   WBC 10*3/mm3 15.10* 25.10* 29.70* 27.60*   HEMOGLOBIN g/dL 12.7* 15.9 15.2 15.8   HEMATOCRIT % 37.8 47.6 46.4 46.2   PLATELETS 10*3/mm3 198 164 202 152     Results from last 7 days   Lab Units 03/09/22 0322 03/08/22 0224 03/07/22 2107 03/03/22  1015 03/03/22  0325   SODIUM mmol/L 135* 132* 129* 131*  --    POTASSIUM mmol/L 4.6 5.5* 5.2 4.2 4.5   CHLORIDE mmol/L 101 98 93* 99  --    CO2 mmol/L 24.0 22.0 25.0 20.0*  --    BUN mg/dL 29* 42* 41* 30*  --    CREATININE mg/dL 0.69* 0.78 0.87 0.65*  --    GLUCOSE mg/dL 106* 123* 141* 170*  --    ALBUMIN g/dL 2.10* 2.00* 2.20* 2.40*  --    BILIRUBIN mg/dL 0.6 0.9 0.8 0.6  --    ALK PHOS U/L 124*  161* 168* 156*  --    AST (SGOT) U/L 37 32 32 45*  --    ALT (SGPT) U/L 78* 83* 84* 188*  --    CALCIUM mg/dL 8.3* 9.5 9.9 9.4  --                  Microbiology   Microbiology Results (last 10 days)     Procedure Component Value - Date/Time    Respiratory Culture - Sputum, Cough [[id number]] Collected: 03/09/22 0547    Lab Status: Preliminary result Specimen: Sputum from Cough Updated: 03/09/22 0713     Gram Stain Many (4+) WBCs per low power field      Few (2+) Mixed bacterial morphotypes seen on Gram Stain      Rare (1+) Yeast      Rare (1+) Epithelial cells per low power field    MRSA Screen, PCR (Inpatient) - Swab, Nares [[id number]]  (Normal) Collected: 03/08/22 1627    Lab Status: Final result Specimen: Swab from Nares Updated: 03/08/22 1915     MRSA PCR No MRSA Detected    Blood Culture - Blood, Chest, Right [[id number]]  (Normal) Collected: 03/07/22 2122    Lab Status: Preliminary result Specimen: Blood from Chest, Right Updated: 03/08/22 2133     Blood Culture No growth at 24 hours    Respiratory Panel PCR w/COVID-19(SARS-CoV-2) SHANE/SHAI/FELECIA/PAD/COR/MAD/VALERIA In-House, NP Swab in UTM/VTM, 3-4 HR TAT - Swab, Nasopharynx [[id number]]  (Normal) Collected: 03/07/22 2122    Lab Status: Final result Specimen: Swab from Nasopharynx Updated: 03/07/22 2217     ADENOVIRUS, PCR Not Detected     Coronavirus 229E Not Detected     Coronavirus HKU1 Not Detected     Coronavirus NL63 Not Detected     Coronavirus OC43 Not Detected     COVID19 Not Detected     Human Metapneumovirus Not Detected     Human Rhinovirus/Enterovirus Not Detected     Influenza A PCR Not Detected     Influenza B PCR Not Detected     Parainfluenza Virus 1 Not Detected     Parainfluenza Virus 2 Not Detected     Parainfluenza Virus 3 Not Detected     Parainfluenza Virus 4 Not Detected     RSV, PCR Not Detected     Bordetella pertussis pcr Not Detected     Bordetella parapertussis PCR Not Detected     Chlamydophila pneumoniae PCR Not Detected     Mycoplasma  Appropriate pneumo by PCR Not Detected    Narrative:      In the setting of a positive respiratory panel with a viral infection PLUS a negative procalcitonin without other underlying concern for bacterial infection, consider observing off antibiotics or discontinuation of antibiotics and continue supportive care. If the respiratory panel is positive for atypical bacterial infection (Bordetella pertussis, Chlamydophila pneumoniae, or Mycoplasma pneumoniae), consider antibiotic de-escalation to target atypical bacterial infection.    Blood Culture - Blood, Chest, Right [[id number]]  (Normal) Collected: 03/07/22 2107    Lab Status: Preliminary result Specimen: Blood from Chest, Right Updated: 03/08/22 2117     Blood Culture No growth at 24 hours          Medication Review:       Schedule Meds  ampicillin-sulbactam, 3 g, Intravenous, Q6H  budesonide-formoterol, 2 puff, Inhalation, BID  escitalopram, 5 mg, Oral, Daily  folic acid, 400 mcg, Oral, Daily  gabapentin, 100 mg, Oral, BID  lidocaine, , ,   lidocaine PF 2%, , ,   megestrol acetate, 200 mg, Oral, TID With Meals  methylPREDNISolone sodium succinate, 40 mg, Intravenous, Q12H  pantoprazole, 40 mg, Intravenous, Q AM  sodium chloride, , ,   vancomycin, 750 mg, Intravenous, Q12H        Infusion Meds  Pharmacy to dose vancomycin,   sodium chloride, 75 mL/hr, Last Rate: 75 mL/hr (03/09/22 0324)        PRN Meds  •  albuterol  •  ipratropium-albuterol  •  melatonin  •  ondansetron  •  Pharmacy to dose vancomycin  •  promethazine  •  sodium chloride        Assessment/Plan       Antimicrobial Therapy   1.  Unasyn        2.  Vancomycin        3.        4.        5.            Assessment     Possible subtle right-sided postobstructive pneumonia.  Patient has recently been diagnosed with lung cancer and a recent CT shows a soft tissue mass at the mediastinal AP window that is infiltrating the left mainstem bronchus, increasing lymphadenopathy and partial obstruction of the bronchus  intermedius, right middle lobe and left lower lobe bronchi with concern for pneumonia in the right middle lobe and right lower lobes.    -Patient has been on immunotherapy  -Patient has reported increased shortness of breath hemoptysis  -Now on 3 L of oxygen by nasal cannula  -COVID-19 screen and respiratory viral DNA panel are negative  -Patient recently had a bronchoscopy on 2022-02-23 and BAL was negative including a negative PCP PCR  -Bronchoscopy on 3/9/2022-a endobronchial mass in the left mainstem with active bleeding was found-application of electrocautery.  Cultures pending  -MRSA the nares screen was negative     Leukocytosis-could be related to metastasis/pneumonia but also need to rule out C. difficile colitis since patient is having diarrhea  -Wife reports multiple bouts of dark liquid stool  -Patient was just recently discharged on 3/3/21 for pneumonia and was on antibiotics at that time  -Patient is also currently on steroids  -Patient does not have any bowel movements in the last 24 hours  -Trending down  -Cultures are negative so far     Elevated liver transaminase -trending down  -Hepatitis panel was negative     Pulmonary fibrosis patient is on Ofev according to his wife     Recent diagnosis of renal and bladder cancer in 2020.  Patient to have some chemotherapy at that time and a left nephrectomy     S/P Aortic valve replacement in 2021     S/P Pacemaker placement 2016     Obstructive sleep apnea     Chronic kidney disease     Plan     Continue IV Unasyn 3 g every 6 hours  Discontinue IV vancomycin   Waiting on bronchoscopy cultures  Patient is scheduled for radiation treatment later today  Continue supportive care  A.m. labs  Case discussed with patient and wife at bedside  Overall prognosis is very guarded    Kat Luz, APRN  03/09/22  12:30 EST    Note is dictated utilizing voice recognition software/Dragon

## 2022-03-09 NOTE — SIGNIFICANT NOTE
03/09/22 1147   OTHER   Discipline physical therapist   Rehab Time/Intention   Session Not Performed other (see comments)  (Pt out of room for procedure this AM. Will f/u tomorrow)   Recommendation   PT - Next Appointment 03/10/22

## 2022-03-09 NOTE — PROCEDURES
Bronchoscopy Procedure Note    Procedure:  Bronchoscopy, Therapeutic  APC electrocautery therapy, endobronchial    Pre-Operative Diagnosis: hemoptysis, lung mass    Post-Operative Diagnosis: Same    Indication: hemoptysis, lung mass from metastatic renal cell carcinoma    Anesthesia: Monitored Anesthesia Care (MAC)    Procedure Details: Patient was consented for the procedure with all risk and benefit of the procedure explained in detail.  Patient was given the opportunity to ask questions and all concerns were answered.    Timeout was done in the standard manner   the bronchoscope was inserted into the main airway via the oropharynx. An anatomical survey was done of the main airways and the subsegmental bronchus.  The findings are consistent of an endobronchial mass in L main stem with active bleeding .  A thereapeutic application of APC electrocautery was on L main stem lesion followed by A therapeutic cold saline lavage was performed using aliquots of normal saline instilled into the airways then aspirated back until clear and the bleeding stopped.    Estimated Blood Loss:  less than 50 mL           Specimens:  None                Complications:  None; patient tolerated the procedure well.           Disposition: PACU - hemodynamically stable.    Post op plan:  Resume p.o. after 2 hours  D/w oncology: plan for XRT     Patient tolerated the procedure well.

## 2022-03-09 NOTE — DISCHARGE PLACEMENT REQUEST
"Power Lind (76 y.o. Male)             Date of Birth   1945    Social Security Number       Address   [address] [place] [facility]    Home Phone   [phone]    MRN   [id number]       Ep[employer]   [employer]    Marital Status                               Admission Date   3/7/22    Admission Type   Emergency    Admitting Provider   Power De León MD    Attending Provider   Power De León MD    Department, Room/Bed   [address] PEDIATRICS, 209/1       Discharge Date       Discharge Disposition       Discharge Destination                               Attending Provider: Power De León MD    Allergies: No Known Allergies    Isolation: Spore   Infection: C.difficile (rule out) (03/08/22)   Code Status: No CPR   Advance Care Planning Activity    Ht: 172.7 cm (67.99\")   Wt: 68.6 kg (151 lb 3.8 oz)    Admission Cmt: None   Principal Problem: Hemoptysis [R04.2]                 Active Insurance as of 3/7/2022     Primary Coverage     Payor Plan Insurance Group Employer/Plan Group    MEDICARE MEDICARE A & B      Payor Plan Address Payor Plan Phone Number Payor Plan Fax Number Effective Dates    [address] [phone]  12/1/2011 - None Entered    [facility] [id number]       Subscriber Name Subscriber Birth Date Member ID       POWER LIND 1945 [id number]           Secondary Coverage     Payor Plan Insurance Group Employer/Plan Group    [facility]      Payor Plan Address Payor Plan Phone Number Payor Plan Fax Number Effective Dates    [address] [phone]  12/1/2014 - None Entered    [facility] [id number]       Subscriber Name Subscriber Birth Date Member ID       POWER LIND 1945 [id number]                 Emergency Contacts      (Rel.) Home Phone Work Phone Mobile Phone    LeloClifford (Spouse) [phone] -- [phone]    Dimitri Lind (Son) [phone] -- --              "

## 2022-03-09 NOTE — PROGRESS NOTES
Daily Progress Note        Hemoptysis    Presence of cardiac pacemaker    Coronary artery disease involving native coronary artery of native heart without angina pectoris    Shortness of breath    Mixed hyperlipidemia    Primary hypertension    NATALIA on CPAP    Anemia of chronic disease    H/O radical nephrectomy, left    Chronic diastolic congestive heart failure (HCC)    S/P TAVR (transcatheter aortic valve replacement)    Bladder carcinoma (HCC)    ILD (interstitial lung disease) (HCC)    Sepsis without septic shock (HCC)    Sepsis, due to unspecified organism, unspecified whether acute organ dysfunction present (HCC)      Assessment  Recurrent hemoptysis  Lung mass infiltrating L main stem  Mediastinal mass in AP window , enlarged since 2 weeks ago  s/p bronch 2/23/2022 with endobronchial biopsy positive for transitional cell carcinoma     Interstitial Lung Disease- on OFEV  PNA  NATALIA- home cpap  Diarrhea  HTN  Chronic diastolic CHF  HLD  CAD  S/P TAVAR  Presence of pacemaker  Bladder Cancer- On immunotherapy every 3 weeks  H/O Radical left nephrectomy  H/O artery stenosis s/p stent placement     Bronchoscopy from 2/23/22- cultures negative     Respiratory panel negative  Blood cultures pending     ABG 3/7/22 at 9:30PM on NC- ph 7.48, CO2 32.4, PO2 69.2, HCO 24.4    Plan    Bronchoscopy today  Discontinue Ofev until hemoptysis resolves  Continue to titrate oxygen- Currently on 4L NC  Cefepime for sepsis- finishes 3/15/22  Vancomycin for sepsis- finishes 3/15/22  Solu-medrol 40mg q8hrs  Bronchodilators PRN  Electrolyte/Glycemic control  GI Prophylaxis- Protonix     3/8/22                                                               2/22/22       LOS: 1 day     Subjective   Shortness of breath and hemoptysis      Objective     Vital signs for last 24 hours:  Vitals:    03/08/22 0818 03/08/22 1625 03/09/22 0325 03/09/22 0849   BP: 112/76 126/84 121/74 129/88   BP Location: Right arm Right arm Right arm Right arm    Patient Position: Lying Lying Lying Lying   Pulse: 105 99 85 88   Resp: 25 23 18 17   Temp:  95.4 °F (35.2 °C) 97.5 °F (36.4 °C) 97.4 °F (36.3 °C)   TempSrc:  Oral Axillary Oral   SpO2: 97% 93% 97% 95%   Weight:   68.6 kg (151 lb 3.8 oz)    Height:           Intake/Output last 3 shifts:  I/O last 3 completed shifts:  In: 2400 [I.V.:2000; IV Piggyback:400]  Out: -   Intake/Output this shift:  No intake/output data recorded.      Radiology  Imaging Results (Last 24 Hours)     Procedure Component Value Units Date/Time    CT Chest Without Contrast Diagnostic [[id number]] Collected: 03/08/22 0911     Updated: 03/08/22 0935    Narrative:      CT CHEST WO CONTRAST DIAGNOSTIC-     Date of Exam: 3/8/2022 9:01 AM     Indication: Shortness of breath (Ped 0-18y)  . HISTORY of renal  carcinoma with left nephrectomy. HISTORY of bladder cancer treated with  chemotherapy. Former smoker.     Comparison: AP portable chest 3/7/2022. CT chest 2/22/2022. Outside CT  chest from [facility] radiology 11/10/2021.     Technique: Serial and axial CT images of the chest were obtained.  Reconstructions in the coronal and sagittal planes were performed.   Automated exposure control and iterative reconstruction methods were  used.     FINDINGS:     Interlobular and intralobular interstitial septal thickening is seen  within both lungs, greatest peripherally, greatest in the right  perihilar region, consistent with chronic interstitial fibrosis. There  is a cylindrical traction bronchiectasis within the perihilar regions of  both lungs, right greater than left.     There is opacification of the bronchus intermedius and right lower lobe  bronchus, partial opacification the right middle lobe bronchus. There is  bronchial wall thickening in the same distribution. There is increased  airspace disease within the right middle lobe and right lower lobe which  may represent partial atelectasis or developing pneumonia. The left lung  remains free of acute  airspace disease.     Abnormally enlarged prevascular lymph nodes are again noted, largest  measuring 11 mm short axis     AP window adenopathy or soft tissue mass is seen, measuring  approximately 2.2 x 3.0 cm. There is extrinsic compression upon the  posterior lateral wall of the lower thoracic trachea, and mild narrowing  of the left neck mainstem bronchus. This mass appears to infiltrate into  the left mainstem bronchus (series 2 image 41). This has increased when  compared to the outside CT chest of 11/10/2021.     Heart size is normal. Dense native coronary artery calcific lesions are  present, and there are signs of prior CABG. Left chest wall pacemaker  device is in place. Right chest wall kingsley catheter tip terminates at  the lower SVC level. There are signs of prior TAVR. No effusion or  pleural effusion is seen. Thyroid gland is unremarkable.     Ill-defined low-density mass in the hepatic dome is suspicious for  metastatic disease measuring 5.1 x 5.3 cm. Cholecystectomy changes are  present. 2.3 x 1.4 cm right adrenal nodule is a new finding when  compared to the CT chest 11/10/2021, and is consistent with metastatic  disease. The left adrenal gland is unremarkable. Left nephrectomy  changes are present. Right renal artery stent is noted. A cyst is seen  within the right kidney, measuring 12 mm. A 3 mm nonobstructing right  renal stone is noted. The remainder the imaged upper abdominal organs  have a normal noncontrast appearance.     There are old bilateral rib fractures. Chronic T7 compression fracture  with vertebroplasty. Some of the vertebroplasty cement has chronic  extravasation into the anterior epidural space of the thoracic spinal  canal, with moderate to severe canal stenosis. There is a chronic  compression fracture of the L1 vertebral body inferior endplate,  unchanged from prior. No acute osseous abnormalities identified.          Impression:         1. Mediastinal AP window soft tissue  mass appears to infiltrate the left  mainstem bronchus and mildly indents the distal thoracic trachea. It has  enlarged since the prior examination. It could represent primary lung  malignancy or metastatic disease of unknown primary origin.  2. Prevascular mediastinal adenopathy has increased compared to  11/10/2021.  3. There is opacification and partial obstruction of the bronchus  intermedius, right middle lobe and right lower lobe bronchi with  bronchial wall thickening. There is partial atelectasis versus  developing pneumonia within the right middle lobe and right lower lobe.  4. A 5.3 cm mass is seen within the hepatic dome, consistent with  metastatic disease.  5. 2.3 cm right adrenal nodule consistent with adrenal metastasis.  6. Additional CT findings include CABG, TAVR, chronic interstitial  pulmonary fibrosis, left nephrectomy, nonobstructing right renal stone,  cholecystectomy, old bilateral rib fractures, old T7 and L1 compression  fractures.           Electronically Signed By-Monie Tiwari MD On:3/8/2022 9:33 AM  This report was finalized on [id number] by  Monie Tiwari MD.          Labs:  Results from last 7 days   Lab Units 03/09/22  0322   WBC 10*3/mm3 15.10*   HEMOGLOBIN g/dL 12.7*   HEMATOCRIT % 37.8   PLATELETS 10*3/mm3 198     Results from last 7 days   Lab Units 03/09/22  0322   SODIUM mmol/L 135*   POTASSIUM mmol/L 4.6   CHLORIDE mmol/L 101   CO2 mmol/L 24.0   BUN mg/dL 29*   CREATININE mg/dL 0.69*   CALCIUM mg/dL 8.3*   BILIRUBIN mg/dL 0.6   ALK PHOS U/L 124*   ALT (SGPT) U/L 78*   AST (SGOT) U/L 37   GLUCOSE mg/dL 106*     Results from last 7 days   Lab Units 03/07/22  2134   PH, ARTERIAL pH units 7.486*   PO2 ART mm Hg 69.2*   PCO2, ARTERIAL mm Hg 32.4*   HCO3 ART mmol/L 24.4     Results from last 7 days   Lab Units 03/09/22  0322 03/08/22  0224 03/07/22 2107   ALBUMIN g/dL 2.10* 2.00* 2.20*     Results from last 7 days   Lab Units 03/07/22 2107   TROPONIN T ng/mL <0.010          Results from last 7 days   Lab Units 03/03/22  0325   MAGNESIUM mg/dL 2.0     Results from last 7 days   Lab Units 03/07/22  2107   INR  1.05   APTT seconds 26.4               Meds:   SCHEDULE  ampicillin-sulbactam, 3 g, Intravenous, Q6H  budesonide-formoterol, 2 puff, Inhalation, BID  escitalopram, 5 mg, Oral, Daily  folic acid, 400 mcg, Oral, Daily  gabapentin, 100 mg, Oral, BID  megestrol acetate, 200 mg, Oral, TID With Meals  methylPREDNISolone sodium succinate, 40 mg, Intravenous, Q12H  pantoprazole, 40 mg, Intravenous, Q AM  vancomycin, 750 mg, Intravenous, Q12H      Infusions  Pharmacy to dose vancomycin,   sodium chloride, 75 mL/hr, Last Rate: 75 mL/hr (03/09/22 0324)      PRNs  •  albuterol  •  ipratropium-albuterol  •  melatonin  •  ondansetron  •  Pharmacy to dose vancomycin  •  promethazine  •  sodium chloride    Physical Exam:  Physical Exam  Vitals reviewed.   Constitutional:       Appearance: He is ill-appearing.   Pulmonary:      Effort: Pulmonary effort is normal.      Breath sounds: Rales present.   Skin:     General: Skin is warm and dry.   Neurological:      Mental Status: He is alert.       ROS  Review of Systems  Constitutional: Positive for fatigue.   Respiratory: Positive for cough.    And coughing up bloody secretions        I have reviewed current clinicals.     Electronically signed by MARGARITA Storey, 03/09/22, 8:58 AM EST.     The NP scribed the note for me, I have examined the patient and reviewed and verified the above findings and and I formulated the assessment and plan as documented

## 2022-03-09 NOTE — PROGRESS NOTES
[facility] Medicine Services Daily Progress Note    Patient Name: Navneet Lind  : 1945  MRN: [id number]  Primary Care Physician:  Uri Cortes MD  Date of admission: 3/7/2022      Subjective      Chief Complaint: Shortness of breath and cough.      No overnight events noted.    Review of Systems   Constitutional: Negative.   HENT: Negative.    Eyes: Negative.    Cardiovascular: Negative.    Respiratory: Negative.    Endocrine: Negative.    Hematologic/Lymphatic: Negative.    Skin: Negative.    Musculoskeletal: Negative.    Gastrointestinal: Negative.    Genitourinary: Negative.    Neurological: Negative.    Psychiatric/Behavioral: Negative.    Allergic/Immunologic: Negative.             Objective      Vitals:   Temp:  [97.3 °F (36.3 °C)-97.5 °F (36.4 °C)] 97.3 °F (36.3 °C)  Heart Rate:  [85-95] 95  Resp:  [17-34] 34  BP: (101-129)/(64-88) 121/74  Flow (L/min):  [3-10] 5    Physical Exam  Vitals and nursing note reviewed.   Constitutional:       General: He is not in acute distress.     Appearance: He is ill-appearing.   HENT:      Head: Normocephalic.      Nose: Nose normal.      Mouth/Throat:      Mouth: Mucous membranes are dry.      Pharynx: Oropharynx is clear.   Eyes:      Extraocular Movements: Extraocular movements intact.      Conjunctiva/sclera: Conjunctivae normal.      Pupils: Pupils are equal, round, and reactive to light.   Cardiovascular:      Pulses: Normal pulses.      Heart sounds: No murmur heard.    No friction rub. No gallop.      Comments: S1 and S2 present.  No tachycardia.  Pulmonary:      Breath sounds: No stridor. No wheezing or rales.      Comments: Decreased air entry bilaterally.  Positive crackles.  Chest:      Chest wall: No tenderness.   Abdominal:      General: Bowel sounds are normal. There is no distension.      Palpations: Abdomen is soft.      Tenderness: There is no abdominal tenderness. There is no right CVA tenderness or  guarding.   Musculoskeletal:         General: No swelling, tenderness, deformity or signs of injury.      Cervical back: Normal range of motion. No rigidity.      Right lower leg: No edema.      Left lower leg: No edema.   Skin:     General: Skin is warm and dry.      Capillary Refill: Capillary refill takes less than 2 seconds.      Coloration: Skin is not jaundiced.      Findings: No bruising, erythema, lesion or rash.   Neurological:      Comments: No facial asymmetry noted.  Gait and station not tested.   Psychiatric:      Comments: No agitation.                Result Review    Result Review:  I have personally reviewed the results from the time of this admission to 3/9/2022 17:47 EST and agree with these findings:  [x]  Laboratory  [x]  Microbiology  [x]  Radiology  []  EKG/Telemetry   []  Cardiology/Vascular   []  Pathology  []  Old records  []  Other:  Most notable findings include:     CT scan of the chest showed:       left mainstem bronchus (series 2 image 41). This has increased when   compared to the outside CT chest of 11/10/2021.       Heart size is normal. Dense native coronary artery calcific lesions are   present, and there are signs of prior CABG. Left chest wall pacemaker   device is in place. Right chest wall kingsley catheter tip terminates at   the lower SVC level. There are signs of prior TAVR. No effusion or   pleural effusion is seen. Thyroid gland is unremarkable.       Ill-defined low-density mass in the hepatic dome is suspicious for   metastatic disease measuring 5.1 x 5.3 cm. Cholecystectomy changes are   present. 2.3 x 1.4 cm right adrenal nodule is a new finding when   compared to the CT chest 11/10/2021, and is consistent with metastatic   disease. The left adrenal gland is unremarkable. Left nephrectomy   changes are present. Right renal artery stent is noted. A cyst is seen   within the right kidney, measuring 12 mm. A 3 mm nonobstructing right   renal stone is noted. The remainder  the imaged upper abdominal organs   have a normal noncontrast appearance.       There are old bilateral rib fractures. Chronic T7 compression fracture   with vertebroplasty. Some of the vertebroplasty cement has chronic   extravasation into the anterior epidural space of the thoracic spinal   canal, with moderate to severe canal stenosis. There is a chronic   compression fracture of the L1 vertebral body inferior endplate,   unchanged from prior. No acute osseous abnormalities identified.           Impression:         1. Mediastinal AP window soft tissue mass appears to infiltrate the left   mainstem bronchus and mildly indents the distal thoracic trachea. It has   enlarged since the prior examination. It could represent primary lung   malignancy or metastatic disease of unknown primary origin.   2. Prevascular mediastinal adenopathy has increased compared to   11/10/2021.   3. There is opacification and partial obstruction of the bronchus   intermedius, right middle lobe and right lower lobe bronchi with   bronchial wall thickening. There is partial atelectasis versus   developing pneumonia within the right middle lobe and right lower lobe.   4. A 5.3 cm mass is seen within the hepatic dome, consistent with   metastatic disease.   5. 2.3 cm right adrenal nodule consistent with adrenal metastasis.   6. Additional CT findings include CABG, TAVR, chronic interstitial   pulmonary fibrosis, left nephrectomy, nonobstructing right renal stone,   cholecystectomy, old bilateral rib fractures, old T7 and L1 compression   fractures.               Electronically Signed By-Monie Tiwari MD On:3/8/2022 9:33 AM   This report was finalized on [id number] by  Monie Tiwari MD.           Assessment/Plan    From previous notes and with minor updates.  Brief Patient Summary:  Patient is a 76 y.o. male with multiple medical problems including bladder cancer, chronic diastolic CHF, CAD, radical nephrectomy (left). HLD, NATALIA on CPAP,  cardiac pacemaker, s/p TAVR, anemia, RBBB, recent hospitalization for sepsis pneumonia with hemoptysis and interstitial lung disease who presented to [facility] on 3/7/2022 complaining of 2-day history of increasing shortness of breath, cough and recurring hemoptysis today.  Patient was just discharged from [facility] on March 3, 2022 after admission for pneumonia and interstitial lung disease on February 22, 2022.  Patient is lethargic and appears very weak he nods appropriately to some questions but not all, making it impossible to complete for an accurate HPI at this time.  When asked if he had coughed up a large or small amount of bloody sputum patient nodded yes he did not verbalize the amount.  When patient asked if he wished to remain DNR/DNI he did nod head yes.  Patient asked if he was in pain patient shook her head no.  Patient was seen in the emergency room and vital signs upon arrival to emergency department today blood pressure 135/87, heart rate 111, oxygen saturation 98% room air, respiratory rate 28, patient afebrile with T-max of 97.9.  Initial evaluation in the emergency department included:  Initial ABG showing pH 7.486, PCO2 32.4, PO2 69.2, HCO3 24.4, base excess 1.82, oxygen saturation 95.1%.  Drawn on room air.  Initial troponin negative 0.010,  Initial BNP 1109.  Notable abnormal labs: Glucose 141, sodium 129, BUN 41, BUN creatinine ratio 47.1, alkaline phosphatase 168 ALT 84, albumin 2.20, WBCs 29.7, 85.1% neutrophils,  Blood cultures drawn with results pending  Respiratory panel collected negative for COVID-19 and other viral infections.  X-ray completed showing chronic interstitial opacities with no significant change from previous exam. left chest pacemaker noted in the right hemidiaphragm is noted  EKG completed shows sinus tachycardia RBBB, LP FB heart rate 118  While in the emergency department patient was administered 2 g cefepime, and 1250 mg  of  vancomycin.  Patient will be admitted to hospitalist group for further evaluation and treatment of sepsis, hemoptysis, interstitial lung disease, diarrhea along with other acute and/or chronic conditions.    ampicillin-sulbactam, 3 g, Intravenous, Q6H  budesonide-formoterol, 2 puff, Inhalation, BID  escitalopram, 5 mg, Oral, Daily  folic acid, 400 mcg, Oral, Daily  gabapentin, 100 mg, Oral, BID  lidocaine, , ,   lidocaine PF 2%, , ,   megestrol acetate, 200 mg, Oral, TID With Meals  methylPREDNISolone sodium succinate, 40 mg, Intravenous, Q12H  pantoprazole, 40 mg, Intravenous, Q AM  sodium chloride, , ,        dextrose 5 % and sodium chloride 0.9 %, 75 mL/hr         Active Hospital Problems:  Active Hospital Problems    Diagnosis    • **Hemoptysis  Follow pulmonary recommendations.      • Sepsis, due to unspecified organism, unspecified whether acute organ dysfunction present (HCC)  Follow ID recommendations.  Follow cultures.      • Sepsis without septic shock (HCC)    • ILD (interstitial lung disease) (HCC)  Follow pulmonary recommendations.      • Bladder carcinoma (HCC)  Follow hematology/oncology recommendations.      • Shortness of breath  Treat with oxygen therapy as needed.      • S/P TAVR (transcatheter aortic valve replacement)    • Chronic diastolic congestive heart failure (HCC)    • H/O radical nephrectomy, left    • Anemia of chronic disease  Monitor hemoglobin and hematocrit.      • NATALIA on CPAP    • Coronary artery disease involving native coronary artery of native heart without angina pectoris      CABG 1995; SVG to RCA is occluded, LIMA to LAD, SVG to diag of LAD, SVG to marginal of LCX     • Mixed hyperlipidemia  Treat with Lipitor.      • Primary hypertension  Stable.      • Presence of cardiac pacemaker      BS dual chamber PM 11/7/2016       Continue appropriate patient's home medications for other chronic medical conditions.  Continue the present level of care.  Patient and family agreed with  the plan of care.      DVT prophylaxis:  Mechanical DVT prophylaxis orders are present.    CODE STATUS:    Medical Intervention Limits: NO intubation (DNI)  Code Status (Patient has no pulse and is not breathing): No CPR (Do Not Attempt to Resuscitate)  Medical Interventions (Patient has pulse or is breathing): Limited Support      Disposition:      This patient has been examined wearing appropriate Personal Protective Equipment and discussed with hospital infection control department, [facility] department, infectious disease specialist and pulmonologist. 03/09/22      Electronically signed by Navneet De León MD, FACP, 03/09/22, 17:47 EST.      [facility] Hospitalist Team

## 2022-03-09 NOTE — ANESTHESIA PREPROCEDURE EVALUATION
Anesthesia Evaluation     Patient summary reviewed and Nursing notes reviewed   NPO Solid Status: > 8 hours  NPO Liquid Status: > 8 hours           Airway   Mallampati: II  TM distance: >3 FB  Neck ROM: full  No difficulty expected  Dental - normal exam     Pulmonary - normal exam    breath sounds clear to auscultation  (+) lung cancer, shortness of breath, sleep apnea on CPAP,   Cardiovascular - normal exam  Exercise tolerance: unable to assess    ECG reviewed  Rhythm: regular  Rate: normal    (+) hypertension, valvular problems/murmurs AS, CAD, CABG >6 Months, hyperlipidemia,     ROS comment: S/P TAVR    EF NL    Neuro/Psych- negative ROS  GI/Hepatic/Renal/Endo    (+)   renal disease CRI,     Musculoskeletal (-) negative ROS    Abdominal  - normal exam   Substance History - negative use     OB/GYN negative ob/gyn ROS         Other      history of cancer active                    Anesthesia Plan    ASA 3     MAC     intravenous induction     Anesthetic plan, all risks, benefits, and alternatives have been provided, discussed and informed consent has been obtained with: patient.        CODE STATUS:    While under anesthesia and immediate perioperative period:  Code Status: CPR - Full Support

## 2022-03-09 NOTE — PROGRESS NOTES
Hematology/Oncology Inpatient Progress Note    PATIENT NAME: Navneet Lind  : 1945  MRN: [id number]    CHIEF COMPLAINT: Metastatic transitional cell renal carcinoma and hemoptysis    HISTORY OF PRESENT ILLNESS:   76 y.o. male admitted to [facility] through the ED on 3/7/2022 where he reported coughing up black bloody sputum since recent discharge.  He denied fever.  He reported sore throat, shortness of air, and diarrhea.  He reported poor oral intake and his wife reported she was unsure if she could bring him back and forth for outpatient office visits due to his declining physical status.  Chest x-ray was negative for acute findings.  CBC revealed WBC 29.7, hemoglobin 15.2, and platelets 202,000.  Creatinine was normal at 0.87 however BUN was elevated to 41 and sodium was low at 129.  Coags were not elevated.  Respiratory viral panel was negative. Pulmonology was consulted with plan for bronchoscopy.  CT chest performed 3/8/2022 showed mediastinal AP window soft tissue mass infiltrating the left mainstem bronchus and mildly indenting the distal thoracic trachea felt enlarged and increasing lymphadenopathy since 2021 exam.  There was opacification and partial obstruction of the bronchus intermedius, RML and RLL bronchi with bronchial wall thickening as well as partial atelectasis versus pneumonia of the RML and RLL lobes.  There was a 5.3 cm liver lesion in the hepatic dome, and a 2.3 cm right adrenal metastatic lesion.     22  Hematology/Oncology was consulted by Dr. De León as the patient is known to our service for metastatic transitional cell renal carcinoma.  He had been diagnosed with stage III disease in 2020 at time of left nephrectomy.  He received adjuvant chemotherapy that was complicated by poor tolerance with cytopenias requiring dose reductions and the patient chose to forego day 8 of cycle 4 in order to undergo heart valve surgery.  Cycle 4-day 1  chemotherapy was given 3/9/2021.  He was also anemic at time of diagnosis November 2020 with iron deficiency and folate deficiencies noted on workup..  In July 2021 CT chest was negative for metastatic disease however 11/10/2021 CT chest, abdomen, and pelvis showed mediastinal lymphadenopathy and a left retroperitoneal lymph node.  At that time he also had a T7 compression fracture with significant pain requiring evaluation for kyphoplasty.  He underwent left periaortic lymph node biopsy on 12/22/2021 revealing metastatic transitional cell carcinoma.  He was hospitalized in late December 2021 with pneumonia followed by discharge to rehab.  He was seen in follow-up by us in January 2022 at which time mutual decision with the patient was made to start pembrolizumab as he was not a candidate for aggressive therapy secondary to poor ECOG status with recent hospitalization and weight loss as well as patient did not want to pursue chemotherapy.  His first dose was given 1/21/2022 and second dose 2/10/2022.  He was evaluated with the infusion visit 2/10/2022 at which time he reported no improvement of ECOG status to date.  · He had been seen by our service during recent hospitalization 2/22/2022-3/3/2022 where he was admitted with hemoptysis that started the morning of admission described as significant in amount covering his shirt and being bright red.  He was on aspirin but no anticoagulation. A CT PE protocol was negative for PE and showed increased prevascular lymphadenopathy as compared with November 2021 outside CT with the largest measuring 2.0 x 1.1 cm.  There was evidence of interstitial fibrosis and bronchiectasis, hepatic steatosis, and T7 compression fracture with evidence of kyphoplasty.  CBC revealed WBC 16.5, hemoglobin 14.8, and platelets 321,000.  Creatinine was 0.87 and LFTs were not elevated.  PT was 11.9 (9.6-11.7) and PTT was 26.9 (24-31).  D-dimer was 3.15 (0-0.59).  Respiratory viral panel and  COVID-19 screen were negative.  Blood cultures were collected and final results were negative.  ANCA panel was negative.  He was started on antibiotics and supportive care. On 2/23/2022 he underwent bronchoscopy by Dr. Jiang with findings of an abnormal lesion in the left mainstem which was biopsied and a blood clot in the left mainstem which was removed prior to BAL.  Pathology on left mainstem lung biopsy revealed metastatic urothelial (transitional) cell carcinoma.  BAL cytology and cultures were negative.  Hemoptysis resolved and post admission hemoglobin had dropped to 11.8 on 2/25/2022 with subsequent improvement.  Our service was consulted on 3/2/2022.  Hemoptysis had resolved.  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  Hepatitis panel performed due to elevation of LFTs was nonreactive.  We had discussed that he had received 2 cycles of pembrolizumab todate and it was too early to know the treatment effects.  We had discussed life expectancy with and without continue treatment and poor prognosis given declining performance status.  Plan was to consider radiation therapy if hemoptysis recurred.  Plan was also to resume outpatient immunotherapy should his performance status improve with consideration of palliative care or hospice.     PCP: Uri Cortes MD    INTERVAL HISTORY:  • 3/9/2022-radiation oncology planning 30 Gy in 10 fractions to AP window/left mainstem area for palliation and control of bleeding.  WBC 15.1, hemoglobin 12.7, platelets 198,000.    Subjective   He is complaining of sore throat and shortness of air.  His spouse reported he is not swallowing and has been made n.p.o. and is concerned about his medications including depression/anxiety meds.    ROS:  Review of Systems   Constitutional: Negative for activity change, chills, fatigue, fever and unexpected weight change.   HENT: Positive for  "sore throat and trouble swallowing. Negative for congestion, dental problem, hearing loss, mouth sores and nosebleeds.    Eyes: Negative for photophobia and visual disturbance.   Respiratory: Positive for shortness of breath. Negative for cough and chest tightness.    Cardiovascular: Negative for chest pain, palpitations and leg swelling.   Gastrointestinal: Negative for abdominal distention, abdominal pain, blood in stool, constipation, diarrhea, nausea and vomiting.   Endocrine: Negative for cold intolerance and heat intolerance.   Genitourinary: Negative for decreased urine volume, difficulty urinating, dysuria, frequency, hematuria and urgency.   Musculoskeletal: Negative for arthralgias and gait problem.   Skin: Negative for rash and wound.   Neurological: Negative for dizziness, tremors, weakness, light-headedness, numbness and headaches.   Hematological: Negative for adenopathy. Does not bruise/bleed easily.   Psychiatric/Behavioral: Negative for confusion and hallucinations. The patient is not nervous/anxious.    All other systems reviewed and are negative.       MEDICATIONS:    Scheduled Meds:  ampicillin-sulbactam, 3 g, Intravenous, Q6H  methylPREDNISolone sodium succinate, 40 mg, Intravenous, Q12H  pantoprazole, 40 mg, Intravenous, Q AM  vancomycin, 750 mg, Intravenous, Q12H       Continuous Infusions:  Pharmacy to dose vancomycin,   sodium chloride, 75 mL/hr, Last Rate: 75 mL/hr (03/09/22 0324)       PRN Meds:  ipratropium-albuterol  •  Pharmacy to dose vancomycin  •  sodium chloride     ALLERGIES:  No Known Allergies    Objective    VITALS:   /74 (BP Location: Right arm, Patient Position: Lying)   Pulse 85   Temp 97.5 °F (36.4 °C) (Axillary)   Resp 18   Ht 172.7 cm (67.99\")   Wt 68.6 kg (151 lb 3.8 oz)   SpO2 97%   BMI 23.00 kg/m²     PHYSICAL EXAM:  Physical Exam  Vitals and nursing note reviewed.   Constitutional:       General: He is not in acute distress.     Appearance: Normal " appearance. He is well-developed. He is not diaphoretic.   HENT:      Head: Normocephalic and atraumatic.      Comments: Alopecia     Right Ear: External ear normal.      Left Ear: External ear normal.      Nose: Nose normal.      Comments: O2 per NC     Mouth/Throat:      Mouth: Mucous membranes are moist.      Pharynx: Oropharynx is clear. No oropharyngeal exudate or posterior oropharyngeal erythema.      Comments: Dental fillings  Eyes:      General: No scleral icterus.     Extraocular Movements: Extraocular movements intact.      Conjunctiva/sclera: Conjunctivae normal.      Pupils: Pupils are equal, round, and reactive to light.   Cardiovascular:      Rate and Rhythm: Normal rate and regular rhythm.      Heart sounds: Normal heart sounds. No murmur heard.     Comments: Monitor leads.  Left chest wall pacemaker.  Right chest wall [id number].  Pulmonary:      Effort: Pulmonary effort is normal. No respiratory distress.      Breath sounds: Rhonchi (Bilateral rhonchi) present. No wheezing or rales.   Chest:   Breasts:      Right: No supraclavicular adenopathy.      Left: No supraclavicular adenopathy.       Abdominal:      General: Bowel sounds are normal. There is no distension.      Palpations: Abdomen is soft. There is no mass.      Tenderness: There is no abdominal tenderness. There is no guarding.   Genitourinary:     Comments: Deferred   Musculoskeletal:         General: No swelling, tenderness or deformity. Normal range of motion.      Cervical back: Normal range of motion and neck supple.      Right lower leg: No edema.      Left lower leg: No edema.      Comments: BLE SCDs   Lymphadenopathy:      Cervical: No cervical adenopathy.      Upper Body:      Right upper body: No supraclavicular adenopathy.      Left upper body: No supraclavicular adenopathy.   Skin:     General: Skin is warm and dry.      Coloration: Skin is not pale.      Findings: No bruising, erythema or rash.   Neurological:      General:  No focal deficit present.      Mental Status: He is alert and oriented to person, place, and time.      Coordination: Coordination normal.   Psychiatric:         Mood and Affect: Mood normal.         Behavior: Behavior normal.         Thought Content: Thought content normal.           RECENT LABS:  Lab Results (last 24 hours)     Procedure Component Value Units Date/Time    Respiratory Culture - Sputum, Cough [[id number]] Collected: 03/09/22 0547    Specimen: Sputum from Cough Updated: 03/09/22 0713     Gram Stain Many (4+) WBCs per low power field      Few (2+) Mixed bacterial morphotypes seen on Gram Stain      Rare (1+) Yeast      Rare (1+) Epithelial cells per low power field    Comprehensive Metabolic Panel [[id number]]  (Abnormal) Collected: 03/09/22 0322    Specimen: Blood Updated: 03/09/22 0354     Glucose 106 mg/dL      BUN 29 mg/dL      Creatinine 0.69 mg/dL      Sodium 135 mmol/L      Potassium 4.6 mmol/L      Chloride 101 mmol/L      CO2 24.0 mmol/L      Calcium 8.3 mg/dL      Total Protein 5.4 g/dL      Albumin 2.10 g/dL      ALT (SGPT) 78 U/L      AST (SGOT) 37 U/L      Alkaline Phosphatase 124 U/L      Total Bilirubin 0.6 mg/dL      Globulin 3.3 gm/dL      A/G Ratio 0.6 g/dL      BUN/Creatinine Ratio 42.0     Anion Gap 10.0 mmol/L      eGFR 96.5 mL/min/1.73      Comment: National Kidney Foundation and American Society of Nephrology (ASN) Task Force recommended calculation based on the Chronic Kidney Disease Epidemiology Collaboration (CKD-EPI) equation refit without adjustment for race.       Narrative:      GFR Normal >60  Chronic Kidney Disease <60  Kidney Failure <15      CBC & Differential [[id number]]  (Abnormal) Collected: 03/09/22 0322    Specimen: Blood Updated: 03/09/22 0351    Narrative:      The following orders were created for panel order CBC & Differential.  Procedure                               Abnormality         Status                     ---------                                -----------         ------                     CBC Auto Differential[[id number]]        Abnormal            Final result                 Please view results for these tests on the individual orders.    CBC Auto Differential [[id number]]  (Abnormal) Collected: 03/09/22 0322    Specimen: Blood Updated: 03/09/22 0351     WBC 15.10 10*3/mm3      RBC 4.08 10*6/mm3      Hemoglobin 12.7 g/dL      Comment: Result checked         Hematocrit 37.8 %      MCV 92.4 fL      MCH 31.1 pg      MCHC 33.6 g/dL      RDW 18.1 %      RDW-SD 58.6 fl      MPV 9.9 fL      Platelets 198 10*3/mm3      Neutrophil % 93.8 %      Lymphocyte % 4.3 %      Monocyte % 1.7 %      Eosinophil % 0.1 %      Basophil % 0.1 %      Neutrophils, Absolute 14.10 10*3/mm3      Lymphocytes, Absolute 0.70 10*3/mm3      Monocytes, Absolute 0.30 10*3/mm3      Eosinophils, Absolute 0.00 10*3/mm3      Basophils, Absolute 0.00 10*3/mm3      nRBC 0.0 /100 WBC     Blood Culture - Blood, Chest, Right [[id number]]  (Normal) Collected: 03/07/22 2122    Specimen: Blood from Chest, Right Updated: 03/08/22 2133     Blood Culture No growth at 24 hours    Blood Culture - Blood, Chest, Right [[id number]]  (Normal) Collected: 03/07/22 2107    Specimen: Blood from Chest, Right Updated: 03/08/22 2117     Blood Culture No growth at 24 hours    MRSA Screen, PCR (Inpatient) - Swab, Nares [[id number]]  (Normal) Collected: 03/08/22 1627    Specimen: Swab from Nares Updated: 03/08/22 1915     MRSA PCR No MRSA Detected          PENDING RESULTS: stool C. Diff and stool panel; bronchoscopy findings.    IMAGING REVIEWED:  CT Chest Without Contrast Diagnostic    Result Date: 3/8/2022   1. Mediastinal AP window soft tissue mass appears to infiltrate the left mainstem bronchus and mildly indents the distal thoracic trachea. It has enlarged since the prior examination. It could represent primary lung malignancy or metastatic disease of unknown primary origin. 2. Prevascular mediastinal adenopathy has  increased compared to 11/10/2021. 3. There is opacification and partial obstruction of the bronchus intermedius, right middle lobe and right lower lobe bronchi with bronchial wall thickening. There is partial atelectasis versus developing pneumonia within the right middle lobe and right lower lobe. 4. A 5.3 cm mass is seen within the hepatic dome, consistent with metastatic disease. 5. 2.3 cm right adrenal nodule consistent with adrenal metastasis. 6. Additional CT findings include CABG, TAVR, chronic interstitial pulmonary fibrosis, left nephrectomy, nonobstructing right renal stone, cholecystectomy, old bilateral rib fractures, old T7 and L1 compression fractures.    Electronically Signed By-Monie Tiwari MD On:3/8/2022 9:33 AM This report was finalized on [id number] by  Monie Tiwari MD.    XR Chest 1 View    Result Date: 3/7/2022   Negative portable chest x-ray. No evidence of acute cardiopulmonary disease.  Electronically Signed By-Tylor Lucio On:3/7/2022 9:30 PM This report was finalized on [id number] by  Tylor Lucio, .      I have reviewed the patient's labs, imaging, reports, and other clinician documentation.    Assessment/Plan   ASSESSMENT:  1. 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  Bronch last admit showed endobronchial lesion with path positive.  CT chest showing disease progression since November 2021.  Given recurrent hemoptysis rad-onc consulted with palliative radiation planned after bronchoscopy.  2. Recurrent hemoptysis/right lung postobstructive pneumonia-on antibiotics per ID and supportive care per pulmonary.  Bronchoscopy with possible cauterization of bleeders planned.  3. Leukocytosis-secondary to pneumonia and possibly infectious diarrhea with stool studies  pending and malignancy. Improving.  4. Diarrhea-stool panel and C. difficile pending.  5. Interstitial lung disease/fibrosis-management per pulmonary.  ANCA panel negative last admission.   6. Elevated transaminases-acute hepatitis panel last admit was negative.  Liver met noted on CT.  7. Anorexia/failure to thrive-due to progressive cancer. Now NPO per family report with patient not swallowing.  8. CAD/valvular heart disease/s/p status post pacemaker placement-on aspirin but no other anticoagulation at time of admission.       PLAN:  1. Await bronchoscopy findings.  2. Palliative radiation planned.  3. Monitor hemoglobin given recent hemoptysis.  4. Stool studies pending.  5. Consider NG tube to deliver medications/nutrition if patient continues to have difficulty swallowing post bronchoscopy and if patient/family want to pursue aggressive measures.    Note prepared by DEBBY Mirza.  Patient seen and examined by Eddie Loredo MD.  Electronically signed by MARGARITA Wright, 03/09/22, 8:47 AM EST.          I have personally performed a face-to-face diagnostic evaluation on this patient.  I have discussed the case with Lianna Lemons NP, have edited/reviewed the note, and agree with the care plan.  The patient is complaining of soreness in his throat and shortness of air.  On examination he has a raspy voice but mucous membranes appear clear.  Labs are significant for a mild anemia.  He has not been able to swallow oral medications.  Discussed poor prognosis with family.  Bronchoscopy is planned with possible cauterization.  Radiation is also planned for control of hemoptysis.          I discussed the patients findings and my recommendations with patient, family and Dr. Thorne.    Electronically signed by Eddie Loredo MD, 03/09/22, 11:50 AM EST.

## 2022-03-09 NOTE — PLAN OF CARE
Goal Outcome Evaluation:  Plan of Care Reviewed With: patient   Patient is sitting up in bed this evening and is alert but continues to not speak.  He is alert and oriented to self and he appears to know what is being said to him.  He attempts to mouth words but nothing is audible.  Implantable port in the right upper chest is accessed and draws well.  IV antibiotics continue as well as IV maintenance fluids.  No ASE observed.  Lung sounds continue to have bilateral crackles throughout.  Supplemental oxygen in place via nasal cannula.  Continuing to monitor.     Progress: improving

## 2022-03-10 ENCOUNTER — INPATIENT HOSPITAL (AMBULATORY)
Dept: URBAN - METROPOLITAN AREA HOSPITAL 84 | Facility: HOSPITAL | Age: 77
End: 2022-03-10
Payer: COMMERCIAL

## 2022-03-10 DIAGNOSIS — C64.2 MALIGNANT NEOPLASM OF LEFT KIDNEY, EXCEPT RENAL PELVIS: ICD-10-CM

## 2022-03-10 DIAGNOSIS — R63.30 FEEDING DIFFICULTIES, UNSPECIFIED: ICD-10-CM

## 2022-03-10 DIAGNOSIS — R13.10 DYSPHAGIA, UNSPECIFIED: ICD-10-CM

## 2022-03-10 DIAGNOSIS — R74.01 ELEVATION OF LEVELS OF LIVER TRANSAMINASE LEVELS: ICD-10-CM

## 2022-03-10 DIAGNOSIS — Z46.59 ENCOUNTER FOR FITTING AND ADJUSTMENT OF OTHER GASTROINTESTIN: ICD-10-CM

## 2022-03-10 DIAGNOSIS — C78.7 SECONDARY MALIGNANT NEOPLASM OF LIVER AND INTRAHEPATIC BILE: ICD-10-CM

## 2022-03-10 PROCEDURE — 99222 1ST HOSP IP/OBS MODERATE 55: CPT | Performed by: NURSE PRACTITIONER

## 2022-03-10 NOTE — PROGRESS NOTES
Daily Progress Note        Hemoptysis    Presence of cardiac pacemaker    Coronary artery disease involving native coronary artery of native heart without angina pectoris    Shortness of breath    Mixed hyperlipidemia    Primary hypertension    NATALIA on CPAP    Anemia of chronic disease    H/O radical nephrectomy, left    Chronic diastolic congestive heart failure (HCC)    S/P TAVR (transcatheter aortic valve replacement)    Bladder carcinoma (HCC)    ILD (interstitial lung disease) (HCC)    Sepsis without septic shock (HCC)    Sepsis, due to unspecified organism, unspecified whether acute organ dysfunction present (HCC)      Assessment  Recurrent hemoptysis  Lung mass infiltrating L main stem  Mediastinal mass in AP window , enlarged since 2 weeks ago  s/p bronch 2/23/2022 with endobronchial biopsy positive for transitional cell carcinoma     Interstitial Lung Disease- on OFEV  PNA  NATALIA- home cpap  Diarrhea  HTN  Chronic diastolic CHF  HLD  CAD  S/P TAVAR  Presence of pacemaker  Bladder Cancer- On immunotherapy every 3 weeks  H/O Radical left nephrectomy  H/O artery stenosis s/p stent placement     Bronchoscopy from 2/23/22- cultures negative     Respiratory panel negative  Blood cultures negative so far    Bronchoscopy 3/9/2022  -APC electrocautery was on L main stem lesion followed by A therapeutic cold saline lavage was performed  -Respiratory culture pending    Plan    Discontinue Ofev until hemoptysis resolves  Continue to titrate oxygen- Currently on 4L NC  Cefepime for sepsis- finishes 3/15/22  Vancomycin for sepsis- finishes 3/15/22  Solu-medrol 40mg q8hrs  Bronchodilators PRN  Electrolyte/Glycemic control  GI Prophylaxis- Protonix     3/8/22                                                               2/22/22       LOS: 2 days     Subjective   Shortness of breath and improving hemoptysis      Objective     Vital signs for last 24 hours:  Vitals:    03/10/22 0414 03/10/22 0745 03/10/22 0750 03/10/22 0815    BP: 140/82   131/69   BP Location: Right arm      Patient Position: Lying      Pulse: 74 71 74 73   Resp: 19 18 20 20   Temp: 98.7 °F (37.1 °C)   97.8 °F (36.6 °C)   TempSrc: Oral      SpO2: 100% 99% 95% 97%   Weight:       Height:           Intake/Output last 3 shifts:  I/O last 3 completed shifts:  In: 1200 [I.V.:1000; IV Piggyback:200]  Out: -   Intake/Output this shift:  No intake/output data recorded.      Radiology  Imaging Results (Last 24 Hours)     ** No results found for the last 24 hours. **          Labs:  Results from last 7 days   Lab Units 03/09/22  0322   WBC 10*3/mm3 15.10*   HEMOGLOBIN g/dL 12.7*   HEMATOCRIT % 37.8   PLATELETS 10*3/mm3 198     Results from last 7 days   Lab Units 03/09/22  0322   SODIUM mmol/L 135*   POTASSIUM mmol/L 4.6   CHLORIDE mmol/L 101   CO2 mmol/L 24.0   BUN mg/dL 29*   CREATININE mg/dL 0.69*   CALCIUM mg/dL 8.3*   BILIRUBIN mg/dL 0.6   ALK PHOS U/L 124*   ALT (SGPT) U/L 78*   AST (SGOT) U/L 37   GLUCOSE mg/dL 106*     Results from last 7 days   Lab Units 03/07/22  2134   PH, ARTERIAL pH units 7.486*   PO2 ART mm Hg 69.2*   PCO2, ARTERIAL mm Hg 32.4*   HCO3 ART mmol/L 24.4     Results from last 7 days   Lab Units 03/09/22  0322 03/08/22  0224 03/07/22  2107   ALBUMIN g/dL 2.10* 2.00* 2.20*     Results from last 7 days   Lab Units 03/07/22 2107   TROPONIN T ng/mL <0.010             Results from last 7 days   Lab Units 03/07/22 2107   INR  1.05   APTT seconds 26.4               Meds:   SCHEDULE  ampicillin-sulbactam, 3 g, Intravenous, Q6H  budesonide-formoterol, 2 puff, Inhalation, BID  escitalopram, 5 mg, Oral, Daily  folic acid, 400 mcg, Oral, Daily  gabapentin, 100 mg, Oral, BID  megestrol acetate, 200 mg, Oral, TID With Meals  methylPREDNISolone sodium succinate, 40 mg, Intravenous, Q12H  pantoprazole, 40 mg, Intravenous, Q AM      Infusions  dextrose 5 % and sodium chloride 0.9 %, 75 mL/hr, Last Rate: 75 mL/hr (03/09/22 0559)      PRNs  •  albuterol  •   ipratropium-albuterol  •  melatonin  •  ondansetron  •  promethazine  •  sodium chloride    Physical Exam:  Physical Exam  Vitals reviewed.   Constitutional:       Appearance: He is ill-appearing.   Pulmonary:      Effort: Pulmonary effort is normal.      Breath sounds: Rales present.   Skin:     General: Skin is warm and dry.   Neurological:      Mental Status: He is alert.       ROS  Review of Systems  Constitutional: Positive for fatigue.   Respiratory: Positive for cough.    And coughing up bloody secretions but improving  Patient nonverbal but will nod head to yes/no questions        I have reviewed current clinicals.     Electronically signed by MARGARITA Storey, 03/10/22, 8:58 AM EST.     The NP scribed the note for me, I have examined the patient and reviewed and verified the above findings and and I formulated the assessment and plan as documented

## 2022-03-10 NOTE — PROGRESS NOTES
[facility] Daily Progress Note    Patient Name: Navneet Lind  : 1945  MRN: [id number]  Primary Care Physician:  Uri Cortes MD  Date of admission: 3/7/2022      Subjective      Chief Complaint: Shortness of breath and cough.      Patient reports no new symptoms.    Review of Systems   Constitutional: Negative.   HENT: Negative.    Eyes: Negative.    Cardiovascular: Negative.    Respiratory: Negative.    Endocrine: Negative.    Hematologic/Lymphatic: Negative.    Skin: Negative.    Musculoskeletal: Negative.    Gastrointestinal: Negative.    Genitourinary: Negative.    Neurological: Negative.    Psychiatric/Behavioral: Negative.    Allergic/Immunologic: Negative.             Objective      Vitals:   Temp:  [97.3 °F (36.3 °C)-98.9 °F (37.2 °C)] 98.9 °F (37.2 °C)  Heart Rate:  [67-95] 67  Resp:  [16-34] 20  BP: (114-140)/(60-82) 114/60  Flow (L/min):  [4-5] 4    Physical Exam  Vitals and nursing note reviewed.   Constitutional:       General: He is not in acute distress.     Appearance: He is ill-appearing.   HENT:      Head: Normocephalic.      Nose: Nose normal.      Mouth/Throat:      Mouth: Mucous membranes are dry.      Pharynx: Oropharynx is clear.   Eyes:      Extraocular Movements: Extraocular movements intact.      Conjunctiva/sclera: Conjunctivae normal.      Pupils: Pupils are equal, round, and reactive to light.   Cardiovascular:      Pulses: Normal pulses.      Heart sounds: No murmur heard.    No friction rub. No gallop.      Comments: S1 and S2 present.  No tachycardia.  Pulmonary:      Breath sounds: No stridor. No wheezing or rales.      Comments: Decreased air entry bilaterally.  Positive crackles.  Chest:      Chest wall: No tenderness.   Abdominal:      General: Bowel sounds are normal. There is no distension.      Palpations: Abdomen is soft.      Tenderness: There is no abdominal tenderness. There is no right CVA tenderness or  guarding.   Musculoskeletal:         General: No swelling, tenderness, deformity or signs of injury.      Cervical back: Normal range of motion. No rigidity.      Right lower leg: No edema.      Left lower leg: No edema.   Skin:     General: Skin is warm and dry.      Capillary Refill: Capillary refill takes less than 2 seconds.      Coloration: Skin is not jaundiced.      Findings: No bruising, erythema, lesion or rash.   Neurological:      Comments: No facial asymmetry noted.  Gait and station not tested.   Psychiatric:      Comments: No agitation.                Result Review    Result Review:  I have personally reviewed the results from the time of this admission to 3/10/2022 14:12 EST and agree with these findings:  [x]  Laboratory  [x]  Microbiology  [x]  Radiology  []  EKG/Telemetry   []  Cardiology/Vascular   []  Pathology  []  Old records  []  Other:  Most notable findings include:     CT scan of the chest showed:       left mainstem bronchus (series 2 image 41). This has increased when   compared to the outside CT chest of 11/10/2021.       Heart size is normal. Dense native coronary artery calcific lesions are   present, and there are signs of prior CABG. Left chest wall pacemaker   device is in place. Right chest wall kingsley catheter tip terminates at   the lower SVC level. There are signs of prior TAVR. No effusion or   pleural effusion is seen. Thyroid gland is unremarkable.       Ill-defined low-density mass in the hepatic dome is suspicious for   metastatic disease measuring 5.1 x 5.3 cm. Cholecystectomy changes are   present. 2.3 x 1.4 cm right adrenal nodule is a new finding when   compared to the CT chest 11/10/2021, and is consistent with metastatic   disease. The left adrenal gland is unremarkable. Left nephrectomy   changes are present. Right renal artery stent is noted. A cyst is seen   within the right kidney, measuring 12 mm. A 3 mm nonobstructing right   renal stone is noted. The remainder  the imaged upper abdominal organs   have a normal noncontrast appearance.       There are old bilateral rib fractures. Chronic T7 compression fracture   with vertebroplasty. Some of the vertebroplasty cement has chronic   extravasation into the anterior epidural space of the thoracic spinal   canal, with moderate to severe canal stenosis. There is a chronic   compression fracture of the L1 vertebral body inferior endplate,   unchanged from prior. No acute osseous abnormalities identified.           Impression:         1. Mediastinal AP window soft tissue mass appears to infiltrate the left   mainstem bronchus and mildly indents the distal thoracic trachea. It has   enlarged since the prior examination. It could represent primary lung   malignancy or metastatic disease of unknown primary origin.   2. Prevascular mediastinal adenopathy has increased compared to   11/10/2021.   3. There is opacification and partial obstruction of the bronchus   intermedius, right middle lobe and right lower lobe bronchi with   bronchial wall thickening. There is partial atelectasis versus   developing pneumonia within the right middle lobe and right lower lobe.   4. A 5.3 cm mass is seen within the hepatic dome, consistent with   metastatic disease.   5. 2.3 cm right adrenal nodule consistent with adrenal metastasis.   6. Additional CT findings include CABG, TAVR, chronic interstitial   pulmonary fibrosis, left nephrectomy, nonobstructing right renal stone,   cholecystectomy, old bilateral rib fractures, old T7 and L1 compression   fractures.               Electronically Signed By-Monie Tiwari MD On:3/8/2022 9:33 AM   This report was finalized on [id number] by  Monie Tiwari MD.           Assessment/Plan    From previous notes and with minor updates.  Brief Patient Summary:  Patient is a 76 y.o. male with multiple medical problems including NATALIA on CPAP, bladder cancer, chronic diastolic CHF, CAD, radical nephrectomy (left). HLD,  cardiac pacemaker, s/p TAVR, anemia, RBBB, recent hospitalization for sepsis pneumonia with hemoptysis and interstitial lung disease who presented to [facility] on 3/7/2022 complaining of 2-day history of increasing shortness of breath, cough and recurring hemoptysis today.  Patient was just discharged from [facility] on March 3, 2022 after admission for pneumonia and interstitial lung disease on February 22, 2022.  Patient is lethargic and appears very weak he nods appropriately to some questions but not all, making it impossible to complete for an accurate HPI at this time.  When asked if he had coughed up a large or small amount of bloody sputum patient nodded yes he did not verbalize the amount.  When patient asked if he wished to remain DNR/DNI he did nod head yes.  Patient asked if he was in pain patient shook her head no.  Patient was seen in the emergency room and vital signs upon arrival to emergency department today blood pressure 135/87, heart rate 111, oxygen saturation 98% room air, respiratory rate 28, patient afebrile with T-max of 97.9.  Initial evaluation in the emergency department included:  Initial ABG showing pH 7.486, PCO2 32.4, PO2 69.2, HCO3 24.4, base excess 1.82, oxygen saturation 95.1%.  Drawn on room air.  Initial troponin negative 0.010,  Initial BNP 1109.  Notable abnormal labs: Glucose 141, sodium 129, BUN 41, BUN creatinine ratio 47.1, alkaline phosphatase 168 ALT 84, albumin 2.20, WBCs 29.7, 85.1% neutrophils,  Blood cultures drawn with results pending  Respiratory panel collected negative for COVID-19 and other viral infections.  X-ray completed showing chronic interstitial opacities with no significant change from previous exam. left chest pacemaker noted in the right hemidiaphragm is noted  EKG completed shows sinus tachycardia RBBB, LP FB heart rate 118  While in the emergency department patient was administered 2 g cefepime, and 1250 mg  of  vancomycin.  Patient will be admitted to hospitalist group for further evaluation and treatment of sepsis, hemoptysis, interstitial lung disease, diarrhea along with other acute and/or chronic conditions.    ampicillin-sulbactam, 3 g, Intravenous, Q6H  budesonide-formoterol, 2 puff, Inhalation, BID  escitalopram, 5 mg, Oral, Daily  folic acid, 400 mcg, Oral, Daily  gabapentin, 100 mg, Oral, BID  megestrol acetate, 200 mg, Oral, TID With Meals  methylPREDNISolone sodium succinate, 40 mg, Intravenous, Q12H  pantoprazole, 40 mg, Intravenous, Q AM       dextrose 5 % and sodium chloride 0.9 %, 75 mL/hr, Last Rate: 75 mL/hr (03/09/22 6525)         Active Hospital Problems:  Active Hospital Problems    Diagnosis    • **Hemoptysis  Follow pulmonary recommendations.      • Sepsis, due to unspecified organism, unspecified whether acute organ dysfunction present (HCC)  Follow ID recommendations.  Follow cultures.      • Sepsis without septic shock (HCC)    • ILD (interstitial lung disease) (HCC)  Follow pulmonary recommendations.      • Bladder carcinoma (HCC)  Follow hematology/oncology recommendations.      • Shortness of breath  Treat with oxygen therapy as needed.    Pneumonia.  Follow pulmonary/infectious disease recommendations.      Dysphagia.  Follow GI recommendations for PEG tube placement.        • S/P TAVR (transcatheter aortic valve replacement)    • Chronic diastolic congestive heart failure (HCC)    • H/O radical nephrectomy, left    • Anemia of chronic disease  Monitor hemoglobin and hematocrit.      • NATALIA on CPAP    • Coronary artery disease involving native coronary artery of native heart without angina pectoris      CABG 1995; SVG to RCA is occluded, LIMA to LAD, SVG to diag of LAD, SVG to marginal of LCX     • Mixed hyperlipidemia  Treat with Lipitor.      • Primary hypertension  Stable.      • Presence of cardiac pacemaker      BS dual chamber PM 11/7/2016       Continue appropriate patient's home  medications for other chronic medical conditions.  Continue the present level of care.  Patient and family agreed with the plan of care.      DVT prophylaxis:  Mechanical DVT prophylaxis orders are present.    CODE STATUS:    Medical Intervention Limits: NO intubation (DNI)  Code Status (Patient has no pulse and is not breathing): No CPR (Do Not Attempt to Resuscitate)  Medical Interventions (Patient has pulse or is breathing): Limited Support      Disposition:      This patient has been examined wearing appropriate Personal Protective Equipment and discussed with hospital infection control department, [facility], infectious disease specialist and pulmonologist. 03/10/22      Electronically signed by Navneet De León MD, FACP, 03/10/22, 14:12 EST.      [facility]ist Team

## 2022-03-10 NOTE — PLAN OF CARE
Objective:   Bed mobility - Max-A and Assist x 2  Transfers - Max-A and Assist x 2  Ambulation - 0 feet N/A or Not attempted.    Assessment: Navneet Lind presents with functional mobility impairments which indicate the need for skilled intervention. Tolerating session today without incident. Pt continues to require Ax2 for bed mobility. Performed multiple rolling attempts this date due to incontinence. Pt able to assist with UE on bed rail. Pt stood x2 attempts for approx 20 sec per attempt this date. Requiring MAX A x2 with cueing for bringing COG over LATONIA. Will continue to follow and progress as tolerated.

## 2022-03-10 NOTE — THERAPY TREATMENT NOTE
Subjective: Pt agreeable to therapeutic plan of care.    Objective:     Bed mobility - Max-A and Assist x 2  Transfers - Max-A and Assist x 2  Ambulation - 0 feet N/A or Not attempted.    Pain: 0 VAS  Education: Provided education on importance of mobility and skilled verbal / tactile cueing throughout intervention.     Assessment: Navneet Lind presents with functional mobility impairments which indicate the need for skilled intervention. Tolerating session today without incident. Pt continues to require Ax2 for bed mobility. Performed multiple rolling attempts this date due to incontinence. Pt able to assist with UE on bed rail. Pt stood x2 attempts for approx 20 sec per attempt this date. Requiring MAX A x2 with cueing for bringing COG over LATONIA. Will continue to follow and progress as tolerated.     Plan/Recommendations:   Pt would benefit from Inpatient Rehabilitation placement at discharge from facility and requires no DME at discharge.   Pt desires Inpatient Rehabilitation placement at discharge. Pt cooperative; agreeable to therapeutic recommendations and plan of care.     Basic Mobility 6-click:  Rollin = Total, A lot = 2, A little = 3; 4 = None  Supine>Sit:   1 = Total, A lot = 2, A little = 3; 4 = None   Sit>Stand with arms:  1 = Total, A lot = 2, A little = 3; 4 = None  Bed>Chair:   1 = Total, A lot = 2, A little = 3; 4 = None  Ambulate in room:  1 = Total, A lot = 2, A little = 3; 4 = None  3-5 Steps with railin = Total, A lot = 2, A little = 3; 4 = None  Score: 9    Modified [place]: 5 = Severe disability (Requires constant nursing care and attention, bedridden, incontinent)    Post-Tx Position: Supine with HOB Elevated, Alarms activated and Call light and personal items within reach  PPE: gloves, surgical mask, eyewear protection

## 2022-03-10 NOTE — CASE MANAGEMENT/SOCIAL WORK
Continued Stay Note   [facility]     Patient Name: Navneet Lind  MRN: [id number]  Today's Date: 3/10/2022    Admit Date: 3/7/2022     Discharge Plan     Row Name 03/10/22 1325       Plan    Plan DC Plan:Pending clinical course. Pt from home with spouse with [facility], will need [facility] order if pt wants to resume.    Plan Comments Discussed in rounds that GI has been consulted for PEG placement and plan for pallative radiation. DC Barriers: PEG placement pending, IV antibiotics.            Phone communication or documentation only- no physical contact with patient or family.    Ching Pinon RN     Office Phone: [phone]  Office Cell: [phone]

## 2022-03-10 NOTE — CONSULTS
GI CONSULT  NOTE:    Referring Provider:  Dr. De León    Chief complaint: feeding difficulty, PEG evaluation    Subjective .     History of present illness: Navneet Lind is a 76 y.o. male with history of renal/bladder cancer (2020) on immunotherapy s/p left nephrectomy, CHF, CAD, pacemaker, TAVR, recent hospitalization for pneumonia and interstitial lung disease who presented 3/31 with increasing shortness of breath, cough and recurrent hemoptysis.  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  He has an endobronchial mass infiltrating his left mainstem which was found to be bleeding during his bronchoscopy yesterday (s/p APC).  Patient's wife is at bedside and assists with history.  Patient is unarousable.  She states current plan for treatment is radiation.  She reports that patient has been having dysphagia for months.  She says he has been harder to arouse in the past couple days.    Endo History:  4/2018 colonoscopy (Dr. Epstein): Mild diverticulosis, hemorrhoids, polyps (TA)    Past Medical History:  Past Medical History:   Diagnosis Date   • Aortic stenosis 9/29/2015    Per Echo 2017   • Benign essential HTN    • Bundle branch block, right 2/7/2013   • CAD (coronary artery disease), native coronary artery    • Cancer (HCC)     bladder- chemo, new left renal mass   • Chronic kidney disease    • Coronary artery disease involving native coronary artery of native heart without angina pectoris 11/19/2019    CABG 1995; SVG to RCA is occluded, LIMA to LAD, SVG to diag of LAD, SVG to marginal of LCX   • COVID-19 vaccine administered    • Essential hypertension 11/19/2019   • Heart murmur    • History of transfusion    • Hyperlipidemia, mixed    • ILD (interstitial lung disease) (HCC) 11/1/2021   • Injury of back    • Mixed hyperlipidemia 11/19/2019   • Near syncope    • NATALIA (obstructive sleep apnea)     AHI 11.6/h, CPAP   • Premature ventricular contractions 2/11/2014   • Presence of cardiac  pacemaker 7/5/2019    BS dual chamber PM 11/7/2016   • Shortness of breath    • SSS (sick sinus syndrome) ([facility]) 7/5/2019       Past Surgical History:  Past Surgical History:   Procedure Laterality Date   • AORTIC VALVE REPAIR/REPLACEMENT N/A 3/30/2021    Procedure: TTE TRANSFEMORAL TRANSCATHETER AORTIC VALVE REPLACEMENT PERCUTANEOUS APPROACH;  Surgeon: Hang Martinez MD;  Location: [facility] OR 18/19;  Service: Cardiothoracic;  Laterality: N/A;   • AORTIC VALVE REPAIR/REPLACEMENT N/A 3/30/2021    Procedure: Transfemoral Transcatheter Aortic Valve Replacement w/Intra Op TTE and Possible Open Rescue Surgery;  Surgeon: Anderson Galvez MD;  Location: [facility] OR 18/19;  Service: Cardiovascular;  Laterality: N/A;   • BRONCHOSCOPY N/A 2/23/2022    Procedure: BRONCHOSCOPY WITH BRONCHOALVEOLAR LAVAGE AND BIOPSY X1 AREA;  Surgeon: Ronny Jiang MD;  Location: [facility] ENDOSCOPY;  Service: Pulmonary;  Laterality: N/A;  blood clot in lung, hemoptysis   • CARDIAC CATHETERIZATION  2018   • CARDIAC CATHETERIZATION N/A 3/22/2021    Procedure: Left Heart Cath;  Surgeon: ADRIANO Erazo MD;  Location: [facility] CATH INVASIVE LOCATION;  Service: Cardiovascular;  Laterality: N/A;   • CARDIAC CATHETERIZATION N/A 6/22/2021    Procedure: RENAL ANGIOGRAPHY;  Surgeon: Jesus Bhagat MD;  Location: [facility] CATH INVASIVE LOCATION;  Service: Cardiovascular;  Laterality: N/A;   • CARDIAC CATHETERIZATION N/A 6/22/2021    Procedure: Stent BMS peripheral;  Surgeon: Jesus Bhagat MD;  Location: [facility] CATH INVASIVE LOCATION;  Service: Cardiovascular;  Laterality: N/A;   rt renal   • CARDIAC CATHETERIZATION N/A 6/22/2021    Procedure: Angioplasty-peripheral;  Surgeon: Jesus Bhagat MD;  Location: [facility] CATH INVASIVE LOCATION;  Service: Cardiovascular;  Laterality: N/A;   rt renal   • CARDIOVASCULAR STRESS TEST  2017   • CORONARY ARTERY BYPASS GRAFT  1995   • ECHO - CONVERTED  2017   •  NEPHROURETERECTOMY Left 2020    Procedure: NEPHROURETERECTOMY;  Surgeon: Rogers Bae MD;  Location: [facility] MAIN OR;  Service: Urology;  Laterality: Left;   • PACEMAKER IMPLANTATION      bs   • PORTACATH PLACEMENT         Social History:  Social History     Tobacco Use   • Smoking status: Former Smoker     Years: 5.00     Quit date:      Years since quittin.1   • Smokeless tobacco: Never Used   Vaping Use   • Vaping Use: Never used   Substance Use Topics   • Alcohol use: Yes     Alcohol/week: 1.0 standard drink     Types: 1 Shots of liquor per week     Comment: RARE   • Drug use: Never       Family History:  Family History   Problem Relation Age of Onset   • No Known Problems Mother    • Heart disease Father    • Heart failure Father    • Heart attack Father    • No Known Problems Sister    • No Known Problems Brother    • Malig Hyperthermia Neg Hx        Medications:  Medications Prior to Admission   Medication Sig Dispense Refill Last Dose   • acetaminophen (TYLENOL) 500 MG tablet Take 1,000 mg by mouth Every 6 (Six) Hours As Needed for Mild Pain .      • albuterol sulfate  (90 Base) MCG/ACT inhaler Inhale 2 puffs Every 4 (Four) Hours As Needed for Wheezing. 18 g 2    • aspirin 81 MG EC tablet Take 81 mg by mouth Daily.      • budesonide-formoterol (Symbicort) 160-4.5 MCG/ACT inhaler Inhale 2 puffs 2 (Two) Times a Day for 90 days. 10.2 g 2    • Cholecalciferol (Vitamin D3) 50 MCG (2000 UT) tablet Take 2,000 Units by mouth Daily.      • escitalopram (LEXAPRO) 5 MG tablet Take 5 mg by mouth Daily.      • famotidine (PEPCID) 40 MG tablet Take 40 mg by mouth Every Night.      • folic acid (FOLVITE) 400 MCG tablet Take 400 mcg by mouth Daily.      • gabapentin (NEURONTIN) 100 MG capsule Take 100 mg by mouth 2 (Two) Times a Day.      • melatonin 5 MG tablet tablet Take 5 mg by mouth At Night As Needed.      • omeprazole (priLOSEC) 40 MG capsule Take 40 mg by mouth Daily.      • ondansetron  (ZOFRAN) 4 MG tablet Take 4 mg by mouth Every 8 (Eight) Hours As Needed.      • polyethylene glycol (MIRALAX) 17 g packet Take 17 g by mouth Daily As Needed.      • promethazine (PHENERGAN) 25 MG tablet Take 25 mg by mouth Every 4 (Four) Hours As Needed for Nausea or Vomiting.      • megestrol (MEGACE) 40 MG/ML suspension Take 200 mg by mouth 3 (Three) Times a Day With Meals.   Unknown at Unknown time   • NON FORMULARY / PATIENT SUPPLIED MEDICATION Take 100 mg by mouth Daily. OFEV-Nintedanib   Unknown at Unknown time       Scheduled Meds:ampicillin-sulbactam, 3 g, Intravenous, Q6H  budesonide-formoterol, 2 puff, Inhalation, BID  escitalopram, 5 mg, Oral, Daily  folic acid, 400 mcg, Oral, Daily  gabapentin, 100 mg, Oral, BID  megestrol acetate, 200 mg, Oral, TID With Meals  methylPREDNISolone sodium succinate, 40 mg, Intravenous, Q12H  pantoprazole, 40 mg, Intravenous, Q AM      Continuous Infusions:dextrose 5 % and sodium chloride 0.9 %, 75 mL/hr, Last Rate: 75 mL/hr (03/09/22 9141)      PRN Meds:.•  albuterol  •  ipratropium-albuterol  •  melatonin  •  ondansetron  •  promethazine  •  sodium chloride    ALLERGIES:  Patient has no known allergies.    ROS:  Unable to perform-unarousable  Objective     Vital Signs:   Vitals:    03/10/22 0414 03/10/22 0745 03/10/22 0750 03/10/22 0815   BP: 140/82   131/69   BP Location: Right arm      Patient Position: Lying      Pulse: 74 71 74 73   Resp: 19 18 20 20   Temp: 98.7 °F (37.1 °C)   97.8 °F (36.6 °C)   TempSrc: Oral      SpO2: 100% 99% 95% 97%   Weight:       Height:           Physical Exam:       General Appearance:   Unarousable, in no acute distress   Head:    Normocephalic, without obvious abnormality, atraumatic   Throat:   No oral lesions, no thrush, oral mucosa moist   Lungs:     Respirations regular, even and unlabored   Chest Wall:    No abnormalities observed   Abdomen:     Soft, non-tender, no rebound or guarding, non-distended, no hepatosplenomegaly   Rectal:      Deferred   Extremities:   Moves all extremities, no edema, no cyanosis   Pulses:   Pulses palpable and equal bilaterally   Skin:   No rash, no jaundice, normal palpation   Lymph nodes:   No cervical, supraclavicular or submandibular palpable adenopathy           Results Review:   I reviewed the patient's labs and imaging.  CBC    Results from last 7 days   Lab Units 03/10/22  0854 03/09/22 0322 03/08/22 0224 03/07/22 2107 03/03/22  1015   WBC 10*3/mm3 12.30* 15.10* 25.10* 29.70* 27.60*   HEMOGLOBIN g/dL 11.8* 12.7* 15.9 15.2 15.8   PLATELETS 10*3/mm3 129* 198 164 202 152     CMP   Results from last 7 days   Lab Units 03/10/22  0854 03/09/22 0322 03/08/22 0224 03/07/22 2107 03/03/22  1015   SODIUM mmol/L 136 135* 132* 129* 131*   POTASSIUM mmol/L 4.2 4.6 5.5* 5.2 4.2   CHLORIDE mmol/L 106 101 98 93* 99   CO2 mmol/L 22.0 24.0 22.0 25.0 20.0*   BUN mg/dL 22 29* 42* 41* 30*   CREATININE mg/dL 0.55* 0.69* 0.78 0.87 0.65*   GLUCOSE mg/dL 140* 106* 123* 141* 170*   ALBUMIN g/dL 2.10* 2.10* 2.00* 2.20* 2.40*   BILIRUBIN mg/dL 0.5 0.6 0.9 0.8 0.6   ALK PHOS U/L 114 124* 161* 168* 156*   AST (SGOT) U/L 57* 37 32 32 45*   ALT (SGPT) U/L 122* 78* 83* 84* 188*     Cr Clearance Estimated Creatinine Clearance: 77.4 mL/min (A) (by C-G formula based on SCr of 0.55 mg/dL (L)).  Coag   Results from last 7 days   Lab Units 03/07/22 2107   INR  1.05   APTT seconds 26.4     HbA1C   Lab Results   Component Value Date    HGBA1C 4.90 03/29/2021     Blood Glucose   Glucose   Date/Time Value Ref Range Status   03/10/2022 0812 131 (H) 70 - 105 mg/dL Final     Comment:     Serial Number: [id number]:  [id number]   03/10/2022 0601 133 (H) 70 - 105 mg/dL Final     Comment:     Serial Number: [id number]:  [id number]   03/10/2022 0044 116 (H) 70 - 105 mg/dL Final     Comment:     Serial Number: [id number]:  [id number]   03/09/2022 2106 104 70 - 105 mg/dL Final     Comment:     Serial Number: [id number]:  [id number]    03/09/2022 1722 135 (H) 70 - 105 mg/dL Final     Comment:     Serial Number: [id number]:  [id number]     Infection   Results from last 7 days   Lab Units 03/09/22  0547 03/08/22  0224 03/07/22 2122 03/07/22  2107   BLOODCX   --   --  No growth at 2 days No growth at 2 days   RESPCX  Light growth (2+) The culture consists of normal respiratory seb. This is a preliminary report; final report to follow.  --   --   --    PROCALCITONIN ng/mL  --  0.31*  --   --      UA      Radiology(recent) No radiology results for the last day       ASSESSMENT  -Dysphagia/feeding difficulty  -Confusion  -Shortness of breath  -Hemoptysis  -Left transitional cell renal cell carcinoma with metastasis, endobronchial mass infiltrating left mainstem  -Leukocytosis  -Normocytic anemia  -History of renal bladder cancer (2020) s/p left nephrectomy  -Pacemaker  -History of TAVR  -CHF  -CAD     PLAN:  Patient is a 76-year-old male with left transitional cell renal cell carcinoma with metastasis.  He has a lung mass that is infiltrating his left mainstem.  GI has been consulted for possible PEG placement.  On exam, he is unarousable.  At this time, plan for Dobbhoff placement until mental status improves and treatment goals can be established.  Plan for speech evaluation once patient is more alert.  Consult dietitian for tube feed recommendations.  Can consider PEG if patient improves to be discharged to home or to rehab.  Continue supportive care.     I discussed the patients findings and my recommendations with the patient.    We appreciate the referral    Electronically signed by MARGARITA Baron, 03/10/22, 10:11 AM EST.

## 2022-03-10 NOTE — CASE COMMUNICATION
Admitted to [facility] on   3/8/22    Transfer [facility] completed      Transfer Summary Sent    Orders Discontinued    POC resolved

## 2022-03-10 NOTE — PLAN OF CARE
Goal Outcome Evaluation:  Plan of Care Reviewed With: patient        Progress: no change   Alert. Unable to assess orientation status d/t patient being non verbal. Can shake head yes or no. Tolerates medication crushed in applesauce. Currently on IV ABT, no s/s of allergic/adverse reaction noted. No s/s of pain/discomfort/SOB noted. Continues to require O2 at 4 LPM via NC. Incontinent of bowel and bladder. No s/s of hyper/hypoglycemia noted. Currently in bed, eyes closed. Rise and fall of chest observed. Call bell in reach

## 2022-03-10 NOTE — PLAN OF CARE
Problem: Skin Injury Risk Increased  Goal: Skin Health and Integrity  Outcome: Ongoing, Progressing     Problem: Fall Injury Risk  Goal: Absence of Fall and Fall-Related Injury  Outcome: Ongoing, Progressing   Goal Outcome Evaluation:  Plan of Care Reviewed With: patient, spouse           Outcome Evaluation: patient is stable on 5liters oxygen. no complaints or concerns.hospice nurse spoke with patient and wife today.no determination has been made yet as to further plan of care. GI has been consulted for PEG placement per MD De León. Patient remains NPO. will continue to monitor

## 2022-03-10 NOTE — NURSING NOTE
Call placed to radiology to verify placement of dobhoff. Tylor Lucio radiologist states it is okay to go ahead with feedings.

## 2022-03-10 NOTE — PROGRESS NOTES
Hematology/Oncology Inpatient Progress Note    PATIENT NAME: Navneet Lind  : 1945  MRN: [id number]    CHIEF COMPLAINT: Metastatic transitional cell renal carcinoma and hemoptysis    HISTORY OF PRESENT ILLNESS:   76 y.o. male admitted to [facility] through the ED on 3/7/2022 where he reported coughing up black bloody sputum since recent discharge.  He denied fever.  He reported sore throat, shortness of air, and diarrhea.  He reported poor oral intake and his wife reported she was unsure if she could bring him back and forth for outpatient office visits due to his declining physical status.  Chest x-ray was negative for acute findings.  CBC revealed WBC 29.7, hemoglobin 15.2, and platelets 202,000.  Creatinine was normal at 0.87 however BUN was elevated to 41 and sodium was low at 129.  Coags were not elevated.  Respiratory viral panel was negative. Pulmonology was consulted with plan for bronchoscopy.  CT chest performed 3/8/2022 showed mediastinal AP window soft tissue mass infiltrating the left mainstem bronchus and mildly indenting the distal thoracic trachea felt enlarged and increasing lymphadenopathy since 2021 exam.  There was opacification and partial obstruction of the bronchus intermedius, RML and RLL bronchi with bronchial wall thickening as well as partial atelectasis versus pneumonia of the RML and RLL lobes.  There was a 5.3 cm liver lesion in the hepatic dome, and a 2.3 cm right adrenal metastatic lesion.     22  Hematology/Oncology was consulted by Dr. De León as the patient is known to our service for metastatic transitional cell renal carcinoma.  He had been diagnosed with stage III disease in 2020 at time of left nephrectomy.  He received adjuvant chemotherapy that was complicated by poor tolerance with cytopenias requiring dose reductions and the patient chose to forego day 8 of cycle 4 in order to undergo heart valve surgery.  Cycle 4-day 1  chemotherapy was given 3/9/2021.  He was also anemic at time of diagnosis November 2020 with iron deficiency and folate deficiencies noted on workup..  In July 2021 CT chest was negative for metastatic disease however 11/10/2021 CT chest, abdomen, and pelvis showed mediastinal lymphadenopathy and a left retroperitoneal lymph node.  At that time he also had a T7 compression fracture with significant pain requiring evaluation for kyphoplasty.  He underwent left periaortic lymph node biopsy on 12/22/2021 revealing metastatic transitional cell carcinoma.  He was hospitalized in late December 2021 with pneumonia followed by discharge to rehab.  He was seen in follow-up by us in January 2022 at which time mutual decision with the patient was made to start pembrolizumab as he was not a candidate for aggressive therapy secondary to poor ECOG status with recent hospitalization and weight loss as well as patient did not want to pursue chemotherapy.  His first dose was given 1/21/2022 and second dose 2/10/2022.  He was evaluated with the infusion visit 2/10/2022 at which time he reported no improvement of ECOG status to date.  · He had been seen by our service during recent hospitalization 2/22/2022-3/3/2022 where he was admitted with hemoptysis that started the morning of admission described as significant in amount covering his shirt and being bright red.  He was on aspirin but no anticoagulation. A CT PE protocol was negative for PE and showed increased prevascular lymphadenopathy as compared with November 2021 outside CT with the largest measuring 2.0 x 1.1 cm.  There was evidence of interstitial fibrosis and bronchiectasis, hepatic steatosis, and T7 compression fracture with evidence of kyphoplasty.  CBC revealed WBC 16.5, hemoglobin 14.8, and platelets 321,000.  Creatinine was 0.87 and LFTs were not elevated.  PT was 11.9 (9.6-11.7) and PTT was 26.9 (24-31).  D-dimer was 3.15 (0-0.59).  Respiratory viral panel and  COVID-19 screen were negative.  Blood cultures were collected and final results were negative.  ANCA panel was negative.  He was started on antibiotics and supportive care. On 2/23/2022 he underwent bronchoscopy by Dr. Jiang with findings of an abnormal lesion in the left mainstem which was biopsied and a blood clot in the left mainstem which was removed prior to BAL.  Pathology on left mainstem lung biopsy revealed metastatic urothelial (transitional) cell carcinoma.  BAL cytology and cultures were negative.  Hemoptysis resolved and post admission hemoglobin had dropped to 11.8 on 2/25/2022 with subsequent improvement.  Our service was consulted on 3/2/2022.  Hemoptysis had resolved.  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  Hepatitis panel performed due to elevation of LFTs was nonreactive.  We had discussed that he had received 2 cycles of pembrolizumab todate and it was too early to know the treatment effects.  We had discussed life expectancy with and without continue treatment and poor prognosis given declining performance status.  Plan was to consider radiation therapy if hemoptysis recurred.  Plan was also to resume outpatient immunotherapy should his performance status improve with consideration of palliative care or hospice.     PCP: Uri Cortes MD    INTERVAL HISTORY:  • 3/9/2022-radiation oncology planning 30 Gy in 10 fractions to AP window/left mainstem area for palliation and control of bleeding.  WBC 15.1, hemoglobin 12.7, platelets 198,000.  • 3/10/2022-platelets 129,000.  Therapeutic bronchoscopy performed by Dr. Thorne 3/9 showed known endobronchial mass in the left mainstem with active bleeding treated with electrocautery.  Estimated blood loss less than 50 mL.    History of present illness reviewed since last visit and changes noted on 03/10/22.    Subjective   Continues to be weak.  He is  willing to get a feeding tube and is deciding between NG tube versus PEG.    ROS:  Review of Systems   Constitutional: Negative for activity change, chills, fatigue, fever and unexpected weight change.   HENT: Positive for trouble swallowing. Negative for congestion, dental problem, hearing loss, mouth sores, nosebleeds and sore throat.    Eyes: Negative for photophobia and visual disturbance.   Respiratory: Negative for cough, choking, chest tightness and shortness of breath.    Cardiovascular: Negative for chest pain, palpitations and leg swelling.   Gastrointestinal: Negative for abdominal distention, abdominal pain, blood in stool, constipation, diarrhea, nausea and vomiting.   Endocrine: Negative for cold intolerance and heat intolerance.   Genitourinary: Negative for decreased urine volume, difficulty urinating, dysuria, frequency, hematuria and urgency.   Musculoskeletal: Negative for arthralgias and gait problem.   Skin: Negative for rash and wound.   Neurological: Positive for weakness. Negative for dizziness, tremors, light-headedness, numbness and headaches.   Hematological: Negative for adenopathy. Does not bruise/bleed easily.   Psychiatric/Behavioral: Negative for confusion and hallucinations. The patient is not nervous/anxious.    All other systems reviewed and are negative.       MEDICATIONS:    Scheduled Meds:  ampicillin-sulbactam, 3 g, Intravenous, Q6H  budesonide-formoterol, 2 puff, Inhalation, BID  escitalopram, 5 mg, Oral, Daily  folic acid, 400 mcg, Oral, Daily  gabapentin, 100 mg, Oral, BID  megestrol acetate, 200 mg, Oral, TID With Meals  methylPREDNISolone sodium succinate, 40 mg, Intravenous, Q12H  pantoprazole, 40 mg, Intravenous, Q AM       Continuous Infusions:  dextrose 5 % and sodium chloride 0.9 %, 75 mL/hr, Last Rate: 75 mL/hr (03/09/22 5709)       PRN Meds:  •  albuterol  •  ipratropium-albuterol  •  melatonin  •  ondansetron  •  promethazine  •  sodium chloride     ALLERGIES:  No  "Known Allergies    Objective    VITALS:   /69   Pulse 73   Temp 97.8 °F (36.6 °C)   Resp 20   Ht 172.7 cm (67.99\")   Wt 68.6 kg (151 lb 3.8 oz)   SpO2 97%   BMI 23.00 kg/m²     PHYSICAL EXAM:  Physical Exam  Vitals and nursing note reviewed.   Constitutional:       General: He is not in acute distress.     Appearance: Normal appearance. He is well-developed. He is ill-appearing (Chronically ill appearing). He is not diaphoretic.   HENT:      Head: Normocephalic and atraumatic.      Comments: Alopecia     Right Ear: External ear normal.      Left Ear: External ear normal.      Nose: Nose normal.      Comments: O2 per NC     Mouth/Throat:      Mouth: Mucous membranes are moist.      Pharynx: Oropharynx is clear. No oropharyngeal exudate or posterior oropharyngeal erythema.      Comments: Dental fillings  Eyes:      General: No scleral icterus.     Extraocular Movements: Extraocular movements intact.      Conjunctiva/sclera: Conjunctivae normal.      Pupils: Pupils are equal, round, and reactive to light.   Cardiovascular:      Rate and Rhythm: Normal rate and regular rhythm.      Heart sounds: Normal heart sounds. No murmur heard.     Comments: Midline vertical chest wall scar.  Monitor leads.  Left chest wall pacemaker.  Right chest wall [id number].  Pulmonary:      Effort: Pulmonary effort is normal. No respiratory distress.      Breath sounds: Normal breath sounds. No stridor. No wheezing, rhonchi (Bilateral rhonchi) or rales.   Chest:   Breasts:      Right: No supraclavicular adenopathy.      Left: No supraclavicular adenopathy.       Abdominal:      General: Bowel sounds are normal. There is no distension.      Palpations: Abdomen is soft. There is no mass.      Tenderness: There is no abdominal tenderness. There is no guarding.   Genitourinary:     Comments: Deferred   Musculoskeletal:         General: No swelling, tenderness or deformity. Normal range of motion.      Cervical back: Normal range " of motion and neck supple.      Right lower leg: No edema.      Left lower leg: No edema.      Comments: BLE SCDs.  Right hand O2 monitor.   Lymphadenopathy:      Cervical: No cervical adenopathy.      Upper Body:      Right upper body: No supraclavicular adenopathy.      Left upper body: No supraclavicular adenopathy.   Skin:     General: Skin is warm and dry.      Coloration: Skin is not pale.      Findings: No bruising, erythema or rash.      Comments: Venous stripping scar left lower extremity.   Neurological:      General: No focal deficit present.      Mental Status: He is alert and oriented to person, place, and time.      Coordination: Coordination normal.      Comments: Voice is weak   Psychiatric:         Mood and Affect: Mood normal.         Behavior: Behavior normal.         Thought Content: Thought content normal.           RECENT LABS:  Lab Results (last 24 hours)     Procedure Component Value Units Date/Time    Comprehensive Metabolic Panel [[id number]]  (Abnormal) Collected: 03/10/22 0854    Specimen: Blood Updated: 03/10/22 0929     Glucose 140 mg/dL      BUN 22 mg/dL      Creatinine 0.55 mg/dL      Sodium 136 mmol/L      Potassium 4.2 mmol/L      Chloride 106 mmol/L      CO2 22.0 mmol/L      Calcium 7.9 mg/dL      Total Protein 5.1 g/dL      Albumin 2.10 g/dL      ALT (SGPT) 122 U/L      AST (SGOT) 57 U/L      Alkaline Phosphatase 114 U/L      Total Bilirubin 0.5 mg/dL      Globulin 3.0 gm/dL      A/G Ratio 0.7 g/dL      BUN/Creatinine Ratio 40.0     Anion Gap 8.0 mmol/L      eGFR 103.3 mL/min/1.73      Comment: National Kidney Foundation and American Society of Nephrology (ASN) Task Force recommended calculation based on the Chronic Kidney Disease Epidemiology Collaboration (CKD-EPI) equation refit without adjustment for race.       Narrative:      GFR Normal >60  Chronic Kidney Disease <60  Kidney Failure <15      CBC & Differential [[id number]]  (Abnormal) Collected: 03/10/22 0854    Specimen:  Blood Updated: 03/10/22 0917    Narrative:      The following orders were created for panel order CBC & Differential.  Procedure                               Abnormality         Status                     ---------                               -----------         ------                     CBC Auto Differential[[id number]]        Abnormal            Final result                 Please view results for these tests on the individual orders.    CBC Auto Differential [[id number]]  (Abnormal) Collected: 03/10/22 0854    Specimen: Blood Updated: 03/10/22 0917     WBC 12.30 10*3/mm3      RBC 3.85 10*6/mm3      Hemoglobin 11.8 g/dL      Hematocrit 36.1 %      MCV 93.8 fL      MCH 30.8 pg      MCHC 32.8 g/dL      RDW 18.4 %      RDW-SD 60.8 fl      MPV 8.3 fL      Platelets 129 10*3/mm3      Neutrophil % 92.0 %      Lymphocyte % 4.5 %      Monocyte % 3.3 %      Eosinophil % 0.0 %      Basophil % 0.2 %      Neutrophils, Absolute 11.30 10*3/mm3      Lymphocytes, Absolute 0.60 10*3/mm3      Monocytes, Absolute 0.40 10*3/mm3      Eosinophils, Absolute 0.00 10*3/mm3      Basophils, Absolute 0.00 10*3/mm3      nRBC 0.0 /100 WBC     Respiratory Culture - Sputum, Cough [[id number]] Collected: 03/09/22 0547    Specimen: Sputum from Cough Updated: 03/10/22 0901     Respiratory Culture Light growth (2+) The culture consists of normal respiratory seb. This is a preliminary report; final report to follow.     Gram Stain Many (4+) WBCs per low power field      Few (2+) Mixed bacterial morphotypes seen on Gram Stain      Rare (1+) Yeast      Rare (1+) Epithelial cells per low power field    POC Glucose Once [[id number]]  (Abnormal) Collected: 03/10/22 0812    Specimen: Blood Updated: 03/10/22 0813     Glucose 131 mg/dL      Comment: Serial Number: [id number]:  [id number]       POC Glucose Once [[id number]]  (Abnormal) Collected: 03/10/22 0601    Specimen: Blood Updated: 03/10/22 0602     Glucose 133 mg/dL      Comment: Serial Number:  [id number]:  [id number]       POC Glucose Once [[id number]]  (Abnormal) Collected: 03/10/22 0044    Specimen: Blood Updated: 03/10/22 0045     Glucose 116 mg/dL      Comment: Serial Number: [id number]:  [id number]       Blood Culture - Blood, Chest, Right [[id number]]  (Normal) Collected: 03/07/22 2122    Specimen: Blood from Chest, Right Updated: 03/09/22 2131     Blood Culture No growth at 2 days    Blood Culture - Blood, Chest, Right [[id number]]  (Normal) Collected: 03/07/22 2107    Specimen: Blood from Chest, Right Updated: 03/09/22 2116     Blood Culture No growth at 2 days    POC Glucose Once [[id number]]  (Normal) Collected: 03/09/22 2106    Specimen: Blood Updated: 03/09/22 2107     Glucose 104 mg/dL      Comment: Serial Number: [id number]:  [id number]       POC Glucose Once [[id number]]  (Abnormal) Collected: 03/09/22 1722    Specimen: Blood Updated: 03/09/22 1723     Glucose 135 mg/dL      Comment: Serial Number: [id number]:  [id number]       Vancomycin, Random [[id number]]  (Normal) Collected: 03/09/22 1037    Specimen: Blood Updated: 03/09/22 1146     Vancomycin Random 12.40 mcg/mL     Narrative:      Therapeutic Ranges for Vancomycin    Vancomycin Random   5.0-40.0 mcg/mL  Vancomycin Trough   5.0-20.0 mcg/mL  Vancomycin Peak     20.0-40.0 mcg/mL          PENDING RESULTS: n/a    IMAGING REVIEWED:  No radiology results for the last day    I have reviewed the patient's labs, imaging, reports, and other clinician documentation.    Assessment/Plan   ASSESSMENT:  1. 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  Bronch last admit showed endobronchial lesion with path positive.  CT chest showing disease progression since November 2021.  Repeat bronc this admission showed bleeding  from endobronchial lesion status post electrocautery. Given recurrent hemoptysis rad-onc consulted with palliative radiation planned after bronchoscopy. Hospice eval in progress.  2. Recurrent hemoptysis/right lung postobstructive pneumonia-on antibiotics per ID and supportive care per pulmonary.  Bronchoscopy performed with bleeding controlled.  3. Thrombocytopenia-mild drop post admission.  Will follow at present and work-up if additional drop noted.  4. Leukocytosis-secondary to pneumonia and possibly infectious diarrhea with stool studies pending and malignancy. Improving.  5. Diarrhea-resolved.  6. Interstitial lung disease/fibrosis-management per pulmonary.  ANCA panel negative last admission. Ofev held per pulmonary until hemoptysis resolves.  7. Elevated transaminases-acute hepatitis panel last admit was negative.  Liver met noted on CT.  8. Anorexia/failure to thrive-due to progressive cancer. Now NPO per family report with patient not swallowing.  GI consulted inpatient was not arousable at time of their consult.  Dobbhoff planned.  9. CAD/valvular heart disease/s/p status post pacemaker placement-on aspirin but no other anticoagulation at time of admission.       PLAN:  1. Palliative radiation planned.  2. [facility] evaluating.  3. Dobbhoff planned with PEG pending per GI.    Note prepared by DEBBY iMrza.  Patient seen and examined by Eddie Loredo MD.  Electronically signed by MARGARITA Wright, 03/10/22, 11:39 AM EST.          I have personally performed a face-to-face diagnostic evaluation on this patient.  I have discussed the case with Lianna Lemons NP, have edited/reviewed the note, and agree with the care plan.  The patient is considering tube feedings.  On examination, he is ill-appearing with a weak voice.  Labs are significant for mild anemia and mild thrombocytopenia.  Bronchoscopy was performed yesterday with cauterization of bleeders in the endobronchial mass.  GI and  radiation oncology are following patient.  Overall prognosis is poor.        I discussed the patients findings and my recommendations with patient, family and Dr. Thorne.    Electronically signed by Eddie Loredo MD, 03/10/22, 12:45 PM EST.

## 2022-03-10 NOTE — PROGRESS NOTES
Infectious Diseases Progress Note      LOS: 2 days   Patient Care Team:  Uri Cortes MD as PCP - General  Andre Veliz MD as Consulting Physician (Nephrology)  Anderson Galvez MD as Consulting Physician (Cardiology)  Ajit Quinones MD as Surgeon (Cardiothoracic Surgery)  Cristina Wolfe MD as Consulting Physician (Sleep Medicine)  Juan Vega PA as Physician Assistant (Physician Assistant)  Eddie Loredo MD as Consulting Physician (Hematology and Oncology)    Chief Complaint: Shortness of breath, lethargy, hemoptysis at discharge    Subjective       The patient has been afebrile for the last 24 hours.  The patient is on 4 L of oxygen by nasal cannula, hemodynamically stable, and is tolerating antimicrobial therapy.  Patient has not had any bowel movements according to the tech today      Review of Systems:   Review of Systems   Constitutional: Positive for fatigue.   HENT: Negative.    Eyes: Negative.    Respiratory: Positive for cough and shortness of breath.    Cardiovascular: Negative.    Gastrointestinal: Negative.    Endocrine: Negative.    Genitourinary: Negative.    Musculoskeletal: Negative.    Skin: Negative.    Neurological: Positive for weakness.   Psychiatric/Behavioral: Negative.    All other systems reviewed and are negative.       Objective     Vital Signs  Temp:  [97.3 °F (36.3 °C)-98.8 °F (37.1 °C)] 97.8 °F (36.6 °C)  Heart Rate:  [71-95] 73  Resp:  [16-34] 20  BP: (121-140)/(69-82) 131/69    Physical Exam:  Physical Exam  Vitals and nursing note reviewed.   Constitutional:       General: He is not in acute distress.     Appearance: He is well-developed and normal weight. He is ill-appearing. He is not diaphoretic.   HENT:      Head: Normocephalic and atraumatic.   Eyes:      Extraocular Movements: Extraocular movements intact.      Conjunctiva/sclera: Conjunctivae normal.      Pupils: Pupils are equal, round, and reactive to light.    Cardiovascular:      Rate and Rhythm: Normal rate and regular rhythm.      Heart sounds: Normal heart sounds, S1 normal and S2 normal.   Pulmonary:      Effort: Pulmonary effort is normal. No respiratory distress.      Breath sounds: Normal breath sounds. No stridor. No wheezing or rales.      Comments: Very diminished on the right side  Abdominal:      General: Bowel sounds are normal. There is no distension.      Palpations: Abdomen is soft. There is no mass.      Tenderness: There is no abdominal tenderness. There is no guarding.   Musculoskeletal:         General: No deformity. Normal range of motion.      Cervical back: Neck supple.   Skin:     General: Skin is warm and dry.      Coloration: Skin is not pale.      Findings: No erythema or rash.   Neurological:      Mental Status: He is alert and oriented to person, place, and time.      Cranial Nerves: No cranial nerve deficit.      Comments: Appears very fatigued   Psychiatric:         Mood and Affect: Mood normal.          Results Review:    I have reviewed all clinical data, test, lab, and imaging results.     Radiology  No Radiology Exams Resulted Within Past 24 Hours    Cardiology    Laboratory    Results from last 7 days   Lab Units 03/10/22  0854 03/09/22  0322 03/08/22 0224 03/07/22  2107   WBC 10*3/mm3 12.30* 15.10* 25.10* 29.70*   HEMOGLOBIN g/dL 11.8* 12.7* 15.9 15.2   HEMATOCRIT % 36.1* 37.8 47.6 46.4   PLATELETS 10*3/mm3 129* 198 164 202     Results from last 7 days   Lab Units 03/10/22  0854 03/09/22  0322 03/08/22 0224 03/07/22  2107   SODIUM mmol/L 136 135* 132* 129*   POTASSIUM mmol/L 4.2 4.6 5.5* 5.2   CHLORIDE mmol/L 106 101 98 93*   CO2 mmol/L 22.0 24.0 22.0 25.0   BUN mg/dL 22 29* 42* 41*   CREATININE mg/dL 0.55* 0.69* 0.78 0.87   GLUCOSE mg/dL 140* 106* 123* 141*   ALBUMIN g/dL 2.10* 2.10* 2.00* 2.20*   BILIRUBIN mg/dL 0.5 0.6 0.9 0.8   ALK PHOS U/L 114 124* 161* 168*   AST (SGOT) U/L 57* 37 32 32   ALT (SGPT) U/L 122* 78* 83* 84*    CALCIUM mg/dL 7.9* 8.3* 9.5 9.9                 Microbiology   Microbiology Results (last 10 days)     Procedure Component Value - Date/Time    Respiratory Culture - Sputum, Cough [[id number]] Collected: 03/09/22 0547    Lab Status: Preliminary result Specimen: Sputum from Cough Updated: 03/10/22 0901     Respiratory Culture Light growth (2+) The culture consists of normal respiratory seb. This is a preliminary report; final report to follow.     Gram Stain Many (4+) WBCs per low power field      Few (2+) Mixed bacterial morphotypes seen on Gram Stain      Rare (1+) Yeast      Rare (1+) Epithelial cells per low power field    MRSA Screen, PCR (Inpatient) - Swab, Nares [[id number]]  (Normal) Collected: 03/08/22 1627    Lab Status: Final result Specimen: Swab from Nares Updated: 03/08/22 1915     MRSA PCR No MRSA Detected    Blood Culture - Blood, Chest, Right [[id number]]  (Normal) Collected: 03/07/22 2122    Lab Status: Preliminary result Specimen: Blood from Chest, Right Updated: 03/09/22 2131     Blood Culture No growth at 2 days    Respiratory Panel PCR w/COVID-19(SARS-CoV-2) SHANE/SHAI/FELECIA/PAD/COR/MAD/VALERIA In-House, NP Swab in UTM/VTM, 3-4 HR TAT - Swab, Nasopharynx [[id number]]  (Normal) Collected: 03/07/22 2122    Lab Status: Final result Specimen: Swab from Nasopharynx Updated: 03/07/22 2217     ADENOVIRUS, PCR Not Detected     Coronavirus 229E Not Detected     Coronavirus HKU1 Not Detected     Coronavirus NL63 Not Detected     Coronavirus OC43 Not Detected     COVID19 Not Detected     Human Metapneumovirus Not Detected     Human Rhinovirus/Enterovirus Not Detected     Influenza A PCR Not Detected     Influenza B PCR Not Detected     Parainfluenza Virus 1 Not Detected     Parainfluenza Virus 2 Not Detected     Parainfluenza Virus 3 Not Detected     Parainfluenza Virus 4 Not Detected     RSV, PCR Not Detected     Bordetella pertussis pcr Not Detected     Bordetella parapertussis PCR Not Detected     Chlamydophila  pneumoniae PCR Not Detected     Mycoplasma pneumo by PCR Not Detected    Narrative:      In the setting of a positive respiratory panel with a viral infection PLUS a negative procalcitonin without other underlying concern for bacterial infection, consider observing off antibiotics or discontinuation of antibiotics and continue supportive care. If the respiratory panel is positive for atypical bacterial infection (Bordetella pertussis, Chlamydophila pneumoniae, or Mycoplasma pneumoniae), consider antibiotic de-escalation to target atypical bacterial infection.    Blood Culture - Blood, Chest, Right [[id number]]  (Normal) Collected: 03/07/22 2107    Lab Status: Preliminary result Specimen: Blood from Chest, Right Updated: 03/09/22 2116     Blood Culture No growth at 2 days          Medication Review:       Schedule Meds  ampicillin-sulbactam, 3 g, Intravenous, Q6H  budesonide-formoterol, 2 puff, Inhalation, BID  escitalopram, 5 mg, Oral, Daily  folic acid, 400 mcg, Oral, Daily  gabapentin, 100 mg, Oral, BID  megestrol acetate, 200 mg, Oral, TID With Meals  methylPREDNISolone sodium succinate, 40 mg, Intravenous, Q12H  pantoprazole, 40 mg, Intravenous, Q AM        Infusion Meds  dextrose 5 % and sodium chloride 0.9 %, 75 mL/hr, Last Rate: 75 mL/hr (03/09/22 0321)        PRN Meds  •  albuterol  •  ipratropium-albuterol  •  melatonin  •  ondansetron  •  promethazine  •  sodium chloride        Assessment/Plan       Antimicrobial Therapy   1.  Unasyn        2.  Vancomycin        3.        4.        5.            Assessment     Possible subtle right-sided postobstructive pneumonia.  Patient has recently been diagnosed with lung cancer and a recent CT shows a soft tissue mass at the mediastinal AP window that is infiltrating the left mainstem bronchus, increasing lymphadenopathy and partial obstruction of the bronchus intermedius, right middle lobe and left lower lobe bronchi with concern for pneumonia in the right middle  lobe and right lower lobes.    -Patient has been on immunotherapy  -Patient has reported increased shortness of breath hemoptysis  -Now on 3 L of oxygen by nasal cannula  -COVID-19 screen and respiratory viral DNA panel are negative  -Patient recently had a bronchoscopy on 2022-02-23 and BAL was negative including a negative PCP PCR  -Bronchoscopy on 3/9/2022-a endobronchial mass in the left mainstem with active bleeding was found-application of electrocautery.  Cultures pending  -MRSA the nares screen was negative     Leukocytosis-could be related to metastasis/pneumonia but also need to rule out C. difficile colitis since patient is having diarrhea  -Wife reports multiple bouts of dark liquid stool  -Patient was just recently discharged on 3/3/21 for pneumonia and was on antibiotics at that time  -Patient is also currently on steroids  -Patient does not have any bowel movements in the last 24 hours  -Trending down  -Blood cultures are negative so far  -White blood cell count is normalizing     Elevated liver transaminase -trending down  -Hepatitis panel was negative     Pulmonary fibrosis patient is on Ofev according to his wife     Recent diagnosis of renal and bladder cancer in 2020.  Patient to have some chemotherapy at that time and a left nephrectomy     S/P Aortic valve replacement in 2021     S/P Pacemaker placement 2016     Obstructive sleep apnea     Chronic kidney disease     Plan     Continue IV Unasyn 3 g every 6 hours  Waiting on sputum cultures  Continue supportive care  A.m. labs  Overall prognosis is very guarded    Kat Luz, APRN  03/10/22  12:18 EST    Note is dictated utilizing voice recognition software/Dragon

## 2022-03-10 NOTE — CONSULTS
Nutrition Services    Patient Name: Navneet Lind  YOB: 1945  MRN: [id number]  Admission date: 3/7/2022    Comment:    Initiate tube feeding of Nutren 2.0 @25mL/hour with 10mL/hour free water flush  Close monitoring for possible refeeding syndrome.      Severe chronic disease related malnutrition related to likely inadequate calorie intake PTA with dysphagia and bladder cancer as evidenced by weight loss of 13% in 3 months and physical exam revealing moderate to severe muscle/fat loss.    See MSA.      PPE Documentation        PPE Worn By Provider mask, gloves, eye protection and gown   PPE Worn By Patient  none     CLINICAL NUTRITION ASSESSMENT      Reason for Assessment 3/10: PAMELLA consult, TF consult     H&P  76 y.o. male with multiple medical problems including chronic diastolic CHF, CAD, radical nephrectomy (left).  Bladder cancer, HLD, NATALIA on CPAP, cardiac pacemaker, s/p TAVR, anemia, RBBB, recent hospitalization for sepsis pneumonia with hemoptysis and interstitial lung disease who presented to [facility] on 3/7/2022 complaining of 2-day history of increasing shortness of breath, cough and recurring hemoptysis today.  Patient was just discharged from [facility] on March 3, 2022 after admission for pneumonia and interstitial lung disease on February 22, 2022.     Past Medical History:   Diagnosis Date   • Aortic stenosis 9/29/2015    Per Echo 2017   • Benign essential HTN    • Bundle branch block, right 2/7/2013   • CAD (coronary artery disease), native coronary artery    • Cancer (HCC)     bladder- chemo, new left renal mass   • Chronic kidney disease    • Coronary artery disease involving native coronary artery of native heart without angina pectoris 11/19/2019    CABG 1995; SVG to RCA is occluded, LIMA to LAD, SVG to diag of LAD, SVG to marginal of LCX   • COVID-19 vaccine administered    • Essential hypertension 11/19/2019   • Heart murmur    • History of transfusion     • Hyperlipidemia, mixed    • ILD (interstitial lung disease) ([facility]) 11/1/2021   • Injury of back    • Mixed hyperlipidemia 11/19/2019   • Near syncope    • NATALIA (obstructive sleep apnea)     AHI 11.6/h, CPAP   • Premature ventricular contractions 2/11/2014   • Presence of cardiac pacemaker 7/5/2019    BS dual chamber PM 11/7/2016   • Shortness of breath    • SSS (sick sinus syndrome) ([facility]) 7/5/2019       Past Surgical History:   Procedure Laterality Date   • AORTIC VALVE REPAIR/REPLACEMENT N/A 3/30/2021    Procedure: TTE TRANSFEMORAL TRANSCATHETER AORTIC VALVE REPLACEMENT PERCUTANEOUS APPROACH;  Surgeon: Hang Martinez MD;  Location: [facility] OR 18/19;  Service: Cardiothoracic;  Laterality: N/A;   • AORTIC VALVE REPAIR/REPLACEMENT N/A 3/30/2021    Procedure: Transfemoral Transcatheter Aortic Valve Replacement w/Intra Op TTE and Possible Open Rescue Surgery;  Surgeon: Anderson Galvez MD;  Location: [facility] OR 18/19;  Service: Cardiovascular;  Laterality: N/A;   • BRONCHOSCOPY N/A 2/23/2022    Procedure: BRONCHOSCOPY WITH BRONCHOALVEOLAR LAVAGE AND BIOPSY X1 AREA;  Surgeon: Ronny Jiang MD;  Location: [facility] ENDOSCOPY;  Service: Pulmonary;  Laterality: N/A;  blood clot in lung, hemoptysis   • CARDIAC CATHETERIZATION  2018   • CARDIAC CATHETERIZATION N/A 3/22/2021    Procedure: Left Heart Cath;  Surgeon: ADRIANO Erazo MD;  Location: [facility] CATH INVASIVE LOCATION;  Service: Cardiovascular;  Laterality: N/A;   • CARDIAC CATHETERIZATION N/A 6/22/2021    Procedure: RENAL ANGIOGRAPHY;  Surgeon: Jesus Bhagat MD;  Location: [facility] CATH INVASIVE LOCATION;  Service: Cardiovascular;  Laterality: N/A;   • CARDIAC CATHETERIZATION N/A 6/22/2021    Procedure: Stent BMS peripheral;  Surgeon: Jesus Bhagat MD;  Location: [facility] CATH INVASIVE LOCATION;  Service: Cardiovascular;  Laterality: N/A;   rt renal   • CARDIAC CATHETERIZATION N/A 6/22/2021    Procedure:  "Angioplasty-peripheral;  Surgeon: Jesus Bhagat MD;  Location: [facility] CATH INVASIVE LOCATION;  Service: Cardiovascular;  Laterality: N/A;   rt renal   • CARDIOVASCULAR STRESS TEST  2017   • CORONARY ARTERY BYPASS GRAFT  1995   • ECHO - CONVERTED  2017   • NEPHROURETERECTOMY Left 11/2/2020    Procedure: NEPHROURETERECTOMY;  Surgeon: Rogers Bae MD;  Location: [facility] MAIN OR;  Service: Urology;  Laterality: Left;   • PACEMAKER IMPLANTATION  2016    bs   • PORTACATH PLACEMENT          Current Problems   Hemoptysis    Sepsis    Bladder cancer    Shortness of breath    PNA    Dysphagia  -onset months ago per chart review  -GI following for PEG, DHT in place    HF    NATALIA    Anemia    CAD     Encounter Information        Trending Narrative     3/10: Visited patient in room- no family at bedside. Pt is non-verbal at visit, DHT in place. Discussed with nursing plan for tube feeding. NFPE completed.     Anthropometrics        Current Height, Weight Height: 172.7 cm (67.99\")  Weight: 68.6 kg (151 lb 3.8 oz) (03/09/22 0325)       Ideal Body Weight (IBW) 154lb   Usual Body Weight (UBW) FIDEL       Trending Weight Hx     This admission: Current weight up 6lb from admit weight             PTA: Current weight down 13% in 3 months, 21% loss in 1 year    Wt Readings from Last 30 Encounters:   03/09/22 0325 68.6 kg (151 lb 3.8 oz)   03/08/22 0020 64.8 kg (142 lb 14.4 oz)   03/07/22 2029 65.8 kg (145 lb)   03/03/22 0613 66.1 kg (145 lb 11.6 oz)   03/03/22 0521 68.6 kg (151 lb 3.8 oz)   03/02/22 0500 69.8 kg (153 lb 14.1 oz)   03/01/22 0432 70.6 kg (155 lb 10.3 oz)   02/28/22 0312 70.5 kg (155 lb 6.8 oz)   02/27/22 0555 69.9 kg (154 lb)   02/25/22 0445 70.1 kg (154 lb 8.7 oz)   02/25/22 0251 70.1 kg (154 lb 8.7 oz)   02/24/22 0431 71.3 kg (157 lb 3 oz)   02/23/22 2050 72 kg (158 lb 11.7 oz)   02/22/22 2049 72.1 kg (159 lb)   02/22/22 0936 69.9 kg (154 lb)   02/11/22 1045 69.9 kg (154 lb)   01/14/22 1129 71.7 kg (158 lb) "   12/29/21 2343 78 kg (171 lb 15.3 oz)   12/22/21 0816 78.9 kg (174 lb)   11/29/21 1328 78.9 kg (174 lb)   08/23/21 1431 85.3 kg (188 lb)   07/14/21 1334 88.5 kg (195 lb)   07/14/21 0851 88.5 kg (195 lb)   06/22/21 1821 90.6 kg (199 lb 11.8 oz)   06/22/21 0904 88 kg (194 lb)   05/12/21 1155 88 kg (194 lb)   05/12/21 1058 88.2 kg (194 lb 6.4 oz)   03/31/21 1212 84.4 kg (186 lb)   03/31/21 0500 84.5 kg (186 lb 3.2 oz)   03/30/21 0517 85.8 kg (189 lb 3.2 oz)   03/29/21 0811 86.2 kg (190 lb)   03/25/21 0924 84.8 kg (187 lb)   03/22/21 0711 85.7 kg (188 lb 15 oz)   03/15/21 1523 86.2 kg (190 lb)   03/11/21 1115 87.1 kg (192 lb)   03/02/21 1006 86.5 kg (190 lb 11.2 oz)   02/19/21 0853 87.5 kg (193 lb)   11/23/20 1141 88.5 kg (195 lb)   11/09/20 0553 98.9 kg (218 lb 0.6 oz)   11/08/20 0520 95.9 kg (211 lb 6.7 oz)   11/07/20 0622 100 kg (220 lb 7.4 oz)   11/06/20 0508 99.5 kg (219 lb 5.7 oz)   11/03/20 0500 99.8 kg (220 lb 0.3 oz)   11/02/20 0823 90.5 kg (199 lb 8.3 oz)   10/23/20 1608 95.3 kg (210 lb)   10/28/20 1423 92.1 kg (203 lb)   06/09/20 0900 95.3 kg (210 lb)   11/25/19 1104 102 kg (224 lb)   04/16/19 0628 102 kg (224 lb)   10/01/18 0758 103 kg (226 lb)   04/02/18 0628 105 kg (232 lb)      BMI kg/m2 Body mass index is 23 kg/m².       Labs        Pertinent Labs    Results from last 7 days   Lab Units 03/10/22  0854 03/09/22  0322 03/08/22  0224   SODIUM mmol/L 136 135* 132*   POTASSIUM mmol/L 4.2 4.6 5.5*   CHLORIDE mmol/L 106 101 98   CO2 mmol/L 22.0 24.0 22.0   BUN mg/dL 22 29* 42*   CREATININE mg/dL 0.55* 0.69* 0.78   CALCIUM mg/dL 7.9* 8.3* 9.5   BILIRUBIN mg/dL 0.5 0.6 0.9   ALK PHOS U/L 114 124* 161*   ALT (SGPT) U/L 122* 78* 83*   AST (SGOT) U/L 57* 37 32   GLUCOSE mg/dL 140* 106* 123*     Results from last 7 days   Lab Units 03/10/22  0854   HEMOGLOBIN g/dL 11.8*   HEMATOCRIT % 36.1*     COVID19   Date Value Ref Range Status   03/07/2022 Not Detected Not Detected - Ref. Range Final     Lab Results   Component  "Value Date    HGBA1C 4.90 03/29/2021        Medications    Scheduled Medications ampicillin-sulbactam, 3 g, Intravenous, Q6H  budesonide-formoterol, 2 puff, Inhalation, BID  escitalopram, 5 mg, Per PEG Tube, Daily  folic acid, 400 mcg, Per PEG Tube, Daily  gabapentin, 100 mg, Per PEG Tube, BID  [START ON 3/11/2022] lansoprazole, 30 mg, Per PEG Tube, QAM  megestrol acetate, 200 mg, Per PEG Tube, TID With Meals  methylPREDNISolone sodium succinate, 40 mg, Intravenous, Q12H        Infusions dextrose 5 % and sodium chloride 0.9 %, 75 mL/hr, Last Rate: 75 mL/hr (03/10/22 1420)        PRN Medications •  albuterol  •  ipratropium-albuterol  •  melatonin  •  ondansetron  •  promethazine  •  sodium chloride     Physical Findings        Trending Physical   Appearance, NFPE 3/10: NFPE completed, pt meets criteria for severe malnutrition   --  Edema  none     Bowel Function BM 3/9   Tubes NGT, placed 3/10   Chewing/Swallowing Hx dysphagia x months per history review, not able to participate in ST eval, NPO     Skin  no breakdown       Estimated/Assessed Needs       Energy Requirements    Height for Calculation  Height: 172.7 cm (67.99\")   Weight for Calculation 68.6kg, CBW   Method for Estimation  35kcal/kg   EST Needs (kcal/day) 2401kcal/day       Protein Requirements    Weight for Calculation 68.6kg   EST Protein Needs (g/kg) 1.3-1.5g/kg   EST Daily Needs (g/day) 89-103g/day       Fluid Requirements     Estimated Needs (mL/day) 1400-1900mL hx HF       Fluid Deficit    Current Na Level (mEq/L)    Desired Na Level (mEq/L)    Estimated Fluid Deficit (L)       Current Nutrition Orders & Evaluation of Intake       Oral Nutrition     Food Allergies NKFA   Current PO Diet NPO Diet  NPO Diet   Supplement    PO Evaluation     Trending % PO Intake NPO     Enteral Nutrition    Enteral Route    TF Modular    TF Delivery Method    Current TF Order    Current Water Flush     TF Residual/Tolerance     TF Observation         Parenteral " Nutrition     TPN Route    Current TPN Order    Lipids (mL/%/frequency)     MVI Frequency     Trace Element Frequency     Total # Days on TPN    TPN Observation         Nutrition Course Details    PO Diets: NPO since admit   Nutrition Support: 3/10 TF to start     Nutritional Risk Screening        NRS-2002 Score          Nutrition Diagnosis         Nutrition Dx Problem 1 Severe chronic disease related malnutrition related to likely inadequate calorie intake PTA with dysphagia and bladder cancer as evidenced by weight loss of 13% in 3 months and physical exam revealing moderate to severe muscle/fat loss.      Nutrition Dx Problem 2        Intervention Goal         Intervention Goal(s) TF initiated     Nutrition Intervention        RD Action Work up and order EN     Nutrition Prescription          Diet Prescription NPO   Supplement Prescription      Enteral Prescription Initial Goal:  *initial goal conservative d/t risk of RFS     Nutren 2.0 at 25mL/hr + 10mL/hr water flush      End Goal:    Nutren 2.0 at 55 mL/hr + water flush pending clinical picture     Calories  2420 kcals (100%)   Protein  97 g (100%)   Free water  835 mL   Flushes  Pending clinical picture     The above end goal rate is for 22 hrs/day to assume interruptions for ADLs. Water flushes adjusted based on clinical picture + Rx flushes/IV fluids        TPN Prescription      Monitor/Evaluation        Monitor I&O, Pertinent labs, EN delivery/tolerance, Weight, Skin status, GI status, Swallow function     Malnutrition Severity Assessment      Patient meets criteria for : Severe Malnutrition  Malnutrition Type (last 8 hours)     Malnutrition Severity Assessment     Row Name 03/10/22 1657       Malnutrition Severity Assessment    Malnutrition Type Chronic Disease - Related Malnutrition    Row Name 03/10/22 1657       Unintentional Weight Loss     Unintentional Weight Loss Findings Severe    Unintentional Weight Loss  Weight loss greater than 7.5% in three  months    Row Name 03/10/22 1657       Muscle Loss    Loss of Muscle Mass Findings Severe    Worship Region Moderate - slight depression    Clavicle Bone Region Moderate - some protrusion in females, visible in males    Acromion Bone Region Moderate - acromion may slightly protrude    Scapular Bone Region None    Dorsal Hand Region None    Patellar Region Severe - prominent bone, square looking, very little muscle definition    Anterior Thigh Region Severe - line/depression along thigh, obviously thin    Posterior Calf Region Severe - thin with very little definition/firmness    Row Name 03/10/22 1657       Fat Loss    Subcutaneous Fat Loss Findings Moderate    Orbital Region  Moderate -  somewhat hollowness, slightly dark circles    Upper Arm Region Moderate - some fat tissue, not ample    Thoracic & Lumbar Region None    Row Name 03/10/22 5437       Criteria Met (Must meet criteria for severity in at least 2 of these categories: M Wasting, Fat Loss, Fluid, Secondary Signs, Wt. Status, Intake)    Patient meets criteria for  Severe Malnutrition                       Electronically signed by:  Esther Mariscal RD  03/10/22 16:26 EST

## 2022-03-10 NOTE — PLAN OF CARE
Goal Outcome Evaluation:         Attempted to see for re-evaluation of swallow. Patient was not appropriate as he was too lethargic. He was not responsive or did not engage in exchange with yes/no questions. Noted wet quality at rest in his breathing. Spoke with nursing regarding inability to complete re-evaluation.

## 2022-03-11 ENCOUNTER — INPATIENT HOSPITAL (AMBULATORY)
Dept: URBAN - METROPOLITAN AREA HOSPITAL 84 | Facility: HOSPITAL | Age: 77
End: 2022-03-11
Payer: COMMERCIAL

## 2022-03-11 DIAGNOSIS — R13.10 DYSPHAGIA, UNSPECIFIED: ICD-10-CM

## 2022-03-11 DIAGNOSIS — C78.7 SECONDARY MALIGNANT NEOPLASM OF LIVER AND INTRAHEPATIC BILE: ICD-10-CM

## 2022-03-11 DIAGNOSIS — R63.30 FEEDING DIFFICULTIES, UNSPECIFIED: ICD-10-CM

## 2022-03-11 DIAGNOSIS — R74.01 ELEVATION OF LEVELS OF LIVER TRANSAMINASE LEVELS: ICD-10-CM

## 2022-03-11 DIAGNOSIS — C64.2 MALIGNANT NEOPLASM OF LEFT KIDNEY, EXCEPT RENAL PELVIS: ICD-10-CM

## 2022-03-11 PROBLEM — E43 SEVERE MALNUTRITION: Status: ACTIVE | Noted: 2022-01-01

## 2022-03-11 PROCEDURE — 99232 SBSQ HOSP IP/OBS MODERATE 35: CPT | Mod: FS | Performed by: NURSE PRACTITIONER

## 2022-03-11 NOTE — SIGNIFICANT NOTE
Pts cough is weak at this time.  Pt is unable to clear secretions.  Yaunker used to suction the back of the mouth to clear secretions.  Upper airway wheezing decreased post suction.

## 2022-03-11 NOTE — PLAN OF CARE
Problem: Skin Injury Risk Increased  Goal: Skin Health and Integrity  Outcome: Ongoing, Progressing  Intervention: Optimize Skin Protection  Recent Flowsheet Documentation  Taken 3/10/2022 1930 by Sebas Benson RN  Pressure Reduction Techniques: weight shift assistance provided  Head of Bed (HOB) Positioning: HOB elevated  Pressure Reduction Devices: pressure-redistributing mattress utilized     Problem: Fall Injury Risk  Goal: Absence of Fall and Fall-Related Injury  Outcome: Ongoing, Progressing  Intervention: Identify and Manage Contributors  Recent Flowsheet Documentation  Taken 3/11/2022 0107 by Sebas Benson RN  Medication Review/Management: medications reviewed  Taken 3/10/2022 2350 by Sebas Benson RN  Medication Review/Management: medications reviewed  Taken 3/10/2022 2109 by Sebas Benson RN  Medication Review/Management: medications reviewed  Taken 3/10/2022 1930 by Sebas Benson RN  Medication Review/Management: medications reviewed  Intervention: Promote Injury-Free Environment  Recent Flowsheet Documentation  Taken 3/11/2022 0107 by Sebas Benson RN  Safety Promotion/Fall Prevention:   safety round/check completed   fall prevention program maintained  Taken 3/10/2022 2350 by Sebas Benson RN  Safety Promotion/Fall Prevention:   safety round/check completed   fall prevention program maintained  Taken 3/10/2022 1930 by Sebas Benson RN  Safety Promotion/Fall Prevention:   safety round/check completed   nonskid shoes/slippers when out of bed   fall prevention program maintained   clutter free environment maintained   assistive device/personal items within reach   activity supervised     Problem: Adult Inpatient Plan of Care  Goal: Plan of Care Review  Outcome: Ongoing, Progressing  Flowsheets (Taken 3/11/2022 0134)  Outcome Evaluation: Pt desat on 5L per NC. Respiratory contacted for PRN breathing tx & suctioning. N.O received per Dr. Varma for NT suctioning. NT suctioning  performed per respiratory. Pt continued to have episodes of desat'ing. Pt placed on 10L per hi flow NC per respiratory. Dr. Varma contacted r/t pt's desat's. N.o received to consult intensivist, obtain STAT ABG, CXR, & BNP. Intensivist assessed labs & CXR results with N.O received to push 40 mg IV Lasix. Pt's sat's maintained above 90% at this time. Pt receiving TF per Dobhoff. Pt receives Nutren 2.0 @ 25 mL/hr, 10 mL water flush hourly. TF stopped at MN for possible PEG placement. Pt is relatively non-verbal but will say short phrases at times. Pt rested on & off throughout the night. Will continue to monitor.  Goal: Patient-Specific Goal (Individualized)  Outcome: Ongoing, Progressing  Goal: Absence of Hospital-Acquired Illness or Injury  Outcome: Ongoing, Progressing  Intervention: Identify and Manage Fall Risk  Recent Flowsheet Documentation  Taken 3/11/2022 0107 by Sebas Benson, ESE  Safety Promotion/Fall Prevention:   safety round/check completed   fall prevention program maintained  Taken 3/10/2022 2350 by Sebas Benson RN  Safety Promotion/Fall Prevention:   safety round/check completed   fall prevention program maintained  Taken 3/10/2022 1930 by Sebas Benson, ESE  Safety Promotion/Fall Prevention:   safety round/check completed   nonskid shoes/slippers when out of bed   fall prevention program maintained   clutter free environment maintained   assistive device/personal items within reach   activity supervised  Intervention: Prevent Skin Injury  Recent Flowsheet Documentation  Taken 3/10/2022 1930 by Sebas Benson, RN  Body Position: supine  Intervention: Prevent and Manage VTE (Venous Thromboembolism) Risk  Recent Flowsheet Documentation  Taken 3/10/2022 1930 by Sebas Benson, RN  Activity Management: activity adjusted per tolerance  VTE Prevention/Management:   sequential compression devices on   bilateral  Intervention: Prevent Infection  Recent Flowsheet Documentation  Taken 3/11/2022 0107 by  Sebas Benson RN  Infection Prevention:   hand hygiene promoted   personal protective equipment utilized   rest/sleep promoted   single patient room provided  Taken 3/10/2022 2350 by Sebas Benson RN  Infection Prevention:   hand hygiene promoted   personal protective equipment utilized   rest/sleep promoted   single patient room provided  Taken 3/10/2022 2109 by Sebas Benson RN  Infection Prevention:   hand hygiene promoted   personal protective equipment utilized   rest/sleep promoted   single patient room provided  Taken 3/10/2022 1930 by Sebas Benson RN  Infection Prevention:   hand hygiene promoted   personal protective equipment utilized   rest/sleep promoted   single patient room provided  Goal: Optimal Comfort and Wellbeing  Outcome: Ongoing, Progressing  Intervention: Monitor Pain and Promote Comfort  Recent Flowsheet Documentation  Taken 3/10/2022 1930 by Sebas Benson RN  Pain Management Interventions:   care clustered   diversional activity provided   pillow support provided   quiet environment facilitated   unnecessary movement minimized  Intervention: Provide Person-Centered Care  Recent Flowsheet Documentation  Taken 3/10/2022 1930 by Sebas Benson RN  Trust Relationship/Rapport:   care explained   choices provided   questions answered   questions encouraged   reassurance provided   thoughts/feelings acknowledged  Goal: Readiness for Transition of Care  Outcome: Ongoing, Progressing     Problem: Infection Progression (Sepsis/Septic Shock)  Goal: Absence of Infection Signs and Symptoms  Outcome: Ongoing, Progressing  Intervention: Initiate Sepsis Management  Recent Flowsheet Documentation  Taken 3/11/2022 0107 by Sebas Benson RN  Infection Prevention:   hand hygiene promoted   personal protective equipment utilized   rest/sleep promoted   single patient room provided  Isolation Precautions:   contact   spore   precautions maintained  Taken 3/10/2022 2350 by Sebas Benson  RN  Infection Prevention:   hand hygiene promoted   personal protective equipment utilized   rest/sleep promoted   single patient room provided  Isolation Precautions:   contact   spore   precautions maintained  Taken 3/10/2022 2109 by Sebas Benson RN  Infection Prevention:   hand hygiene promoted   personal protective equipment utilized   rest/sleep promoted   single patient room provided  Isolation Precautions:   contact   spore   precautions maintained  Taken 3/10/2022 1930 by Sebas Benson RN  Stabilization Measures: respiratory support increased  Infection Prevention:   hand hygiene promoted   personal protective equipment utilized   rest/sleep promoted   single patient room provided  Isolation Precautions:   contact   spore   precautions maintained  Intervention: Promote Recovery  Recent Flowsheet Documentation  Taken 3/10/2022 1930 by Sebas Benson RN  Activity Management: activity adjusted per tolerance  Sleep/Rest Enhancement:   awakenings minimized   noise level reduced   room darkened     Problem: Nutrition Impaired (Sepsis/Septic Shock)  Goal: Optimal Nutrition Intake  Outcome: Ongoing, Progressing     Problem: Fluid Imbalance (Pneumonia)  Goal: Fluid Balance  Outcome: Ongoing, Progressing     Problem: Infection (Pneumonia)  Goal: Resolution of Infection Signs and Symptoms  Outcome: Ongoing, Progressing  Intervention: Prevent Infection Progression  Recent Flowsheet Documentation  Taken 3/11/2022 0107 by Sebas Benson RN  Isolation Precautions:   contact   spore   precautions maintained  Taken 3/10/2022 2350 by Sebas eBnson RN  Isolation Precautions:   contact   spore   precautions maintained  Taken 3/10/2022 2109 by Sebas Benson RN  Isolation Precautions:   contact   spore   precautions maintained  Taken 3/10/2022 1930 by Sebas Benson RN  Isolation Precautions:   contact   spore   precautions maintained     Problem: Respiratory Compromise (Pneumonia)  Goal: Effective Oxygenation  and Ventilation  Outcome: Ongoing, Progressing  Intervention: Promote Airway Secretion Clearance  Recent Flowsheet Documentation  Taken 3/10/2022 1930 by Sebas Benson RN  Breathing Techniques/Airway Clearance: deep/controlled cough encouraged  Cough And Deep Breathing: done with encouragement  Intervention: Optimize Oxygenation and Ventilation  Recent Flowsheet Documentation  Taken 3/10/2022 1930 by Sebas Benson RN  Head of Bed (HOB) Positioning: HOB elevated     Problem: Mobility Impairment  Goal: Optimal Mobility  Outcome: Ongoing, Progressing  Intervention: Optimize Mobility  Recent Flowsheet Documentation  Taken 3/10/2022 1930 by Sebas Benson RN  Activity Management: activity adjusted per tolerance  Positioning/Transfer Devices:   pillows   in use     Problem: Gas Exchange Impaired  Goal: Optimal Gas Exchange  Outcome: Ongoing, Progressing  Intervention: Optimize Oxygenation and Ventilation  Recent Flowsheet Documentation  Taken 3/10/2022 1930 by Sebas Benson RN  Head of Bed ([facility]) Positioning: HOB elevated     Problem: Heart Failure Comorbidity  Goal: Maintenance of Heart Failure Symptom Control  Outcome: Ongoing, Progressing  Intervention: Maintain Heart Failure-Management  Recent Flowsheet Documentation  Taken 3/11/2022 0107 by Sebas Benson RN  Medication Review/Management: medications reviewed  Taken 3/10/2022 2350 by Sebas Benson RN  Medication Review/Management: medications reviewed  Taken 3/10/2022 2109 by Sebas Benson RN  Medication Review/Management: medications reviewed  Taken 3/10/2022 1930 by Sebas Benson RN  Medication Review/Management: medications reviewed   Goal Outcome Evaluation:              Outcome Evaluation: Pt desat on 5L per NC. Respiratory contacted for PRN breathing tx & suctioning. N.O received per Dr. Varma for NT suctioning. NT suctioning performed per respiratory. Pt continued to have episodes of desat'ing. Pt placed on 10L per hi flow NC per  respiratory. Dr. Varma contacted r/t pt's desat's. N.o received to consult intensivist, obtain STAT ABG, CXR, & BNP. Intensivist assessed labs & CXR results with N.O received to push 40 mg IV Lasix. Pt's sat's maintained above 90% at this time. Pt receiving TF per Dobhoff. Pt receives Nutren 2.0 @ 25 mL/hr, 10 mL water flush hourly. TF stopped at MN for possible PEG placement. Pt is relatively non-verbal but will say short phrases at times. Pt rested on & off throughout the night. Will continue to monitor.

## 2022-03-11 NOTE — PROGRESS NOTES
Infectious Diseases Progress Note      LOS: 3 days   Patient Care Team:  Uri Cortes MD as PCP - General  Andre Veliz MD as Consulting Physician (Nephrology)  Anderson Galvez MD as Consulting Physician (Cardiology)  Ajit Quinones MD as Surgeon (Cardiothoracic Surgery)  Cristina Wolfe MD as Consulting Physician (Sleep Medicine)  Juan Vega PA as Physician Assistant (Physician Assistant)  Eddie Loredo MD as Consulting Physician (Hematology and Oncology)    Chief Complaint: Shortness of breath, lethargy, hemoptysis at discharge    Subjective       The patient has been afebrile for the last 24 hours.  The patient is on 10 L of oxygen by nasal cannula, hemodynamically stable, and is tolerating antimicrobial therapy.        Review of Systems:   Review of Systems   Constitutional: Positive for fatigue.   HENT: Negative.    Eyes: Negative.    Respiratory: Positive for cough and shortness of breath.    Cardiovascular: Negative.    Gastrointestinal: Negative.    Endocrine: Negative.    Genitourinary: Negative.    Musculoskeletal: Negative.    Skin: Negative.    Neurological: Positive for weakness.   Psychiatric/Behavioral: Negative.    All other systems reviewed and are negative.       Objective     Vital Signs  Temp:  [94.5 °F (34.7 °C)-98.9 °F (37.2 °C)] 97.6 °F (36.4 °C)  Heart Rate:  [71-93] 76  Resp:  [18-31] 19  BP: (111-148)/(60-80) 117/70    Physical Exam:  Physical Exam  Vitals and nursing note reviewed.   Constitutional:       General: He is not in acute distress.     Appearance: He is well-developed and normal weight. He is ill-appearing. He is not diaphoretic.   HENT:      Head: Normocephalic and atraumatic.   Eyes:      Extraocular Movements: Extraocular movements intact.      Conjunctiva/sclera: Conjunctivae normal.      Pupils: Pupils are equal, round, and reactive to light.   Cardiovascular:      Rate and Rhythm: Normal rate and regular rhythm.       Heart sounds: Normal heart sounds, S1 normal and S2 normal.   Pulmonary:      Effort: Respiratory distress present.      Breath sounds: No stridor. No wheezing or rales.      Comments: Very diminished on the right side    Very congested in the upper lobes  Abdominal:      General: Bowel sounds are normal. There is no distension.      Palpations: Abdomen is soft. There is no mass.      Tenderness: There is no abdominal tenderness. There is no guarding.   Musculoskeletal:         General: No deformity. Normal range of motion.      Cervical back: Neck supple.   Skin:     General: Skin is warm and dry.      Coloration: Skin is not pale.      Findings: No erythema or rash.   Neurological:      Mental Status: He is alert.      Cranial Nerves: No cranial nerve deficit.      Comments: Appears very fatigued   Psychiatric:         Mood and Affect: Mood normal.          Results Review:    I have reviewed all clinical data, test, lab, and imaging results.     Radiology  XR Chest 1 View    Result Date: 3/10/2022  Exam:  Single view chest Date: March 10, 2022 History: Shortness of breath, hypoxia, metastatic disease Comparison: March 7, 2022 Technique: Single frontal radiograph of the chest was obtained Findings: There is a well-positioned nasogastric tube. There is an Wuqyft-o-Vxjg catheter in right chest. The heart is enlarged. There are prior cardiothoracic surgical changes. There is central vascular congestion. There is a pacemaker in the left chest. There is  elevation the right hemidiaphragm. There is hazy airspace opacities in the right upper lobe and right midlung field. The right hilum is prominent in size.     1. The right hilum is prominent in size most likely representing underlying adenopathy or a right hilar mass. 2. Significant elevation the right hemidiaphragm, essentially unchanged. 3. Airspace disease in the right upper lobe and right midlung field likely representing pneumonia. This has progressed in the  interim. Slot 63 Electronically signed by:  Sotero Esposito M.D.  3/10/2022 9:31 PM    XR Abdomen KUB    Result Date: 3/10/2022  DATE OF EXAM: 3/10/2022 2:11 PM  PROCEDURE: XR ABDOMEN KUB-  INDICATIONS: Dobhoff tube placed; A41.9-Sepsis, unspecified organism; J18.9-Pneumonia, unspecified organism; R04.2-Hemoptysis; D72.829-Elevated white blood cell count, unspecified; J84.9-Interstitial pulmonary disease, unspecified  COMPARISON: CT chest without contrast 3 2022.  TECHNIQUE: Single radiographic view of the abdomen was obtained.   FINDINGS: Radiopaque tip of the Dobbhoff tube projects to the level of the proximal gastric body. There is moderate asymmetric elevation the right hemidiaphragm. Stable cardiac enlargement with CABG and TAVR. Right chest wall kingsley catheter tip terminates at the cavoatrial junction. Pacemaker device is unchanged in position. No pneumothorax.      Dobbhoff tube tip projects to the level of the proximal gastric body.  Electronically Signed By-Monie Tiwari MD On:3/10/2022 2:32 PM This report was finalized on [id number] by  Monie Tiwari MD.      Cardiology    Laboratory    Results from last 7 days   Lab Units 03/11/22  0332 03/10/22  0854 03/09/22 0322 03/08/22 0224 03/07/22 2107   WBC 10*3/mm3 15.40* 12.30* 15.10* 25.10* 29.70*   HEMOGLOBIN g/dL 12.9* 11.8* 12.7* 15.9 15.2   HEMATOCRIT % 38.4 36.1* 37.8 47.6 46.4   PLATELETS 10*3/mm3 141 129* 198 164 202     Results from last 7 days   Lab Units 03/10/22  2222 03/10/22  0854 03/09/22  0322 03/08/22 0224 03/07/22 2107   SODIUM mmol/L 137 136 135* 132* 129*   POTASSIUM mmol/L 3.8 4.2 4.6 5.5* 5.2   CHLORIDE mmol/L 105 106 101 98 93*   CO2 mmol/L 24.0 22.0 24.0 22.0 25.0   BUN mg/dL 18 22 29* 42* 41*   CREATININE mg/dL 0.50* 0.55* 0.69* 0.78 0.87   GLUCOSE mg/dL 127* 140* 106* 123* 141*   ALBUMIN g/dL 2.00* 2.10* 2.10* 2.00* 2.20*   BILIRUBIN mg/dL 0.5 0.5 0.6 0.9 0.8   ALK PHOS U/L 111 114 124* 161* 168*   AST (SGOT) U/L 67* 57* 37 32  32   ALT (SGPT) U/L 152* 122* 78* 83* 84*   CALCIUM mg/dL 7.7* 7.9* 8.3* 9.5 9.9                 Microbiology   Microbiology Results (last 10 days)     Procedure Component Value - Date/Time    Respiratory Culture - Sputum, Cough [[id number]] Collected: 03/09/22 0547    Lab Status: Final result Specimen: Sputum from Cough Updated: 03/11/22 1010     Respiratory Culture Light growth (2+) Normal respiratory seb. No S. aureus or Pseudomonas aeruginosa detected. Final report.     Gram Stain Many (4+) WBCs per low power field      Few (2+) Mixed bacterial morphotypes seen on Gram Stain      Rare (1+) Yeast      Rare (1+) Epithelial cells per low power field    MRSA Screen, PCR (Inpatient) - Swab, Nares [[id number]]  (Normal) Collected: 03/08/22 1627    Lab Status: Final result Specimen: Swab from Nares Updated: 03/08/22 1915     MRSA PCR No MRSA Detected    Blood Culture - Blood, Chest, Right [[id number]]  (Normal) Collected: 03/07/22 2122    Lab Status: Preliminary result Specimen: Blood from Chest, Right Updated: 03/10/22 2131     Blood Culture No growth at 3 days    Respiratory Panel PCR w/COVID-19(SARS-CoV-2) SHANE/SHAI/FELECIA/PAD/[facility]/MAD/VALERIA In-House, NP Swab in UTM/VTM, 3-4 HR TAT - Swab, Nasopharynx [[id number]]  (Normal) Collected: 03/07/22 2122    Lab Status: Final result Specimen: Swab from Nasopharynx Updated: 03/07/22 2217     ADENOVIRUS, PCR Not Detected     Coronavirus 229E Not Detected     Coronavirus HKU1 Not Detected     Coronavirus NL63 Not Detected     Coronavirus OC43 Not Detected     COVID19 Not Detected     Human Metapneumovirus Not Detected     Human Rhinovirus/Enterovirus Not Detected     Influenza A PCR Not Detected     Influenza B PCR Not Detected     Parainfluenza Virus 1 Not Detected     Parainfluenza Virus 2 Not Detected     Parainfluenza Virus 3 Not Detected     Parainfluenza Virus 4 Not Detected     RSV, PCR Not Detected     Bordetella pertussis pcr Not Detected     Bordetella parapertussis PCR Not  Detected     Chlamydophila pneumoniae PCR Not Detected     Mycoplasma pneumo by PCR Not Detected    Narrative:      In the setting of a positive respiratory panel with a viral infection PLUS a negative procalcitonin without other underlying concern for bacterial infection, consider observing off antibiotics or discontinuation of antibiotics and continue supportive care. If the respiratory panel is positive for atypical bacterial infection (Bordetella pertussis, Chlamydophila pneumoniae, or Mycoplasma pneumoniae), consider antibiotic de-escalation to target atypical bacterial infection.    Blood Culture - Blood, Chest, Right [[id number]]  (Normal) Collected: 03/07/22 2107    Lab Status: Preliminary result Specimen: Blood from Chest, Right Updated: 03/10/22 2117     Blood Culture No growth at 3 days          Medication Review:       Schedule Meds  ampicillin-sulbactam, 3 g, Intravenous, Q6H  budesonide, 0.5 mg, Nebulization, BID - RT  escitalopram, 5 mg, Per PEG Tube, Daily  folic acid, 400 mcg, Per PEG Tube, Daily  gabapentin, 100 mg, Per PEG Tube, BID  lansoprazole, 30 mg, Per PEG Tube, QAM  megestrol acetate, 200 mg, Per PEG Tube, TID With Meals  methylPREDNISolone sodium succinate, 40 mg, Intravenous, Q12H        Infusion Meds       PRN Meds  •  albuterol  •  Calcium Gluconate-NaCl **AND** calcium gluconate **AND** Calcium, Ionized  •  ipratropium-albuterol  •  magnesium sulfate **OR** magnesium sulfate **OR** magnesium sulfate  •  melatonin  •  ondansetron  •  potassium & sodium phosphates **OR** potassium & sodium phosphates  •  potassium chloride  •  potassium chloride  •  promethazine  •  sodium chloride        Assessment/Plan       Antimicrobial Therapy   1.  Unasyn        2.          3.        4.        5.            Assessment     Possible subtle right-sided postobstructive pneumonia.  Patient has recently been diagnosed with lung cancer and a recent CT shows a soft tissue mass at the mediastinal AP window  that is infiltrating the left mainstem bronchus, increasing lymphadenopathy and partial obstruction of the bronchus intermedius, right middle lobe and left lower lobe bronchi with concern for pneumonia in the right middle lobe and right lower lobes.    -Patient has been on immunotherapy  -Patient has reported increased shortness of breath hemoptysis  -COVID-19 screen and respiratory viral DNA panel are negative  -Patient recently had a bronchoscopy on 2022-02-23 and BAL was negative including a negative PCP PCR  -Bronchoscopy on 3/9/2022-a endobronchial mass in the left mainstem with active bleeding was found-application of electrocautery.  Cultures pending  -MRSA the nares screen was negative  -Patient was up to 10 L of oxygen by high flow nasal cannula  -Patient's sputum cultures were negative    Leukocytosis-could be related to metastasis/pneumonia but also need to rule out C. difficile colitis since patient is having diarrhea  -Wife reports multiple bouts of dark liquid stool  -Patient was just recently discharged on 3/3/21 for pneumonia and was on antibiotics at that time  -Patient is also currently on steroids  -Patient does not have any bowel movements in the last 24 hours  -Trending down  -Blood cultures are negative so far  -White blood cell count is normalizing     Elevated liver transaminase -trending down  -Hepatitis panel was negative     Pulmonary fibrosis patient is on Ofev according to his wife     Recent diagnosis of renal and bladder cancer in 2020.  Patient to have some chemotherapy at that time and a left nephrectomy     S/P Aortic valve replacement in 2021     S/P Pacemaker placement 2016     Obstructive sleep apnea     Chronic kidney disease     Plan     Discontinue IV Unasyn  Start p.o. Augmentin 875/125 twice daily for 4 days for 7 days treatment  Continue supportive care  Case discussed with wife at bedside  Overall prognosis is very guarded  Patient and family are planning to go home with  hospice  Not much more to add from infectious disease standpoint-we will sign off at this time-please call with any questions.    Kat Luz, APRN  03/11/22  12:38 EST    Note is dictated utilizing voice recognition software/Dragon

## 2022-03-11 NOTE — PROGRESS NOTES
LOS: 3 days   Patient Care Team:  Uri Cortes MD as PCP - General  Andre Veliz MD as Consulting Physician (Nephrology)  Anderson Galvez MD as Consulting Physician (Cardiology)  Ajit Quinones MD as Surgeon (Cardiothoracic Surgery)  Cristina Wolfe MD as Consulting Physician (Sleep Medicine)  Juan Vega PA as Physician Assistant (Physician Assistant)  Eddie Loredo MD as Consulting Physician (Hematology and Oncology)      Subjective     Interval History:     Subjective: Patient is more awake on exam today.  His wife assists with history.  He tolerated tube feeds well yesterday.  He did have some increased oxygen demands overnight.      ROS:   History limited, patient unable to communicate well.       Medication Review:     Current Facility-Administered Medications:   •  albuterol (PROVENTIL) nebulizer solution 0.083% 2.5 mg/3mL, 2.5 mg, Nebulization, Q4H PRN, Navneet De León MD  •  ampicillin-sulbactam (UNASYN) 3 g in sodium chloride 0.9 % 100 mL IVPB-MBP, 3 g, Intravenous, Q6H, Kat Luz, APRN, Last Rate: 200 mL/hr at 03/08/22 2327, 3 g at 03/11/22 1025  •  budesonide (PULMICORT) nebulizer solution 0.5 mg, 0.5 mg, Nebulization, BID - RT, Navneet De León MD, 0.5 mg at 03/11/22 0841  •  calcium gluconate 1g/50ml 0.675% NaCl IV SOLN, 1 g, Intravenous, PRN **AND** calcium gluconate 2-0.675 GM/100ML NACL IVPB, 2 g, Intravenous, PRN **AND** Calcium, Ionized, , , PRN, Navneet De León MD  •  escitalopram (LEXAPRO) tablet 5 mg, 5 mg, Per PEG Tube, Daily, Navneet De León MD, 5 mg at 03/10/22 2138  •  folic acid (FOLVITE) tablet 400 mcg, 400 mcg, Per PEG Tube, Daily, Navneet De León MD, 400 mcg at 03/10/22 2138  •  gabapentin (NEURONTIN) capsule 100 mg, 100 mg, Per PEG Tube, BID, Navneet De León MD, 100 mg at 03/10/22 2132  •  ipratropium-albuterol (DUO-NEB) nebulizer solution 3 mL, 3 mL, Nebulization, Q4H PRN, Massiel Washburn APRN,  3 mL at 03/11/22 0828  •  lansoprazole (FIRST) oral suspension 30 mg, 30 mg, Per PEG Tube, QAM, Navneet De León MD  •  Magnesium Sulfate 2 gram Bolus, followed by 8 gram infusion (total Mg dose 10 grams)- Mg less than or equal to 1mg/dL, 2 g, Intravenous, PRN **OR** Magnesium Sulfate 2 gram / 50mL Infusion (GIVE X 3 BAGS TO EQUAL 6GM TOTAL DOSE) - Mg 1.1 - 1.5 mg/dl, 2 g, Intravenous, PRN **OR** Magnesium Sulfate 4 gram infusion- Mg 1.6-1.9 mg/dL, 4 g, Intravenous, PRN, Navneet De León MD  •  megestrol acetate (MEGACE) oral suspension 200 mg, 200 mg, Per PEG Tube, TID With Meals, Navneet De León MD, 200 mg at 03/10/22 2138  •  melatonin tablet 5 mg, 5 mg, Per PEG Tube, Nightly PRN, Navneet De León MD, 5 mg at 03/10/22 2204  •  methylPREDNISolone sodium succinate (SOLU-Medrol) injection 40 mg, 40 mg, Intravenous, Q12H, Navneet De León MD, 40 mg at 03/11/22 1026  •  ondansetron (ZOFRAN) tablet 4 mg, 4 mg, Per PEG Tube, Q8H PRN, Navneet De León MD  •  potassium & sodium phosphates (PHOS-NAK) 280-160-250 MG packet - for Phosphorus less than 1.25 mg/dL, 2 packet, Oral, Q6H PRN **OR** potassium & sodium phosphates (PHOS-NAK) 280-160-250 MG packet - for Phosphorus 1.25 - 2.5 mg/dL, 2 packet, Oral, Q6H PRN, Navneet De León MD  •  potassium chloride (K-DUR,KLOR-CON) CR tablet 40 mEq, 40 mEq, Oral, PRN, Navneet De León MD  •  potassium chloride (KLOR-CON) packet 40 mEq, 40 mEq, Oral, PRN, Navneet De León MD  •  promethazine (PHENERGAN) tablet 25 mg, 25 mg, Per PEG Tube, Q4H PRN, Navneet De León MD  •  sodium chloride 0.9 % flush 10 mL, 10 mL, Intravenous, PRN, Emergency, Triage Protocol, MD, 10 mL at 03/11/22 1026      Objective     Vital Signs  Vitals:    03/11/22 0834 03/11/22 0841 03/11/22 0847 03/11/22 1200   BP:    117/70   BP Location:    Left arm   Patient Position:    Lying   Pulse: 81 82 93 76   Resp: (!) 29 28 (!) 29 19   Temp:    97.6 °F (36.4 °C)   TempSrc:    Axillary   SpO2: 96% 96% 99% 94%    Weight:       Height:           Physical Exam:     General Appearance:    Awake, in no acute distress   Head:    Normocephalic, without obvious abnormality   Eyes:          Conjunctivae normal, anicteric sclera   Throat:   No oral lesions, no thrush, oral mucosa moist   Neck:   No adenopathy, supple, no JVD   Lungs:     respirations regular, dyspnea, on 10 L nasal cannula   Abdomen:     Soft, non-tender, no rebound or guarding, non-distended, no hepatosplenomegaly   Rectal:     Deferred   Extremities:   No edema, no cyanosis   Skin:   No bruising or rash, no jaundice        Results Review:    CBC    Results from last 7 days   Lab Units 03/11/22  0332 03/10/22  0854 03/09/22  0322 03/08/22  0224 03/07/22  2107   WBC 10*3/mm3 15.40* 12.30* 15.10* 25.10* 29.70*   HEMOGLOBIN g/dL 12.9* 11.8* 12.7* 15.9 15.2   PLATELETS 10*3/mm3 141 129* 198 164 202     CMP   Results from last 7 days   Lab Units 03/10/22  2222 03/10/22  0854 03/09/22  0322 03/08/22 0224 03/07/22  2107   SODIUM mmol/L 137 136 135* 132* 129*   POTASSIUM mmol/L 3.8 4.2 4.6 5.5* 5.2   CHLORIDE mmol/L 105 106 101 98 93*   CO2 mmol/L 24.0 22.0 24.0 22.0 25.0   BUN mg/dL 18 22 29* 42* 41*   CREATININE mg/dL 0.50* 0.55* 0.69* 0.78 0.87   GLUCOSE mg/dL 127* 140* 106* 123* 141*   ALBUMIN g/dL 2.00* 2.10* 2.10* 2.00* 2.20*   BILIRUBIN mg/dL 0.5 0.5 0.6 0.9 0.8   ALK PHOS U/L 111 114 124* 161* 168*   AST (SGOT) U/L 67* 57* 37 32 32   ALT (SGPT) U/L 152* 122* 78* 83* 84*   PHOSPHORUS mg/dL 1.4*  --   --   --   --      Cr Clearance Estimated Creatinine Clearance: 77.4 mL/min (A) (by C-G formula based on SCr of 0.5 mg/dL (L)).  Coag   Results from last 7 days   Lab Units 03/07/22  2107   INR  1.05   APTT seconds 26.4     HbA1C   Lab Results   Component Value Date    HGBA1C 4.90 03/29/2021     Blood Glucose   Glucose   Date/Time Value Ref Range Status   03/11/2022 1200 123 (H) 70 - 105 mg/dL Final     Comment:     Serial Number: [id number]:  [id number]    03/11/2022 0620 118 (H) 70 - 105 mg/dL Final     Comment:     Serial Number: [id number]:  [id number]   03/11/2022 0020 138 (H) 70 - 105 mg/dL Final     Comment:     Serial Number: [id number]:  [id number]   03/10/2022 1929 137 (H) 70 - 105 mg/dL Final     Comment:     Serial Number: [id number]:  [id number]   03/10/2022 1737 144 (H) 70 - 105 mg/dL Final     Comment:     Serial Number: [id number]:  [id number]   03/10/2022 1124 140 (H) 70 - 105 mg/dL Final     Comment:     Serial Number: [id number]:  [id number]   03/10/2022 0812 131 (H) 70 - 105 mg/dL Final     Comment:     Serial Number: [id number]:  [id number]   03/10/2022 0601 133 (H) 70 - 105 mg/dL Final     Comment:     Serial Number: [id number]:  [id number]     Infection   Results from last 7 days   Lab Units 03/09/22  0547 03/08/22  0224 03/07/22  2122 03/07/22  2107   BLOODCX   --   --  No growth at 3 days No growth at 3 days   RESPCX  Light growth (2+) Normal respiratory seb. No S. aureus or Pseudomonas aeruginosa detected. Final report.  --   --   --    PROCALCITONIN ng/mL  --  0.31*  --   --      UA      Radiology(recent) XR Chest 1 View    Result Date: 3/10/2022  1. The right hilum is prominent in size most likely representing underlying adenopathy or a right hilar mass. 2. Significant elevation the right hemidiaphragm, essentially unchanged. 3. Airspace disease in the right upper lobe and right midlung field likely representing pneumonia. This has progressed in the interim. Slot 63 Electronically signed by:  Sotero Esposito M.D.  3/10/2022 9:31 PM    XR Abdomen KUB    Result Date: 3/10/2022  Dobbhoff tube tip projects to the level of the proximal gastric body.  Electronically Signed By-Monie Tiwari MD On:3/10/2022 2:32 PM This report was finalized on [id number] by  Monie Tiwari MD.         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  He has a lung mass that is infiltrating his left mainstem.  GI has been consulted for possible PEG placement.  This was deferred due to mental status as well as awaiting establishment of treatment goals.  Dobbhoff was placed yesterday and patient was tolerating tube feeds well.  Patient is more awake today on exam, however, his communication is limited.  Can consider PEG if palliative radiation is planned and if respiratory status improves.  Continue tube feeds via Dobbhoff as tolerated.  Continue supportive care.    Electronically signed by MARGARITA Baron, 03/11/22, 12:09 PM EST.

## 2022-03-11 NOTE — THERAPY TREATMENT NOTE
Acute Care - Speech Language Pathology Treatment Note     Marcos     Patient Name: Navneet Lind  : 1945  MRN: [id number]    Today's Date: 3/11/2022                   Admit Date: 3/7/2022       Visit Dx:      ICD-10-CM ICD-9-CM   1. Sepsis, due to unspecified organism, unspecified whether acute organ dysfunction present ([facility])  A41.9 038.9     995.91   2. Pneumonia due to infectious organism, unspecified laterality, unspecified part of lung  J18.9 486   3. Hemoptysis  R04.2 786.30   4. Leukocytosis, unspecified type  D72.829 288.60   5. ILD (interstitial lung disease) ([facility])  J84.9 515       Patient Active Problem List   Diagnosis   • Presence of cardiac pacemaker   • Bundle branch block, right   • Coronary artery disease involving native coronary artery of native heart without angina pectoris   • Shortness of breath   • Heart murmur   • Mixed hyperlipidemia   • Primary hypertension   • Impotence of organic origin   • Premature ventricular contractions   • NATALIA on CPAP   • Preop cardiovascular exam   • Anemia of chronic disease   • H/O radical nephrectomy, left   • Pre-op examination   • Abnormal myocardial perfusion study   • Chronic diastolic congestive heart failure ([facility])   • S/P TAVR (transcatheter aortic valve replacement)   • S/P kyphoplasty   • Bladder carcinoma ([facility])   • ILD (interstitial lung disease) ([facility])   • Hemoptysis   • Sepsis without septic shock ([facility])   • Pneumonia due to infectious organism   • Severe malnutrition (CMS/HCC)       Past Medical History:   Diagnosis Date   • Aortic stenosis 2015    Per Echo 2017   • Benign essential HTN    • Bundle branch block, right 2013   • CAD (coronary artery disease), native coronary artery    • Cancer ([facility])     bladder- chemo, new left renal mass   • Chronic kidney disease    • Coronary artery disease involving native coronary artery of native heart without angina pectoris 2019    CABG ; SVG to RCA is occluded, LIMA to LAD, SVG to  diag of LAD, SVG to marginal of LCX   • COVID-19 vaccine administered    • Essential hypertension 11/19/2019   • Heart murmur    • History of transfusion    • Hyperlipidemia, mixed    • ILD (interstitial lung disease) ([facility]) 11/1/2021   • Injury of back    • Mixed hyperlipidemia 11/19/2019   • Near syncope    • NATALIA (obstructive sleep apnea)     AHI 11.6/h, CPAP   • Premature ventricular contractions 2/11/2014   • Presence of cardiac pacemaker 7/5/2019    BS dual chamber PM 11/7/2016   • Shortness of breath    • SSS (sick sinus syndrome) ([facility]) 7/5/2019       Past Surgical History:   Procedure Laterality Date   • AORTIC VALVE REPAIR/REPLACEMENT N/A 3/30/2021    Procedure: TTE TRANSFEMORAL TRANSCATHETER AORTIC VALVE REPLACEMENT PERCUTANEOUS APPROACH;  Surgeon: Hang Martinez MD;  Location: [facility] OR 18/19;  Service: Cardiothoracic;  Laterality: N/A;   • AORTIC VALVE REPAIR/REPLACEMENT N/A 3/30/2021    Procedure: Transfemoral Transcatheter Aortic Valve Replacement w/Intra Op TTE and Possible Open Rescue Surgery;  Surgeon: Anderson Galvez MD;  Location: [facility] OR 18/19;  Service: Cardiovascular;  Laterality: N/A;   • BRONCHOSCOPY N/A 2/23/2022    Procedure: BRONCHOSCOPY WITH BRONCHOALVEOLAR LAVAGE AND BIOPSY X1 AREA;  Surgeon: Ronny Jiang MD;  Location: [facility] ENDOSCOPY;  Service: Pulmonary;  Laterality: N/A;  blood clot in lung, hemoptysis   • BRONCHOSCOPY N/A 3/9/2022    Procedure: BRONCHOSCOPY WITH ARGON PLASMA COAGULATION OF LUNG MASS;  Surgeon: Te Thorne MD;  Location: [facility] ENDOSCOPY;  Service: Pulmonary;  Laterality: N/A;  Post: LUNG MASS AND HEMOPTYSIS   • CARDIAC CATHETERIZATION  2018   • CARDIAC CATHETERIZATION N/A 3/22/2021    Procedure: Left Heart Cath;  Surgeon: ADRIANO Erazo MD;  Location: [facility] CATH INVASIVE LOCATION;  Service: Cardiovascular;  Laterality: N/A;   • CARDIAC CATHETERIZATION N/A 6/22/2021    Procedure: RENAL ANGIOGRAPHY;  Surgeon: Jesus Bhagat  MD Chaitanya;  Location: [facility] CATH INVASIVE LOCATION;  Service: Cardiovascular;  Laterality: N/A;   • CARDIAC CATHETERIZATION N/A 6/22/2021    Procedure: Stent BMS peripheral;  Surgeon: Jesus Bhagat MD;  Location: [facility] CATH INVASIVE LOCATION;  Service: Cardiovascular;  Laterality: N/A;   rt renal   • CARDIAC CATHETERIZATION N/A 6/22/2021    Procedure: Angioplasty-peripheral;  Surgeon: Jesus Bhagat MD;  Location: [facility] CATH INVASIVE LOCATION;  Service: Cardiovascular;  Laterality: N/A;   rt renal   • CARDIOVASCULAR STRESS TEST  2017   • CORONARY ARTERY BYPASS GRAFT  1995   • ECHO - CONVERTED  2017   • NEPHROURETERECTOMY Left 11/2/2020    Procedure: NEPHROURETERECTOMY;  Surgeon: Rogers Bae MD;  Location: [facility] MAIN OR;  Service: Urology;  Laterality: Left;   • PACEMAKER IMPLANTATION  2016    bs   • PORTACATH PLACEMENT         Skilled ST intervention conducted this date targeting dysphagia.  Pt alert and amenable to treatment. The patient denied pain. Pt on 7 - 10 liters/min via High Flow nasal cannula during session. Pt currently prescribed a Other   , NPO diet. Patient was not wearing a face mask during this therapy encounter. Therapist used appropriate personal protective equipment including mask, eye protection and gloves.  Mask used was standard procedure mask. Appropriate PPE was worn during the entire therapy session. Hand hygiene was completed before and after therapy session. Patient is not in enhanced droplet precautions.       Treatment narrative/results: See information below      SLP Recommendation and Plan:    ST recommends that the patient remain NPO and continue with alternative source of nutrition at this time.  Patient is at high risk of aspiration as evidenced by difficulties today, and inability to elicit a swallow.  ST will continue to follow with re-evaluation as condition improves.       EDUCATION    The patient and his wife have been educated in the following  "areas:       NPO rationale.  Discussed results/recommendations of session with the patient and his wife  She expressed understanding and was in agreement  Also d/w nursing staff.              SLP GOALS     Row Name 03/11/22 1300          Oral Nutrition/Hydration Goal 1, SLP The pt will participate in an ongoing assessment of swallow including re-evaluation cliically and/or including instrumental assessment of swallow, if indicated, to further assess sswallow fuction in anticipation to initiate a PO diet.  -SM    Time Frame (Oral Nutrition/Hydration Goal 1, SLP) short term goal (STG)  -SM    Barriers (Oral Nutrition/Hydration Goal 1, SLP) The patient was seen bedside today for re-evaluation of swallow. He was awake, alert and cooperative. Wife present for session. He was able to participate in an oral motor assessment. Generalized oral motor weakness noted. He is able to protrude, lateralize and elevate tongue upon request. Labial protrusion/retraction weak/reduced bilaterally. He was able to answer simple questions today. The patient remains NPO with DHT in place for nutrition. Oral cavity noted to be very dry. Pt was amenable to oral care. One single, small ice chip given. PT able to orally manipulate appropriately. No anterior labial spillage noted. No observable swallow noted. No laryngeal movement/elevation evident. Pt began to exhibit much coughing/\"wet\" respirations. 02 sats between 92-95% for duration of session. No other trials administered due to severity of these results. Lengthy discussion held with wife and nursing staff.  -SM    Progress/Outcomes (Oral Nutrition/Hydration Goal 1, SLP) goal ongoing;progress slower than expected  -SM               Oral Nutrition/Hydration Goal 2, SLP The patient will maximize swallow function for least restricitve PO diet, exhibiting no complications associated with dysphagia, adequate PO intake, and demonstrating independent use of swallow compensations.  -SM    Time " Frame (Oral Nutrition/Hydration Goal 2, SLP) by discharge  -SM    Barriers (Oral Nutrition/Hydration Goal 2, SLP) See above  -SM    Progress/Outcomes (Oral Nutrition/Hydration Goal 2, SLP) progress slower than expected  -SM               Oral Nutrition/Hydration Goal, SLP The patient will establish a po diet.  -SM    Time Frame (Oral Nutrition/Hydration Goal, SLP) short term goal (STG)  -SM    Barriers (Oral Nutrition/Hydration Goal, SLP) ST unable to recommend a po diet at this time. Recommend that he remain NPO and continue with alternative means of nutrition. Will continue to follow as per plan of care with additional goals/recommendations to foll  -SM    Progress/Outcomes (Oral Nutrition/Hydration Goal, SLP) goal revised this date  -SM          User Key  (r) = Recorded By, (t) = Taken By, (c) = Cosigned By    Initials Name Provider Type    Martha Luna, SLP Speech and Language Pathologist                            Time Calculation:                                  MILE Collins  3/11/2022

## 2022-03-11 NOTE — DISCHARGE SUMMARY
[facility] Medicine Services  DISCHARGE SUMMARY    Patient Name: Navneet Lind  : 1945  MRN: [id number]    Date of Admission: 3/7/2022  Discharge Diagnosis: Bladder carcinoma with mets to multiple organs.  Date of Discharge: 3/11/2022  Primary Care Physician: Uri Cortes MD      Presenting Problem:   Hemoptysis [R04.2]  Leukocytosis, unspecified type [D72.829]  Pneumonia due to infectious organism, unspecified laterality, unspecified part of lung [J18.9]  Sepsis, due to unspecified organism, unspecified whether acute organ dysfunction present (HCC) [A41.9]    Active and Resolved Hospital Problems:  Active Hospital Problems    Diagnosis POA   • **Hemoptysis [R04.2] Yes     Priority: Medium   • Sepsis without septic shock (HCC) [A41.9] Yes     Priority: High   • Pneumonia due to infectious organism [J18.9] Yes     Priority: High   • ILD (interstitial lung disease) (HCC) [J84.9] Yes     Priority: High   • Bladder carcinoma (HCC) [C67.9] Yes     Priority: Medium   • Severe malnutrition (CMS/HCC) [E43] Yes   • Shortness of breath [R06.02] Yes   • S/P TAVR (transcatheter aortic valve replacement) [Z95.2] Not Applicable   • Chronic diastolic congestive heart failure (HCC) [I50.32] Yes   • H/O radical nephrectomy, left [Z90.5] Not Applicable   • Anemia of chronic disease [D63.8] Yes   • NATALIA on CPAP [G47.33, Z99.89] Not Applicable   • Coronary artery disease involving native coronary artery of native heart without angina pectoris [I25.10] Yes   • Mixed hyperlipidemia [E78.2] Yes   • Primary hypertension [I10] Yes   • Presence of cardiac pacemaker [Z95.0] Yes      Resolved Hospital Problems   No resolved problems to display.         Hospital Course     Hospital Course:  Patient is a 76 y.o. male with multiple medical problems including NATALIA on CPAP, bladder cancer, chronic diastolic CHF, CAD, radical nephrectomy (left). HLD, cardiac pacemaker, s/p TAVR, anemia, RBBB,  recent hospitalization for sepsis pneumonia with hemoptysis and interstitial lung disease who presented to [facility] on 3/7/2022 complaining of 2-day history of increasing shortness of breath, cough and recurring hemoptysis today.  Patient was just discharged from [facility] on March 3, 2022 after admission for pneumonia and interstitial lung disease on February 22, 2022.  Patient is lethargic and appears very weak he nods appropriately to some questions but not all, making it impossible to complete for an accurate HPI at this time.  When asked if he had coughed up a large or small amount of bloody sputum patient nodded yes he did not verbalize the amount.  When patient asked if he wished to remain DNR/DNI he did nod head yes.  Patient asked if he was in pain patient shook her head no.  Patient was seen in the emergency room and vital signs upon arrival to emergency department today blood pressure 135/87, heart rate 111, oxygen saturation 98% room air, respiratory rate 28, patient afebrile with T-max of 97.9.  Initial evaluation in the emergency department included:  Initial ABG showing pH 7.486, PCO2 32.4, PO2 69.2, HCO3 24.4, base excess 1.82, oxygen saturation 95.1%.  Drawn on room air.  Initial troponin negative 0.010,  Initial BNP 1109.  Notable abnormal labs: Glucose 141, sodium 129, BUN 41, BUN creatinine ratio 47.1, alkaline phosphatase 168 ALT 84, albumin 2.20, WBCs 29.7, 85.1% neutrophils,  Blood cultures drawn with results pending  Respiratory panel collected negative for COVID-19 and other viral infections.  X-ray completed showing chronic interstitial opacities with no significant change from previous exam. left chest pacemaker noted in the right hemidiaphragm is noted  EKG completed shows sinus tachycardia RBBB, LP FB heart rate 118  While in the emergency department patient was administered 2 g cefepime, and 1250 mg  of vancomycin.  Patient will be admitted to hospitalist group  for further evaluation and treatment of sepsis, hemoptysis, interstitial lung disease, diarrhea along with other acute and/or chronic conditions.  Infectious disease consult was completed.  Pneumonia was treated with antibiotics.  COPD was treated with Symbicort.  Peripheral neuropathy was treated with gabapentin.  Pulmonary consult was completed.  Interstitial lung disease was treated with antibiotics.  Sepsis without septic shock was treated with antibiotics.  GI consult was completed because of dysphagia.  Appropriate patient's home medications were resumed in the hospital for other chronic medical conditions.   Shortness of breath was treated with oxygen therapy.  Acute hypoxemic respiratory failure was treated with oxygen therapy.  Oncology consult was completed.  Recurrent hemoptysis was treated with supportive care.  Patient and his family decided on hospice because of poor prognosis.  Hospice consult was completed and patient was discharged home with hospice.      DISCHARGE Follow Up Recommendations for labs and diagnostics:     Patient was advised to follow-up with his primary care physician who will review his current medications.    Patient was advised to follow-up with the hospice physician upon arrival at home.      Reasons For Change In Medications and Indications for New Medications:      Day of Discharge     Vital Signs:  Temp:  [94.5 °F (34.7 °C)-98.4 °F (36.9 °C)] 96.6 °F (35.9 °C)  Heart Rate:  [] 105  Resp:  [19-31] 27  BP: (111-148)/(60-80) 128/75  Flow (L/min):  [4-10] 10    Physical Exam:  Physical Exam  Vitals and nursing note reviewed.   Constitutional:       General: He is not in acute distress.     Appearance: He is ill-appearing.      Comments: Patient appears chronically ill.   HENT:      Head: Normocephalic.      Nose: Nose normal.      Mouth/Throat:      Mouth: Mucous membranes are dry.      Pharynx: Oropharynx is clear.   Eyes:      Extraocular Movements: Extraocular movements  intact.      Conjunctiva/sclera: Conjunctivae normal.      Pupils: Pupils are equal, round, and reactive to light.   Cardiovascular:      Pulses: Normal pulses.      Heart sounds: No murmur heard.    No friction rub. No gallop.      Comments: S1 and S2 present.  No tachycardia.  Pulmonary:      Breath sounds: No stridor. No wheezing or rales.      Comments: Decreased air entry bilaterally.  Chest:      Chest wall: No tenderness.   Abdominal:      General: Bowel sounds are normal. There is no distension.      Palpations: Abdomen is soft.      Tenderness: There is no abdominal tenderness. There is no right CVA tenderness or guarding.   Musculoskeletal:         General: No swelling, tenderness, deformity or signs of injury.      Cervical back: Normal range of motion. No rigidity.      Right lower leg: No edema.      Left lower leg: No edema.   Skin:     General: Skin is warm and dry.      Capillary Refill: Capillary refill takes less than 2 seconds.      Coloration: Skin is not jaundiced.      Findings: No bruising, erythema, lesion or rash.   Neurological:      Comments: No facial asymmetry noted.  Gait and station not tested.   Psychiatric:      Comments: No agitation.              Pertinent  and/or Most Recent Results     LAB RESULTS:      Lab 03/11/22  0332 03/10/22  0854 03/09/22  0322 03/08/22  0224 03/07/22  2110 03/07/22  2107   WBC 15.40* 12.30* 15.10* 25.10*  --  29.70*   HEMOGLOBIN 12.9* 11.8* 12.7* 15.9  --  15.2   HEMATOCRIT 38.4 36.1* 37.8 47.6  --  46.4   PLATELETS 141 129* 198 164  --  202   NEUTROS ABS 14.50* 11.30* 14.10* 21.10*  --  25.30*   LYMPHS ABS 0.40* 0.60* 0.70 2.60  --  2.70   MONOS ABS 0.40 0.40 0.30 1.40*  --  1.60*   EOS ABS 0.00 0.00 0.00 0.00  --  0.00   MCV 94.1 93.8 92.4 93.7  --  92.3   PROCALCITONIN  --   --   --  0.31*  --   --    LACTATE  --   --   --   --  2.0  --    PROTIME  --   --   --   --   --  11.6   APTT  --   --   --   --   --  26.4         [facility] 03/11/22  1223  03/10/22  2222 03/10/22  0854 03/09/22 0322 03/08/22 0224   SODIUM 140 137 136 135* 132*   POTASSIUM 3.9 3.8 4.2 4.6 5.5*   CHLORIDE 104 105 106 101 98   CO2 27.0 24.0 22.0 24.0 22.0   ANION GAP 9.0 8.0 8.0 10.0 12.0   BUN 21 18 22 29* 42*   CREATININE 0.60* 0.50* 0.55* 0.69* 0.78   EGFR 100.7 106.4 103.3 96.5 93.0   GLUCOSE 125* 127* 140* 106* 123*   CALCIUM 7.8* 7.7* 7.9* 8.3* 9.5   PHOSPHORUS 3.3 1.4*  --   --   --          Lab 03/11/22  1229 03/10/22  2222 03/10/22  0854 03/09/22 0322 03/08/22 0224   TOTAL PROTEIN 5.7* 5.0* 5.1* 5.4* 6.3   ALBUMIN 2.30* 2.00* 2.10* 2.10* 2.00*   GLOBULIN 3.4 3.0 3.0 3.3 4.3   ALT (SGPT) 220* 152* 122* 78* 83*   AST (SGOT) 84* 67* 57* 37 32   BILIRUBIN 0.5 0.5 0.5 0.6 0.9   ALK PHOS 130* 111 114 124* 161*         Lab 03/10/22  2222 03/07/22  2107   PROBNP 1,251.0 1,109.0   TROPONIN T  --  <0.010   PROTIME  --  11.6   INR  --  1.05                 Lab 03/10/22  2247 03/07/22  2134   PH, ARTERIAL 7.419 7.486*   PCO2, ARTERIAL 37.1 32.4*   PO2 ART 52.7* 69.2*   O2 SATURATION ART 87.6* 95.1   FIO2 48 <21   HCO3 ART 24.0 24.4   BASE EXCESS ART -0.3* 1.8     Brief Urine Lab Results  (Last result in the past 365 days)      Color   Clarity   Blood   Leuk Est   Nitrite   Protein   CREAT   Urine HCG        12/30/21 0205 Yellow   Clear   Negative   Negative   Negative   Negative               Microbiology Results (last 10 days)     Procedure Component Value - Date/Time    Respiratory Culture - Sputum, Cough [[id number]] Collected: 03/09/22 0547    Lab Status: Final result Specimen: Sputum from Cough Updated: 03/11/22 1010     Respiratory Culture Light growth (2+) Normal respiratory seb. No S. aureus or Pseudomonas aeruginosa detected. Final report.     Gram Stain Many (4+) WBCs per low power field      Few (2+) Mixed bacterial morphotypes seen on Gram Stain      Rare (1+) Yeast      Rare (1+) Epithelial cells per low power field    MRSA Screen, PCR (Inpatient) - Swab, Nares [[id number]]   (Normal) Collected: 03/08/22 1627    Lab Status: Final result Specimen: Swab from Nares Updated: 03/08/22 1915     MRSA PCR No MRSA Detected    Blood Culture - Blood, Chest, Right [[id number]]  (Normal) Collected: 03/07/22 2122    Lab Status: Preliminary result Specimen: Blood from Chest, Right Updated: 03/10/22 2131     Blood Culture No growth at 3 days    Respiratory Panel PCR w/COVID-19(SARS-CoV-2) SHANE/SHAI/FELECIA/PAD/COR/MAD/VALERIA In-House, NP Swab in UTM/VTM, 3-4 HR TAT - Swab, Nasopharynx [[id number]]  (Normal) Collected: 03/07/22 2122    Lab Status: Final result Specimen: Swab from Nasopharynx Updated: 03/07/22 2217     ADENOVIRUS, PCR Not Detected     Coronavirus 229E Not Detected     Coronavirus HKU1 Not Detected     Coronavirus NL63 Not Detected     Coronavirus OC43 Not Detected     COVID19 Not Detected     Human Metapneumovirus Not Detected     Human Rhinovirus/Enterovirus Not Detected     Influenza A PCR Not Detected     Influenza B PCR Not Detected     Parainfluenza Virus 1 Not Detected     Parainfluenza Virus 2 Not Detected     Parainfluenza Virus 3 Not Detected     Parainfluenza Virus 4 Not Detected     RSV, PCR Not Detected     Bordetella pertussis pcr Not Detected     Bordetella parapertussis PCR Not Detected     Chlamydophila pneumoniae PCR Not Detected     Mycoplasma pneumo by PCR Not Detected    Narrative:      In the setting of a positive respiratory panel with a viral infection PLUS a negative procalcitonin without other underlying concern for bacterial infection, consider observing off antibiotics or discontinuation of antibiotics and continue supportive care. If the respiratory panel is positive for atypical bacterial infection (Bordetella pertussis, Chlamydophila pneumoniae, or Mycoplasma pneumoniae), consider antibiotic de-escalation to target atypical bacterial infection.    Blood Culture - Blood, Chest, Right [[id number]]  (Normal) Collected: 03/07/22 2107    Lab Status: Preliminary result  Specimen: Blood from Chest, Right Updated: 03/10/22 2117     Blood Culture No growth at 3 days          CT Head With & Without Contrast    Result Date: 3/2/2022  Impression: 1. Stable exam, with no acute process demonstrated in the brain. 2. There is stable ventricular dilatation, favored to be due to atrophy. There is minor chronic White matter abnormality and old right thalamic lacunar infarct.  Electronically Signed By-Mayi Lindsay MD On:3/2/2022 10:30 PM This report was finalized on [id number] by  Mayi Lindsay MD.    CT Chest Without Contrast Diagnostic    Result Date: 3/8/2022  Impression:  1. Mediastinal AP window soft tissue mass appears to infiltrate the left mainstem bronchus and mildly indents the distal thoracic trachea. It has enlarged since the prior examination. It could represent primary lung malignancy or metastatic disease of unknown primary origin. 2. Prevascular mediastinal adenopathy has increased compared to 11/10/2021. 3. There is opacification and partial obstruction of the bronchus intermedius, right middle lobe and right lower lobe bronchi with bronchial wall thickening. There is partial atelectasis versus developing pneumonia within the right middle lobe and right lower lobe. 4. A 5.3 cm mass is seen within the hepatic dome, consistent with metastatic disease. 5. 2.3 cm right adrenal nodule consistent with adrenal metastasis. 6. Additional CT findings include CABG, TAVR, chronic interstitial pulmonary fibrosis, left nephrectomy, nonobstructing right renal stone, cholecystectomy, old bilateral rib fractures, old T7 and L1 compression fractures.    Electronically Signed By-Monie Tiwari MD On:3/8/2022 9:33 AM This report was finalized on [id number] by  Monie Tiwari MD.    XR Chest 1 View    Result Date: 3/10/2022  Impression: 1. The right hilum is prominent in size most likely representing underlying adenopathy or a right hilar mass. 2. Significant elevation the right  hemidiaphragm, essentially unchanged. 3. Airspace disease in the right upper lobe and right midlung field likely representing pneumonia. This has progressed in the interim. Slot 63 Electronically signed by:  Sotero Esposito M.D.  3/10/2022 9:31 PM    XR Chest 1 View    Result Date: 3/7/2022  Impression:  Negative portable chest x-ray. No evidence of acute cardiopulmonary disease.  Electronically Signed By-Tylor Lucio On:3/7/2022 9:30 PM This report was finalized on [id number] by  Tylor Lucio, .    XR Chest 1 View    Result Date: 2/28/2022  Impression: Continued bilateral probably interstitial infiltrates. No significant change since the last study.  Electronically Signed By-Eric Ramon MD On:2/28/2022 1:08 PM This report was finalized on [id number] by  Eric Ramon MD.    XR Chest 1 View    Result Date: 2/22/2022  Impression: Continued bilateral predominantly interstitial infiltrates, unchanged from the previous radiograph. Stable elevation of the right hemidiaphragm.  Electronically Signed By-Eric Ramon MD On:2/22/2022 10:20 AM This report was finalized on [id number] by  Eric Ramon MD.    CT Chest Pulmonary Embolism    Result Date: 2/22/2022  Impression:  1. No acute pulmonary embolic disease. 2. Interval increase in size of the prevascular lymph nodes of questionable significance. 3. Pulmonary interstitial fibrosis. Bronchiectasis is also noted. 4. Hepatic steatosis. 5. T7 compression fracture. There has been an interval kyphoplasty procedure with extravasated bone cement into the anterior epidural space. 6. Coronary artery atherosclerotic vascular disease with postsurgical changes. 7. Additional findings as noted above.  Electronically Signed By-Beck Laura MD On:2/22/2022 12:52 PM This report was finalized on [id number] by  Beck Laura MD.    XR Abdomen KUB    Result Date: 3/10/2022  Impression: Dobbhoff tube tip projects to the level of the proximal gastric body.  Electronically Signed By-Monie  MD Karie On:3/10/2022 2:32 PM This report was finalized on [id number] by  Monie Tiwari MD.              Results for orders placed during the hospital encounter of 05/12/21    Adult Transthoracic Echo Complete W/ Cont if Necessary Per Protocol    Interpretation Summary  · Left ventricular wall thickness is consistent with hypertrophy. Sigmoid-shaped ventricular septum is present.  · Calculated left ventricular EF = 66% Estimated left ventricular EF was in agreement with the calculated left ventricular EF. Left ventricular systolic function is normal.  · Left ventricular diastolic function is consistent with (grade I) impaired relaxation.  · There is a TAVR valve present.  · The valve is well-seated. The leaflets appear thin and mobile.  · The maximum gradient across aortic valve is 13.2 mmHg. The mean gradient is 6.5 mmHg. Aortic valve area is 1.7 cm². Normal parameters  · Mild mitral valve regurgitation is present.  · Mild tricuspid valve regurgitation is present.  · Estimated right ventricular systolic pressure from tricuspid regurgitation is normal (<35 mmHg).      Labs Pending at Discharge:  Pending Labs     Order Current Status    Blood Culture - Blood, Chest, Right Preliminary result    Blood Culture - Blood, Chest, Right Preliminary result          Procedures Performed  Procedure(s):  BRONCHOSCOPY WITH ARGON PLASMA COAGULATION OF LUNG MASS  03/09 1017 Note By: Te Thorne MD  03/09 0907 BRONCHOSCOPY      Consults:   Consults     Date and Time Order Name Status Description    3/10/2022 11:38 PM Inpatient Intensivist Consult      3/9/2022  4:38 PM Inpatient Gastroenterology Consult Completed     3/8/2022  3:01 PM Inpatient Infectious Diseases Consult Completed     3/8/2022 12:55 PM Inpatient Radiation Oncology Consult Completed     3/8/2022  7:48 AM Hematology & Oncology Inpatient Consult Completed     3/8/2022  1:51 AM Inpatient Pulmonology Consult      3/7/2022 10:20 PM Hospitalist (on-call MD  unless specified)      3/2/2022  2:58 PM Hematology & Oncology Inpatient Consult Completed     2/22/2022 10:58 AM Pulmonology (on-call MD unless specified) Completed             Discharge Details        Discharge Medications      New Medications      Instructions Start Date   LORazepam 1 MG tablet  Commonly known as: Ativan   Take 0.5 tablet by mouth Every 4 (Four) Hours As Needed for Anxiety      morphine 20 mg/mL solution concentrated solution   Take 1 mL by mouth Every 2 (Two) Hours As Needed for Severe Pain         Continue These Medications      Instructions Start Date   acetaminophen 500 MG tablet  Commonly known as: TYLENOL   1,000 mg, Oral, Every 6 Hours PRN      albuterol sulfate  (90 Base) MCG/ACT inhaler  Commonly known as: PROVENTIL HFA;VENTOLIN HFA;PROAIR HFA   2 puffs, Inhalation, Every 4 Hours PRN      aspirin 81 MG EC tablet   81 mg, Oral, Daily      escitalopram 5 MG tablet  Commonly known as: LEXAPRO   5 mg, Oral, Daily      famotidine 40 MG tablet  Commonly known as: PEPCID   40 mg, Oral, Nightly      folic acid 400 MCG tablet  Commonly known as: FOLVITE   400 mcg, Oral, Daily      gabapentin 100 MG capsule  Commonly known as: NEURONTIN   100 mg, Oral, 2 Times Daily      megestrol 40 MG/ML suspension  Commonly known as: MEGACE   200 mg, Oral, 3 Times Daily With Meals      melatonin 5 MG tablet tablet   5 mg, Oral, Nightly PRN      NON FORMULARY / PATIENT SUPPLIED MEDICATION   100 mg, Oral, Daily, OFEV-Nintedanib       omeprazole 40 MG capsule  Commonly known as: priLOSEC   40 mg, Oral, Daily      ondansetron 4 MG tablet  Commonly known as: ZOFRAN   4 mg, Oral, Every 8 Hours PRN      polyethylene glycol 17 g packet  Commonly known as: MIRALAX   17 g, Oral, Daily PRN      promethazine 25 MG tablet  Commonly known as: PHENERGAN   25 mg, Oral, Every 4 Hours PRN      Symbicort 160-4.5 MCG/ACT inhaler  Generic drug: budesonide-formoterol   2 puffs, Inhalation, 2 Times Daily      Vitamin D3 50 MCG  (2000 UT) tablet   2,000 Units, Oral, Daily             No Known Allergies      Discharge Disposition: Stable.  Hospice/Home    Diet:  Hospital:No active diet order        Discharge Activity: As tolerated.        CODE STATUS:  Code Status and Medical Interventions:   Ordered at: 03/08/22 0257     Medical Intervention Limits:    NO intubation (DNI)     Code Status (Patient has no pulse and is not breathing):    No CPR (Do Not Attempt to Resuscitate)     Medical Interventions (Patient has pulse or is breathing):    Limited Support         Future Appointments   Date Time Provider Department Center   3/14/2022 To Be Determined Riri Clay, [facility]   3/30/2022  1:10 PM Te Thorne MD [facility] None   4/6/2022 10:45 AM Anderson Galvez MD [facility] NA [facility]   4/6/2022 11:30 AM [facility] ECHO 1/OUTPATIENT [facility]   6/21/2022  9:15 AM Cristina Wolfe MD [facility]           Time spent on Discharge including face to face service: 40 minutes    This patient has been examined wearing appropriate Personal Protective Equipment and discussed with hospital infection control department, [facility], infectious disease specialist and pulmonologist. 03/11/22      Signature: Electronically signed by Navneet De León MD, FACP, 03/11/22, 5:20 PM EST.

## 2022-03-11 NOTE — CASE MANAGEMENT/SOCIAL WORK
Continued Stay Note  ISIDRO Rodríguez     Patient Name: Navneet Lind  MRN: [id number]  Today's Date: 3/11/2022    Admit Date: 3/7/2022     Discharge Plan     Row Name 03/11/22 0943       Plan    Plan DC Plan:Pending clinical course. Pt from home with spouse with [facility] [place], will need ONEIDA order if pt wants to resume    Plan Comments CM met with patient and spouse at bedside to follow up on their conversation with [facility], Bridget. Patient appears to be sleeping so conversation was with his spouse, Clifford. She reports that they are holding the radiation because the patient is too weak. She reported that Bridget, told her they could take the patient at home with the feeding tube if they are not doing the radiation. At this time the spouse says that the plan is to go home with hospice care. In rounds RN reported that patient was up to 10 L HF O2. CM messaged Bridget with [facility] to update her on patient's higher O2 demand.          Met with patient in room wearing PPE: mask/googles    Maintained distance greater than 6 feet and spent less than 15 minutes in the room.    Ching Pinon RN     Office Phone: [phone]  Office Cell: [phone]

## 2022-03-11 NOTE — NURSING NOTE
Pt going home with hospice and comfort care measures. NG tube removed. Pt still on 10L O2. Spouse is at bedside.

## 2022-03-11 NOTE — PROGRESS NOTES
Hematology/Oncology Inpatient Progress Note    PATIENT NAME: Navneet Lind  : 1945  MRN: [id number]    CHIEF COMPLAINT: Metastatic transitional cell renal carcinoma and hemoptysis    HISTORY OF PRESENT ILLNESS:   76 y.o. male admitted to [facility] through the ED on 3/7/2022 where he reported coughing up black bloody sputum since recent discharge.  He denied fever.  He reported sore throat, shortness of air, and diarrhea.  He reported poor oral intake and his wife reported she was unsure if she could bring him back and forth for outpatient office visits due to his declining physical status.  Chest x-ray was negative for acute findings.  CBC revealed WBC 29.7, hemoglobin 15.2, and platelets 202,000.  Creatinine was normal at 0.87 however BUN was elevated to 41 and sodium was low at 129.  Coags were not elevated.  Respiratory viral panel was negative. Pulmonology was consulted with plan for bronchoscopy.  CT chest performed 3/8/2022 showed mediastinal AP window soft tissue mass infiltrating the left mainstem bronchus and mildly indenting the distal thoracic trachea felt enlarged and increasing lymphadenopathy since 2021 exam.  There was opacification and partial obstruction of the bronchus intermedius, RML and RLL bronchi with bronchial wall thickening as well as partial atelectasis versus pneumonia of the RML and RLL lobes.  There was a 5.3 cm liver lesion in the hepatic dome, and a 2.3 cm right adrenal metastatic lesion.     22  Hematology/Oncology was consulted by Dr. De León as the patient is known to our service for metastatic transitional cell renal carcinoma.  He had been diagnosed with stage III disease in 2020 at time of left nephrectomy.  He received adjuvant chemotherapy that was complicated by poor tolerance with cytopenias requiring dose reductions and the patient chose to forego day 8 of cycle 4 in order to undergo heart valve surgery.  Cycle 4-day 1  chemotherapy was given 3/9/2021.  He was also anemic at time of diagnosis November 2020 with iron deficiency and folate deficiencies noted on workup..  In July 2021 CT chest was negative for metastatic disease however 11/10/2021 CT chest, abdomen, and pelvis showed mediastinal lymphadenopathy and a left retroperitoneal lymph node.  At that time he also had a T7 compression fracture with significant pain requiring evaluation for kyphoplasty.  He underwent left periaortic lymph node biopsy on 12/22/2021 revealing metastatic transitional cell carcinoma.  He was hospitalized in late December 2021 with pneumonia followed by discharge to rehab.  He was seen in follow-up by us in January 2022 at which time mutual decision with the patient was made to start pembrolizumab as he was not a candidate for aggressive therapy secondary to poor ECOG status with recent hospitalization and weight loss as well as patient did not want to pursue chemotherapy.  His first dose was given 1/21/2022 and second dose 2/10/2022.  He was evaluated with the infusion visit 2/10/2022 at which time he reported no improvement of ECOG status to date.  · He had been seen by our service during recent hospitalization 2/22/2022-3/3/2022 where he was admitted with hemoptysis that started the morning of admission described as significant in amount covering his shirt and being bright red.  He was on aspirin but no anticoagulation. A CT PE protocol was negative for PE and showed increased prevascular lymphadenopathy as compared with November 2021 outside CT with the largest measuring 2.0 x 1.1 cm.  There was evidence of interstitial fibrosis and bronchiectasis, hepatic steatosis, and T7 compression fracture with evidence of kyphoplasty.  CBC revealed WBC 16.5, hemoglobin 14.8, and platelets 321,000.  Creatinine was 0.87 and LFTs were not elevated.  PT was 11.9 (9.6-11.7) and PTT was 26.9 (24-31).  D-dimer was 3.15 (0-0.59).  Respiratory viral panel and  COVID-19 screen were negative.  Blood cultures were collected and final results were negative.  ANCA panel was negative.  He was started on antibiotics and supportive care. On 2/23/2022 he underwent bronchoscopy by Dr. Jiang with findings of an abnormal lesion in the left mainstem which was biopsied and a blood clot in the left mainstem which was removed prior to BAL.  Pathology on left mainstem lung biopsy revealed metastatic urothelial (transitional) cell carcinoma.  BAL cytology and cultures were negative.  Hemoptysis resolved and post admission hemoglobin had dropped to 11.8 on 2/25/2022 with subsequent improvement.  Our service was consulted on 3/2/2022.  Hemoptysis had resolved.  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  Hepatitis panel performed due to elevation of LFTs was nonreactive.  We had discussed that he had received 2 cycles of pembrolizumab todate and it was too early to know the treatment effects.  We had discussed life expectancy with and without continue treatment and poor prognosis given declining performance status.  Plan was to consider radiation therapy if hemoptysis recurred.  Plan was also to resume outpatient immunotherapy should his performance status improve with consideration of palliative care or hospice.     PCP: Uri Cortes MD    INTERVAL HISTORY:  • 3/9/2022-radiation oncology planning 30 Gy in 10 fractions to AP window/left mainstem area for palliation and control of bleeding.  WBC 15.1, hemoglobin 12.7, platelets 198,000.  • 3/10/2022-platelets 129,000.  Therapeutic bronchoscopy performed by Dr. Thorne 3/9 showed known endobronchial mass in the left mainstem with active bleeding treated with electrocautery.  Estimated blood loss less than 50 mL.  • 3/11/2022-WBC 15.4 hemoglobin 12.9, platelets 141,000.  Dobbhoff was placed 3/10.    History of present illness reviewed since last  visit and changes noted on 03/11/22.    Subjective   Complains of some sore throat and cough.    ROS:  Review of Systems   Constitutional: Negative for activity change, chills, fatigue, fever and unexpected weight change.   HENT: Positive for sore throat and trouble swallowing. Negative for congestion, dental problem, hearing loss, mouth sores, nosebleeds and rhinorrhea.    Eyes: Negative for photophobia and visual disturbance.   Respiratory: Positive for cough. Negative for choking, chest tightness and shortness of breath.    Cardiovascular: Negative for chest pain, palpitations and leg swelling.   Gastrointestinal: Negative for abdominal distention, abdominal pain, blood in stool, constipation, diarrhea, nausea and vomiting.   Endocrine: Negative for cold intolerance and heat intolerance.   Genitourinary: Negative for decreased urine volume, difficulty urinating, dysuria, frequency, hematuria and urgency.   Musculoskeletal: Negative for arthralgias and gait problem.   Skin: Negative for rash and wound.   Neurological: Positive for weakness. Negative for dizziness, tremors, light-headedness, numbness and headaches.   Hematological: Negative for adenopathy. Does not bruise/bleed easily.   Psychiatric/Behavioral: Negative for confusion and hallucinations. The patient is not nervous/anxious.    All other systems reviewed and are negative.       MEDICATIONS:    Scheduled Meds:  ampicillin-sulbactam, 3 g, Intravenous, Q6H  budesonide-formoterol, 2 puff, Inhalation, BID  escitalopram, 5 mg, Per PEG Tube, Daily  folic acid, 400 mcg, Per PEG Tube, Daily  gabapentin, 100 mg, Per PEG Tube, BID  lansoprazole, 30 mg, Per PEG Tube, QAM  megestrol acetate, 200 mg, Per PEG Tube, TID With Meals  methylPREDNISolone sodium succinate, 40 mg, Intravenous, Q12H       Continuous Infusions:      PRN Meds:  •  albuterol  •  ipratropium-albuterol  •  melatonin  •  ondansetron  •  promethazine  •  sodium chloride     ALLERGIES:  No Known  "Allergies    Objective    VITALS:   /80 (BP Location: Right arm, Patient Position: Lying)   Pulse 92   Temp 98.4 °F (36.9 °C) (Axillary)   Resp 26   Ht 172.7 cm (67.99\")   Wt 68.6 kg (151 lb 3.8 oz)   SpO2 100%   BMI 23.00 kg/m²     PHYSICAL EXAM:  Physical Exam  Vitals and nursing note reviewed.   Constitutional:       General: He is not in acute distress.     Appearance: Normal appearance. He is well-developed. He is ill-appearing (Chronically ill appearing). He is not diaphoretic.   HENT:      Head: Normocephalic and atraumatic.      Comments: Alopecia     Right Ear: External ear normal.      Left Ear: External ear normal.      Nose: Nose normal.      Comments: NG tube     Mouth/Throat:      Mouth: Mucous membranes are moist.      Pharynx: Oropharynx is clear. No oropharyngeal exudate or posterior oropharyngeal erythema.      Comments: Dental fillings  Eyes:      General: No scleral icterus.     Extraocular Movements: Extraocular movements intact.      Conjunctiva/sclera: Conjunctivae normal.      Pupils: Pupils are equal, round, and reactive to light.   Cardiovascular:      Rate and Rhythm: Normal rate and regular rhythm.      Heart sounds: Normal heart sounds. No murmur heard.     Comments: Midline vertical chest wall scar.  Monitor leads.  Left chest wall pacemaker.  Right chest wall [id number].  Pulmonary:      Effort: Pulmonary effort is normal. No respiratory distress.      Breath sounds: Normal breath sounds. No stridor. No wheezing, rhonchi or rales.   Chest:   Breasts:      Right: No supraclavicular adenopathy.      Left: No supraclavicular adenopathy.       Abdominal:      General: Bowel sounds are normal. There is no distension.      Palpations: Abdomen is soft. There is no mass.      Tenderness: There is no abdominal tenderness. There is no guarding.   Genitourinary:     Comments: Deferred   Musculoskeletal:         General: No swelling, tenderness or deformity. Normal range of motion. "      Cervical back: Normal range of motion and neck supple.      Right lower leg: No edema.      Left lower leg: No edema.      Comments: Left hand O2 monitor.   Lymphadenopathy:      Cervical: No cervical adenopathy.      Upper Body:      Right upper body: No supraclavicular adenopathy.      Left upper body: No supraclavicular adenopathy.   Skin:     General: Skin is warm and dry.      Coloration: Skin is not pale.      Findings: No bruising, erythema or rash.      Comments: Venous stripping scar left lower extremity.   Neurological:      General: No focal deficit present.      Mental Status: He is alert and oriented to person, place, and time.      Coordination: Coordination normal.      Comments: Voice is extremely weak.  Slow to respond   Psychiatric:         Mood and Affect: Mood normal.         Behavior: Behavior normal.           RECENT LABS:  Lab Results (last 24 hours)     Procedure Component Value Units Date/Time    POC Glucose Once [[id number]]  (Abnormal) Collected: 03/11/22 0620    Specimen: Blood Updated: 03/11/22 0621     Glucose 118 mg/dL      Comment: Serial Number: [id number]:  [id number]       CBC & Differential [[id number]]  (Abnormal) Collected: 03/11/22 0332    Specimen: Blood Updated: 03/11/22 0421    Narrative:      The following orders were created for panel order CBC & Differential.  Procedure                               Abnormality         Status                     ---------                               -----------         ------                     CBC Auto Differential[[id number]]        Abnormal            Final result                 Please view results for these tests on the individual orders.    CBC Auto Differential [[id number]]  (Abnormal) Collected: 03/11/22 0332    Specimen: Blood Updated: 03/11/22 0421     WBC 15.40 10*3/mm3      RBC 4.08 10*6/mm3      Hemoglobin 12.9 g/dL      Hematocrit 38.4 %      MCV 94.1 fL      MCH 31.7 pg      MCHC 33.7 g/dL      RDW 18.3 %       RDW-SD 59.1 fl      MPV 8.9 fL      Platelets 141 10*3/mm3      Neutrophil % 94.3 %      Lymphocyte % 2.8 %      Monocyte % 2.8 %      Eosinophil % 0.0 %      Basophil % 0.1 %      Neutrophils, Absolute 14.50 10*3/mm3      Lymphocytes, Absolute 0.40 10*3/mm3      Monocytes, Absolute 0.40 10*3/mm3      Eosinophils, Absolute 0.00 10*3/mm3      Basophils, Absolute 0.00 10*3/mm3      nRBC 0.1 /100 WBC     POC Glucose Once [[id number]]  (Abnormal) Collected: 03/11/22 0020    Specimen: Blood Updated: 03/11/22 0021     Glucose 138 mg/dL      Comment: Serial Number: [id number]:  [id number]       Phosphorus [[id number]]  (Abnormal) Collected: 03/10/22 2222    Specimen: Blood, Central Line Updated: 03/10/22 2303     Phosphorus 1.4 mg/dL     Comprehensive Metabolic Panel [[id number]]  (Abnormal) Collected: 03/10/22 2222    Specimen: Blood, Central Line Updated: 03/10/22 2254     Glucose 127 mg/dL      BUN 18 mg/dL      Creatinine 0.50 mg/dL      Sodium 137 mmol/L      Potassium 3.8 mmol/L      Chloride 105 mmol/L      CO2 24.0 mmol/L      Calcium 7.7 mg/dL      Total Protein 5.0 g/dL      Albumin 2.00 g/dL      ALT (SGPT) 152 U/L      AST (SGOT) 67 U/L      Alkaline Phosphatase 111 U/L      Total Bilirubin 0.5 mg/dL      Globulin 3.0 gm/dL      A/G Ratio 0.7 g/dL      BUN/Creatinine Ratio 36.0     Anion Gap 8.0 mmol/L      eGFR 106.4 mL/min/1.73      Comment: National Kidney Foundation and American Society of Nephrology (ASN) Task Force recommended calculation based on the Chronic Kidney Disease Epidemiology Collaboration (CKD-EPI) equation refit without adjustment for race.       Narrative:      GFR Normal >60  Chronic Kidney Disease <60  Kidney Failure <15      BNP [[id number]]  (Normal) Collected: 03/10/22 2222    Specimen: Blood, Central Line Updated: 03/10/22 2251     proBNP 1,251.0 pg/mL     Narrative:      Among patients with dyspnea, NT-proBNP is highly sensitive for the detection of acute congestive heart failure.  In addition NT-proBNP of <300 pg/ml effectively rules out acute congestive heart failure with 99% negative predictive value.    Results may be falsely decreased if patient taking Biotin.      Blood Gas, Arterial - [[id number]]  (Abnormal) Collected: 03/10/22 2247    Specimen: Arterial Blood Updated: 03/10/22 2251     Site Right Radial     Truman's Test Positive     pH, Arterial 7.419 pH units      pCO2, Arterial 37.1 mm Hg      pO2, Arterial 52.7 mm Hg      HCO3, Arterial 24.0 mmol/L      Base Excess, Arterial -0.3 mmol/L      Comment: Serial Number: [id number]:  [id number]        O2 Saturation, Arterial 87.6 %      CO2 Content 25.1 mmol/L      Barometric Pressure for Blood Gas --     Comment: N/A        Modality Cannula     FIO2 48 %      Hemodilution No    Blood Culture - Blood, Chest, Right [[id number]]  (Normal) Collected: 03/07/22 2122    Specimen: Blood from Chest, Right Updated: 03/10/22 2131     Blood Culture No growth at 3 days    Blood Culture - Blood, Chest, Right [[id number]]  (Normal) Collected: 03/07/22 2107    Specimen: Blood from Chest, Right Updated: 03/10/22 2117     Blood Culture No growth at 3 days    POC Glucose Once [[id number]]  (Abnormal) Collected: 03/10/22 1929    Specimen: Blood Updated: 03/10/22 1930     Glucose 137 mg/dL      Comment: Serial Number: [id number]:  [id number]       POC Glucose Once [[id number]]  (Abnormal) Collected: 03/10/22 1737    Specimen: Blood Updated: 03/10/22 1738     Glucose 144 mg/dL      Comment: Serial Number: [id number]:  [id number]       POC Glucose Once [[id number]]  (Abnormal) Collected: 03/10/22 1124    Specimen: Blood Updated: 03/10/22 1126     Glucose 140 mg/dL      Comment: Serial Number: [id number]:  [id number]       Comprehensive Metabolic Panel [[id number]]  (Abnormal) Collected: 03/10/22 0854    Specimen: Blood Updated: 03/10/22 0929     Glucose 140 mg/dL      BUN 22 mg/dL      Creatinine 0.55 mg/dL      Sodium 136 mmol/L      Potassium 4.2  mmol/L      Chloride 106 mmol/L      CO2 22.0 mmol/L      Calcium 7.9 mg/dL      Total Protein 5.1 g/dL      Albumin 2.10 g/dL      ALT (SGPT) 122 U/L      AST (SGOT) 57 U/L      Alkaline Phosphatase 114 U/L      Total Bilirubin 0.5 mg/dL      Globulin 3.0 gm/dL      A/G Ratio 0.7 g/dL      BUN/Creatinine Ratio 40.0     Anion Gap 8.0 mmol/L      eGFR 103.3 mL/min/1.73      Comment: National Kidney Foundation and American Society of Nephrology (ASN) Task Force recommended calculation based on the Chronic Kidney Disease Epidemiology Collaboration (CKD-EPI) equation refit without adjustment for race.       Narrative:      GFR Normal >60  Chronic Kidney Disease <60  Kidney Failure <15      CBC & Differential [[id number]]  (Abnormal) Collected: 03/10/22 0854    Specimen: Blood Updated: 03/10/22 0917    Narrative:      The following orders were created for panel order CBC & Differential.  Procedure                               Abnormality         Status                     ---------                               -----------         ------                     CBC Auto Differential[[id number]]        Abnormal            Final result                 Please view results for these tests on the individual orders.    CBC Auto Differential [[id number]]  (Abnormal) Collected: 03/10/22 0854    Specimen: Blood Updated: 03/10/22 0917     WBC 12.30 10*3/mm3      RBC 3.85 10*6/mm3      Hemoglobin 11.8 g/dL      Hematocrit 36.1 %      MCV 93.8 fL      MCH 30.8 pg      MCHC 32.8 g/dL      RDW 18.4 %      RDW-SD 60.8 fl      MPV 8.3 fL      Platelets 129 10*3/mm3      Neutrophil % 92.0 %      Lymphocyte % 4.5 %      Monocyte % 3.3 %      Eosinophil % 0.0 %      Basophil % 0.2 %      Neutrophils, Absolute 11.30 10*3/mm3      Lymphocytes, Absolute 0.60 10*3/mm3      Monocytes, Absolute 0.40 10*3/mm3      Eosinophils, Absolute 0.00 10*3/mm3      Basophils, Absolute 0.00 10*3/mm3      nRBC 0.0 /100 WBC     Respiratory Culture - Sputum, Cough  [[id number]] Collected: 03/09/22 0547    Specimen: Sputum from Cough Updated: 03/10/22 0901     Respiratory Culture Light growth (2+) The culture consists of normal respiratory seb. This is a preliminary report; final report to follow.     Gram Stain Many (4+) WBCs per low power field      Few (2+) Mixed bacterial morphotypes seen on Gram Stain      Rare (1+) Yeast      Rare (1+) Epithelial cells per low power field    POC Glucose Once [[id number]]  (Abnormal) Collected: 03/10/22 0812    Specimen: Blood Updated: 03/10/22 0813     Glucose 131 mg/dL      Comment: Serial Number: [id number]:  [id number]             PENDING RESULTS: n/a    IMAGING REVIEWED:  XR Chest 1 View    Result Date: 3/10/2022  1. The right hilum is prominent in size most likely representing underlying adenopathy or a right hilar mass. 2. Significant elevation the right hemidiaphragm, essentially unchanged. 3. Airspace disease in the right upper lobe and right midlung field likely representing pneumonia. This has progressed in the interim. Slot 63 Electronically signed by:  Sotero Esposito M.D.  3/10/2022 9:31 PM    XR Abdomen KUB    Result Date: 3/10/2022  Dobbhoff tube tip projects to the level of the proximal gastric body.  Electronically Signed By-Monie Tiwari MD On:3/10/2022 2:32 PM This report was finalized on [id number] by  Monie Tiwari MD.      I have reviewed the patient's labs, imaging, reports, and other clinician documentation.    Assessment/Plan   ASSESSMENT:  1. 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  Bronch last admit showed endobronchial lesion with path positive.  CT chest showing disease progression since November 2021.  Repeat bronc this admission showed bleeding from endobronchial lesion status  post electrocautery. Given recurrent hemoptysis rad-onc consulted with palliative radiation planned after bronchoscopy. Hospice admission in progress.  2. Recurrent hemoptysis/right lung postobstructive pneumonia-on antibiotics per ID and supportive care per pulmonary.  Bronchoscopy performed with bleeding controlled.  3. Thrombocytopenia-mild drop post admission.  Improving and will follow.  4. Leukocytosis-secondary to pneumonia and possibly infectious diarrhea with stool studies pending and malignancy. Improving.  5. Diarrhea-resolved.  6. Interstitial lung disease/fibrosis-management per pulmonary.  ANCA panel negative last admission. Ofev held per pulmonary until hemoptysis resolves.  7. Elevated transaminases-acute hepatitis panel last admit was negative.  Liver mets noted on CT.  8. Anorexia/failure to thrive-due to progressive cancer. Now NPO per family report with patient not swallowing.  GI consulted inpatient was not arousable at time of their consult.  Dobbhoff placed.  9. CAD/valvular heart disease/s/p status post pacemaker placement-on aspirin but no other anticoagulation at time of admission.       PLAN:  1. Palliative radiation planned.  2. [facility] evaluating.    Note prepared by DEBBY Mirza.  Patient seen and examined by Eddie Loredo MD.  Electronically signed by MARGARITA Wright, 03/11/22, 7:20 AM EST.        I have personally performed a face-to-face diagnostic evaluation on this patient.  I have discussed the case with Lianna Lemons NP, have edited/reviewed the note, and agree with the care plan.  The patient is complaining of some soreness in his throat and a cough.  On examination, he is very slow to respond.  Labs are significant for a mild anemia.  Dobbhoff has been placed for nutrition.  Plan is to give him palliative radiation for hemoptysis control.      I discussed the patients findings and my recommendations with patient and family.    Electronically signed by  Eddie Loredo MD, 03/11/22, 7:36 AM EST.

## 2022-03-11 NOTE — PROGRESS NOTES
Nutrition Services    Patient Name: Navneet Lind  YOB: 1945  MRN: [id number]  Admission date: 3/7/2022    PPE Documentation        PPE Worn By Provider Did not enter room for this encounter   PPE Worn By Patient  N/A     PROGRESS NOTE      Encounter Information: 3/11: Progress note to monitor TF. Patient currently with TF off, NPO, for possible PEG placement today. Was receiving TF via DHT until midnight. Phos is low with ordered replacement.        PO Diet: NPO Diet   PO Supplements:    PO Intake:  NPO       Nutrition support orders: Nutren 2.0 @25mL/hour with 10mL/hour free water flush   Nutrition support review: Not currently infusing        Labs (reviewed below): Creat low  Ca low  LFT's elev  Phos low (replacement ordered, repeat draw scheduled for AM)        GI Function:  Stool Output  Stool Unmeasured Occurrence: 1 (03/10/22 0500)  Bowel Incontinence: No (03/10/22 0500)  Stool Amount: moderate (03/10/22 0500)          Nutrition Intervention: NPO, awaiting possible PEG placement  Restart tube feeding at reduced rate when resumed- pt at risk for refeeding syndrome.       Results from last 7 days   Lab Units 03/10/22  2222 03/10/22  0854 03/09/22  0322   SODIUM mmol/L 137 136 135*   POTASSIUM mmol/L 3.8 4.2 4.6   CHLORIDE mmol/L 105 106 101   CO2 mmol/L 24.0 22.0 24.0   BUN mg/dL 18 22 29*   CREATININE mg/dL 0.50* 0.55* 0.69*   CALCIUM mg/dL 7.7* 7.9* 8.3*   BILIRUBIN mg/dL 0.5 0.5 0.6   ALK PHOS U/L 111 114 124*   ALT (SGPT) U/L 152* 122* 78*   AST (SGOT) U/L 67* 57* 37   GLUCOSE mg/dL 127* 140* 106*     Results from last 7 days   Lab Units 03/11/22  0332 03/10/22  2222   PHOSPHORUS mg/dL  --  1.4*   HEMOGLOBIN g/dL 12.9*  --    HEMATOCRIT % 38.4  --      COVID19   Date Value Ref Range Status   03/07/2022 Not Detected Not Detected - Ref. Range Final     Lab Results   Component Value Date    HGBA1C 4.90 03/29/2021         RD to follow up per protocol.    Electronically signed by:  Esther  ALMAZ Mariscal  03/11/22 11:52 EST

## 2022-03-11 NOTE — PROGRESS NOTES
Daily Progress Note        Hemoptysis    Presence of cardiac pacemaker    Coronary artery disease involving native coronary artery of native heart without angina pectoris    Shortness of breath    Mixed hyperlipidemia    Primary hypertension    NATALIA on CPAP    Anemia of chronic disease    H/O radical nephrectomy, left    Chronic diastolic congestive heart failure (HCC)    S/P TAVR (transcatheter aortic valve replacement)    Bladder carcinoma (HCC)    ILD (interstitial lung disease) (HCC)    Sepsis without septic shock (HCC)    Pneumonia due to infectious organism      Assessment  Acute hypoxemic respiratory failure  Recurrent hemoptysis  Lung mass infiltrating L main stem  Mediastinal mass in AP window , enlarged since 2 weeks ago  s/p bronch 2/23/2022 with endobronchial biopsy positive for transitional cell carcinoma     Interstitial Lung Disease- on OFEV  PNA  NATALIA- home cpap  Diarrhea  HTN  Chronic diastolic CHF  HLD  CAD  S/P TAVAR  Presence of pacemaker  Bladder Cancer- On immunotherapy every 3 weeks  H/O Radical left nephrectomy  H/O artery stenosis s/p stent placement     Bronchoscopy from 2/23/22- cultures negative     Respiratory panel negative  Blood cultures negative so far    Bronchoscopy 3/9/2022  -APC electrocautery was on L main stem lesion followed by A therapeutic cold saline lavage was performed  -Respiratory culture pending    Plan  Patient is deteriorating and might not survive this admission.  Nurse asked to check with palliative team to see if they have spoken with family again.    Patient had some low oxygenation levels last night.  Chest x-ray completed and Lasix given per intensivist.    Possible PEG placement, GI following and recommend against it due to patient's current condition    Continue to titrate oxygen- Currently on 10L HFNC, worsening  Cefepime for sepsis- finishes 3/15/22  Vancomycin for sepsis- finishes 3/15/22  Solu-medrol 40mg q8hrs  Bronchodilators PRN  Electrolyte/Glycemic  control  GI Prophylaxis- Protonix     3/10/22                                                       3/7/22        3/8/22                                                               2/22/22       LOS: 3 days     Subjective   Shortness of breath      Objective     Vital signs for last 24 hours:  Vitals:    03/10/22 2356 03/11/22 0412 03/11/22 0422 03/11/22 0425   BP: 111/60 148/80     BP Location: Right arm Right arm     Patient Position: Lying Lying     Pulse: 74 90 82 92   Resp: 22 (!) 31 26 26   Temp: 97.9 °F (36.6 °C) 98.4 °F (36.9 °C)     TempSrc: Axillary Axillary     SpO2: 94% 97% 100% 100%   Weight:       Height:           Intake/Output last 3 shifts:  I/O last 3 completed shifts:  In: 1588 [I.V.:1000; Other:191; NG/GT:197; IV Piggyback:200]  Out: -   Intake/Output this shift:  No intake/output data recorded.      Radiology  Imaging Results (Last 24 Hours)     Procedure Component Value Units Date/Time    XR Chest 1 View [[id number]] Collected: 03/10/22 2330     Updated: 03/10/22 2333    Narrative:      Exam:  Single view chest    Date: March 10, 2022    History: Shortness of breath, hypoxia, metastatic disease    Comparison: March 7, 2022    Technique: Single frontal radiograph of the chest was obtained    Findings:    There is a well-positioned nasogastric tube. There is an Uczioo-n-Lnvr catheter in right chest. The heart is enlarged. There are prior cardiothoracic surgical changes. There is central vascular congestion. There is a pacemaker in the left chest. There is   elevation the right hemidiaphragm. There is hazy airspace opacities in the right upper lobe and right midlung field. The right hilum is prominent in size.      Impression:      1. The right hilum is prominent in size most likely representing underlying adenopathy or a right hilar mass.  2. Significant elevation the right hemidiaphragm, essentially unchanged.  3. Airspace disease in the right upper lobe and right midlung field likely  representing pneumonia. This has progressed in the interim.    Slot 63      Electronically signed by:  Sotero Esposito M.D.    3/10/2022 9:31 PM    XR Abdomen KUB [[id number]] Collected: 03/10/22 1429     Updated: 03/10/22 1434    Narrative:      DATE OF EXAM:  3/10/2022 2:11 PM     PROCEDURE:  XR ABDOMEN KUB-     INDICATIONS:  Dobhoff tube placed; A41.9-Sepsis, unspecified organism;  J18.9-Pneumonia, unspecified organism; R04.2-Hemoptysis;  D72.829-Elevated white blood cell count, unspecified; J84.9-Interstitial  pulmonary disease, unspecified     COMPARISON:  CT chest without contrast 3 2022.     TECHNIQUE:   Single radiographic view of the abdomen was obtained.        FINDINGS:  Radiopaque tip of the Dobbhoff tube projects to the level of the  proximal gastric body. There is moderate asymmetric elevation the right  hemidiaphragm. Stable cardiac enlargement with CABG and TAVR. Right  chest wall kingsley catheter tip terminates at the cavoatrial junction.  Pacemaker device is unchanged in position. No pneumothorax.       Impression:      Dobbhoff tube tip projects to the level of the proximal gastric body.     Electronically Signed By-Monie Tiwari MD On:3/10/2022 2:32 PM  This report was finalized on [id number] by  Monie Tiwari MD.          Labs:  Results from last 7 days   Lab Units 03/11/22  0332   WBC 10*3/mm3 15.40*   HEMOGLOBIN g/dL 12.9*   HEMATOCRIT % 38.4   PLATELETS 10*3/mm3 141     Results from last 7 days   Lab Units 03/10/22  2222   SODIUM mmol/L 137   POTASSIUM mmol/L 3.8   CHLORIDE mmol/L 105   CO2 mmol/L 24.0   BUN mg/dL 18   CREATININE mg/dL 0.50*   CALCIUM mg/dL 7.7*   BILIRUBIN mg/dL 0.5   ALK PHOS U/L 111   ALT (SGPT) U/L 152*   AST (SGOT) U/L 67*   GLUCOSE mg/dL 127*     Results from last 7 days   Lab Units 03/10/22  2247   PH, ARTERIAL pH units 7.419   PO2 ART mm Hg 52.7*   PCO2, ARTERIAL mm Hg 37.1   HCO3 ART mmol/L 24.0     Results from last 7 days   Lab Units 03/10/22  2222 03/10/22  0858  03/09/22  0322   ALBUMIN g/dL 2.00* 2.10* 2.10*     Results from last 7 days   Lab Units 03/07/22  2107   TROPONIN T ng/mL <0.010             Results from last 7 days   Lab Units 03/07/22 2107   INR  1.05   APTT seconds 26.4               Meds:   SCHEDULE  ampicillin-sulbactam, 3 g, Intravenous, Q6H  budesonide-formoterol, 2 puff, Inhalation, BID  escitalopram, 5 mg, Per PEG Tube, Daily  folic acid, 400 mcg, Per PEG Tube, Daily  gabapentin, 100 mg, Per PEG Tube, BID  lansoprazole, 30 mg, Per PEG Tube, QAM  megestrol acetate, 200 mg, Per PEG Tube, TID With Meals  methylPREDNISolone sodium succinate, 40 mg, Intravenous, Q12H      Infusions     PRNs  •  albuterol  •  ipratropium-albuterol  •  melatonin  •  ondansetron  •  promethazine  •  sodium chloride    Physical Exam:  General Appearance: Lethargic  HEENT:  Normocephalic, without obvious abnormality, Conjunctiva/corneas clear,.  Normal external ear canals, Nares normal, no drainage     Neck:  Supple, symmetrical, trachea midline. No JVD.  Lungs /Chest wall:   Patient in mild to moderate respiratory distress with using accessory respiratory muscles, symmetrical wall movement.     Heart:  Regular rate and rhythm, S1 S2 normal  Abdomen: Soft, non-tender, no masses, no organomegaly.    Extremities: No edema, no clubbing or cyanosis    ROS  Review of Systems  Unobtainable due to patient lethargy          I have reviewed current clinicals.     Electronically signed by MARGARITA Storey, 03/11/22, 7:49 AM EST.     The NP scribed the note for me, I have examined the patient and reviewed and verified the above findings and and I formulated the assessment and plan as documented

## 2022-03-11 NOTE — PLAN OF CARE
Goal Outcome Evaluation:  Plan of Care Reviewed With: patient, spouse        Progress: declining  Outcome Evaluation: Pt was on 10L HF nasal cannula at shift change this morning. Pt was weaned down to 7L by pulmonology. Pt desatted to low 80s this afternoon and titrated back up to 10L. Pt's current sats are 90-92%. Tube feeds restarted at 25mL/hour. Spouse is at bedside. Will continue to monitor.

## 2022-03-11 NOTE — CONSULTS
"PULMONARY/CRITICAL CARE CONSULT NOTE     PATIENT NAME:  Navneet Lind       :  1945      MRN:  [id number]      ROOM:  [address]      PRIMARY CARE PROVIDER  Uri Cortes MD    REFERRING PROVIDER     Navneet De León MD     REASON FOR CONSULTATION  Increased work of breathing    SUBJECTIVE     CHIEF COMPLAINT:   Shortness of breath    HISTORY OF PRESENT ILLNESS:  Navneet Lind is a 76 y.o. male  has a past medical history of Aortic stenosis (2015), Benign essential HTN, Bundle branch block, right (2013), CAD (coronary artery disease), native coronary artery, Cancer ([facility]), Chronic kidney disease, Coronary artery disease involving native coronary artery of native heart without angina pectoris (2019), COVID-19 vaccine administered, Essential hypertension (2019), Heart murmur, History of transfusion, Hyperlipidemia, mixed, ILD (interstitial lung disease) ([facility]) (2021), Injury of back, Mixed hyperlipidemia (2019), Near syncope, NATALIA (obstructive sleep apnea), Premature ventricular contractions (2014), Presence of cardiac pacemaker (2019), Shortness of breath, and SSS (sick sinus syndrome) ([facility]) (2019).   Patient presented to [facility] on 3/7/2022 with complaint of shortness of breath.  HPI information is limited due to patient is lethargic and will only nod head yes or no to questions asked.  HPI information from chart review and nurse report.  Patient was initially admitted for increasing shortness of breath, cough, and recurring hemoptysis.  Pulmonology was consulted today for increased work of breathing and coarse breath sounds.  At the time of my assessment patient is resting comfortably on 10 L high flow nasal cannula.  He nods his head \"yes\", that his breathing feels easier after oxygenation changes.    Pulmonary/Intensivist consultation was requested for further evaluation and treatment of patient condition.      Thank you for " this consultation, we will follow along on this patient.        REVIEW OF SYSTEMS:  Review of systems could not be obtained due to   Acuity of condition      ASSESSMENT     Principal Problem:    Hemoptysis  Active Problems:    Presence of cardiac pacemaker    Coronary artery disease involving native coronary artery of native heart without angina pectoris    Shortness of breath    Mixed hyperlipidemia    Primary hypertension    NATALIA on CPAP    Anemia of chronic disease    H/O radical nephrectomy, left    Chronic diastolic congestive heart failure (HCC)    S/P TAVR (transcatheter aortic valve replacement)    Bladder carcinoma (HCC)    ILD (interstitial lung disease) (HCC)    Sepsis without septic shock (HCC)    Pneumonia due to infectious organism         PLAN     Plan:  -CXR Reviewed  -EKG Reviewed  -ABG, monitor as ordered  -BNP, monitor as ordered  -Pulmonary toilet  -Duoneb  -Symbicort  -Continue steroids and Unasyn, de-escalate as appropriate  -Titrate oxygen for O2 SAT > 92%  -Continuous cardiac monitoring and continuous pulse ox    Code Status: DNR/DNI  VTE Prophylaxis: SCDs  PUD Prophylaxis: PPI      HOSPITAL MEDICATIONS     SCHEDULED MEDICATIONS:  ampicillin-sulbactam, 3 g, Intravenous, Q6H  budesonide-formoterol, 2 puff, Inhalation, BID  escitalopram, 5 mg, Per PEG Tube, Daily  folic acid, 400 mcg, Per PEG Tube, Daily  gabapentin, 100 mg, Per PEG Tube, BID  lansoprazole, 30 mg, Per PEG Tube, QAM  megestrol acetate, 200 mg, Per PEG Tube, TID With Meals  methylPREDNISolone sodium succinate, 40 mg, Intravenous, Q12H         CONTINUOUS INFUSIONS:    dextrose 5 % and sodium chloride 0.9 %, 75 mL/hr, Last Rate: 75 mL/hr (03/10/22 1420)         PRN MEDICATIONS:   •  albuterol  •  ipratropium-albuterol  •  melatonin  •  ondansetron  •  promethazine  •  sodium chloride       OBJECTIVE     VITAL SIGNS:  /60 (BP Location: Right arm, Patient Position: Lying)   Pulse 74   Temp 97.9 °F (36.6 °C) (Axillary)   Resp  "22   Ht 172.7 cm (67.99\")   Wt 68.6 kg (151 lb 3.8 oz)   SpO2 94%   BMI 23.00 kg/m²     Wt Readings from Last 3 Encounters:   03/09/22 68.6 kg (151 lb 3.8 oz)   03/03/22 66.1 kg (145 lb 11.6 oz)   02/11/22 69.9 kg (154 lb)       INTAKE/OUTPUT:  Intake/Output                 03/10/22 0701 - 03/11/22 0700     [id number] Total              Intake    Other  10  -- 10    Flush/ Irrigation Intake (mL) (NG/OG Tube Nasogastric Right nostril) 10 -- 10    IV Piggyback  --  100 100    Total Intake 10 100 110       Output    Total Output -- -- --           NET INTAKE/OUTPUT SINCE ADMISSION:  Net IO Since Admission: 2,510 mL [03/11/22 0031]     PHYSICAL EXAM:   Constitutional: Acutely ill-appearing, pale  Eyes:  PERRL  HENT:  Atraumatic, trachea midline  Respiratory: Rhonchi and coarse throughout  Cardiovascular:  Normal rate, normal rhythm   GI:  Soft, nondistended, normal bowel sounds  : Defer  Musculoskeletal: Generalized edema  Integument: Warm and dry  Neurologic:  Alert , no focal deficits noted   Psychiatric:  Behavior appropriate       HISTORY     HISTORY:  Past Medical History:   Diagnosis Date   • Aortic stenosis 9/29/2015    Per Echo 2017   • Benign essential HTN    • Bundle branch block, right 2/7/2013   • CAD (coronary artery disease), native coronary artery    • Cancer (HCC)     bladder- chemo, new left renal mass   • Chronic kidney disease    • Coronary artery disease involving native coronary artery of native heart without angina pectoris 11/19/2019    CABG 1995; SVG to RCA is occluded, LIMA to LAD, SVG to diag of LAD, SVG to marginal of LCX   • COVID-19 vaccine administered    • Essential hypertension 11/19/2019   • Heart murmur    • History of transfusion    • Hyperlipidemia, mixed    • ILD (interstitial lung disease) (HCC) 11/1/2021   • Injury of back    • Mixed hyperlipidemia 11/19/2019   • Near syncope    • NATALIA (obstructive sleep apnea)     AHI 11.6/h, CPAP   • Premature ventricular " contractions 2/11/2014   • Presence of cardiac pacemaker 7/5/2019    BS dual chamber PM 11/7/2016   • Shortness of breath    • SSS (sick sinus syndrome) ([facility]) 7/5/2019     Past Surgical History:   Procedure Laterality Date   • AORTIC VALVE REPAIR/REPLACEMENT N/A 3/30/2021    Procedure: TTE TRANSFEMORAL TRANSCATHETER AORTIC VALVE REPLACEMENT PERCUTANEOUS APPROACH;  Surgeon: Hang Martinez MD;  Location: [facility] OR 18/19;  Service: Cardiothoracic;  Laterality: N/A;   • AORTIC VALVE REPAIR/REPLACEMENT N/A 3/30/2021    Procedure: Transfemoral Transcatheter Aortic Valve Replacement w/Intra Op TTE and Possible Open Rescue Surgery;  Surgeon: Anderson Galvez MD;  Location: [facility] OR 18/19;  Service: Cardiovascular;  Laterality: N/A;   • BRONCHOSCOPY N/A 2/23/2022    Procedure: BRONCHOSCOPY WITH BRONCHOALVEOLAR LAVAGE AND BIOPSY X1 AREA;  Surgeon: Ronny Jiang MD;  Location: [facility] ENDOSCOPY;  Service: Pulmonary;  Laterality: N/A;  blood clot in lung, hemoptysis   • BRONCHOSCOPY N/A 3/9/2022    Procedure: BRONCHOSCOPY WITH ARGON PLASMA COAGULATION OF LUNG MASS;  Surgeon: Te Thorne MD;  Location: [facility] ENDOSCOPY;  Service: Pulmonary;  Laterality: N/A;  Post: LUNG MASS AND HEMOPTYSIS   • CARDIAC CATHETERIZATION  2018   • CARDIAC CATHETERIZATION N/A 3/22/2021    Procedure: Left Heart Cath;  Surgeon: ADRIANO Erazo MD;  Location: [facility] CATH INVASIVE LOCATION;  Service: Cardiovascular;  Laterality: N/A;   • CARDIAC CATHETERIZATION N/A 6/22/2021    Procedure: RENAL ANGIOGRAPHY;  Surgeon: Jesus Bhagat MD;  Location: [facility] CATH INVASIVE LOCATION;  Service: Cardiovascular;  Laterality: N/A;   • CARDIAC CATHETERIZATION N/A 6/22/2021    Procedure: Stent BMS peripheral;  Surgeon: Jesus Bhagat MD;  Location: [facility] CATH INVASIVE LOCATION;  Service: Cardiovascular;  Laterality: N/A;   rt renal   • CARDIAC CATHETERIZATION N/A 6/22/2021    Procedure: Angioplasty-peripheral;   Surgeon: Jesus Bhagat MD;  Location: [facility] CATH INVASIVE LOCATION;  Service: Cardiovascular;  Laterality: N/A;   rt renal   • CARDIOVASCULAR STRESS TEST     • CORONARY ARTERY BYPASS GRAFT     • ECHO - CONVERTED     • NEPHROURETERECTOMY Left 2020    Procedure: NEPHROURETERECTOMY;  Surgeon: Rogers Bae MD;  Location: [facility] MAIN OR;  Service: Urology;  Laterality: Left;   • PACEMAKER IMPLANTATION      bs   • PORTACATH PLACEMENT       Family History   Problem Relation Age of Onset   • No Known Problems Mother    • Heart disease Father    • Heart failure Father    • Heart attack Father    • No Known Problems Sister    • No Known Problems Brother    • Malig Hyperthermia Neg Hx      Social History     Socioeconomic History   • Marital status:    Tobacco Use   • Smoking status: Former Smoker     Years: 5.00     Quit date:      Years since quittin.1   • Smokeless tobacco: Never Used   Vaping Use   • Vaping Use: Never used   Substance and Sexual Activity   • Alcohol use: Yes     Alcohol/week: 1.0 standard drink     Types: 1 Shots of liquor per week     Comment: RARE   • Drug use: Never   • Sexual activity: Defer        HOME MEDICATIONS:   Prior to Admission medications    Medication Sig Start Date End Date Taking? Authorizing Provider   acetaminophen (TYLENOL) 500 MG tablet Take 1,000 mg by mouth Every 6 (Six) Hours As Needed for Mild Pain .   Yes Jaja Watson MD   albuterol sulfate  (90 Base) MCG/ACT inhaler Inhale 2 puffs Every 4 (Four) Hours As Needed for Wheezing. 3/3/22  Yes Navneet De León MD   aspirin 81 MG EC tablet Take 81 mg by mouth Daily.   Yes Jaja Watson MD   budesonide-formoterol (Symbicort) 160-4.5 MCG/ACT inhaler Inhale 2 puffs 2 (Two) Times a Day for 90 days. 3/3/22 6/1/22 Yes Navneet De León MD   Cholecalciferol (Vitamin D3) 50 MCG (2000 UT) tablet Take 2,000 Units by mouth Daily.   Yes Jaja Watson MD    escitalopram (LEXAPRO) 5 MG tablet Take 5 mg by mouth Daily.   Yes Jaja Watson MD   famotidine (PEPCID) 40 MG tablet Take 40 mg by mouth Every Night.   Yes Jaja Watson MD   folic acid (FOLVITE) 400 MCG tablet Take 400 mcg by mouth Daily.   Yes Jaja Watson MD   gabapentin (NEURONTIN) 100 MG capsule Take 100 mg by mouth 2 (Two) Times a Day.   Yes Jaja Watson MD   melatonin 5 MG tablet tablet Take 5 mg by mouth At Night As Needed.   Yes Jaja Watson MD   omeprazole (priLOSEC) 40 MG capsule Take 40 mg by mouth Daily.   Yes Jaja Watson MD   ondansetron (ZOFRAN) 4 MG tablet Take 4 mg by mouth Every 8 (Eight) Hours As Needed. 11/17/21  Yes Jaja Watson MD   polyethylene glycol (MIRALAX) 17 g packet Take 17 g by mouth Daily As Needed. 2/8/22  Yes Jaja Watson MD   promethazine (PHENERGAN) 25 MG tablet Take 25 mg by mouth Every 4 (Four) Hours As Needed for Nausea or Vomiting.   Yes Jaja Watson MD   megestrol (MEGACE) 40 MG/ML suspension Take 200 mg by mouth 3 (Three) Times a Day With Meals.    Jaja Watson MD   NON FORMULARY / PATIENT SUPPLIED MEDICATION Take 100 mg by mouth Daily. OFEV-Nintedanib    Jaja Watson MD         IMMUNIZATIONS:  Immunization History   Administered Date(s) Administered   • Fluzone High Dose =>65 Years ([employer] ONLY) 10/03/2016, 10/08/2019, 10/12/2019   • H1N1 Inj 01/07/2010   • Influenza, Unspecified 10/23/2018   • Pneumococcal Conjugate 13-Valent (PCV13) 10/03/2016        ALLERGIES:  Patient has no known allergies.      RESULTS     LABS:  Lab Results (last 24 hours)     Procedure Component Value Units Date/Time    POC Glucose Once [[id number]]  (Abnormal) Collected: 03/10/22 0044    Specimen: Blood Updated: 03/10/22 0045     Glucose 116 mg/dL      Comment: Serial Number: [id number]:  [id number]       POC Glucose Once [[id number]]  (Abnormal) Collected: 03/10/22 0601    Specimen: Blood  Updated: 03/10/22 0602     Glucose 133 mg/dL      Comment: Serial Number: [id number]:  [id number]       POC Glucose Once [[id number]]  (Abnormal) Collected: 03/10/22 0812    Specimen: Blood Updated: 03/10/22 0813     Glucose 131 mg/dL      Comment: Serial Number: [id number]:  [id number]       CBC & Differential [[id number]]  (Abnormal) Collected: 03/10/22 0854    Specimen: Blood Updated: 03/10/22 0917    Narrative:      The following orders were created for panel order CBC & Differential.  Procedure                               Abnormality         Status                     ---------                               -----------         ------                     CBC Auto Differential[[id number]]        Abnormal            Final result                 Please view results for these tests on the individual orders.    Comprehensive Metabolic Panel [[id number]]  (Abnormal) Collected: 03/10/22 0854    Specimen: Blood Updated: 03/10/22 0929     Glucose 140 mg/dL      BUN 22 mg/dL      Creatinine 0.55 mg/dL      Sodium 136 mmol/L      Potassium 4.2 mmol/L      Chloride 106 mmol/L      CO2 22.0 mmol/L      Calcium 7.9 mg/dL      Total Protein 5.1 g/dL      Albumin 2.10 g/dL      ALT (SGPT) 122 U/L      AST (SGOT) 57 U/L      Alkaline Phosphatase 114 U/L      Total Bilirubin 0.5 mg/dL      Globulin 3.0 gm/dL      A/G Ratio 0.7 g/dL      BUN/Creatinine Ratio 40.0     Anion Gap 8.0 mmol/L      eGFR 103.3 mL/min/1.73      Comment: National Kidney Foundation and American Society of Nephrology (ASN) Task Force recommended calculation based on the Chronic Kidney Disease Epidemiology Collaboration (CKD-EPI) equation refit without adjustment for race.       Narrative:      GFR Normal >60  Chronic Kidney Disease <60  Kidney Failure <15      CBC Auto Differential [[id number]]  (Abnormal) Collected: 03/10/22 0854    Specimen: Blood Updated: 03/10/22 0917     WBC 12.30 10*3/mm3      RBC 3.85 10*6/mm3      Hemoglobin 11.8 g/dL       Hematocrit 36.1 %      MCV 93.8 fL      MCH 30.8 pg      MCHC 32.8 g/dL      RDW 18.4 %      RDW-SD 60.8 fl      MPV 8.3 fL      Platelets 129 10*3/mm3      Neutrophil % 92.0 %      Lymphocyte % 4.5 %      Monocyte % 3.3 %      Eosinophil % 0.0 %      Basophil % 0.2 %      Neutrophils, Absolute 11.30 10*3/mm3      Lymphocytes, Absolute 0.60 10*3/mm3      Monocytes, Absolute 0.40 10*3/mm3      Eosinophils, Absolute 0.00 10*3/mm3      Basophils, Absolute 0.00 10*3/mm3      nRBC 0.0 /100 WBC     POC Glucose Once [[id number]]  (Abnormal) Collected: 03/10/22 1124    Specimen: Blood Updated: 03/10/22 1126     Glucose 140 mg/dL      Comment: Serial Number: [id number]:  [id number]       POC Glucose Once [[id number]]  (Abnormal) Collected: 03/10/22 1737    Specimen: Blood Updated: 03/10/22 1738     Glucose 144 mg/dL      Comment: Serial Number: [id number]:  [id number]       POC Glucose Once [[id number]]  (Abnormal) Collected: 03/10/22 1929    Specimen: Blood Updated: 03/10/22 1930     Glucose 137 mg/dL      Comment: Serial Number: [id number]:  [id number]       Comprehensive Metabolic Panel [[id number]]  (Abnormal) Collected: 03/10/22 2222    Specimen: Blood, Central Line Updated: 03/10/22 2254     Glucose 127 mg/dL      BUN 18 mg/dL      Creatinine 0.50 mg/dL      Sodium 137 mmol/L      Potassium 3.8 mmol/L      Chloride 105 mmol/L      CO2 24.0 mmol/L      Calcium 7.7 mg/dL      Total Protein 5.0 g/dL      Albumin 2.00 g/dL      ALT (SGPT) 152 U/L      AST (SGOT) 67 U/L      Alkaline Phosphatase 111 U/L      Total Bilirubin 0.5 mg/dL      Globulin 3.0 gm/dL      A/G Ratio 0.7 g/dL      BUN/Creatinine Ratio 36.0     Anion Gap 8.0 mmol/L      eGFR 106.4 mL/min/1.73      Comment: National Kidney Foundation and American Society of Nephrology (ASN) Task Force recommended calculation based on the Chronic Kidney Disease Epidemiology Collaboration (CKD-EPI) equation refit without adjustment for race.       Narrative:       GFR Normal >60  Chronic Kidney Disease <60  Kidney Failure <15      Phosphorus [[id number]]  (Abnormal) Collected: 03/10/22 2222    Specimen: Blood, Central Line Updated: 03/10/22 2303     Phosphorus 1.4 mg/dL     BNP [[id number]]  (Normal) Collected: 03/10/22 2222    Specimen: Blood, Central Line Updated: 03/10/22 2251     proBNP 1,251.0 pg/mL     Narrative:      Among patients with dyspnea, NT-proBNP is highly sensitive for the detection of acute congestive heart failure. In addition NT-proBNP of <300 pg/ml effectively rules out acute congestive heart failure with 99% negative predictive value.    Results may be falsely decreased if patient taking Biotin.      Blood Gas, Arterial - [[id number]]  (Abnormal) Collected: 03/10/22 2247    Specimen: Arterial Blood Updated: 03/10/22 2251     Site Right Radial     Truman's Test Positive     pH, Arterial 7.419 pH units      pCO2, Arterial 37.1 mm Hg      pO2, Arterial 52.7 mm Hg      HCO3, Arterial 24.0 mmol/L      Base Excess, Arterial -0.3 mmol/L      Comment: Serial Number: [id number]:  [id number]        O2 Saturation, Arterial 87.6 %      CO2 Content 25.1 mmol/L      Barometric Pressure for Blood Gas --     Comment: N/A        Modality Cannula     FIO2 48 %      Hemodilution No    POC Glucose Once [[id number]]  (Abnormal) Collected: 03/11/22 0020    Specimen: Blood Updated: 03/11/22 0021     Glucose 138 mg/dL      Comment: Serial Number: [id number]:  [id number]               MICRO:  Microbiology Results (last 10 days)     Procedure Component Value - Date/Time    Respiratory Culture - Sputum, Cough [[id number]] Collected: 03/09/22 0547    Lab Status: Preliminary result Specimen: Sputum from Cough Updated: 03/10/22 0901     Respiratory Culture Light growth (2+) The culture consists of normal respiratory seb. This is a preliminary report; final report to follow.     Gram Stain Many (4+) WBCs per low power field      Few (2+) Mixed bacterial morphotypes seen on Gram  Stain      Rare (1+) Yeast      Rare (1+) Epithelial cells per low power field    MRSA Screen, PCR (Inpatient) - Swab, Nares [[id number]]  (Normal) Collected: 03/08/22 1627    Lab Status: Final result Specimen: Swab from Nares Updated: 03/08/22 1915     MRSA PCR No MRSA Detected    Blood Culture - Blood, Chest, Right [[id number]]  (Normal) Collected: 03/07/22 2122    Lab Status: Preliminary result Specimen: Blood from Chest, Right Updated: 03/10/22 2131     Blood Culture No growth at 3 days    Respiratory Panel PCR w/COVID-19(SARS-CoV-2) SHANE/SHAI/FELECIA/PAD/COR/MAD/VALERIA In-House, NP Swab in UTM/VTM, 3-4 HR TAT - Swab, Nasopharynx [[id number]]  (Normal) Collected: 03/07/22 2122    Lab Status: Final result Specimen: Swab from Nasopharynx Updated: 03/07/22 2217     ADENOVIRUS, PCR Not Detected     Coronavirus 229E Not Detected     Coronavirus HKU1 Not Detected     Coronavirus NL63 Not Detected     Coronavirus OC43 Not Detected     COVID19 Not Detected     Human Metapneumovirus Not Detected     Human Rhinovirus/Enterovirus Not Detected     Influenza A PCR Not Detected     Influenza B PCR Not Detected     Parainfluenza Virus 1 Not Detected     Parainfluenza Virus 2 Not Detected     Parainfluenza Virus 3 Not Detected     Parainfluenza Virus 4 Not Detected     RSV, PCR Not Detected     Bordetella pertussis pcr Not Detected     Bordetella parapertussis PCR Not Detected     Chlamydophila pneumoniae PCR Not Detected     Mycoplasma pneumo by PCR Not Detected    Narrative:      In the setting of a positive respiratory panel with a viral infection PLUS a negative procalcitonin without other underlying concern for bacterial infection, consider observing off antibiotics or discontinuation of antibiotics and continue supportive care. If the respiratory panel is positive for atypical bacterial infection (Bordetella pertussis, Chlamydophila pneumoniae, or Mycoplasma pneumoniae), consider antibiotic de-escalation to target atypical bacterial  infection.    Blood Culture - Blood, Chest, Right [[id number]]  (Normal) Collected: 03/07/22 2107    Lab Status: Preliminary result Specimen: Blood from Chest, Right Updated: 03/10/22 2117     Blood Culture No growth at 3 days            RADIOLOGY STUDIES:  Imaging Results (Last 72 Hours)     Procedure Component Value Units Date/Time    XR Chest 1 View [[id number]] Collected: 03/10/22 2330     Updated: 03/10/22 2333    Narrative:      Exam:  Single view chest    Date: March 10, 2022    History: Shortness of breath, hypoxia, metastatic disease    Comparison: March 7, 2022    Technique: Single frontal radiograph of the chest was obtained    Findings:    There is a well-positioned nasogastric tube. There is an Uqsafd-t-Wqfw catheter in right chest. The heart is enlarged. There are prior cardiothoracic surgical changes. There is central vascular congestion. There is a pacemaker in the left chest. There is   elevation the right hemidiaphragm. There is hazy airspace opacities in the right upper lobe and right midlung field. The right hilum is prominent in size.      Impression:      1. The right hilum is prominent in size most likely representing underlying adenopathy or a right hilar mass.  2. Significant elevation the right hemidiaphragm, essentially unchanged.  3. Airspace disease in the right upper lobe and right midlung field likely representing pneumonia. This has progressed in the interim.    Slot 63      Electronically signed by:  Sotero Esposito M.D.    3/10/2022 9:31 PM    XR Abdomen KUB [[id number]] Collected: 03/10/22 1429     Updated: 03/10/22 1434    Narrative:      DATE OF EXAM:  3/10/2022 2:11 PM     PROCEDURE:  XR ABDOMEN KUB-     INDICATIONS:  Dobhoff tube placed; A41.9-Sepsis, unspecified organism;  J18.9-Pneumonia, unspecified organism; R04.2-Hemoptysis;  D72.829-Elevated white blood cell count, unspecified; J84.9-Interstitial  pulmonary disease, unspecified     COMPARISON:  CT chest without contrast 3  2022.     TECHNIQUE:   Single radiographic view of the abdomen was obtained.        FINDINGS:  Radiopaque tip of the Dobbhoff tube projects to the level of the  proximal gastric body. There is moderate asymmetric elevation the right  hemidiaphragm. Stable cardiac enlargement with CABG and TAVR. Right  chest wall kingsley catheter tip terminates at the cavoatrial junction.  Pacemaker device is unchanged in position. No pneumothorax.       Impression:      Dobbhoff tube tip projects to the level of the proximal gastric body.     Electronically Signed By-Monie Tiwari MD On:3/10/2022 2:32 PM  This report was finalized on [id number] by  Monie Tiwari MD.    CT Chest Without Contrast Diagnostic [[id number]] Collected: 03/08/22 0911     Updated: 03/08/22 0935    Narrative:      CT CHEST WO CONTRAST DIAGNOSTIC-     Date of Exam: 3/8/2022 9:01 AM     Indication: Shortness of breath (Ped 0-18y)  . HISTORY of renal  carcinoma with left nephrectomy. HISTORY of bladder cancer treated with  chemotherapy. Former smoker.     Comparison: AP portable chest 3/7/2022. CT chest 2/22/2022. Outside CT  chest from [facility] radiology 11/10/2021.     Technique: Serial and axial CT images of the chest were obtained.  Reconstructions in the coronal and sagittal planes were performed.   Automated exposure control and iterative reconstruction methods were  used.     FINDINGS:     Interlobular and intralobular interstitial septal thickening is seen  within both lungs, greatest peripherally, greatest in the right  perihilar region, consistent with chronic interstitial fibrosis. There  is a cylindrical traction bronchiectasis within the perihilar regions of  both lungs, right greater than left.     There is opacification of the bronchus intermedius and right lower lobe  bronchus, partial opacification the right middle lobe bronchus. There is  bronchial wall thickening in the same distribution. There is increased  airspace disease within the  right middle lobe and right lower lobe which  may represent partial atelectasis or developing pneumonia. The left lung  remains free of acute airspace disease.     Abnormally enlarged prevascular lymph nodes are again noted, largest  measuring 11 mm short axis     AP window adenopathy or soft tissue mass is seen, measuring  approximately 2.2 x 3.0 cm. There is extrinsic compression upon the  posterior lateral wall of the lower thoracic trachea, and mild narrowing  of the left neck mainstem bronchus. This mass appears to infiltrate into  the left mainstem bronchus (series 2 image 41). This has increased when  compared to the outside CT chest of 11/10/2021.     Heart size is normal. Dense native coronary artery calcific lesions are  present, and there are signs of prior CABG. Left chest wall pacemaker  device is in place. Right chest wall kingsley catheter tip terminates at  the lower SVC level. There are signs of prior TAVR. No effusion or  pleural effusion is seen. Thyroid gland is unremarkable.     Ill-defined low-density mass in the hepatic dome is suspicious for  metastatic disease measuring 5.1 x 5.3 cm. Cholecystectomy changes are  present. 2.3 x 1.4 cm right adrenal nodule is a new finding when  compared to the CT chest 11/10/2021, and is consistent with metastatic  disease. The left adrenal gland is unremarkable. Left nephrectomy  changes are present. Right renal artery stent is noted. A cyst is seen  within the right kidney, measuring 12 mm. A 3 mm nonobstructing right  renal stone is noted. The remainder the imaged upper abdominal organs  have a normal noncontrast appearance.     There are old bilateral rib fractures. Chronic T7 compression fracture  with vertebroplasty. Some of the vertebroplasty cement has chronic  extravasation into the anterior epidural space of the thoracic spinal  canal, with moderate to severe canal stenosis. There is a chronic  compression fracture of the L1 vertebral body inferior  endplate,  unchanged from prior. No acute osseous abnormalities identified.          Impression:         1. Mediastinal AP window soft tissue mass appears to infiltrate the left  mainstem bronchus and mildly indents the distal thoracic trachea. It has  enlarged since the prior examination. It could represent primary lung  malignancy or metastatic disease of unknown primary origin.  2. Prevascular mediastinal adenopathy has increased compared to  11/10/2021.  3. There is opacification and partial obstruction of the bronchus  intermedius, right middle lobe and right lower lobe bronchi with  bronchial wall thickening. There is partial atelectasis versus  developing pneumonia within the right middle lobe and right lower lobe.  4. A 5.3 cm mass is seen within the hepatic dome, consistent with  metastatic disease.  5. 2.3 cm right adrenal nodule consistent with adrenal metastasis.  6. Additional CT findings include CABG, TAVR, chronic interstitial  pulmonary fibrosis, left nephrectomy, nonobstructing right renal stone,  cholecystectomy, old bilateral rib fractures, old T7 and L1 compression  fractures.           Electronically Signed By-Monie Tiwari MD On:3/8/2022 9:33 AM  This report was finalized on [id number] by  Monie Tiwari MD.              ECHOCARDIOGRAM:  Results for orders placed during the hospital encounter of 05/12/21    Adult Transthoracic Echo Complete W/ Cont if Necessary Per Protocol    Interpretation Summary  · Left ventricular wall thickness is consistent with hypertrophy. Sigmoid-shaped ventricular septum is present.  · Calculated left ventricular EF = 66% Estimated left ventricular EF was in agreement with the calculated left ventricular EF. Left ventricular systolic function is normal.  · Left ventricular diastolic function is consistent with (grade I) impaired relaxation.  · There is a TAVR valve present.  · The valve is well-seated. The leaflets appear thin and mobile.  · The maximum gradient  across aortic valve is 13.2 mmHg. The mean gradient is 6.5 mmHg. Aortic valve area is 1.7 cm². Normal parameters  · Mild mitral valve regurgitation is present.  · Mild tricuspid valve regurgitation is present.  · Estimated right ventricular systolic pressure from tricuspid regurgitation is normal (<35 mmHg).         I reviewed the patient's new clinical results.    I discussed the patient's findings and my recommendations with patient and nursing staff.  I have discussed plan with the attending Pulmonary/Intensivist physician, who agrees with this plan of care.    Appropriate PPE worn during assessment of patient per established guidelines.      Electronically signed by MARGARITA Yan, 03/11/22, 12:31 AM EST.

## 2022-03-14 NOTE — CASE MANAGEMENT/SOCIAL WORK
Case Management Discharge Note      Final Note: Linda         Selected Continued Care - Discharged on 3/11/2022 Admission date: 3/7/2022 - Discharge disposition: Hospice/Home     Final Discharge Disposition Code: 50 - home with hospice

## 2022-03-21 ENCOUNTER — TELEPHONE (OUTPATIENT)
Dept: CARDIOLOGY | Facility: CLINIC | Age: 77
End: 2022-03-21

## 2022-03-23 LAB — FUNGUS WND CULT: ABNORMAL

## 2022-04-06 ENCOUNTER — APPOINTMENT (OUTPATIENT)
Dept: CARDIOLOGY | Facility: HOSPITAL | Age: 77
End: 2022-04-06

## 2022-04-06 LAB
MYCOBACTERIUM SPEC CULT: NORMAL
NIGHT BLUE STAIN TISS: NORMAL

## 2023-01-22 NOTE — PROGRESS NOTES
Physical Therapy Daily Progress Note    VISIT#: 2    Subjective   Navneet Lind reports that he had chemo last week and he is feeling a lot weaker today. He has chemo again tomorrow.     Objective     See Exercise, Manual, and Modality Logs for complete treatment.     Patient Education: importance of rest breaks, HEP    Assessment & Plan     Assessment  Assessment details: The patient presents to therapy today with decreased gait speed, stride length, and step length. He had chemo the week previous and stated he was very tired from that. Patient tolerated exercises well today with only moderate rest breaks for fatigue. PT performed table exercises only today due to an increase in patient's fatigue.         Progress per Plan of Care and Progress strengthening /stabilization /functional activity          Timed:         Manual Therapy:         mins  64103;     Therapeutic Exercise:    24     mins  21214;     Neuromuscular Mal:        mins  10149;    Therapeutic Activity:     16     mins  23286;     Gait Training:           mins  39985;     Ultrasound:          mins  53979;    Ionto                                   mins   53024  Self Care                            mins   07068  Canalith Repos                   mins  98463    Un-Timed:  Electrical Stimulation:         mins  27067 ( );  Dry Needling          mins self-pay  Traction          mins 96923  Low Eval          Mins  07446  Mod Eval          Mins  23783  High Eval                            Mins  12487  Re-Eval                               mins  63474    Timed Treatment:   40   mins   Total Treatment:     40   mins    Ofelia Valderrama PT  Physical Therapist  [place] License # [id number]  
No

## 2024-03-19 NOTE — PROGRESS NOTES
Cardiology Office Follow Up Visit      Primary Care Provider:  Uri Cortes MD    Reason for f/u:     Preoperative vascular risk assessment requested  Left renal mass  Coronary artery disease  Aortic insufficiency  Previous CABG  History of bladder cancer      Subjective     CC:    Denies chest pain, reports nausea, reports chronic dyspnea with exertion at patient's baseline    History of Present Illness       Navneet Lind is a 74 y.o. male.  Patient reports for earlier than scheduled follow-up requesting preoperative cardiovascular risk assessment prior to upcoming left nephrectomy by Dr. Bae due to a left renal mass suspicious for renal cell carcinoma.    The patient has a history of coronary artery disease with multivessel CABG in 1995 had subsequent occlusion of the saphenous vein graft to the RCA.  Last cardiac catheterization was in March 2018 saphenous vein graft to the OM was okay LIMA to the LAD was okay saphenous vein graft to the RCA was occluded but fills via collaterals distally from the left side.    Since his last visit here the patient's been treated for bladder cancer requiring chemotherapy and now has been found to have a left renal mass and has upcoming plans for left nephrectomy.    He is having significant flank pain and nausea.  He denies any chest pain.  He complains of chronic dyspnea with exertion that is unchanged from his baseline.    Ejection fraction has historically been preserved patient has known aortic insufficiency.        Past Medical History:   Diagnosis Date   • Aortic stenosis 9/29/2015    Per Echo 2017   • Benign essential HTN    • Bundle branch block, right 2/7/2013   • CAD (coronary artery disease), native coronary artery    • Coronary artery disease involving native coronary artery of native heart without angina pectoris 11/19/2019    CABG 1995; SVG to RCA is occluded, LIMA to LAD, SVG to diag of LAD, SVG to marginal of LCX   • Essential hypertension  [facility] Alert Outreach Telephone Call Attempt     Patient is being outreached by the [facility] Transitions Program for a clinical alert from the [facility] monitoring program.     Call Attempt Date: 3/19/2024    Call Attempt: First Attempt   2019   • Heart murmur    • Hyperlipidemia, mixed    • Mixed hyperlipidemia 2019   • Near syncope    • NATALIA (obstructive sleep apnea)     AHI 11.6/h   • Premature ventricular contractions 2014   • Presence of cardiac pacemaker 2019    BS dual chamber PM 2016   • Sick sinus syndrome (CMS/HCC)    • SSS (sick sinus syndrome) (CMS/HCC) 2019       Past Surgical History:   Procedure Laterality Date   • CARDIAC CATHETERIZATION     • CARDIOVASCULAR STRESS TEST     • CORONARY ARTERY BYPASS GRAFT     • ECHO - CONVERTED     • PACEMAKER IMPLANTATION      bs         Current Outpatient Medications:   •  amLODIPine (NORVASC) 10 MG tablet, Take 1 tablet by mouth Daily. (Patient taking differently: Take 10 mg by mouth Every Evening.), Disp: 90 tablet, Rfl: 1  •  gabapentin (NEURONTIN) 800 MG tablet, Take 800 mg by mouth Every Evening., Disp: , Rfl:   •  HYDROcodone-acetaminophen (NORCO) 7.5-325 MG per tablet, 1 tablet Every 6 (Six) Hours As Needed., Disp: , Rfl:   •  simvastatin (ZOCOR) 40 MG tablet, TAKE 1 TABLET BY MOUTH EVERY DAY (Patient taking differently: 40 mg Every Night.), Disp: 90 tablet, Rfl: 0  •  aspirin 81 MG EC tablet, Take 81 mg by mouth Daily. LD 10/12 stopped for surgery, Disp: , Rfl:     Social History     Socioeconomic History   • Marital status:      Spouse name: Not on file   • Number of children: Not on file   • Years of education: Not on file   • Highest education level: Not on file   Tobacco Use   • Smoking status: Former Smoker     Quit date:      Years since quittin.8   • Smokeless tobacco: Never Used   Substance and Sexual Activity   • Alcohol use: Never     Frequency: Never     Comment: once/week    • Drug use: Never   • Sexual activity: Defer       History reviewed. No pertinent family history.    The following portions of the patient's history were reviewed and updated as appropriate: allergies, current medications, past family history,  "past medical history, past social history, past surgical history and problem list.    Review of Systems   Constitution: Positive for decreased appetite, malaise/fatigue and weight loss. Negative for diaphoresis.   HENT: Negative for congestion, hearing loss and nosebleeds.    Cardiovascular: Positive for dyspnea on exertion. Negative for chest pain, claudication, irregular heartbeat, leg swelling, near-syncope, orthopnea, palpitations, paroxysmal nocturnal dyspnea and syncope.   Respiratory: Negative for cough, shortness of breath and sleep disturbances due to breathing.    Endocrine: Negative for polyuria.   Hematologic/Lymphatic: Does not bruise/bleed easily.   Skin: Negative for itching and rash.   Musculoskeletal: Negative for back pain, muscle weakness and myalgias.   Gastrointestinal: Positive for abdominal pain, nausea and vomiting. Negative for change in bowel habit.   Genitourinary: Negative for dysuria, flank pain, frequency and hesitancy.   Neurological: Positive for weakness. Negative for dizziness and tremors.   Psychiatric/Behavioral: Negative for altered mental status. The patient does not have insomnia.      /74   Pulse 85   Ht 172.7 cm (67.99\")   Wt 92.1 kg (203 lb)   SpO2 99%   BMI 30.87 kg/m² .  Objective     Constitutional:       General: Not in acute distress.     Appearance: Normal appearance. Well-developed.   Eyes:      Pupils: Pupils are equal, round, and reactive to light.   HENT:      Head: Normocephalic and atraumatic.   Neck:      Musculoskeletal: Normal range of motion and neck supple.      Vascular: No JVD.   Pulmonary:      Effort: Pulmonary effort is normal.      Breath sounds: Normal breath sounds.   Cardiovascular:      Normal rate. Regular rhythm.   Pulses:     Intact distal pulses.   Edema:     Peripheral edema absent.   Abdominal:      General: There is no distension.      Palpations: Abdomen is soft.      Tenderness: There is no abdominal tenderness. "   Musculoskeletal: Normal range of motion.   Skin:     General: Skin is warm and dry.   Neurological:      Mental Status: Alert and oriented to person, place, and time.             ECG 12 Lead    Date/Time: 10/28/2020 2:34 PM  Performed by: Jamila Ramey APRN  Authorized by: Jamila Ramey APRN   Rhythm: sinus rhythm  BPM: 85  Conduction: right bundle branch block  ST Depression: V1-V6  QRS axis: right    Clinical impression: abnormal EKG                     Diagnoses and all orders for this visit:    1. Preop cardiovascular exam (Primary)  Comments:  Preoperative cardiovascular risk assessment requested prior to upcoming left nephrectomy by Dr. Bae  Orders:  -     ECG 12 Lead  -     Adult Transthoracic Echo Complete W/ Cont if Necessary Per Protocol; Future    2. Left renal mass    3. Coronary artery disease involving native coronary artery of native heart without angina pectoris  Comments:  CABG in 95, last cath March 2018 LIMA to LAD patent vein graft to OM patent vein graft to RCA occluded distal RCA fills via collaterals from left.  No chest dg    4. Aortic valve insufficiency, etiology of cardiac valve disease unspecified  Comments:  Moderate aortic insufficiency by last echo in 2017 with preserved LV function  Orders:  -     ECG 12 Lead  -     Adult Transthoracic Echo Complete W/ Cont if Necessary Per Protocol; Future    5. Essential hypertension  Comments:  Stable on current medical therapy          Plan:    Patient presents back for earlier than scheduled follow-up.  He has scheduled for left nephrectomy on Monday due to new left renal mass and renal cell carcinoma.    Since his last visit here he is also battling bladder cancer and had chemotherapy.    He has no acute signs of cardiac decompensation.  He has had no chest pain, he does not appear clinically to be in heart failure at this time.  He does complain of chronic dyspnea with exertion but says has been ongoing for some time.    He is  known to have preserved LV function and moderate AI.  He has multivessel coronary disease with previous bypass surgery.  Cardiac catheterization in 2018 he was adequately revascularized.  Vein graft to the OM and LIMA to the LAD were patent.  Vein graft to the RCA was occluded but filled via collaterals from the left sinus system distally.    We will go ahead and get an echocardiogram to reassess his LV function and valvular status prior to surgery but otherwise he will be cleared to proceed with surgery as planned on Monday.  We have discussed that he is at elevated risk due to his history of coronary artery disease with his wife verbalized understanding.    Dr. Erazo will be available to assist if any cardiac issues arise

## (undated) DEVICE — 9165 UNIVERSAL PATIENT PLATE: Brand: 3M™

## (undated) DEVICE — TBG ART PRESS 24 IN

## (undated) DEVICE — GLV SURG SENSICARE PI MIC PF SZ5.5 LF STRL

## (undated) DEVICE — DRN WND EVAC BULB 100CC

## (undated) DEVICE — GW XCHG AMPLTZ XSTIF PTFE CRV .035 3X260

## (undated) DEVICE — SUT SILK 1 LBYRNTH TIES 30IN A307H

## (undated) DEVICE — SENSR CERBRL O2 PK/2

## (undated) DEVICE — SUT VIC 3/0 SH 27IN J416H

## (undated) DEVICE — DRN WND FLT FUL PERF NO/TROC 7MM

## (undated) DEVICE — SUT SILK 0 CT1 CR8 18IN C021D

## (undated) DEVICE — DRAPE,LAPAROTOMY,PCH,STERILE: Brand: MEDLINE

## (undated) DEVICE — SYRINGE,TOOMEY,IRRIGATION,70CC,STERILE: Brand: MEDLINE

## (undated) DEVICE — TBG PRESS EXCITE INJ HPF1200 CLR 100IN

## (undated) DEVICE — SUT SILK 2/0 30IN A305H

## (undated) DEVICE — CATH OMNI FLUSH 5FR

## (undated) DEVICE — 3M™ BAIR HUGGER® UNDERBODY BLANKET, FULL ACCESS, 10 PER CASE 63500: Brand: BAIR HUGGER™

## (undated) DEVICE — ANGIO-SEAL STS PLUS VASCULAR CLOSURE DEVICE: Brand: ANGIO-SEAL

## (undated) DEVICE — RADIFOCUS OBTURATOR: Brand: RADIFOCUS

## (undated) DEVICE — TOTAL TRAY, 16FR 10ML SIL FOLEY, URN: Brand: MEDLINE

## (undated) DEVICE — TRANSD PRESS STOPCOCK1WAY CABL48IN

## (undated) DEVICE — SUT VIC 1 CTX 36IN J977H

## (undated) DEVICE — NITINOL WIRE WITH HYDROPHILIC TIP: Brand: SENSOR

## (undated) DEVICE — SPK CONTRST MISER

## (undated) DEVICE — GW PTFE EMERALD HEPCOAT FC J TIP STD .035 3MM 150CM

## (undated) DEVICE — PERCLOSE PROGLIDE™ SUTURE-MEDIATED CLOSURE SYSTEM: Brand: PERCLOSE PROGLIDE™

## (undated) DEVICE — CATHETER,FOLEY,100%SILICONE,20FR,10ML,LF: Brand: MEDLINE

## (undated) DEVICE — SOL IRRIG NACL 9PCT 1000ML BTL

## (undated) DEVICE — ST ACC MICROPUNCTURE STFF/CANN PLAT/TP 4F 21G 40CM

## (undated) DEVICE — ANGIO-SEAL VIP VASCULAR CLOSURE DEVICE: Brand: ANGIO-SEAL

## (undated) DEVICE — TRY INTRO PERC 6F

## (undated) DEVICE — Device

## (undated) DEVICE — VESSEL LOOPS,MINI, RED: Brand: DEVON

## (undated) DEVICE — DISPOSABLE BIOPSY FORCEPS: Brand: DISPOSABLE BIOPSY FORCEPS

## (undated) DEVICE — PENCL EVAC ULTRAVAC SMOKE W/BLD

## (undated) DEVICE — 3M™ STERI-STRIP™ REINFORCED ADHESIVE SKIN CLOSURES, R1547, 1/2 IN X 4 IN (12 MM X 100 MM), 6 STRIPS/ENVELOPE: Brand: 3M™ STERI-STRIP™

## (undated) DEVICE — SUTURE REMOVAL TRAY: Brand: MEDLINE INDUSTRIES, INC.

## (undated) DEVICE — GLV SURG SIGNATURE ESSENTIAL PF LTX SZ7.5

## (undated) DEVICE — SPNG LAP PREWSH SFTPK 18X18IN STRL PK/5

## (undated) DEVICE — PK TRY HEART CATH 50

## (undated) DEVICE — FIAPC® PROBE W/ FILTER 1500 A OD 1.5MM/4.5FR; L 1.5M/4.9FT: Brand: ERBE

## (undated) DEVICE — SOL IRRIG H2O 1000ML STRL

## (undated) DEVICE — BAPTIST FLOYD BRONCHOSCOPY: Brand: MEDLINE INDUSTRIES, INC.

## (undated) DEVICE — DEV INFL COMPAK W/ACCESSPLUS IN4530

## (undated) DEVICE — KT SURG TURNOVER 050

## (undated) DEVICE — PINNACLE INTRODUCER SHEATH: Brand: PINNACLE

## (undated) DEVICE — TUBING, SUCTION, 1/4" X 12', STRAIGHT: Brand: MEDLINE

## (undated) DEVICE — GLV SURG BIOGEL LTX PF 8

## (undated) DEVICE — CATH DIAG IMPULSE FR4 5F 100CM

## (undated) DEVICE — TOTAL TRAY, DB, 100% SILI FOLEY, 16FR 10: Brand: MEDLINE

## (undated) DEVICE — HEMOCONCENTRATOR PERFUS LPS06

## (undated) DEVICE — DRAPE SHEET ULTRAGARD: Brand: MEDLINE

## (undated) DEVICE — PK PERFUS CUST W/CARDIOPLEGIA

## (undated) DEVICE — KT MANIFLD CARDIAC

## (undated) DEVICE — TOWEL,OR,DSP,ST,WHITE,DLX,4/PK,20PK/CS: Brand: MEDLINE

## (undated) DEVICE — GW EMR FIX EXCHG J STD .035 3MM 260CM

## (undated) DEVICE — INTRO SHEATH ART/FEM ENGAGE .035 5F12CM

## (undated) DEVICE — FR5 INFINITI MULTIPAC: Brand: INFINITI

## (undated) DEVICE — DECANT BG O JET

## (undated) DEVICE — TOWEL,OR,DSP,ST,BLUE,STD,4/PK,20PK/CS: Brand: MEDLINE

## (undated) DEVICE — TBG IRRI TUR Y/TYP NONVENT 98IN LF

## (undated) DEVICE — ELECTRD NDL EZ CLN MOD 2.75IN

## (undated) DEVICE — SUT SILK 3/0 30IN A304H

## (undated) DEVICE — SOL NACL 0.9PCT 1000ML

## (undated) DEVICE — CVR TABL BACK STND

## (undated) DEVICE — CATH DIAG IMPULSE FR4 6F 100CM

## (undated) DEVICE — 3M™ STERI-DRAPE™ INSTRUMENT POUCH 1018: Brand: STERI-DRAPE™

## (undated) DEVICE — SOL IRR H2O BTL 1000ML STRL

## (undated) DEVICE — DRSNG WND GZ PAD BORDERED 4X8IN STRL

## (undated) DEVICE — SUT VIC 2/0 SH 27IN

## (undated) DEVICE — TOWEL,OR,DSP,ST,WHITE,DLX,XR,4/PK,20PK/C: Brand: MEDLINE

## (undated) DEVICE — ENDOPATH ETS-FLEX45 ARTICULATING ENDOSCOPIC LINEAR CUTTER, NO RELOAD: Brand: ENDOPATH

## (undated) DEVICE — TAVR: Brand: MEDLINE INDUSTRIES, INC.

## (undated) DEVICE — DRSNG WND BORDR/ADHS NONADHR/GZ LF 4X10IN STRL

## (undated) DEVICE — BIOPATCH™ ANTIMICROBIAL DRESSING WITH CHLORHEXIDINE GLUCONATE IS A HYDROPHILLIC POLYURETHANE ABSORPTIVE FOAM WITH CHLORHEXIDINE GLUCONATE (CHG) WHICH INHIBITS BACTERIAL GROWTH UNDER THE DRESSING. THE DRESSING IS INTENDED TO BE USED TO ABSORB EXUDATE, COVER A WOUND CAUSED BY VASCULAR AND NONVASCULAR PERCUTANEOUS MEDICAL DEVICES DURING SURGERY, AS WELL AS REDUCE LOCAL INFECTION AND COLONIZATION OF MICROORGANISMS.: Brand: BIOPATCH

## (undated) DEVICE — PLUG,CATHETER,DRAINAGE PROTECTOR,TUBE: Brand: MEDLINE

## (undated) DEVICE — PK MAJ LAPAROTOMY 50

## (undated) DEVICE — CATH ELECTRD PACE TEMP BI NONHEP 5F110CM

## (undated) DEVICE — GUIDE CATHETER: Brand: MACH1™

## (undated) DEVICE — URETERAL STENT
Type: IMPLANTABLE DEVICE | Site: URETER | Status: NON-FUNCTIONAL
Brand: PERCUFLEX™ PLUS
Removed: 2020-11-02

## (undated) DEVICE — TBG INJ CONTRL PVCCLR RIGD RA 1200PSI 10

## (undated) DEVICE — ADHS LIQ MASTISOL 2/3ML

## (undated) DEVICE — TP UMB COTN 1/8X36 U12T

## (undated) DEVICE — CUFF SCD HEMOFORCE SEQ CALF STD MD

## (undated) DEVICE — CATH DIAG IMPULSE FL4 5F 100CM

## (undated) DEVICE — ZINC CALCIUM ALGINATE DRESSING: Brand: KENDALL

## (undated) DEVICE — INTRO SHEATH ART/FEM ENGAGE .035 8F12CM

## (undated) DEVICE — DRSNG SURESITE WNDW 2.38X2.75

## (undated) DEVICE — CATH DIAG IMPULSE IMT 5F 100CM

## (undated) DEVICE — SUT SILK 2/0 SH 30IN K833H

## (undated) DEVICE — COVER,TABLE,44X90,STERILE: Brand: MEDLINE

## (undated) DEVICE — TP SXN YANKR BULB STRL

## (undated) DEVICE — SUT SILK 2 SUTUPAK TIE 60IN SA8H 2STRAND

## (undated) DEVICE — DECANTER: Brand: UNBRANDED

## (undated) DEVICE — SUT SILK 3/0 SH CR8 18IN C013D

## (undated) DEVICE — INTRO SHEATH ART/FEM ENGAGE .035 6F12CM

## (undated) DEVICE — ELECTRD BLD EZ CLN STD 6.5IN

## (undated) DEVICE — CONTRST ISOVUE300 61PCT 50ML

## (undated) DEVICE — CATH DIAG IMPULSE AL1 6F 100CM

## (undated) DEVICE — UNDYED BRAIDED (POLYGLACTIN 910), SYNTHETIC ABSORBABLE SUTURE: Brand: COATED VICRYL

## (undated) DEVICE — STPCK 1WY STD/PRESS SWIVELNUT

## (undated) DEVICE — GW INQWIRE FC PTFE STR .035IN 150

## (undated) DEVICE — HI-TORQUE SUPRA CORE .035 PERIPHERAL GUIDE WIRE .035 X 190 CM: Brand: HI-TORQUE SUPRA CORE

## (undated) DEVICE — HI-TORQUE SUPRA CORE .035 PERIPHERAL GUIDE WIRE .035 X 300 CM: Brand: HI-TORQUE SUPRA CORE

## (undated) DEVICE — SUT SILK 4/0 TIES 18IN A183H

## (undated) DEVICE — GW DIAG EMERALD HEPCOAT MOVE JTIP STD .035 3MM 150CM

## (undated) DEVICE — DRSNG WND BORDR/ADHS NONADHR/GZ LF 4X4IN STRL

## (undated) DEVICE — NC TREK™ CORONARY DILATATION CATHETER 4.5 MM X 15 MM / RAPID-EXCHANGE: Brand: NC TREK™

## (undated) DEVICE — SUT SILK 0 TIES 30IN A306H

## (undated) DEVICE — SOL ISO/ALC RUB 70PCT 4OZ

## (undated) DEVICE — ERBE NESSY®PLATE 170 SPLIT; 168CM²; CABLE 3M: Brand: ERBE

## (undated) DEVICE — CVR PROB 96IN LF STRL